# Patient Record
Sex: FEMALE | Race: BLACK OR AFRICAN AMERICAN | NOT HISPANIC OR LATINO | Employment: OTHER | URBAN - METROPOLITAN AREA
[De-identification: names, ages, dates, MRNs, and addresses within clinical notes are randomized per-mention and may not be internally consistent; named-entity substitution may affect disease eponyms.]

---

## 2017-01-05 RX ORDER — CLOPIDOGREL BISULFATE 75 MG/1
TABLET ORAL
Qty: 90 TABLET | Refills: 3 | OUTPATIENT
Start: 2017-01-05

## 2017-01-10 ENCOUNTER — APPOINTMENT (OUTPATIENT)
Dept: SLEEP MEDICINE | Facility: HOSPITAL | Age: 72
End: 2017-01-10
Attending: INTERNAL MEDICINE

## 2017-02-09 ENCOUNTER — OFFICE VISIT (OUTPATIENT)
Dept: OBSTETRICS AND GYNECOLOGY | Facility: CLINIC | Age: 72
End: 2017-02-09

## 2017-02-09 VITALS — SYSTOLIC BLOOD PRESSURE: 130 MMHG | DIASTOLIC BLOOD PRESSURE: 70 MMHG

## 2017-02-09 DIAGNOSIS — N89.8 VAGINAL IRRITATION: Primary | ICD-10-CM

## 2017-02-09 PROCEDURE — 99212 OFFICE O/P EST SF 10 MIN: CPT | Performed by: OBSTETRICS & GYNECOLOGY

## 2017-02-09 RX ORDER — VALSARTAN AND HYDROCHLOROTHIAZIDE 80; 12.5 MG/1; MG/1
TABLET, FILM COATED ORAL
COMMUNITY
Start: 2017-01-06 | End: 2017-09-07

## 2017-02-09 RX ORDER — FLUTICASONE PROPIONATE 50 MCG
SPRAY, SUSPENSION (ML) NASAL
COMMUNITY
Start: 2017-01-06 | End: 2017-08-03

## 2017-02-09 NOTE — PROGRESS NOTES
Subjective   Tyrone Rand is a 72 y.o. female is here today as a self referral for vaginal itching and irritation.    History of Present Illness-Here today for increasing difficulty with vaginal irritation.    The following portions of the patient's history were reviewed and updated as appropriate: allergies, current medications, past family history, past medical history, past social history, past surgical history and problem list.    Review of Systems   Constitutional: Negative.    HENT: Negative.    Eyes: Negative.    Respiratory: Negative.    Cardiovascular: Negative.    Gastrointestinal: Negative.    Endocrine: Negative.    Genitourinary: Negative.         Patient has had an annoying symptom for several years that primarily occurs after intercourse.  She will have a raised area developed takes 2 or 3 days to resolve is extremely sensitive to the touch.  She has not tried using a lubricant during intercourse as far.  She is also not used any over-the-counter vaginal moisturizers.   Musculoskeletal: Negative.    Skin: Negative.    Allergic/Immunologic: Negative.    Neurological: Negative.    Hematological: Negative.    Psychiatric/Behavioral: Negative.        Objective   Physical Exam   Constitutional: She is oriented to person, place, and time. She appears well-developed and well-nourished.   HENT:   Head: Normocephalic and atraumatic.   Eyes: Pupils are equal, round, and reactive to light.   Pulmonary/Chest: Effort normal.   Abdominal: Soft. She exhibits no distension. There is no tenderness.   Genitourinary: Vagina normal and uterus normal. No vaginal discharge found.   Neurological: She is alert and oriented to person, place, and time.   Skin: Skin is warm and dry.   Psychiatric: She has a normal mood and affect. Her behavior is normal. Judgment and thought content normal.   Nursing note and vitals reviewed.        Assessment/Plan   Problems Addressed this Visit     None      Visit Diagnoses     Vaginal  irritation    -  Primary        4 over-the-counter vaginal moisturizers were written down.  2 intercourse lubricants were suggested.  I encouraged her to come in if there is a flareup on that very day so I can see it for myself.  I also mentioned the use of topical estrogen cream as a possible treatment.

## 2017-03-14 ENCOUNTER — DOCUMENTATION (OUTPATIENT)
Dept: INTERNAL MEDICINE | Facility: HOSPITAL | Age: 72
End: 2017-03-14

## 2017-03-14 ENCOUNTER — HOSPITAL ENCOUNTER (OUTPATIENT)
Dept: SLEEP MEDICINE | Facility: HOSPITAL | Age: 72
Discharge: HOME | End: 2017-03-14
Attending: INTERNAL MEDICINE | Admitting: INTERNAL MEDICINE

## 2017-03-14 PROCEDURE — G0463 HOSPITAL OUTPT CLINIC VISIT: HCPCS

## 2017-03-14 NOTE — PROGRESS NOTES
PULMONARY SLEEP CONSULT NOTE      PATIENT IDENTIFICATION:  Name: Tyrone Rand  Age: 72 y.o.  Sex: female  :  1945  MRN: [id number]    DATE OF CONSULTATION:  3/14/2017   Referring Physician: No admitting provider for patient encounter.                  CHIEF COMPLAINT: Obstructive Sleep Apnea      History of Present Illness:   Tyrone Rand is a 72 y.o. female Pt on CPAP feeling better more energy especially the night he use it more than 4 hours, no sleepiness no fatigue no tiredness, no mask irritation no dryness, compliance report reviewed with pt AHI< 5 with good usage.   Still having sinus congestion and not using flonase       Review of Systems:   Constitutional: negative   Eyes: negative   ENT/oropharynx: negative   Cardiovascular: negative   Respiratory: negative   Gastrointestinal: negative   Genitourinary: negative   Neurological: negative   Musculoskeletal: negative   Integument/breast: negative   Endocrine: negative   Allergic/Immunologic: negative     Past Medical History:  Past Medical History   Diagnosis Date   • Depression    • Diabetes mellitus    • Hyperlipidemia    • Hypertension    • Panic attack    • Poor sleep pattern        Past Surgical History:  Past Surgical History   Procedure Laterality Date   • Colon surgery     • Kidney surgery     • Mouth surgery       TOOTH EXTRACTION        Family History:  Family History   Problem Relation Age of Onset   • Stroke Mother    • Heart disease Mother         Social History:   Social History   Substance Use Topics   • Smoking status: Former Smoker     Quit date:    • Smokeless tobacco: Not on file   • Alcohol use Yes      Comment: social        Allergies:  Allergies   Allergen Reactions   • Other    • Sulfa Antibiotics        Home Meds:    (Not in a hospital admission)    Objective:    Vitals Ranges:      WEIGHT: 200   pound       HEIGHT: 68  inches     BMI: 32   /80      Exam:    General Appearance:      Head:  Normocephalic,  without obvious abnormality, atraumatic.   Eyes:  Conjunctiva/corneas clear, EOM's intact, both eyes.         Ears:  Normal external ear canals, both ears.    Nose:  Nares normal, no drainage      Throat:  Lips, mucosa, and tongue normal. Mallampati score 3    Neck:  Supple, symmetrical, trachea midline. No JVD.  Lungs:   Bilateral basal rhonchi bilaterally, respirations unlabored symmetrical wall movement.    Chest wall:  No tenderness or deformity.    Heart:  Regular rate and rhythm, S1 and S2 normal.  Abdomen: Soft, non-tender, no masses, no organomegaly.    Extremities: Trace edema no clubbing or Cyanosis        Data Review:  All labs (24hrs): No results found for this or any previous visit (from the past 24 hour(s)).     Imaging:  [unfilled]    ASSESSMENT:  obstructive sleep apnea   .Hypertension essential  Allergic rhinitis   PLAN:  This patient with obstructive sleep apnea, compliance is improved. Encourage to use it more frequent, I re-emphasized on pt the long and short term benefit of treating LAURA.   Continue flonase BID        Susu Uriarte MD. D, ABSM.  3/14/2017  10:48 AM

## 2017-04-11 ENCOUNTER — TRANSCRIBE ORDERS (OUTPATIENT)
Dept: ADMINISTRATIVE | Facility: HOSPITAL | Age: 72
End: 2017-04-11

## 2017-04-11 ENCOUNTER — LAB (OUTPATIENT)
Dept: LAB | Facility: HOSPITAL | Age: 72
End: 2017-04-11
Attending: INTERNAL MEDICINE

## 2017-04-11 DIAGNOSIS — I12.9 HYPERTENSIVE NEPHROPATHY, STAGE 1-4 OR UNSPECIFIED CHRONIC KIDNEY DISEASE: ICD-10-CM

## 2017-04-11 DIAGNOSIS — N18.30 CHRONIC KIDNEY DISEASE, STAGE III (MODERATE) (HCC): ICD-10-CM

## 2017-04-11 DIAGNOSIS — E55.9 UNSPECIFIED VITAMIN D DEFICIENCY: ICD-10-CM

## 2017-04-11 DIAGNOSIS — N18.30 CHRONIC KIDNEY DISEASE, STAGE III (MODERATE) (HCC): Primary | ICD-10-CM

## 2017-04-11 LAB
25(OH)D3 SERPL-MCNC: 49.7 NG/ML
ANION GAP SERPL CALCULATED.3IONS-SCNC: 13.6 MMOL/L
BASOPHILS # BLD AUTO: 0.09 10*3/MM3 (ref 0–0.2)
BASOPHILS NFR BLD AUTO: 1.2 % (ref 0–2)
BILIRUB UR QL STRIP: NEGATIVE
BUN BLD-MCNC: 25 MG/DL (ref 8–23)
BUN/CREAT SERPL: 16.1 (ref 7–25)
CALCIUM SPEC-SCNC: 10.1 MG/DL (ref 8.8–10.5)
CHLORIDE SERPL-SCNC: 91 MMOL/L (ref 98–107)
CLARITY UR: CLEAR
CO2 SERPL-SCNC: 26.4 MMOL/L (ref 22–29)
COLOR UR: YELLOW
CREAT BLD-MCNC: 1.55 MG/DL (ref 0.57–1)
DEPRECATED RDW RBC AUTO: 41.1 FL (ref 37–54)
EOSINOPHIL # BLD AUTO: 0.19 10*3/MM3 (ref 0.1–0.3)
EOSINOPHIL NFR BLD AUTO: 2.5 % (ref 0–4)
ERYTHROCYTE [DISTWIDTH] IN BLOOD BY AUTOMATED COUNT: 13.6 % (ref 11.5–14.5)
GFR SERPL CREATININE-BSD FRML MDRD: 40 ML/MIN/1.73
GLUCOSE BLD-MCNC: 324 MG/DL (ref 65–99)
GLUCOSE UR STRIP-MCNC: ABNORMAL MG/DL
HCT VFR BLD AUTO: 41.9 % (ref 37–47)
HGB BLD-MCNC: 13.6 G/DL (ref 12–16)
HGB UR QL STRIP.AUTO: NEGATIVE
IMM GRANULOCYTES # BLD: 0.02 10*3/MM3 (ref 0–0.03)
IMM GRANULOCYTES NFR BLD: 0.3 % (ref 0–0.5)
KETONES UR QL STRIP: NEGATIVE
LEUKOCYTE ESTERASE UR QL STRIP.AUTO: NEGATIVE
LYMPHOCYTES # BLD AUTO: 1.71 10*3/MM3 (ref 0.6–4.8)
LYMPHOCYTES NFR BLD AUTO: 22.3 % (ref 20–45)
MCH RBC QN AUTO: 26.9 PG (ref 27–31)
MCHC RBC AUTO-ENTMCNC: 32.5 G/DL (ref 31–37)
MCV RBC AUTO: 83 FL (ref 81–99)
MONOCYTES # BLD AUTO: 0.67 10*3/MM3 (ref 0–1)
MONOCYTES NFR BLD AUTO: 8.7 % (ref 3–8)
NEUTROPHILS # BLD AUTO: 5 10*3/MM3 (ref 1.5–8.3)
NEUTROPHILS NFR BLD AUTO: 65 % (ref 45–70)
NITRITE UR QL STRIP: NEGATIVE
NRBC BLD MANUAL-RTO: 0 /100 WBC (ref 0–0)
PH UR STRIP.AUTO: 6 [PH] (ref 4.5–8)
PLATELET # BLD AUTO: 257 10*3/MM3 (ref 140–500)
PMV BLD AUTO: 9.5 FL (ref 7.4–10.4)
POTASSIUM BLD-SCNC: 4.6 MMOL/L (ref 3.5–5.2)
PROT UR QL STRIP: NEGATIVE
RBC # BLD AUTO: 5.05 10*6/MM3 (ref 4.2–5.4)
SODIUM BLD-SCNC: 131 MMOL/L (ref 136–145)
SP GR UR STRIP: 1.01 (ref 1–1.03)
UROBILINOGEN UR QL STRIP: ABNORMAL
WBC NRBC COR # BLD: 7.68 10*3/MM3 (ref 4.8–10.8)

## 2017-04-11 PROCEDURE — 36415 COLL VENOUS BLD VENIPUNCTURE: CPT

## 2017-04-11 PROCEDURE — 82306 VITAMIN D 25 HYDROXY: CPT

## 2017-04-11 PROCEDURE — 85025 COMPLETE CBC W/AUTO DIFF WBC: CPT

## 2017-04-11 PROCEDURE — 80048 BASIC METABOLIC PNL TOTAL CA: CPT

## 2017-04-11 PROCEDURE — 81003 URINALYSIS AUTO W/O SCOPE: CPT

## 2017-06-21 ENCOUNTER — TRANSCRIBE ORDERS (OUTPATIENT)
Dept: ADMINISTRATIVE | Facility: HOSPITAL | Age: 72
End: 2017-06-21

## 2017-06-21 DIAGNOSIS — Z12.31 VISIT FOR SCREENING MAMMOGRAM: Primary | ICD-10-CM

## 2017-06-28 ENCOUNTER — HOSPITAL ENCOUNTER (OUTPATIENT)
Dept: MAMMOGRAPHY | Facility: HOSPITAL | Age: 72
Discharge: HOME | End: 2017-06-28
Admitting: INTERNAL MEDICINE

## 2017-06-28 DIAGNOSIS — Z12.31 VISIT FOR SCREENING MAMMOGRAM: ICD-10-CM

## 2017-06-28 PROCEDURE — 77063 BREAST TOMOSYNTHESIS BI: CPT

## 2017-06-28 PROCEDURE — G0202 SCR MAMMO BI INCL CAD: HCPCS

## 2017-07-05 ENCOUNTER — TRANSCRIBE ORDERS (OUTPATIENT)
Dept: ADMINISTRATIVE | Facility: HOSPITAL | Age: 72
End: 2017-07-05

## 2017-07-05 DIAGNOSIS — R92.8 ABNORMAL MAMMOGRAM: Primary | ICD-10-CM

## 2017-07-11 ENCOUNTER — HOSPITAL ENCOUNTER (OUTPATIENT)
Dept: MAMMOGRAPHY | Facility: HOSPITAL | Age: 72
Discharge: HOME | End: 2017-07-11
Admitting: INTERNAL MEDICINE

## 2017-07-11 ENCOUNTER — HOSPITAL ENCOUNTER (OUTPATIENT)
Dept: ULTRASOUND IMAGING | Facility: HOSPITAL | Age: 72
Discharge: HOME | End: 2017-07-11

## 2017-07-11 DIAGNOSIS — R92.8 ABNORMAL MAMMOGRAM: ICD-10-CM

## 2017-07-11 PROCEDURE — G0206 DX MAMMO INCL CAD UNI: HCPCS

## 2017-07-11 PROCEDURE — G0279 TOMOSYNTHESIS, MAMMO: HCPCS

## 2017-07-11 PROCEDURE — 76642 ULTRASOUND BREAST LIMITED: CPT

## 2017-07-14 ENCOUNTER — TRANSCRIBE ORDERS (OUTPATIENT)
Dept: ADMINISTRATIVE | Facility: HOSPITAL | Age: 72
End: 2017-07-14

## 2017-07-14 DIAGNOSIS — N63.0 BREAST NODULE: Primary | ICD-10-CM

## 2017-07-19 ENCOUNTER — HOSPITAL ENCOUNTER (OUTPATIENT)
Dept: MAMMOGRAPHY | Facility: HOSPITAL | Age: 72
Discharge: HOME | End: 2017-07-19

## 2017-07-19 ENCOUNTER — HOSPITAL ENCOUNTER (OUTPATIENT)
Dept: ULTRASOUND IMAGING | Facility: HOSPITAL | Age: 72
Discharge: HOME | End: 2017-07-19
Admitting: INTERNAL MEDICINE

## 2017-07-19 DIAGNOSIS — N63.0 BREAST NODULE: ICD-10-CM

## 2017-08-02 DIAGNOSIS — R92.8 ABNORMAL MAMMOGRAM: Primary | ICD-10-CM

## 2017-08-03 ENCOUNTER — CONSULT (OUTPATIENT)
Dept: ONCOLOGY | Facility: CLINIC | Age: 72
End: 2017-08-03

## 2017-08-03 ENCOUNTER — LAB (OUTPATIENT)
Dept: OTHER | Facility: HOSPITAL | Age: 72
End: 2017-08-03

## 2017-08-03 VITALS
HEIGHT: 65 IN | TEMPERATURE: 98.2 F | SYSTOLIC BLOOD PRESSURE: 118 MMHG | OXYGEN SATURATION: 100 % | RESPIRATION RATE: 16 BRPM | WEIGHT: 195 LBS | DIASTOLIC BLOOD PRESSURE: 72 MMHG | BODY MASS INDEX: 32.49 KG/M2 | HEART RATE: 95 BPM

## 2017-08-03 DIAGNOSIS — R92.8 ABNORMAL MAMMOGRAM: ICD-10-CM

## 2017-08-03 DIAGNOSIS — C50.812 MALIGNANT NEOPLASM OF OVERLAPPING SITES OF LEFT FEMALE BREAST (HCC): Primary | ICD-10-CM

## 2017-08-03 LAB
BASOPHILS # BLD AUTO: 0.08 10*3/MM3 (ref 0–0.2)
BASOPHILS NFR BLD AUTO: 1 % (ref 0–1.5)
DEPRECATED RDW RBC AUTO: 42.5 FL (ref 37–54)
EOSINOPHIL # BLD AUTO: 0.27 10*3/MM3 (ref 0–0.7)
EOSINOPHIL NFR BLD AUTO: 3.4 % (ref 0.3–6.2)
ERYTHROCYTE [DISTWIDTH] IN BLOOD BY AUTOMATED COUNT: 13.8 % (ref 11.7–13)
HCT VFR BLD AUTO: 42.9 % (ref 35.6–45.5)
HGB BLD-MCNC: 14 G/DL (ref 11.9–15.5)
IMM GRANULOCYTES # BLD: 0.04 10*3/MM3 (ref 0–0.03)
IMM GRANULOCYTES NFR BLD: 0.5 % (ref 0–0.5)
LYMPHOCYTES # BLD AUTO: 1.7 10*3/MM3 (ref 0.9–4.8)
LYMPHOCYTES NFR BLD AUTO: 21.5 % (ref 19.6–45.3)
MCH RBC QN AUTO: 27.3 PG (ref 26.9–32)
MCHC RBC AUTO-ENTMCNC: 32.6 G/DL (ref 32.4–36.3)
MCV RBC AUTO: 83.8 FL (ref 80.5–98.2)
MONOCYTES # BLD AUTO: 0.87 10*3/MM3 (ref 0.2–1.2)
MONOCYTES NFR BLD AUTO: 11 % (ref 5–12)
NEUTROPHILS # BLD AUTO: 4.95 10*3/MM3 (ref 1.9–8.1)
NEUTROPHILS NFR BLD AUTO: 62.6 % (ref 42.7–76)
NRBC BLD MANUAL-RTO: 0 /100 WBC (ref 0–0)
PLATELET # BLD AUTO: 253 10*3/MM3 (ref 140–500)
PMV BLD AUTO: 9.3 FL (ref 6–12)
RBC # BLD AUTO: 5.12 10*6/MM3 (ref 3.9–5.2)
WBC NRBC COR # BLD: 7.91 10*3/MM3 (ref 4.5–10.7)

## 2017-08-03 PROCEDURE — 99205 OFFICE O/P NEW HI 60 MIN: CPT | Performed by: INTERNAL MEDICINE

## 2017-08-03 PROCEDURE — 85025 COMPLETE CBC W/AUTO DIFF WBC: CPT | Performed by: INTERNAL MEDICINE

## 2017-08-03 PROCEDURE — 36415 COLL VENOUS BLD VENIPUNCTURE: CPT

## 2017-08-03 NOTE — PROGRESS NOTES
Subjective     REASON FOR CONSULTATION:  Provide an opinion on any further workup or treatment on:    Left breast cancer                       REQUESTING PHYSICIAN: Cris Mendieta DO      RECORDS OBTAINED: Records of the patients history including those obtained from the referring provider were reviewed and summarized in detail.    HISTORY OF PRESENT ILLNESS:      Tyrone Rand is a 72 y.o. patient who was referred for evaluation of left breast cancer.  The patient had her annual mammogram on 6/28/17 and was found to have an asymmetric density in the left breast.  Additional images were recommended.  She had a diagnostic mammogram on 7/11/17 that confirmed the finding at 9 o'clock position of the left breast 6 cm from the nipple.  It measured 1.5 x 1.3 x 1.2 cmthe.  There was prominent blood flow in the lesion on Doppler.  The patient did not feel any lumps in the area of concern.  She had an ultrasound-guided biopsy on 7/19/17.  Pathology examination revealed invasive ductal carcinoma.  She she was referred to our office and was referred to oncology surgery.  She has an appointment with Dr. Kulkarni.    Patient reports occasional pain in the lower sternal area.  It is not related to activities.  It does not radiate.  She has no shortness of breath.  She does have diagnoses of sleep apnea syndrome but she does not use the CPAP machine regularly.    Patient has diabetes mellitus.  She had the hemoglobin A1c that was up to 13 and improved to 10 after she was recently started on short acting insulin.       Past Medical History:   Diagnosis Date   • Depression    • Diabetes mellitus    • H/O Left breast mass 2017   • Hyperlipidemia    • Hypertension    • Panic attack    • Poor sleep pattern        Past Surgical History:   Procedure Laterality Date   • COLON SURGERY     • KIDNEY SURGERY     • MOUTH SURGERY      TOOTH EXTRACTION         MEDICATIONS:    Current Outpatient Prescriptions:   •  Acetaminophen (TYLENOL  ARTHRITIS PAIN PO), Take  by mouth., Disp: , Rfl:   •  clopidogrel (PLAVIX) 75 MG tablet, Take 75 mg by mouth daily., Disp: , Rfl:   •  ergocalciferol (ERGOCALCIFEROL) 25531 UNITS capsule, Take 1 capsule by mouth once a week., Disp: , Rfl:   •  glucose blood test strip, Test blood sugar twice daily, Disp: , Rfl:   •  Insulin Lispro (HUMALOG KWIKPEN SC), Inject 10 Units under the skin 3 (Three) Times a Day., Disp: , Rfl:   •  Loratadine (CLARITIN PO), Take 10 mg by mouth Daily., Disp: , Rfl:   •  ranitidine (ZANTAC) 150 MG tablet, Take by mouth 2 (two) times a day., Disp: , Rfl:   •  spironolactone (ALDACTONE) 100 MG tablet, Take 1 tablet by mouth daily., Disp: 90 tablet, Rfl: 3  •  TOUJEO SOLOSTAR 300 UNIT/ML solution pen-injector, Inject 25 Units under the skin daily with dinner. INJECT 25 UNITS AT BEDTIME, Disp: 3 pen, Rfl: 5  •  valsartan-hydrochlorothiazide (DIOVAN-HCT) 80-12.5 MG per tablet, , Disp: , Rfl:      ALLERGIES:  Allergies   Allergen Reactions   • Other    • Sulfa Antibiotics         Social History     Social History   • Marital status:      Spouse name: Pascual   • Number of children: 1   • Years of education: N/A     Occupational History   •  Retired     Social History Main Topics   • Smoking status: Former Smoker     Quit date: 2000   • Smokeless tobacco: Not on file      Comment: smoked 25 years, 1 PPD   • Alcohol use Yes      Comment: social   • Drug use: Not on file   • Sexual activity: Not on file     Other Topics Concern   • Not on file     Social History Narrative       Cancer-related family history is not on file.    REVIEW OF SYSTEMS:  GENERAL:  No Fever or chills. No weight change.  No Fatigue.   SKIN:  No skin rashes or lesions.  HEME/LYMPH: No Anemia. No Easy bruisability. No Enlarged lymph nodes.  EYES:  No Vision changes. No Diplopia.  ENT:  No Mouth or gum bleeding. No Epistaxis. No Hoarseness.  RESPIRATORY:  No Cough. No Shortness of breath. No Hemoptysis.   CVS:  No Chest  "pain. No Palpitations. Ankle swelling.  GI:  No Abdominal pain. No Nausea. No Vomiting. No Constipation. No Diarrhea. No Melena. No Hematochezia.  :  No Hematuria. No Dysuria. No Increased frequency.   MUSCULOSKELETAL:  No Bone pain. No Joint pain. No Joint stiffness.  NEUROLOGICAL:  No Headaches. No Dizziness. No Focal weakness. No Global weakness.  No Numbness.  PSYCHIATRIC:  No Anxiety. No Mood changes. No Depression.        Objective   VITAL SIGNS:  Vitals:    08/03/17 1425   BP: 118/72   Pulse: 95   Resp: 16   Temp: 98.2 °F (36.8 °C)   TempSrc: Oral   SpO2: 100%   Weight: 195 lb (88.5 kg)   Height: 65.35\" (166 cm)  Comment: new pt   PainSc: 0-No pain       Wt Readings from Last 3 Encounters:   08/03/17 195 lb (88.5 kg)   01/28/16 182 lb (82.6 kg)   12/09/15 182 lb 0.2 oz (82.6 kg)       PHYSICAL EXAMINATION  GENERAL:  The patient appears in good general condition, not in acute distress.  SKIN:  No skin rashes or lesions. No Ecchymosis or Petechiae.  HEAD:  Normocephalic.  EYES:  No Jaundice. No Pallor.   NECK:  Supple with Good ROM. No Thyromegaly. No Masses.  LYMPHATICS:  No Cervical, supraclavicular or axillary Lymphadenopathy.  CHEST:  Lungs clear to auscultation. No added sounds.  BREASTS:  Right breast with no palpable masses. No abnormal skin changes.  Left breast examination revealed tenderness at 9 o'clock position.  No discrete palpable masses however.  ABDOMEN:  Soft. No tenderness. No Hepatomegaly. No Splenomegaly. No masses.  EXTREMITIES:  No Edema. No Cyanosis. No Calf tenderness.  NEUROLOGICAL:  No Focal neurological deficits.      RESULT REVIEW:     Results from last 7 days  Lab Units 08/03/17  1358   WBC 10*3/mm3 7.91   NEUTROS ABS 10*3/mm3 4.95   HEMOGLOBIN g/dL 14.0   HEMATOCRIT % 42.9   PLATELETS 10*3/mm3 253              07/11/2017:  1. LEFT UNILATERAL DIAGNOSTIC MAMMOGRAM.  2. ULTRASOUND LEFT BREAST.      HISTORY:  72-year-old female with a new irregular lesion in the deep medial " left  breast seen on recent screening mammogram.      TECHNIQUE:  Left unilateral digital diagnostic mammogram images were obtained,  including spot compression in two projections. Digital breast  tomosynthesis images were also reviewed.      MAMMOGRAM FINDINGS:  The images confirm the presence of a small, irregular, suspicious nodule  in the deep medial left breast along the 9:00 axis about 6 cm from the  nipple. Ultrasound examination was subsequent performed.      ULTRASOUND FINDINGS:  Ultrasound images show a suspicious, irregular, cystic nodule along the  9:00 axis measuring 1.5 x 1.3 x 1.2 cm. Prominent blood flow is seen  within the lesion on color Doppler imaging.      SUMMARY:  Mammogram and ultrasound features are highly suspicious for invasive  breast malignancy. Ultrasound-guided core biopsy is recommended for  further evaluation. I have discussed the findings and recommendations  with the patient in the department today. She will hold her daily  Plavix, we will schedule a procedure at her first opportunity.      Efforts are currently underway to contact the ordering physician. Stat  final copy of this report was sent to the ordering physician's office  immediately following this dictation with telephone notification and  documentation.      IMPRESSION:  1. Malignant appearing 1.5 cm lesion in the deep medial left breast.  2. Ultrasound-guided core needle biopsy is recommended for further  evaluation.    6/28/17:  1. Bilateral digital screening mammogram with CAD.  2. Bilateral digital screening breast tomosynthesis.  Breast parenchyma shows scattered fibroglandular densities. No  suspicious microcalcifications in either breast. Partially obscured  nodules in the right breast are benign and stable. In the lower inner  quadrant of the left breast, there is a new 13 mm asymmetric density  seen only on the cc view. Follow-up with an exaggerated medial cc view  and a true lateral view recommended. Ultrasound  may be needed as well.      IMPRESSION:  1. Benign right mammogram.  2. Asymmetric density in the lower inner quadrant of the left breast.  Additional imaging recommended.    I personally reviewed the mammogram with the patient during today's visit.    Assessment/Plan   Newly diagnosed left breast cancer, invasive ductal carcinoma, grade 3, ER negative, SC positive at 20%, HER-2 fredreic negative.  Ki-67 was elevated at 50-55%. It is located at 9 o'clock position of the left breast.  It was diagnosed on annual screening mammogram.  Patient is 72 years old.  She has underlying diabetes that's not well controlled.    I had an extensive discussion with the patient and her .  Discussed the findings on pathology examination.  I explained the features of the breast cancer namely the fact that this weakly hormone positive and the Ki-67 is elevated indicating rapid tumor growth.  It is measuring 1.3-1.5 cm (T1c). No clinical lymph node involvement on exam or imaging studies.     I recommended obtaining a lateral breast MRI examination to evaluate for other possible lesions in the breast.  This would also help in determining if there is lymph node involvement.  The patient has an appointment with surgical oncology on 8/15/17.    Based on the size of the tumor, a lumpectomy would be recommended if the MRI does not show any other suspicious areas in the breast.  We would await the MRI to see if there is chest wall involvement.  If there is no chest wall involvement, we would not recommend neoadjuvant chemotherapy given that this is not a large tumor and that is hormone positive.    We will see the patient in follow-up in early September and we'll review the results of the pathology examination.  The patient would need repeat ER and SC re-testing given that the initial results showed it to be ER negative and SC weakly positive.  I explained to the patient that radiation therapy would be needed if a lumpectomy was done.  She  would need adjuvant hormonal therapy after her surgery.  The decision regarding adjuvant chemotherapy would be made based on the results of the pathology examination of the lumpectomy.  If she has lymph node involvement, we would recommend adjuvant chemotherapy using Adriamycin and Cytoxan versus Taxotere and Cytoxan.  If the lymph nodes are not involved, we would recommend Oncotype DX to determine the expected benefit from adjuvant chemotherapy.    Thank you for the opportunity to participate in the care of this pleasant patient.  We will see her in follow up in 1 month and update you on her status.           Luz Elena Herndon MD  08/03/17

## 2017-08-10 ENCOUNTER — HOSPITAL ENCOUNTER (OUTPATIENT)
Dept: MRI IMAGING | Facility: HOSPITAL | Age: 72
Discharge: HOME | End: 2017-08-10
Attending: INTERNAL MEDICINE | Admitting: INTERNAL MEDICINE

## 2017-08-10 DIAGNOSIS — C50.812 MALIGNANT NEOPLASM OF OVERLAPPING SITES OF LEFT FEMALE BREAST (HCC): ICD-10-CM

## 2017-08-10 LAB — CREAT BLDA-MCNC: 1.5 MG/DL (ref 0.6–1.3)

## 2017-08-10 PROCEDURE — C8908 MRI W/O FOL W/CONT, BREAST,: HCPCS

## 2017-08-10 PROCEDURE — 0159T HC MRI BREAST COMPUTER ANALYSIS: CPT

## 2017-08-10 PROCEDURE — A9577 INJ MULTIHANCE: HCPCS | Performed by: INTERNAL MEDICINE

## 2017-08-10 PROCEDURE — 0 GADOBENATE DIMEGLUMINE 529 MG/ML SOLUTION: Performed by: INTERNAL MEDICINE

## 2017-08-10 PROCEDURE — 82565 ASSAY OF CREATININE: CPT

## 2017-08-10 RX ADMIN — GADOBENATE DIMEGLUMINE 18 ML: 529 INJECTION, SOLUTION INTRAVENOUS at 16:33

## 2017-08-15 ENCOUNTER — TRANSCRIBE ORDERS (OUTPATIENT)
Dept: ADMINISTRATIVE | Facility: HOSPITAL | Age: 72
End: 2017-08-15

## 2017-08-15 ENCOUNTER — LAB (OUTPATIENT)
Dept: LAB | Facility: HOSPITAL | Age: 72
End: 2017-08-15
Attending: INTERNAL MEDICINE

## 2017-08-15 DIAGNOSIS — N18.30 CHRONIC KIDNEY DISEASE, STAGE III (MODERATE) (HCC): Primary | ICD-10-CM

## 2017-08-15 DIAGNOSIS — N18.30 CHRONIC KIDNEY DISEASE, STAGE III (MODERATE) (HCC): ICD-10-CM

## 2017-08-15 DIAGNOSIS — I12.9 HYPERTENSIVE NEPHROPATHY, STAGE 1-4 OR UNSPECIFIED CHRONIC KIDNEY DISEASE: ICD-10-CM

## 2017-08-15 LAB
ANION GAP SERPL CALCULATED.3IONS-SCNC: 14.8 MMOL/L
BASOPHILS # BLD AUTO: 0.08 10*3/MM3 (ref 0–0.2)
BASOPHILS NFR BLD AUTO: 0.9 % (ref 0–2)
BUN BLD-MCNC: 34 MG/DL (ref 8–23)
BUN/CREAT SERPL: 23.1 (ref 7–25)
CALCIUM SPEC-SCNC: 9.6 MG/DL (ref 8.8–10.5)
CHLORIDE SERPL-SCNC: 95 MMOL/L (ref 98–107)
CO2 SERPL-SCNC: 23.2 MMOL/L (ref 22–29)
CREAT BLD-MCNC: 1.47 MG/DL (ref 0.57–1)
DEPRECATED RDW RBC AUTO: 42.6 FL (ref 37–54)
EOSINOPHIL # BLD AUTO: 0.25 10*3/MM3 (ref 0.1–0.3)
EOSINOPHIL NFR BLD AUTO: 2.7 % (ref 0–4)
ERYTHROCYTE [DISTWIDTH] IN BLOOD BY AUTOMATED COUNT: 13.8 % (ref 11.5–14.5)
GFR SERPL CREATININE-BSD FRML MDRD: 42 ML/MIN/1.73
GLUCOSE BLD-MCNC: 151 MG/DL (ref 65–99)
HCT VFR BLD AUTO: 42.2 % (ref 37–47)
HGB BLD-MCNC: 13.5 G/DL (ref 12–16)
IMM GRANULOCYTES # BLD: 0.03 10*3/MM3 (ref 0–0.03)
IMM GRANULOCYTES NFR BLD: 0.3 % (ref 0–0.5)
LYMPHOCYTES # BLD AUTO: 2.01 10*3/MM3 (ref 0.6–4.8)
LYMPHOCYTES NFR BLD AUTO: 21.9 % (ref 20–45)
MCH RBC QN AUTO: 27.1 PG (ref 27–31)
MCHC RBC AUTO-ENTMCNC: 32 G/DL (ref 31–37)
MCV RBC AUTO: 84.7 FL (ref 81–99)
MONOCYTES # BLD AUTO: 0.89 10*3/MM3 (ref 0–1)
MONOCYTES NFR BLD AUTO: 9.7 % (ref 3–8)
NEUTROPHILS # BLD AUTO: 5.91 10*3/MM3 (ref 1.5–8.3)
NEUTROPHILS NFR BLD AUTO: 64.5 % (ref 45–70)
NRBC BLD MANUAL-RTO: 0 /100 WBC (ref 0–0)
PLATELET # BLD AUTO: 271 10*3/MM3 (ref 140–500)
PMV BLD AUTO: 9.4 FL (ref 7.4–10.4)
POTASSIUM BLD-SCNC: 4.3 MMOL/L (ref 3.5–5.2)
RBC # BLD AUTO: 4.98 10*6/MM3 (ref 4.2–5.4)
SODIUM BLD-SCNC: 133 MMOL/L (ref 136–145)
WBC NRBC COR # BLD: 9.17 10*3/MM3 (ref 4.8–10.8)

## 2017-08-15 PROCEDURE — 85025 COMPLETE CBC W/AUTO DIFF WBC: CPT

## 2017-08-15 PROCEDURE — 36415 COLL VENOUS BLD VENIPUNCTURE: CPT

## 2017-08-15 PROCEDURE — 80048 BASIC METABOLIC PNL TOTAL CA: CPT

## 2017-08-16 ENCOUNTER — TRANSCRIBE ORDERS (OUTPATIENT)
Dept: ADMINISTRATIVE | Facility: HOSPITAL | Age: 72
End: 2017-08-16

## 2017-08-16 ENCOUNTER — LAB (OUTPATIENT)
Dept: LAB | Facility: HOSPITAL | Age: 72
End: 2017-08-16
Attending: INTERNAL MEDICINE

## 2017-08-16 DIAGNOSIS — R30.0 DYSURIA: Primary | ICD-10-CM

## 2017-08-16 DIAGNOSIS — R30.0 DYSURIA: ICD-10-CM

## 2017-08-16 LAB
BACTERIA UR QL AUTO: ABNORMAL /HPF
BILIRUB UR QL STRIP: NEGATIVE
CLARITY UR: CLEAR
COLOR UR: YELLOW
GLUCOSE UR STRIP-MCNC: NEGATIVE MG/DL
HGB UR QL STRIP.AUTO: ABNORMAL
HYALINE CASTS UR QL AUTO: ABNORMAL /LPF
KETONES UR QL STRIP: NEGATIVE
LEUKOCYTE ESTERASE UR QL STRIP.AUTO: ABNORMAL
NITRITE UR QL STRIP: POSITIVE
PH UR STRIP.AUTO: 5.5 [PH] (ref 4.5–8)
PROT UR QL STRIP: NEGATIVE
RBC # UR: ABNORMAL /HPF
REF LAB TEST METHOD: ABNORMAL
SP GR UR STRIP: 1.01 (ref 1–1.03)
SQUAMOUS #/AREA URNS HPF: ABNORMAL /HPF
UROBILINOGEN UR QL STRIP: ABNORMAL
WBC UR QL AUTO: ABNORMAL /HPF

## 2017-08-16 PROCEDURE — 81001 URINALYSIS AUTO W/SCOPE: CPT

## 2017-08-16 PROCEDURE — 81003 URINALYSIS AUTO W/O SCOPE: CPT

## 2017-08-16 PROCEDURE — 87186 SC STD MICRODIL/AGAR DIL: CPT

## 2017-08-16 PROCEDURE — 87086 URINE CULTURE/COLONY COUNT: CPT

## 2017-08-18 LAB — BACTERIA SPEC AEROBE CULT: ABNORMAL

## 2017-08-23 ENCOUNTER — TELEPHONE (OUTPATIENT)
Dept: ONCOLOGY | Facility: CLINIC | Age: 72
End: 2017-08-23

## 2017-08-23 NOTE — TELEPHONE ENCOUNTER
The following staff message was sent to Dr. Herndon:    Dr. Herndon, patient calling about her MRI results.  Is confused because she was told by you that she has breast cancer but says the letter she got about the MRI says she does not have breast cancer.  She is hoping to talk with you about when you have time.  If you are able to call her, the number is [phone]

## 2017-08-30 ENCOUNTER — TELEPHONE (OUTPATIENT)
Dept: MAMMOGRAPHY | Facility: HOSPITAL | Age: 72
End: 2017-08-30

## 2017-09-07 ENCOUNTER — OFFICE VISIT (OUTPATIENT)
Dept: ONCOLOGY | Facility: CLINIC | Age: 72
End: 2017-09-07

## 2017-09-07 ENCOUNTER — LAB (OUTPATIENT)
Dept: OTHER | Facility: HOSPITAL | Age: 72
End: 2017-09-07

## 2017-09-07 VITALS
DIASTOLIC BLOOD PRESSURE: 82 MMHG | WEIGHT: 195.8 LBS | HEIGHT: 65 IN | SYSTOLIC BLOOD PRESSURE: 124 MMHG | OXYGEN SATURATION: 95 % | RESPIRATION RATE: 16 BRPM | TEMPERATURE: 97.7 F | HEART RATE: 106 BPM | BODY MASS INDEX: 32.62 KG/M2

## 2017-09-07 DIAGNOSIS — C50.312 MALIGNANT NEOPLASM OF LOWER-INNER QUADRANT OF LEFT FEMALE BREAST (HCC): Primary | ICD-10-CM

## 2017-09-07 DIAGNOSIS — C50.812 MALIGNANT NEOPLASM OF OVERLAPPING SITES OF LEFT FEMALE BREAST (HCC): ICD-10-CM

## 2017-09-07 LAB
ALBUMIN SERPL-MCNC: 4.2 G/DL (ref 3.5–5.2)
ALBUMIN/GLOB SERPL: 1 G/DL
ALP SERPL-CCNC: 80 U/L (ref 39–117)
ALT SERPL W P-5'-P-CCNC: 18 U/L (ref 1–33)
ANION GAP SERPL CALCULATED.3IONS-SCNC: 12.7 MMOL/L
AST SERPL-CCNC: 19 U/L (ref 1–32)
BASOPHILS # BLD AUTO: 0.09 10*3/MM3 (ref 0–0.2)
BASOPHILS NFR BLD AUTO: 1 % (ref 0–1.5)
BILIRUB SERPL-MCNC: 0.2 MG/DL (ref 0.1–1.2)
BUN BLD-MCNC: 26 MG/DL (ref 8–23)
BUN/CREAT SERPL: 17.3 (ref 7–25)
CALCIUM SPEC-SCNC: 10.2 MG/DL (ref 8.6–10.5)
CHLORIDE SERPL-SCNC: 99 MMOL/L (ref 98–107)
CO2 SERPL-SCNC: 26.3 MMOL/L (ref 22–29)
CREAT BLD-MCNC: 1.5 MG/DL (ref 0.57–1)
DEPRECATED RDW RBC AUTO: 42.7 FL (ref 37–54)
EOSINOPHIL # BLD AUTO: 0.34 10*3/MM3 (ref 0–0.7)
EOSINOPHIL NFR BLD AUTO: 3.7 % (ref 0.3–6.2)
ERYTHROCYTE [DISTWIDTH] IN BLOOD BY AUTOMATED COUNT: 13.7 % (ref 11.7–13)
GFR SERPL CREATININE-BSD FRML MDRD: 41 ML/MIN/1.73
GLOBULIN UR ELPH-MCNC: 4.1 GM/DL
GLUCOSE BLD-MCNC: 142 MG/DL (ref 65–99)
HCT VFR BLD AUTO: 41.7 % (ref 35.6–45.5)
HGB BLD-MCNC: 13.4 G/DL (ref 11.9–15.5)
IMM GRANULOCYTES # BLD: 0.04 10*3/MM3 (ref 0–0.03)
IMM GRANULOCYTES NFR BLD: 0.4 % (ref 0–0.5)
LYMPHOCYTES # BLD AUTO: 1.92 10*3/MM3 (ref 0.9–4.8)
LYMPHOCYTES NFR BLD AUTO: 20.6 % (ref 19.6–45.3)
MCH RBC QN AUTO: 27.6 PG (ref 26.9–32)
MCHC RBC AUTO-ENTMCNC: 32.1 G/DL (ref 32.4–36.3)
MCV RBC AUTO: 85.8 FL (ref 80.5–98.2)
MONOCYTES # BLD AUTO: 0.92 10*3/MM3 (ref 0.2–1.2)
MONOCYTES NFR BLD AUTO: 9.9 % (ref 5–12)
NEUTROPHILS # BLD AUTO: 5.99 10*3/MM3 (ref 1.9–8.1)
NEUTROPHILS NFR BLD AUTO: 64.4 % (ref 42.7–76)
NRBC BLD MANUAL-RTO: 0 /100 WBC (ref 0–0)
PLATELET # BLD AUTO: 286 10*3/MM3 (ref 140–500)
PMV BLD AUTO: 9.2 FL (ref 6–12)
POTASSIUM BLD-SCNC: 4.9 MMOL/L (ref 3.5–5.2)
PROT SERPL-MCNC: 8.3 G/DL (ref 6–8.5)
RBC # BLD AUTO: 4.86 10*6/MM3 (ref 3.9–5.2)
SODIUM BLD-SCNC: 138 MMOL/L (ref 136–145)
WBC NRBC COR # BLD: 9.3 10*3/MM3 (ref 4.5–10.7)

## 2017-09-07 PROCEDURE — 99214 OFFICE O/P EST MOD 30 MIN: CPT | Performed by: INTERNAL MEDICINE

## 2017-09-07 PROCEDURE — 85025 COMPLETE CBC W/AUTO DIFF WBC: CPT | Performed by: INTERNAL MEDICINE

## 2017-09-07 PROCEDURE — 80053 COMPREHEN METABOLIC PANEL: CPT | Performed by: INTERNAL MEDICINE

## 2017-09-07 PROCEDURE — 36415 COLL VENOUS BLD VENIPUNCTURE: CPT

## 2017-09-07 RX ORDER — METOPROLOL SUCCINATE 25 MG/1
TABLET, EXTENDED RELEASE ORAL
Refills: 11 | COMMUNITY
Start: 2017-08-04

## 2017-09-07 NOTE — PROGRESS NOTES
Subjective     REASON FOR FOLLOW UP:      Left breast cancer                       HISTORY OF PRESENT ILLNESS:    Tyrone Rand is a 72 y.o. patient who was referred for evaluation of left breast cancer.  After her consultation, she was seen by Dr. Kulkarni.  He noticed for cardiac clearance for the patient and the patient saw her cardiologist, Dr. Saravanan Larios.  She had an echocardiogram done and she was cleared to have the lumpectomy done is scheduled for 9/28/17.  It will be done at [facility].    Patient denies any new symptoms on today's visit.    Past Medical History:   Diagnosis Date   • Depression    • Diabetes mellitus    • H/O Left breast mass 2017   • Hyperlipidemia    • Hypertension    • Panic attack    • Poor sleep pattern        Past Surgical History:   Procedure Laterality Date   • COLON SURGERY     • KIDNEY SURGERY     • MOUTH SURGERY      TOOTH EXTRACTION         MEDICATIONS:    Current Outpatient Prescriptions:   •  Acetaminophen (TYLENOL ARTHRITIS PAIN PO), Take  by mouth., Disp: , Rfl:   •  clopidogrel (PLAVIX) 75 MG tablet, Take 75 mg by mouth daily., Disp: , Rfl:   •  ergocalciferol (ERGOCALCIFEROL) 30588 UNITS capsule, Take 1 capsule by mouth once a week., Disp: , Rfl:   •  glucose blood test strip, Test blood sugar twice daily, Disp: , Rfl:   •  Insulin Lispro (HUMALOG KWIKPEN SC), Inject 10 Units under the skin 3 (Three) Times a Day., Disp: , Rfl:   •  Loratadine (CLARITIN PO), Take 10 mg by mouth Daily., Disp: , Rfl:   •  metoprolol succinate XL (TOPROL-XL) 25 MG 24 hr tablet, TAKE ONE TABLET BY MOUTH IN THE MORNING, Disp: , Rfl: 11  •  ranitidine (ZANTAC) 150 MG tablet, Take by mouth 2 (two) times a day., Disp: , Rfl:   •  spironolactone (ALDACTONE) 100 MG tablet, Take 1 tablet by mouth daily., Disp: 90 tablet, Rfl: 3  •  TOUJEO SOLOSTAR 300 UNIT/ML solution pen-injector, Inject 25 Units under the skin daily with dinner. INJECT 25 UNITS AT BEDTIME, Disp: 3 pen, Rfl: 5  "    ALLERGIES:  Allergies   Allergen Reactions   • Other    • Sulfa Antibiotics         Social History     Social History   • Marital status:      Spouse name: Pascual   • Number of children: 1   • Years of education: GED     Occupational History   •  Retired     Social History Main Topics   • Smoking status: Former Smoker     Packs/day: 0.00     Quit date: 2000   • Smokeless tobacco: Not on file      Comment: smoked 25 years, 1 PPD   • Alcohol use Yes      Comment: social   • Drug use: Not on file   • Sexual activity: Not on file     Other Topics Concern   • Not on file     Social History Narrative       Cancer-related family history is not on file.    REVIEW OF SYSTEMS:  GENERAL:  No Fever or chills. No weight change.  No Fatigue.   SKIN:  No skin rashes or lesions.  HEME/LYMPH: No Anemia. No Easy bruisability. No Enlarged lymph nodes.  EYES:  No Vision changes. No Diplopia.  ENT:  No Mouth or gum bleeding. No Epistaxis. No Hoarseness.  RESPIRATORY:  No Cough. No Shortness of breath. No Hemoptysis.   CVS:  No Chest pain. No Palpitations. Ankle swelling.  GI:  No Abdominal pain. No Nausea. No Vomiting. No Constipation. No Diarrhea. No Melena. No Hematochezia.  :  No Hematuria. No Dysuria. No Increased frequency.   MUSCULOSKELETAL:  No Bone pain. No Joint pain. No Joint stiffness.  NEUROLOGICAL:  No Headaches. No Dizziness. No Focal weakness. No Global weakness.  No Numbness.  PSYCHIATRIC:  No Anxiety. No Mood changes. No Depression.        Objective   VITAL SIGNS:  Vitals:    09/07/17 1354   BP: 124/82   Pulse: 106   Resp: 16   Temp: 97.7 °F (36.5 °C)   TempSrc: Oral   SpO2: 95%   Weight: 195 lb 12.8 oz (88.8 kg)   Height: 65.35\" (166 cm)   PainSc: 0-No pain       Wt Readings from Last 3 Encounters:   09/07/17 195 lb 12.8 oz (88.8 kg)   08/03/17 195 lb (88.5 kg)   08/10/17 195 lb (88.5 kg)       PHYSICAL EXAMINATION  GENERAL:  The patient appears in good general condition, not in acute distress.  SKIN:  " No skin rashes or lesions. No Ecchymosis or Petechiae.  NEUROLOGICAL:  No Focal neurological deficits.      RESULT REVIEW:     Results from last 7 days  Lab Units 09/07/17  1338   WBC 10*3/mm3 9.30   NEUTROS ABS 10*3/mm3 5.99   HEMOGLOBIN g/dL 13.4   HEMATOCRIT % 41.7   PLATELETS 10*3/mm3 286       Results from last 7 days  Lab Units 09/07/17  1338   SODIUM mmol/L 138   POTASSIUM mmol/L 4.9   CHLORIDE mmol/L 99   CO2 mmol/L 26.3   BUN mg/dL 26*   CREATININE mg/dL 1.50*   CALCIUM mg/dL 10.2   ALBUMIN g/dL 4.20   BILIRUBIN mg/dL 0.2   ALK PHOS U/L 80   ALT (SGPT) U/L 18   AST (SGOT) U/L 19          MRI of the breasts on 8/10/17:    BILATERAL BREAST MRI WITHOUT AND WITH CONTRAST      HISTORY: 72-year-old female with history of a malignant neoplasm of the  left breast presenting for further evaluation.      TECHNIQUE: Multiplanar and multisequence images of both breasts were  obtained pre and post intravenous administration of gadolinium. [employer]  was used to produce kinetic information and reformatted images.      COMPARISON: Digital mammography, 07/19/2017 and 07/11/2017.      FINDINGS: Within the posterior one-third of the medial aspect of the  left breast at the 8 o'clock position on the order of 11 cm from the  nipple, there is an irregular enhancing mass that measures 2.1 cm in the  anterior-to-posterior dimension, 1.2 cm in the superior-to-inferior  dimension, and 1.5 cm in the medial-to-lateral dimension. A metallic  clip is located within the mass. This represents the biopsy proven site  of malignancy.      Scattered stippled foci of variable enhancement are seen throughout the  left breast, none of which appear dominant  or clump. I see no evidence  for abnormal left breast skin, nipple or chest wall enhancement and  there is no evidence for left axillary or internal mammary chain  lymphadenopathy.      Scattered stippled foci of variable enhancement are seen throughout the  right breast, none of which appear  dominant or clump. I see no evidence  for abnormal right breast skin, nipple or chest wall enhancement, and  there is no evidence for right axillary adenopathy.      IMPRESSION:  1. Biopsy-proven malignancy in the left breast measuring on the order of  2.1 cm in greatest dimension at the 8 o'clock position. No other  suspicious findings are seen within the left breast and there is no  evidence for left axillary adenopathy.  2. There are no findings suspicious for malignancy in the right breast.  3. Scattered stippled foci throughout both breasts consistent with  bilateral fibrocystic breast changes.    Assessment/Plan   1.  Left breast cancer, invasive ductal carcinoma, grade 3, ER negative, AZ positive at 20%, HER-2 frederic negative.  Ki-67 was elevated at 50-55%. It is located at 9 o'clock position of the left breast.  She had MRI of the breast on 8/10/17 that showed the lesion to measure 2.1 cm located at 8 o'clock position.  No enlarged lymph nodes were identified.  No other lesions were seen.    Patient scheduled for a left breast lumpectomy and sentinel lymph node biopsy on 9/28/17 at [facility].    Explained to the patient that we would need to review the status of the lymph nodes to help with the decision regarding adjuvant chemotherapy treatments.  If there is no lymph node involvement, we would recommend Oncotype DX test to assist with decision making regarding adjuvant chemotherapy treatment.  If there is lymph node involvement, we would recommend adjuvant chemotherapy given that the tumor is grade 3 and that it is ER negative and AZ positive at 20%.    We discussed today that if her surgical margin is clear, adjuvant radiation therapy would not be recommended based on her age.  Adjuvant hormonal therapy would be recommended since the tumor is hormone positive although only at 20% for the AZ.    We will schedule the patient to return in 4 weeks to review the pathology report.        Luz Elena Herndon,  MD  09/07/17

## 2017-10-11 ENCOUNTER — LAB (OUTPATIENT)
Dept: LAB | Facility: HOSPITAL | Age: 72
End: 2017-10-11
Attending: INTERNAL MEDICINE

## 2017-10-11 ENCOUNTER — TRANSCRIBE ORDERS (OUTPATIENT)
Dept: ADMINISTRATIVE | Facility: HOSPITAL | Age: 72
End: 2017-10-11

## 2017-10-11 DIAGNOSIS — N18.30 CKD (CHRONIC KIDNEY DISEASE) STAGE 3, GFR 30-59 ML/MIN (HCC): ICD-10-CM

## 2017-10-11 DIAGNOSIS — N18.30 CKD (CHRONIC KIDNEY DISEASE) STAGE 3, GFR 30-59 ML/MIN (HCC): Primary | ICD-10-CM

## 2017-10-11 DIAGNOSIS — I12.9 HYPERTENSIVE NEPHROPATHY: ICD-10-CM

## 2017-10-11 LAB
ANION GAP SERPL CALCULATED.3IONS-SCNC: 11.1 MMOL/L
BACTERIA UR QL AUTO: ABNORMAL /HPF
BASOPHILS # BLD AUTO: 0.07 10*3/MM3 (ref 0–0.2)
BASOPHILS NFR BLD AUTO: 0.8 % (ref 0–2)
BILIRUB UR QL STRIP: NEGATIVE
BUN BLD-MCNC: 22 MG/DL (ref 8–23)
BUN/CREAT SERPL: 16.4 (ref 7–25)
CALCIUM SPEC-SCNC: 10.2 MG/DL (ref 8.8–10.5)
CHLORIDE SERPL-SCNC: 95 MMOL/L (ref 98–107)
CLARITY UR: ABNORMAL
CO2 SERPL-SCNC: 28.9 MMOL/L (ref 22–29)
COLOR UR: YELLOW
CREAT BLD-MCNC: 1.34 MG/DL (ref 0.57–1)
DEPRECATED RDW RBC AUTO: 42.9 FL (ref 37–54)
EOSINOPHIL # BLD AUTO: 0.1 10*3/MM3 (ref 0.1–0.3)
EOSINOPHIL NFR BLD AUTO: 1.2 % (ref 0–4)
ERYTHROCYTE [DISTWIDTH] IN BLOOD BY AUTOMATED COUNT: 13.9 % (ref 11.5–14.5)
GFR SERPL CREATININE-BSD FRML MDRD: 47 ML/MIN/1.73
GLUCOSE BLD-MCNC: 160 MG/DL (ref 65–99)
GLUCOSE UR STRIP-MCNC: NEGATIVE MG/DL
HCT VFR BLD AUTO: 42.5 % (ref 37–47)
HGB BLD-MCNC: 13.7 G/DL (ref 12–16)
HGB UR QL STRIP.AUTO: NEGATIVE
IMM GRANULOCYTES # BLD: 0.02 10*3/MM3 (ref 0–0.03)
IMM GRANULOCYTES NFR BLD: 0.2 % (ref 0–0.5)
KETONES UR QL STRIP: ABNORMAL
LEUKOCYTE ESTERASE UR QL STRIP.AUTO: ABNORMAL
LYMPHOCYTES # BLD AUTO: 1.36 10*3/MM3 (ref 0.6–4.8)
LYMPHOCYTES NFR BLD AUTO: 16.2 % (ref 20–45)
MCH RBC QN AUTO: 27.7 PG (ref 27–31)
MCHC RBC AUTO-ENTMCNC: 32.2 G/DL (ref 31–37)
MCV RBC AUTO: 85.9 FL (ref 81–99)
MONOCYTES # BLD AUTO: 0.74 10*3/MM3 (ref 0–1)
MONOCYTES NFR BLD AUTO: 8.8 % (ref 3–8)
NEUTROPHILS # BLD AUTO: 6.11 10*3/MM3 (ref 1.5–8.3)
NEUTROPHILS NFR BLD AUTO: 72.8 % (ref 45–70)
NITRITE UR QL STRIP: POSITIVE
NRBC BLD MANUAL-RTO: 0 /100 WBC (ref 0–0)
PH UR STRIP.AUTO: 6 [PH] (ref 4.5–8)
PLATELET # BLD AUTO: 284 10*3/MM3 (ref 140–500)
PMV BLD AUTO: 9.1 FL (ref 7.4–10.4)
POTASSIUM BLD-SCNC: 4.5 MMOL/L (ref 3.5–5.2)
PROT UR QL STRIP: NEGATIVE
RBC # BLD AUTO: 4.95 10*6/MM3 (ref 4.2–5.4)
RBC # UR: ABNORMAL /HPF
REF LAB TEST METHOD: ABNORMAL
SODIUM BLD-SCNC: 135 MMOL/L (ref 136–145)
SP GR UR STRIP: 1.02 (ref 1–1.03)
SQUAMOUS #/AREA URNS HPF: ABNORMAL /HPF
UROBILINOGEN UR QL STRIP: ABNORMAL
WBC NRBC COR # BLD: 8.4 10*3/MM3 (ref 4.8–10.8)
WBC UR QL AUTO: ABNORMAL /HPF

## 2017-10-11 PROCEDURE — 87186 SC STD MICRODIL/AGAR DIL: CPT

## 2017-10-11 PROCEDURE — 85025 COMPLETE CBC W/AUTO DIFF WBC: CPT

## 2017-10-11 PROCEDURE — 81001 URINALYSIS AUTO W/SCOPE: CPT

## 2017-10-11 PROCEDURE — 36415 COLL VENOUS BLD VENIPUNCTURE: CPT

## 2017-10-11 PROCEDURE — 80048 BASIC METABOLIC PNL TOTAL CA: CPT

## 2017-10-11 PROCEDURE — 87086 URINE CULTURE/COLONY COUNT: CPT

## 2017-10-12 ENCOUNTER — OFFICE VISIT (OUTPATIENT)
Dept: ONCOLOGY | Facility: CLINIC | Age: 72
End: 2017-10-12

## 2017-10-12 ENCOUNTER — LAB (OUTPATIENT)
Dept: OTHER | Facility: HOSPITAL | Age: 72
End: 2017-10-12

## 2017-10-12 VITALS
DIASTOLIC BLOOD PRESSURE: 70 MMHG | RESPIRATION RATE: 16 BRPM | TEMPERATURE: 97.6 F | OXYGEN SATURATION: 100 % | WEIGHT: 193.1 LBS | HEIGHT: 65 IN | SYSTOLIC BLOOD PRESSURE: 128 MMHG | HEART RATE: 77 BPM | BODY MASS INDEX: 32.17 KG/M2

## 2017-10-12 DIAGNOSIS — C50.312 MALIGNANT NEOPLASM OF LOWER-INNER QUADRANT OF LEFT FEMALE BREAST (HCC): ICD-10-CM

## 2017-10-12 DIAGNOSIS — Z17.1 MALIGNANT NEOPLASM OF LOWER-INNER QUADRANT OF LEFT BREAST IN FEMALE, ESTROGEN RECEPTOR NEGATIVE (HCC): Primary | ICD-10-CM

## 2017-10-12 DIAGNOSIS — C50.312 MALIGNANT NEOPLASM OF LOWER-INNER QUADRANT OF LEFT BREAST IN FEMALE, ESTROGEN RECEPTOR NEGATIVE (HCC): Primary | ICD-10-CM

## 2017-10-12 DIAGNOSIS — F41.9 ANXIETY: ICD-10-CM

## 2017-10-12 LAB
BASOPHILS # BLD AUTO: 0.11 10*3/MM3 (ref 0–0.2)
BASOPHILS NFR BLD AUTO: 1.1 % (ref 0–1.5)
DEPRECATED RDW RBC AUTO: 42.4 FL (ref 37–54)
EOSINOPHIL # BLD AUTO: 0.16 10*3/MM3 (ref 0–0.7)
EOSINOPHIL NFR BLD AUTO: 1.6 % (ref 0.3–6.2)
ERYTHROCYTE [DISTWIDTH] IN BLOOD BY AUTOMATED COUNT: 13.8 % (ref 11.7–13)
HCT VFR BLD AUTO: 43.9 % (ref 35.6–45.5)
HGB BLD-MCNC: 14 G/DL (ref 11.9–15.5)
IMM GRANULOCYTES # BLD: 0.07 10*3/MM3 (ref 0–0.03)
IMM GRANULOCYTES NFR BLD: 0.7 % (ref 0–0.5)
LYMPHOCYTES # BLD AUTO: 1.37 10*3/MM3 (ref 0.9–4.8)
LYMPHOCYTES NFR BLD AUTO: 14.1 % (ref 19.6–45.3)
MCH RBC QN AUTO: 26.9 PG (ref 26.9–32)
MCHC RBC AUTO-ENTMCNC: 31.9 G/DL (ref 32.4–36.3)
MCV RBC AUTO: 84.3 FL (ref 80.5–98.2)
MONOCYTES # BLD AUTO: 0.91 10*3/MM3 (ref 0.2–1.2)
MONOCYTES NFR BLD AUTO: 9.4 % (ref 5–12)
NEUTROPHILS # BLD AUTO: 7.08 10*3/MM3 (ref 1.9–8.1)
NEUTROPHILS NFR BLD AUTO: 73.1 % (ref 42.7–76)
NRBC BLD MANUAL-RTO: 0 /100 WBC (ref 0–0)
PLATELET # BLD AUTO: 253 10*3/MM3 (ref 140–500)
PMV BLD AUTO: 10.2 FL (ref 6–12)
RBC # BLD AUTO: 5.21 10*6/MM3 (ref 3.9–5.2)
WBC NRBC COR # BLD: 9.7 10*3/MM3 (ref 4.5–10.7)

## 2017-10-12 PROCEDURE — 85025 COMPLETE CBC W/AUTO DIFF WBC: CPT | Performed by: INTERNAL MEDICINE

## 2017-10-12 PROCEDURE — 99215 OFFICE O/P EST HI 40 MIN: CPT | Performed by: INTERNAL MEDICINE

## 2017-10-12 RX ORDER — ALPRAZOLAM 0.25 MG/1
0.25 TABLET ORAL EVERY 8 HOURS PRN
Qty: 20 TABLET | Refills: 0 | Status: SHIPPED | OUTPATIENT
Start: 2017-10-12 | End: 2018-03-21

## 2017-10-12 NOTE — PROGRESS NOTES
Subjective     REASON FOR FOLLOW UP:      Left breast cancer                       HISTORY OF PRESENT ILLNESS:    Tyrone Rand is a 72 y.o. patient with left breast cancer. She had left lumpectomy and sentinel lymph node biopsy on 9/26/17. She tolerated the procedure well.  She reports some discomfort in the left breast at the incision. No left arm pain or swelling.    The patient is accompanied by her  and daughter. The daughter reports that the patient has been under stress related to the diagnosis of the breast cancer and the need for chemotherapy. This anxiety has resulted in the patient having difficulty focusing.     Past Medical History:   Diagnosis Date   • Depression    • Diabetes mellitus    • H/O Left breast mass 2017   • Hyperlipidemia    • Hypertension    • Panic attack    • Poor sleep pattern        Past Surgical History:   Procedure Laterality Date   • COLON SURGERY     • KIDNEY SURGERY     • MOUTH SURGERY      TOOTH EXTRACTION         MEDICATIONS:    Current Outpatient Prescriptions:   •  Acetaminophen (TYLENOL ARTHRITIS PAIN PO), Take  by mouth., Disp: , Rfl:   •  clopidogrel (PLAVIX) 75 MG tablet, Take 75 mg by mouth daily., Disp: , Rfl:   •  ergocalciferol (ERGOCALCIFEROL) 74256 UNITS capsule, Take 1 capsule by mouth once a week., Disp: , Rfl:   •  glucose blood test strip, Test blood sugar twice daily, Disp: , Rfl:   •  Insulin Lispro (HUMALOG KWIKPEN SC), Inject 10 Units under the skin 3 (Three) Times a Day., Disp: , Rfl:   •  Loratadine (CLARITIN PO), Take 10 mg by mouth Daily., Disp: , Rfl:   •  metoprolol succinate XL (TOPROL-XL) 25 MG 24 hr tablet, TAKE ONE TABLET BY MOUTH IN THE MORNING, Disp: , Rfl: 11  •  ranitidine (ZANTAC) 150 MG tablet, Take by mouth 2 (two) times a day., Disp: , Rfl:   •  spironolactone (ALDACTONE) 100 MG tablet, Take 1 tablet by mouth daily., Disp: 90 tablet, Rfl: 3  •  TOUJEO SOLOSTAR 300 UNIT/ML solution pen-injector, Inject 25 Units under the skin  "daily with dinner. INJECT 25 UNITS AT BEDTIME, Disp: 3 pen, Rfl: 5     ALLERGIES:  Allergies   Allergen Reactions   • Other    • Sulfa Antibiotics         Social History     Social History   • Marital status:      Spouse name: Pascual   • Number of children: 1   • Years of education: GED     Occupational History   •  Retired     Social History Main Topics   • Smoking status: Former Smoker     Packs/day: 0.00     Quit date: 2000   • Smokeless tobacco: Not on file      Comment: smoked 25 years, 1 PPD   • Alcohol use Yes      Comment: social   • Drug use: Not on file   • Sexual activity: Not on file     Other Topics Concern   • Not on file     Social History Narrative       Cancer-related family history is not on file.    REVIEW OF SYSTEMS:  GENERAL:  No Fever or chills. No weight change.  No Fatigue.   SKIN:  No skin rashes or lesions.  HEME/LYMPH: No Anemia. No Easy bruisability. No Enlarged lymph nodes.  RESPIRATORY:  No Cough. No Shortness of breath. No Hemoptysis.   CVS:  No Chest pain. No Palpitations. Ankle swelling.  GI:  No Abdominal pain. No Nausea. No Vomiting. No Constipation. No Diarrhea. No Melena. No Hematochezia.  :  No Hematuria. No Dysuria. No Increased frequency.   MUSCULOSKELETAL:  No Bone pain. No Joint pain. No Joint stiffness.  NEUROLOGICAL:  No Headaches. No Dizziness. No Focal weakness. No Global weakness.  No Numbness.  PSYCHIATRIC:  Anxiety. No Mood changes. No Depression.      Objective   VITAL SIGNS:  Vitals:    10/12/17 1242   BP: 128/70   Pulse: 77   Resp: 16   Temp: 97.6 °F (36.4 °C)   TempSrc: Oral   SpO2: 100%   Weight: 193 lb 1.6 oz (87.6 kg)   Height: 65.35\" (166 cm)   PainSc: 0-No pain       Wt Readings from Last 3 Encounters:   10/12/17 193 lb 1.6 oz (87.6 kg)   09/07/17 195 lb 12.8 oz (88.8 kg)   08/03/17 195 lb (88.5 kg)       PHYSICAL EXAMINATION  GENERAL:  The patient appears in good general condition, not in acute distress.  SKIN:  No skin rashes or lesions.   EXT: " No left arm lymphedema.   NEUROLOGICAL:  No Focal neurological deficits.      RESULT REVIEW:     Results from last 7 days  Lab Units 10/11/17  1325   WBC 10*3/mm3 8.40   NEUTROS ABS 10*3/mm3 6.11   HEMOGLOBIN g/dL 13.7   HEMATOCRIT % 42.5   PLATELETS 10*3/mm3 284       Results from last 7 days  Lab Units 10/11/17  1325   SODIUM mmol/L 135*   POTASSIUM mmol/L 4.5   CHLORIDE mmol/L 95*   CO2 mmol/L 28.9   BUN mg/dL 22   CREATININE mg/dL 1.34*   CALCIUM mg/dL 10.2       Assessment/Plan   1.  Left breast cancer, invasive ductal carcinoma, grade 3, ER negative, FL positive at 20%, HER-2 frederic negative.  Ki-67 was elevated at 50-55% on the biopsy from 7/19/17. She underwent a lumpectomy and sentinel lymph node biopsy on 9/26/17 at [facility]. Pathology examination revealed the tumor to measure 1.6 cm. It is poorly differentiated with a Ki-67 50-60%.  ER is negative. FL is weakly positive at <10%. Her-2 is negative. This is considered to behave like triple negative breast cancer.     I recommended adjuvant chemotherapy. The rational behind the use of chemotherapy was explained. I recommended 4 cycles of Cytoxan and Taxotere with Neulasta support.     I explained the side effects to the patient and her family including fatigue, nausea, vomiting, infusion reaction, hair loss, neuropathy and myelosuppression. The effect on the blood glucose was also explained.  The patient and her family expressed their understanding and decided to proceed.      She has trip planned for Thanksgiving. I recommended that we start the chemotherapy on 11/6/17. She is going to have chemotherapy education with the NP next week.  She will have a port placed next week.      After completion of chemotherapy, we will refer her to Radiation Oncology for port lumpectomy radiation.  She will be placed on hormonal therapy after the radiation therapy.      2.  Anxiety due to the diagnosis of breat cancer and the need for chemotherapy. After a lengthy  discussion, she agreed to take Xanax 0.25 mg every 8 hours as needed.        Luz Elena Herndon MD  10/12/17

## 2017-10-13 LAB
BACTERIA SPEC AEROBE CULT: ABNORMAL
BACTERIA SPEC AEROBE CULT: ABNORMAL

## 2017-10-19 ENCOUNTER — OFFICE VISIT (OUTPATIENT)
Dept: ONCOLOGY | Facility: CLINIC | Age: 72
End: 2017-10-19

## 2017-10-19 ENCOUNTER — APPOINTMENT (OUTPATIENT)
Dept: OTHER | Facility: HOSPITAL | Age: 72
End: 2017-10-19

## 2017-10-19 VITALS — WEIGHT: 195 LBS | BODY MASS INDEX: 32.49 KG/M2 | HEIGHT: 65 IN

## 2017-10-19 DIAGNOSIS — C50.312 MALIGNANT NEOPLASM OF LOWER-INNER QUADRANT OF LEFT BREAST IN FEMALE, ESTROGEN RECEPTOR NEGATIVE (HCC): Primary | ICD-10-CM

## 2017-10-19 DIAGNOSIS — Z17.1 MALIGNANT NEOPLASM OF LOWER-INNER QUADRANT OF LEFT BREAST IN FEMALE, ESTROGEN RECEPTOR NEGATIVE (HCC): Primary | ICD-10-CM

## 2017-10-19 PROCEDURE — 99215 OFFICE O/P EST HI 40 MIN: CPT | Performed by: NURSE PRACTITIONER

## 2017-10-19 PROCEDURE — G0463 HOSPITAL OUTPT CLINIC VISIT: HCPCS | Performed by: NURSE PRACTITIONER

## 2017-10-19 RX ORDER — DEXAMETHASONE 4 MG/1
TABLET ORAL
Qty: 16 TABLET | Refills: 0 | Status: SHIPPED | OUTPATIENT
Start: 2017-10-19 | End: 2017-12-11 | Stop reason: SDUPTHER

## 2017-10-19 RX ORDER — ONDANSETRON 4 MG/1
4 TABLET, FILM COATED ORAL EVERY 8 HOURS PRN
Qty: 30 TABLET | Refills: 2 | Status: SHIPPED | OUTPATIENT
Start: 2017-10-19 | End: 2018-03-21

## 2017-10-19 NOTE — PROGRESS NOTES
Subjective     PATIENT NAME:  Tyrone Rand  YOB: 1945  PATIENTS AGE:  72 y.o.  PATIENTS SEX:  female  DATE OF SERVICE:  10/19/2017  PROVIDER:  BOB Moody      ____________________PATIENT EDUCATION____________________    PATIENT EDUCATION:  Today I met with the patient, her , and daughter to discuss the chemotherapy regimen recommended for treatment of her breast cancer.  The patient was given explanation of treatment premed side effects including office policy that prohibits patients to drive if sedating medications are administered, MD explanation given regarding benefits, side effects, toxicities and goals of treatment.  The patient received a Chemotherapy/Biotherapy Plan Summary including diagnosis and specific treatment plan.    SIDE EFFECTS:  Common side effects were discussed with the patient and/or significant other.  Discussion included hair loss/discoloration, anemia/fatigue, infection/chills/fever, appetite, bleeding risk/precautions, constipation, diarrhea, mouth sores, taste alteration, loss of appetite,nausea/vomiting, peripheral neuropathy, skin/nail changes, rash, muscle aches/weakness, photosensitivity, weight gain/loss, hearing loss, dizziness, high blood pressure, heart damage, liver damage, lung damage, kidney damage, DVT/PE risk, fluid retention, pleural/pericardial effusion, somnolence, electrolyte/LFT imbalance, vein exercises and/or the possible need for vascular access/port placement.  The patient was advice that although uncommon, leakage of an infused medication from the vein or venous access device (port) may lead to skin breakdown and/or other tissue damage.  The patient was advised that he/she may have pain, bleeding, and/or bruising from the insertion of a needle in their vein or venous access device (port).  The patient was further advised that, in spite of proper technique, infection with redness and irritation may rarely occur at the site where  the needle was inserted.  The patient was advised that if complications occur, additional medical treatment is available.    Discussion also included side effects specific to drugs in the treatment plan, specifically Taxotere, Cytoxan, and Neulasta.     Reproductive risks were discussed, including appropriate use of birth control and protection during sexual relations.    A total of 40 minutes were spent with the patient, with 100% of time spent in education and counseling.

## 2017-11-01 DIAGNOSIS — C50.312 MALIGNANT NEOPLASM OF LOWER-INNER QUADRANT OF LEFT BREAST IN FEMALE, ESTROGEN RECEPTOR NEGATIVE (HCC): Primary | ICD-10-CM

## 2017-11-01 DIAGNOSIS — Z17.1 MALIGNANT NEOPLASM OF LOWER-INNER QUADRANT OF LEFT BREAST IN FEMALE, ESTROGEN RECEPTOR NEGATIVE (HCC): Primary | ICD-10-CM

## 2017-11-01 PROBLEM — Z45.2 FITTING AND ADJUSTMENT OF VASCULAR CATHETER: Status: ACTIVE | Noted: 2017-11-01

## 2017-11-01 LAB
LAB AP CASE REPORT: NORMAL
LAB AP CLINICAL INFORMATION: NORMAL
Lab: NORMAL
PATH REPORT.FINAL DX SPEC: NORMAL

## 2017-11-01 RX ORDER — SODIUM CHLORIDE 0.9 % (FLUSH) 0.9 %
10 SYRINGE (ML) INJECTION AS NEEDED
Status: CANCELLED | OUTPATIENT
Start: 2017-11-06

## 2017-11-06 ENCOUNTER — OFFICE VISIT (OUTPATIENT)
Dept: ONCOLOGY | Facility: CLINIC | Age: 72
End: 2017-11-06

## 2017-11-06 ENCOUNTER — INFUSION (OUTPATIENT)
Dept: ONCOLOGY | Facility: HOSPITAL | Age: 72
End: 2017-11-06

## 2017-11-06 VITALS — DIASTOLIC BLOOD PRESSURE: 80 MMHG | SYSTOLIC BLOOD PRESSURE: 122 MMHG | HEART RATE: 66 BPM

## 2017-11-06 VITALS
HEART RATE: 73 BPM | WEIGHT: 193.4 LBS | OXYGEN SATURATION: 98 % | BODY MASS INDEX: 32.22 KG/M2 | HEIGHT: 65 IN | TEMPERATURE: 97.5 F | SYSTOLIC BLOOD PRESSURE: 132 MMHG | RESPIRATION RATE: 12 BRPM | DIASTOLIC BLOOD PRESSURE: 82 MMHG

## 2017-11-06 DIAGNOSIS — C50.312 MALIGNANT NEOPLASM OF LOWER-INNER QUADRANT OF LEFT BREAST IN FEMALE, ESTROGEN RECEPTOR NEGATIVE (HCC): Primary | ICD-10-CM

## 2017-11-06 DIAGNOSIS — Z17.1 MALIGNANT NEOPLASM OF LOWER-INNER QUADRANT OF LEFT BREAST IN FEMALE, ESTROGEN RECEPTOR NEGATIVE (HCC): ICD-10-CM

## 2017-11-06 DIAGNOSIS — Z17.1 MALIGNANT NEOPLASM OF LOWER-INNER QUADRANT OF LEFT BREAST IN FEMALE, ESTROGEN RECEPTOR NEGATIVE (HCC): Primary | ICD-10-CM

## 2017-11-06 DIAGNOSIS — C50.312 MALIGNANT NEOPLASM OF LOWER-INNER QUADRANT OF LEFT BREAST IN FEMALE, ESTROGEN RECEPTOR NEGATIVE (HCC): ICD-10-CM

## 2017-11-06 PROBLEM — D70.1 CHEMOTHERAPY INDUCED NEUTROPENIA (HCC): Status: ACTIVE | Noted: 2017-11-06

## 2017-11-06 PROBLEM — T45.1X5A CHEMOTHERAPY INDUCED NEUTROPENIA (HCC): Status: ACTIVE | Noted: 2017-11-06

## 2017-11-06 LAB
ALBUMIN SERPL-MCNC: 3.9 G/DL (ref 3.5–5.2)
ALBUMIN/GLOB SERPL: 1.1 G/DL (ref 1.1–2.4)
ALP SERPL-CCNC: 84 U/L (ref 38–116)
ALT SERPL W P-5'-P-CCNC: 16 U/L (ref 0–33)
ANION GAP SERPL CALCULATED.3IONS-SCNC: 13 MMOL/L
AST SERPL-CCNC: 14 U/L (ref 0–32)
BASOPHILS # BLD AUTO: 0.02 10*3/MM3 (ref 0–0.1)
BASOPHILS NFR BLD AUTO: 0.1 % (ref 0–1.1)
BILIRUB SERPL-MCNC: 0.2 MG/DL (ref 0.1–1.2)
BUN BLD-MCNC: 23 MG/DL (ref 6–20)
BUN/CREAT SERPL: 21.5 (ref 7.3–30)
CALCIUM SPEC-SCNC: 9.8 MG/DL (ref 8.5–10.2)
CHLORIDE SERPL-SCNC: 98 MMOL/L (ref 98–107)
CO2 SERPL-SCNC: 24 MMOL/L (ref 22–29)
CREAT BLD-MCNC: 1.07 MG/DL (ref 0.6–1.1)
DEPRECATED RDW RBC AUTO: 42.5 FL (ref 37–49)
EOSINOPHIL # BLD AUTO: 0 10*3/MM3 (ref 0–0.36)
EOSINOPHIL NFR BLD AUTO: 0 % (ref 1–5)
ERYTHROCYTE [DISTWIDTH] IN BLOOD BY AUTOMATED COUNT: 13.5 % (ref 11.7–14.5)
GFR SERPL CREATININE-BSD FRML MDRD: 61 ML/MIN/1.73
GLOBULIN UR ELPH-MCNC: 3.7 GM/DL (ref 1.8–3.5)
GLUCOSE BLD-MCNC: 228 MG/DL (ref 74–124)
HCT VFR BLD AUTO: 42.3 % (ref 34–45)
HGB BLD-MCNC: 13.5 G/DL (ref 11.5–14.9)
IMM GRANULOCYTES # BLD: 0.09 10*3/MM3 (ref 0–0.03)
IMM GRANULOCYTES NFR BLD: 0.6 % (ref 0–0.5)
LYMPHOCYTES # BLD AUTO: 1.01 10*3/MM3 (ref 1–3.5)
LYMPHOCYTES NFR BLD AUTO: 6.3 % (ref 20–49)
MCH RBC QN AUTO: 27.7 PG (ref 27–33)
MCHC RBC AUTO-ENTMCNC: 31.9 G/DL (ref 32–35)
MCV RBC AUTO: 86.7 FL (ref 83–97)
MONOCYTES # BLD AUTO: 0.86 10*3/MM3 (ref 0.25–0.8)
MONOCYTES NFR BLD AUTO: 5.4 % (ref 4–12)
NEUTROPHILS # BLD AUTO: 13.98 10*3/MM3 (ref 1.5–7)
NEUTROPHILS NFR BLD AUTO: 87.6 % (ref 39–75)
NRBC BLD MANUAL-RTO: 0 /100 WBC (ref 0–0)
PLATELET # BLD AUTO: 299 10*3/MM3 (ref 150–375)
PMV BLD AUTO: 9.3 FL (ref 8.9–12.1)
POTASSIUM BLD-SCNC: 4.2 MMOL/L (ref 3.5–4.7)
PROT SERPL-MCNC: 7.6 G/DL (ref 6.3–8)
RBC # BLD AUTO: 4.88 10*6/MM3 (ref 3.9–5)
SODIUM BLD-SCNC: 135 MMOL/L (ref 134–145)
WBC NRBC COR # BLD: 15.96 10*3/MM3 (ref 4–10)

## 2017-11-06 PROCEDURE — 25010000002 DEXAMETHASONE SODIUM PHOSPHATE 10 MG/ML SOLUTION 1 ML VIAL: Performed by: INTERNAL MEDICINE

## 2017-11-06 PROCEDURE — 96417 CHEMO IV INFUS EACH ADDL SEQ: CPT | Performed by: INTERNAL MEDICINE

## 2017-11-06 PROCEDURE — 25010000002 DOCETAXEL 20 MG/ML CONCENTRATION 8 ML VIAL: Performed by: INTERNAL MEDICINE

## 2017-11-06 PROCEDURE — 96375 TX/PRO/DX INJ NEW DRUG ADDON: CPT | Performed by: INTERNAL MEDICINE

## 2017-11-06 PROCEDURE — 25010000002 DIPHENHYDRAMINE PER 50 MG: Performed by: INTERNAL MEDICINE

## 2017-11-06 PROCEDURE — 80053 COMPREHEN METABOLIC PANEL: CPT

## 2017-11-06 PROCEDURE — 96415 CHEMO IV INFUSION ADDL HR: CPT | Performed by: INTERNAL MEDICINE

## 2017-11-06 PROCEDURE — 25010000002 PEGFILGRASTIM 6 MG/0.6ML PREFILLED SYRINGE KIT: Performed by: INTERNAL MEDICINE

## 2017-11-06 PROCEDURE — 96413 CHEMO IV INFUSION 1 HR: CPT | Performed by: INTERNAL MEDICINE

## 2017-11-06 PROCEDURE — 25010000002 CYCLOPHOSPHAMIDE PER 100 MG: Performed by: INTERNAL MEDICINE

## 2017-11-06 PROCEDURE — 25010000002 PALONOSETRON PER 25 MCG: Performed by: INTERNAL MEDICINE

## 2017-11-06 PROCEDURE — 85025 COMPLETE CBC W/AUTO DIFF WBC: CPT

## 2017-11-06 PROCEDURE — 99214 OFFICE O/P EST MOD 30 MIN: CPT | Performed by: INTERNAL MEDICINE

## 2017-11-06 PROCEDURE — 96377 APPLICATON ON-BODY INJECTOR: CPT | Performed by: INTERNAL MEDICINE

## 2017-11-06 RX ORDER — PALONOSETRON 0.05 MG/ML
0.25 INJECTION, SOLUTION INTRAVENOUS ONCE
Status: COMPLETED | OUTPATIENT
Start: 2017-11-06 | End: 2017-11-06

## 2017-11-06 RX ORDER — PALONOSETRON 0.05 MG/ML
0.25 INJECTION, SOLUTION INTRAVENOUS ONCE
Status: CANCELLED | OUTPATIENT
Start: 2017-11-06

## 2017-11-06 RX ORDER — FAMOTIDINE 10 MG/ML
20 INJECTION, SOLUTION INTRAVENOUS ONCE
Status: COMPLETED | OUTPATIENT
Start: 2017-11-06 | End: 2017-11-06

## 2017-11-06 RX ADMIN — PALONOSETRON HYDROCHLORIDE 0.25 MG: 0.25 INJECTION INTRAVENOUS at 11:02

## 2017-11-06 RX ADMIN — DEXAMETHASONE SODIUM PHOSPHATE 12 MG: 10 INJECTION, SOLUTION INTRAMUSCULAR; INTRAVENOUS at 11:32

## 2017-11-06 RX ADMIN — PEGFILGRASTIM 6 MG: KIT SUBCUTANEOUS at 14:37

## 2017-11-06 RX ADMIN — FAMOTIDINE 20 MG: 10 INJECTION, SOLUTION INTRAVENOUS at 11:04

## 2017-11-06 RX ADMIN — DOCETAXEL 145 MG: 20 INJECTION INTRAVENOUS at 12:20

## 2017-11-06 RX ADMIN — SODIUM CHLORIDE 250 ML: 900 INJECTION, SOLUTION INTRAVENOUS at 11:02

## 2017-11-06 RX ADMIN — DIPHENHYDRAMINE HYDROCHLORIDE 50 MG: 50 INJECTION, SOLUTION INTRAMUSCULAR; INTRAVENOUS at 11:06

## 2017-11-06 RX ADMIN — CYCLOPHOSPHAMIDE 1180 MG: 1 INJECTION, POWDER, FOR SOLUTION INTRAVENOUS; ORAL at 14:23

## 2017-11-06 NOTE — PROGRESS NOTES
Subjective     REASON FOR FOLLOW UP:      Left breast cancer                       HISTORY OF PRESENT ILLNESS:    Tyrone Rand is a 72 y.o. patient with left breast cancer. She had left lumpectomy and sentinel lymph node biopsy on 9/26/17. She had the port placed.  She had the chemotherapy vacation with the nurse practitioner and is admitted today to proceed with chemotherapy treatment.    Patient states that the anxiety did not worsen to the degree that she had to take the Xanax.    Past Medical History:   Diagnosis Date   • Depression    • Diabetes mellitus    • H/O Left breast mass 2017   • Hyperlipidemia    • Hypertension    • Panic attack    • Poor sleep pattern        Past Surgical History:   Procedure Laterality Date   • COLON SURGERY     • KIDNEY SURGERY     • MOUTH SURGERY      TOOTH EXTRACTION         MEDICATIONS:    Current Outpatient Prescriptions:   •  Acetaminophen (TYLENOL ARTHRITIS PAIN PO), Take  by mouth., Disp: , Rfl:   •  ALPRAZolam (XANAX) 0.25 MG tablet, Take 1 tablet by mouth Every 8 (Eight) Hours As Needed for Anxiety., Disp: 20 tablet, Rfl: 0  •  dexamethasone (DECADRON) 4 MG tablet, Take 2 tablets (one in the morning and one in the evening, with food) the day before chemo and the day after chemotherapy, Disp: 16 tablet, Rfl: 0  •  ergocalciferol (ERGOCALCIFEROL) 98780 UNITS capsule, Take 1 capsule by mouth once a week., Disp: , Rfl:   •  glucose blood test strip, Test blood sugar twice daily, Disp: , Rfl:   •  Insulin Lispro (HUMALOG KWIKPEN SC), Inject 10 Units under the skin 3 (Three) Times a Day., Disp: , Rfl:   •  Loratadine (CLARITIN PO), Take 10 mg by mouth Daily., Disp: , Rfl:   •  metoprolol succinate XL (TOPROL-XL) 25 MG 24 hr tablet, TAKE ONE TABLET BY MOUTH IN THE MORNING, Disp: , Rfl: 11  •  ondansetron (ZOFRAN) 4 MG tablet, Take 1 tablet by mouth Every 8 (Eight) Hours As Needed for Nausea or Vomiting., Disp: 30 tablet, Rfl: 2  •  ranitidine (ZANTAC) 150 MG tablet, Take by  mouth 2 (two) times a day., Disp: , Rfl:   •  spironolactone (ALDACTONE) 100 MG tablet, Take 1 tablet by mouth daily., Disp: 90 tablet, Rfl: 3  •  TOUJEO SOLOSTAR 300 UNIT/ML solution pen-injector, Inject 25 Units under the skin daily with dinner. INJECT 25 UNITS AT BEDTIME (Patient taking differently: Inject 40 Units under the skin Daily With Dinner. INJECT 25 UNITS AT BEDTIME), Disp: 3 pen, Rfl: 5     ALLERGIES:  Allergies   Allergen Reactions   • Sulfa Antibiotics         Social History     Social History   • Marital status:      Spouse name: Pascual   • Number of children: 1   • Years of education: GED     Occupational History   •  Retired     Social History Main Topics   • Smoking status: Former Smoker     Packs/day: 0.00     Quit date: 2000   • Smokeless tobacco: Not on file      Comment: smoked 25 years, 1 PPD   • Alcohol use Yes      Comment: social   • Drug use: Not on file   • Sexual activity: Not on file     Other Topics Concern   • Not on file     Social History Narrative       Cancer-related family history is not on file.    REVIEW OF SYSTEMS:  GENERAL:  No Fever or chills. No weight change.  No Fatigue.   SKIN:  No skin rashes or lesions.  HEME/LYMPH: No Anemia. No Easy bruisability. No Enlarged lymph nodes.  RESPIRATORY:  No Cough. No Shortness of breath. No Hemoptysis.   CVS:  No Chest pain. No Palpitations. Ankle swelling.  GI:  No Abdominal pain. No Nausea. No Vomiting. No Constipation. No Diarrhea. No Melena. No Hematochezia.  :  No Hematuria. No Dysuria. No Increased frequency.   MUSCULOSKELETAL:  No Bone pain. No Joint pain. No Joint stiffness.  NEUROLOGICAL:  No Headaches. No Dizziness. No Focal weakness. No Global weakness.  No Numbness.  PSYCHIATRIC:  AnxietyHas improved. No Mood changes. No Depression.      Objective   VITAL SIGNS:  Vitals:    11/06/17 0913   BP: 132/82   Pulse: 73   Resp: 12   Temp: 97.5 °F (36.4 °C)   TempSrc: Oral   SpO2: 98%   Weight: 193 lb 6.4 oz (87.7 kg)  "  Height: 65.35\" (166 cm)   PainSc: 0-No pain       Wt Readings from Last 3 Encounters:   11/06/17 193 lb 6.4 oz (87.7 kg)   10/19/17 195 lb (88.5 kg)   10/12/17 193 lb 1.6 oz (87.6 kg)       PHYSICAL EXAMINATION  GENERAL:  The patient appears in good general condition, not in acute distress.  SKIN:  No skin rashes or lesions.  CHEST: In the right upper chest appears benign.  No hematoma or swelling.   EXT: No left arm lymphedema.   NEUROLOGICAL:  No Focal neurological deficits.      RESULT REVIEW:     Results from last 7 days  Lab Units 11/06/17  0834   WBC 10*3/mm3 15.96*   NEUTROS ABS 10*3/mm3 13.98*   HEMOGLOBIN g/dL 13.5   HEMATOCRIT % 42.3   PLATELETS 10*3/mm3 299           Assessment/Plan   1.  Left breast cancer, invasive ductal carcinoma, grade 3, ER negative, NJ positive at 20%, HER-2 frederic negative.  Ki-67 was elevated at 50-55% on the biopsy from 7/19/17. She underwent a lumpectomy and sentinel lymph node biopsy on 9/26/17 at [facility]. Pathology examination revealed the tumor to measure 1.6 cm. It is poorly differentiated with a Ki-67 50-60%.  ER is negative. NJ is weakly positive at <10%. Her-2 is negative. This is considered to behave like triple negative breast cancer.     I recommended adjuvant chemotherapy With 4 cycles of Cytoxan and Taxotere with Neulasta support. She is at increased risk for development of neutropenia and neutropenic infections.  The patient had a chemotherapy education.  She is ready to proceed with chemotherapy today.  She will have on body Neulasta.  We discussed the side effects including possibility of pain in the bone and she will take loratadine for this daily for 4 days starting on day #1.    After completion of chemotherapy, we will refer her to Radiation Oncology for port lumpectomy radiation.  She will be placed on hormonal therapy after the radiation therapy.      2.  Anxiety due to the diagnosis of breat cancer and the need for chemotherapy.  It has improved and " she did not need to take the Xanax..      PLAN:    1.  Proceed with Taxotere and Cytoxan today.    2.  On body Neulasta today.  She will take loratadine daily for 4 days starting today.    3.  Return in 1 week for CBC with RN review.    4.  Patient will be seen for toxicity check in 10-14 days.        Luz Elena Herndon MD  11/06/17     Him

## 2017-11-13 ENCOUNTER — LAB (OUTPATIENT)
Dept: LAB | Facility: HOSPITAL | Age: 72
End: 2017-11-13

## 2017-11-13 ENCOUNTER — CLINICAL SUPPORT (OUTPATIENT)
Dept: ONCOLOGY | Facility: HOSPITAL | Age: 72
End: 2017-11-13

## 2017-11-13 VITALS — DIASTOLIC BLOOD PRESSURE: 66 MMHG | SYSTOLIC BLOOD PRESSURE: 106 MMHG | HEART RATE: 70 BPM

## 2017-11-13 DIAGNOSIS — Z17.1 MALIGNANT NEOPLASM OF LOWER-INNER QUADRANT OF LEFT BREAST IN FEMALE, ESTROGEN RECEPTOR NEGATIVE (HCC): ICD-10-CM

## 2017-11-13 DIAGNOSIS — C50.312 MALIGNANT NEOPLASM OF LOWER-INNER QUADRANT OF LEFT BREAST IN FEMALE, ESTROGEN RECEPTOR NEGATIVE (HCC): ICD-10-CM

## 2017-11-13 LAB
BASOPHILS # BLD AUTO: 0.05 10*3/MM3 (ref 0–0.1)
BASOPHILS NFR BLD AUTO: 0.6 % (ref 0–1.1)
DEPRECATED RDW RBC AUTO: 38.5 FL (ref 37–49)
EOSINOPHIL # BLD AUTO: 0.03 10*3/MM3 (ref 0–0.36)
EOSINOPHIL NFR BLD AUTO: 0.4 % (ref 1–5)
ERYTHROCYTE [DISTWIDTH] IN BLOOD BY AUTOMATED COUNT: 12.8 % (ref 11.7–14.5)
HCT VFR BLD AUTO: 41.6 % (ref 34–45)
HGB BLD-MCNC: 13.6 G/DL (ref 11.5–14.9)
IMM GRANULOCYTES # BLD: 0.5 10*3/MM3 (ref 0–0.03)
IMM GRANULOCYTES NFR BLD: 6.5 % (ref 0–0.5)
LYMPHOCYTES # BLD AUTO: 1.48 10*3/MM3 (ref 1–3.5)
LYMPHOCYTES NFR BLD AUTO: 19.2 % (ref 20–49)
MCH RBC QN AUTO: 27.1 PG (ref 27–33)
MCHC RBC AUTO-ENTMCNC: 32.7 G/DL (ref 32–35)
MCV RBC AUTO: 83 FL (ref 83–97)
MONOCYTES # BLD AUTO: 2.19 10*3/MM3 (ref 0.25–0.8)
MONOCYTES NFR BLD AUTO: 28.4 % (ref 4–12)
NEUTROPHILS # BLD AUTO: 3.46 10*3/MM3 (ref 1.5–7)
NEUTROPHILS NFR BLD AUTO: 44.9 % (ref 39–75)
NRBC BLD MANUAL-RTO: 0 /100 WBC (ref 0–0)
PLATELET # BLD AUTO: 118 10*3/MM3 (ref 150–375)
PMV BLD AUTO: 10.7 FL (ref 8.9–12.1)
RBC # BLD AUTO: 5.01 10*6/MM3 (ref 3.9–5)
WBC NRBC COR # BLD: 7.71 10*3/MM3 (ref 4–10)

## 2017-11-13 PROCEDURE — 36416 COLLJ CAPILLARY BLOOD SPEC: CPT | Performed by: INTERNAL MEDICINE

## 2017-11-13 PROCEDURE — 85025 COMPLETE CBC W/AUTO DIFF WBC: CPT | Performed by: INTERNAL MEDICINE

## 2017-11-13 NOTE — PROGRESS NOTES
CBC reviewed with pt and . Pt c/o fatigue and forgetfulness. Counts okay. Reviewed S/s of bleeding. Copy of labs to pt. Reviewed F/U appts with pt and . They V/U.

## 2017-11-16 ENCOUNTER — OFFICE VISIT (OUTPATIENT)
Dept: ONCOLOGY | Facility: CLINIC | Age: 72
End: 2017-11-16

## 2017-11-16 ENCOUNTER — LAB (OUTPATIENT)
Dept: OTHER | Facility: HOSPITAL | Age: 72
End: 2017-11-16

## 2017-11-16 VITALS
HEART RATE: 97 BPM | SYSTOLIC BLOOD PRESSURE: 132 MMHG | RESPIRATION RATE: 16 BRPM | BODY MASS INDEX: 31.72 KG/M2 | WEIGHT: 190.4 LBS | TEMPERATURE: 98.1 F | OXYGEN SATURATION: 98 % | HEIGHT: 65 IN | DIASTOLIC BLOOD PRESSURE: 80 MMHG

## 2017-11-16 DIAGNOSIS — Z17.1 MALIGNANT NEOPLASM OF LOWER-INNER QUADRANT OF LEFT BREAST IN FEMALE, ESTROGEN RECEPTOR NEGATIVE (HCC): ICD-10-CM

## 2017-11-16 DIAGNOSIS — Z17.1 MALIGNANT NEOPLASM OF LOWER-INNER QUADRANT OF LEFT BREAST IN FEMALE, ESTROGEN RECEPTOR NEGATIVE (HCC): Primary | ICD-10-CM

## 2017-11-16 DIAGNOSIS — C50.312 MALIGNANT NEOPLASM OF LOWER-INNER QUADRANT OF LEFT BREAST IN FEMALE, ESTROGEN RECEPTOR NEGATIVE (HCC): Primary | ICD-10-CM

## 2017-11-16 DIAGNOSIS — C50.312 MALIGNANT NEOPLASM OF LOWER-INNER QUADRANT OF LEFT BREAST IN FEMALE, ESTROGEN RECEPTOR NEGATIVE (HCC): ICD-10-CM

## 2017-11-16 LAB
DEPRECATED RDW RBC AUTO: 39.5 FL (ref 37–54)
ERYTHROCYTE [DISTWIDTH] IN BLOOD BY AUTOMATED COUNT: 13.2 % (ref 11.7–13)
HCT VFR BLD AUTO: 41.2 % (ref 35.6–45.5)
HGB BLD-MCNC: 13.6 G/DL (ref 11.9–15.5)
LYMPHOCYTES # BLD MANUAL: 1.75 10*3/MM3 (ref 0.8–7)
LYMPHOCYTES NFR BLD MANUAL: 13 % (ref 0–12)
LYMPHOCYTES NFR BLD MANUAL: 8 % (ref 24–44)
MCH RBC QN AUTO: 27.1 PG (ref 26.9–32)
MCHC RBC AUTO-ENTMCNC: 33 G/DL (ref 32.4–36.3)
MCV RBC AUTO: 82.2 FL (ref 80.5–98.2)
METAMYELOCYTES NFR BLD MANUAL: 4 % (ref 0–0)
MONOCYTES # BLD AUTO: 2.85 10*3/MM3 (ref 0–1)
NEUTROPHILS # BLD AUTO: 16.43 10*3/MM3 (ref 1.9–8.1)
NEUTROPHILS NFR BLD MANUAL: 75 % (ref 42.7–76)
PLAT MORPH BLD: NORMAL
PLATELET # BLD AUTO: 152 10*3/MM3 (ref 140–500)
PMV BLD AUTO: 10 FL (ref 6–12)
RBC # BLD AUTO: 5.01 10*6/MM3 (ref 3.9–5.2)
RBC MORPH BLD: NORMAL
SCAN SLIDE: NORMAL
WBC MORPH BLD: NORMAL
WBC NRBC COR # BLD: 21.91 10*3/MM3 (ref 4.5–10.7)

## 2017-11-16 PROCEDURE — 85007 BL SMEAR W/DIFF WBC COUNT: CPT | Performed by: INTERNAL MEDICINE

## 2017-11-16 PROCEDURE — 99214 OFFICE O/P EST MOD 30 MIN: CPT | Performed by: INTERNAL MEDICINE

## 2017-11-16 PROCEDURE — 36415 COLL VENOUS BLD VENIPUNCTURE: CPT

## 2017-11-16 PROCEDURE — 85025 COMPLETE CBC W/AUTO DIFF WBC: CPT | Performed by: INTERNAL MEDICINE

## 2017-11-16 NOTE — PROGRESS NOTES
Subjective     REASON FOR FOLLOW UP:      Left breast cancer                       HISTORY OF PRESENT ILLNESS:    Tyrone Rand is a 72 y.o. patient with left breast cancer. She started chemotherapy with Taxotere and Cytoxan on 11/6/17.    The patient had nausea without vomiting after the chemotherapy was started. She developed metallic taste and her appetite decreased.     The patient reports worsening of her anxiety.  This reached a degree that it is now affecting her ability to focus. She denies weakness or numbness.     Past Medical History:   Diagnosis Date   • Depression    • Diabetes mellitus    • H/O Left breast mass 2017   • Hyperlipidemia    • Hypertension    • Panic attack    • Poor sleep pattern        Past Surgical History:   Procedure Laterality Date   • COLON SURGERY     • KIDNEY SURGERY     • MOUTH SURGERY      TOOTH EXTRACTION         MEDICATIONS:    Current Outpatient Prescriptions:   •  Acetaminophen (TYLENOL ARTHRITIS PAIN PO), Take  by mouth., Disp: , Rfl:   •  ALPRAZolam (XANAX) 0.25 MG tablet, Take 1 tablet by mouth Every 8 (Eight) Hours As Needed for Anxiety., Disp: 20 tablet, Rfl: 0  •  dexamethasone (DECADRON) 4 MG tablet, Take 2 tablets (one in the morning and one in the evening, with food) the day before chemo and the day after chemotherapy, Disp: 16 tablet, Rfl: 0  •  ergocalciferol (ERGOCALCIFEROL) 83492 UNITS capsule, Take 1 capsule by mouth once a week., Disp: , Rfl:   •  glucose blood test strip, Test blood sugar twice daily, Disp: , Rfl:   •  Insulin Lispro (HUMALOG KWIKPEN SC), Inject 10 Units under the skin 3 (Three) Times a Day., Disp: , Rfl:   •  Loratadine (CLARITIN PO), Take 10 mg by mouth Daily., Disp: , Rfl:   •  metoprolol succinate XL (TOPROL-XL) 25 MG 24 hr tablet, TAKE ONE TABLET BY MOUTH IN THE MORNING, Disp: , Rfl: 11  •  ondansetron (ZOFRAN) 4 MG tablet, Take 1 tablet by mouth Every 8 (Eight) Hours As Needed for Nausea or Vomiting., Disp: 30 tablet, Rfl: 2  •   "ranitidine (ZANTAC) 150 MG tablet, Take by mouth 2 (two) times a day., Disp: , Rfl:   •  spironolactone (ALDACTONE) 100 MG tablet, Take 1 tablet by mouth daily., Disp: 90 tablet, Rfl: 3  •  TOUJEO SOLOSTAR 300 UNIT/ML solution pen-injector, Inject 25 Units under the skin daily with dinner. INJECT 25 UNITS AT BEDTIME (Patient taking differently: Inject 40 Units under the skin Daily With Dinner. INJECT 25 UNITS AT BEDTIME), Disp: 3 pen, Rfl: 5     ALLERGIES:  Allergies   Allergen Reactions   • Sulfa Antibiotics         Social History     Social History   • Marital status:      Spouse name: Pascual   • Number of children: 1   • Years of education: GED     Occupational History   •  Retired     Social History Main Topics   • Smoking status: Former Smoker     Packs/day: 0.00     Quit date: 2000   • Smokeless tobacco: Not on file      Comment: smoked 25 years, 1 PPD   • Alcohol use Yes      Comment: social   • Drug use: Not on file   • Sexual activity: Not on file     Other Topics Concern   • Not on file     Social History Narrative       Cancer-related family history is not on file.    REVIEW OF SYSTEMS:  GENERAL:  No Fever or chills. No weight change.  No Fatigue.   SKIN:  No skin rashes or lesions.  HEME/LYMPH: No Anemia. No Easy bruisability. No Enlarged lymph nodes.  RESPIRATORY:  No Cough. No Shortness of breath. No Hemoptysis.   CVS:  No Chest pain. No Palpitations. Ankle swelling.  GI:  See HPI.  :  No Hematuria. No Dysuria. No Increased frequency.   MUSCULOSKELETAL:  No Bone pain. No Joint pain. No Joint stiffness.  NEUROLOGICAL:  No Headaches. No Dizziness. No Focal weakness. No Global weakness.  No Numbness.  PSYCHIATRIC:  Anxiety is worse.       Objective   VITAL SIGNS:  Vitals:    11/16/17 0817   BP: 132/80   Pulse: 97   Resp: 16   Temp: 98.1 °F (36.7 °C)   TempSrc: Oral   SpO2: 98%   Weight: 190 lb 6.4 oz (86.4 kg)   Height: 65.35\" (166 cm)   PainSc: 0-No pain       Wt Readings from Last 3 " Encounters:   11/16/17 190 lb 6.4 oz (86.4 kg)   11/06/17 193 lb 6.4 oz (87.7 kg)   10/19/17 195 lb (88.5 kg)       PHYSICAL EXAMINATION  GENERAL:  The patient appears in good general condition, not in acute distress.  SKIN:  No skin rashes or lesions.  CHEST: In the right upper chest appears benign.  No hematoma or swelling.   EXT: No LE edema   NEUROLOGICAL:  No Focal neurological deficits.      RESULT REVIEW:     Results from last 7 days  Lab Units 11/13/17  1035   WBC 10*3/mm3 7.71   NEUTROS ABS 10*3/mm3 3.46   HEMOGLOBIN g/dL 13.6   HEMATOCRIT % 41.6   PLATELETS 10*3/mm3 118*           Assessment/Plan   1.  Left breast cancer, invasive ductal carcinoma, grade 3, ER negative, PA positive at 20%, HER-2 frederic negative.  Ki-67 was 50-55% on the biopsy from 7/19/17.   · She underwent a lumpectomy and sentinel lymph node biopsy on 9/26/17.   · Pathology examination revealed a 1.6 cm tumor, poorly differentiated with a Ki-67 50-60%, ER negative, PA weakly positive at <10%. Her-2 is negative.   · Adjuvant chemotherapy With 4 cycles of Cytoxan and Taxotere with Neulasta support was started on 11/6/17.  · After completion of chemotherapy, we will refer her to Radiation Oncology for post-lumpectomy radiation.  She will be placed on hormonal therapy after the radiation therapy.      2.  Anxiety due to the diagnosis of breat cancer and the need for chemotherapy.      3.  Essential hypertension.  The blood pressure is currently normal but it was low a few days ago. She states she was not taking her Spironolactone for the past month.      PLAN:    1.  Asked patient to start half a tablet of Xanax every 8 hrs as needed for the anxiety.    2.  Return on 11/27/17 for follow up and cycle #2.    3.  Continue to hold spironolactone.  Continue Toprol XL.    4.  Follow up on 11/27/17 for cycle #2.        Luz Elena Herndon MD  11/16/17     Him

## 2017-11-27 ENCOUNTER — INFUSION (OUTPATIENT)
Dept: ONCOLOGY | Facility: HOSPITAL | Age: 72
End: 2017-11-27

## 2017-11-27 ENCOUNTER — OFFICE VISIT (OUTPATIENT)
Dept: ONCOLOGY | Facility: CLINIC | Age: 72
End: 2017-11-27

## 2017-11-27 VITALS
HEART RATE: 72 BPM | HEIGHT: 65 IN | RESPIRATION RATE: 18 BRPM | SYSTOLIC BLOOD PRESSURE: 122 MMHG | TEMPERATURE: 97.6 F | BODY MASS INDEX: 31.56 KG/M2 | DIASTOLIC BLOOD PRESSURE: 80 MMHG | WEIGHT: 189.4 LBS

## 2017-11-27 DIAGNOSIS — Z17.1 MALIGNANT NEOPLASM OF LOWER-INNER QUADRANT OF LEFT BREAST IN FEMALE, ESTROGEN RECEPTOR NEGATIVE (HCC): ICD-10-CM

## 2017-11-27 DIAGNOSIS — D70.1 CHEMOTHERAPY INDUCED NEUTROPENIA (HCC): ICD-10-CM

## 2017-11-27 DIAGNOSIS — T45.1X5A CHEMOTHERAPY INDUCED NEUTROPENIA (HCC): ICD-10-CM

## 2017-11-27 DIAGNOSIS — F41.9 ANXIETY: ICD-10-CM

## 2017-11-27 DIAGNOSIS — C50.312 MALIGNANT NEOPLASM OF LOWER-INNER QUADRANT OF LEFT BREAST IN FEMALE, ESTROGEN RECEPTOR NEGATIVE (HCC): Primary | ICD-10-CM

## 2017-11-27 DIAGNOSIS — Z17.1 MALIGNANT NEOPLASM OF LOWER-INNER QUADRANT OF LEFT BREAST IN FEMALE, ESTROGEN RECEPTOR NEGATIVE (HCC): Primary | ICD-10-CM

## 2017-11-27 DIAGNOSIS — C50.312 MALIGNANT NEOPLASM OF LOWER-INNER QUADRANT OF LEFT BREAST IN FEMALE, ESTROGEN RECEPTOR NEGATIVE (HCC): ICD-10-CM

## 2017-11-27 DIAGNOSIS — I10 ESSENTIAL HYPERTENSION: ICD-10-CM

## 2017-11-27 LAB
ALBUMIN SERPL-MCNC: 3.9 G/DL (ref 3.5–5.2)
ALBUMIN/GLOB SERPL: 1.2 G/DL (ref 1.1–2.4)
ALP SERPL-CCNC: 81 U/L (ref 38–116)
ALT SERPL W P-5'-P-CCNC: 25 U/L (ref 0–33)
ANION GAP SERPL CALCULATED.3IONS-SCNC: 13 MMOL/L
AST SERPL-CCNC: 16 U/L (ref 0–32)
BASOPHILS # BLD AUTO: 0.01 10*3/MM3 (ref 0–0.1)
BASOPHILS NFR BLD AUTO: 0.1 % (ref 0–1.1)
BILIRUB SERPL-MCNC: 0.2 MG/DL (ref 0.1–1.2)
BUN BLD-MCNC: 18 MG/DL (ref 6–20)
BUN/CREAT SERPL: 18.8 (ref 7.3–30)
CALCIUM SPEC-SCNC: 9.8 MG/DL (ref 8.5–10.2)
CHLORIDE SERPL-SCNC: 100 MMOL/L (ref 98–107)
CO2 SERPL-SCNC: 24 MMOL/L (ref 22–29)
CREAT BLD-MCNC: 0.96 MG/DL (ref 0.6–1.1)
DEPRECATED RDW RBC AUTO: 43.7 FL (ref 37–49)
EOSINOPHIL # BLD AUTO: 0 10*3/MM3 (ref 0–0.36)
EOSINOPHIL NFR BLD AUTO: 0 % (ref 1–5)
ERYTHROCYTE [DISTWIDTH] IN BLOOD BY AUTOMATED COUNT: 14.1 % (ref 11.7–14.5)
GFR SERPL CREATININE-BSD FRML MDRD: 69 ML/MIN/1.73
GLOBULIN UR ELPH-MCNC: 3.2 GM/DL (ref 1.8–3.5)
GLUCOSE BLD-MCNC: 225 MG/DL (ref 74–124)
HCT VFR BLD AUTO: 39.8 % (ref 34–45)
HGB BLD-MCNC: 12.6 G/DL (ref 11.5–14.9)
IMM GRANULOCYTES # BLD: 0.07 10*3/MM3 (ref 0–0.03)
IMM GRANULOCYTES NFR BLD: 0.6 % (ref 0–0.5)
LYMPHOCYTES # BLD AUTO: 0.76 10*3/MM3 (ref 1–3.5)
LYMPHOCYTES NFR BLD AUTO: 6.2 % (ref 20–49)
MCH RBC QN AUTO: 27.2 PG (ref 27–33)
MCHC RBC AUTO-ENTMCNC: 31.7 G/DL (ref 32–35)
MCV RBC AUTO: 86 FL (ref 83–97)
MONOCYTES # BLD AUTO: 0.64 10*3/MM3 (ref 0.25–0.8)
MONOCYTES NFR BLD AUTO: 5.2 % (ref 4–12)
NEUTROPHILS # BLD AUTO: 10.79 10*3/MM3 (ref 1.5–7)
NEUTROPHILS NFR BLD AUTO: 87.9 % (ref 39–75)
NRBC BLD MANUAL-RTO: 0 /100 WBC (ref 0–0)
PLATELET # BLD AUTO: 344 10*3/MM3 (ref 150–375)
PMV BLD AUTO: 9 FL (ref 8.9–12.1)
POTASSIUM BLD-SCNC: 4.2 MMOL/L (ref 3.5–4.7)
PROT SERPL-MCNC: 7.1 G/DL (ref 6.3–8)
RBC # BLD AUTO: 4.63 10*6/MM3 (ref 3.9–5)
SODIUM BLD-SCNC: 137 MMOL/L (ref 134–145)
WBC NRBC COR # BLD: 12.27 10*3/MM3 (ref 4–10)

## 2017-11-27 PROCEDURE — 25010000002 PALONOSETRON PER 25 MCG: Performed by: INTERNAL MEDICINE

## 2017-11-27 PROCEDURE — 25010000002 CYCLOPHOSPHAMIDE PER 100 MG: Performed by: INTERNAL MEDICINE

## 2017-11-27 PROCEDURE — 96413 CHEMO IV INFUSION 1 HR: CPT | Performed by: INTERNAL MEDICINE

## 2017-11-27 PROCEDURE — 96415 CHEMO IV INFUSION ADDL HR: CPT | Performed by: INTERNAL MEDICINE

## 2017-11-27 PROCEDURE — 96417 CHEMO IV INFUS EACH ADDL SEQ: CPT | Performed by: INTERNAL MEDICINE

## 2017-11-27 PROCEDURE — 85025 COMPLETE CBC W/AUTO DIFF WBC: CPT

## 2017-11-27 PROCEDURE — 25010000002 DEXAMETHASONE PER 1 MG: Performed by: INTERNAL MEDICINE

## 2017-11-27 PROCEDURE — 96375 TX/PRO/DX INJ NEW DRUG ADDON: CPT | Performed by: INTERNAL MEDICINE

## 2017-11-27 PROCEDURE — 96377 APPLICATON ON-BODY INJECTOR: CPT | Performed by: INTERNAL MEDICINE

## 2017-11-27 PROCEDURE — 80053 COMPREHEN METABOLIC PANEL: CPT

## 2017-11-27 PROCEDURE — 25010000002 PEGFILGRASTIM 6 MG/0.6ML PREFILLED SYRINGE KIT: Performed by: INTERNAL MEDICINE

## 2017-11-27 PROCEDURE — 25010000002 DIPHENHYDRAMINE PER 50 MG: Performed by: INTERNAL MEDICINE

## 2017-11-27 PROCEDURE — 99214 OFFICE O/P EST MOD 30 MIN: CPT | Performed by: INTERNAL MEDICINE

## 2017-11-27 PROCEDURE — 25010000002 DOCETAXEL 20 MG/ML CONCENTRATION 8 ML VIAL: Performed by: INTERNAL MEDICINE

## 2017-11-27 RX ORDER — PALONOSETRON 0.05 MG/ML
0.25 INJECTION, SOLUTION INTRAVENOUS ONCE
Status: CANCELLED | OUTPATIENT
Start: 2017-11-27

## 2017-11-27 RX ORDER — PALONOSETRON 0.05 MG/ML
0.25 INJECTION, SOLUTION INTRAVENOUS ONCE
Status: COMPLETED | OUTPATIENT
Start: 2017-11-27 | End: 2017-11-27

## 2017-11-27 RX ORDER — FAMOTIDINE 10 MG/ML
20 INJECTION, SOLUTION INTRAVENOUS ONCE
Status: CANCELLED | OUTPATIENT
Start: 2017-11-27

## 2017-11-27 RX ORDER — FAMOTIDINE 10 MG/ML
20 INJECTION, SOLUTION INTRAVENOUS ONCE
Status: COMPLETED | OUTPATIENT
Start: 2017-11-27 | End: 2017-11-27

## 2017-11-27 RX ADMIN — DIPHENHYDRAMINE HYDROCHLORIDE 25 MG: 50 INJECTION, SOLUTION INTRAMUSCULAR; INTRAVENOUS at 09:34

## 2017-11-27 RX ADMIN — DEXAMETHASONE SODIUM PHOSPHATE 12 MG: 10 INJECTION INTRAMUSCULAR; INTRAVENOUS at 09:17

## 2017-11-27 RX ADMIN — PEGFILGRASTIM 6 MG: KIT SUBCUTANEOUS at 12:54

## 2017-11-27 RX ADMIN — DOCETAXEL 145 MG: 20 INJECTION, SOLUTION, CONCENTRATE INTRAVENOUS at 10:28

## 2017-11-27 RX ADMIN — PALONOSETRON HYDROCHLORIDE 0.25 MG: 0.25 INJECTION INTRAVENOUS at 09:16

## 2017-11-27 RX ADMIN — CYCLOPHOSPHAMIDE 1180 MG: 1 INJECTION, POWDER, FOR SOLUTION INTRAVENOUS; ORAL at 12:00

## 2017-11-27 RX ADMIN — FAMOTIDINE 20 MG: 10 INJECTION, SOLUTION INTRAVENOUS at 09:15

## 2017-11-27 NOTE — PROGRESS NOTES
Subjective     REASON FOR FOLLOW UP:      Left breast cancer                       HISTORY OF PRESENT ILLNESS:    Tyrone Rand is a 72 y.o. patient with left breast cancer. She started chemotherapy with Taxotere and Cytoxan on 11/6/17.    The patient had nausea without vomiting after the chemotherapy was started.  She also developed metallic taste but this has improved.    Patient continues to have anxiety.  She took 1/2 tablet of Xanax and it helped reduce anxiety.      Patient denies numbness in the fingers or toes.    Past Medical History:   Diagnosis Date   • Depression    • Diabetes mellitus    • H/O Left breast mass 2017   • Hyperlipidemia    • Hypertension    • Panic attack    • Poor sleep pattern        Past Surgical History:   Procedure Laterality Date   • COLON SURGERY     • KIDNEY SURGERY     • MOUTH SURGERY      TOOTH EXTRACTION         MEDICATIONS:    Current Outpatient Prescriptions:   •  Acetaminophen (TYLENOL ARTHRITIS PAIN PO), Take  by mouth., Disp: , Rfl:   •  ALPRAZolam (XANAX) 0.25 MG tablet, Take 1 tablet by mouth Every 8 (Eight) Hours As Needed for Anxiety., Disp: 20 tablet, Rfl: 0  •  dexamethasone (DECADRON) 4 MG tablet, Take 2 tablets (one in the morning and one in the evening, with food) the day before chemo and the day after chemotherapy, Disp: 16 tablet, Rfl: 0  •  ergocalciferol (ERGOCALCIFEROL) 09757 UNITS capsule, Take 1 capsule by mouth once a week., Disp: , Rfl:   •  glucose blood test strip, Test blood sugar twice daily, Disp: , Rfl:   •  Insulin Lispro (HUMALOG KWIKPEN SC), Inject 10 Units under the skin 3 (Three) Times a Day., Disp: , Rfl:   •  Loratadine (CLARITIN PO), Take 10 mg by mouth Daily., Disp: , Rfl:   •  metoprolol succinate XL (TOPROL-XL) 25 MG 24 hr tablet, TAKE ONE TABLET BY MOUTH IN THE MORNING, Disp: , Rfl: 11  •  ondansetron (ZOFRAN) 4 MG tablet, Take 1 tablet by mouth Every 8 (Eight) Hours As Needed for Nausea or Vomiting., Disp: 30 tablet, Rfl: 2  •   "ranitidine (ZANTAC) 150 MG tablet, Take by mouth 2 (two) times a day., Disp: , Rfl:   •  spironolactone (ALDACTONE) 100 MG tablet, Take 1 tablet by mouth daily., Disp: 90 tablet, Rfl: 3  •  TOUJEO SOLOSTAR 300 UNIT/ML solution pen-injector, Inject 25 Units under the skin daily with dinner. INJECT 25 UNITS AT BEDTIME (Patient taking differently: Inject 40 Units under the skin Daily With Dinner. INJECT 25 UNITS AT BEDTIME), Disp: 3 pen, Rfl: 5     ALLERGIES:  Allergies   Allergen Reactions   • Sulfa Antibiotics         Social History     Social History   • Marital status:      Spouse name: Pascual   • Number of children: 1   • Years of education: GED     Occupational History   •  Retired     Social History Main Topics   • Smoking status: Former Smoker     Packs/day: 0.00     Quit date: 2000   • Smokeless tobacco: Not on file      Comment: smoked 25 years, 1 PPD   • Alcohol use Yes      Comment: social   • Drug use: Not on file   • Sexual activity: Not on file     Other Topics Concern   • Not on file     Social History Narrative       Cancer-related family history is not on file.    REVIEW OF SYSTEMS:  GENERAL:  No Fever or chills. No weight change.  No Fatigue.   SKIN:  No skin rashes or lesions.  HEME/LYMPH: No Anemia.   RESPIRATORY:  No No Shortness of breath. No Hemoptysis.   CVS:  No Chest pain. No Palpitations. Ankle swelling.  GI:  Nausea without vomiting.  Metallic taste.  :  No Hematuria. No Dysuria. No Increased frequency.   MUSCULOSKELETAL:  No Bone pain.   NEUROLOGICAL:  No Headaches. No Dizziness.  No numbness.  PSYCHIATRIC:  Anxiety as in the history of present illness.  She had a similar episode in the past      Objective   VITAL SIGNS:  Vitals:    11/27/17 0759   BP: 122/80   Pulse: 72   Resp: 18   Temp: 97.6 °F (36.4 °C)   Weight: 189 lb 6.4 oz (85.9 kg)   Height: 65.35\" (166 cm)   PainSc: 0-No pain       Wt Readings from Last 3 Encounters:   11/27/17 189 lb 6.4 oz (85.9 kg)   11/16/17 190 lb " 6.4 oz (86.4 kg)   11/06/17 193 lb 6.4 oz (87.7 kg)       PHYSICAL EXAMINATION  GENERAL:  The patient appears in good general condition, not in acute distress.  SKIN:  No skin rashes or lesions.  CHEST: Port in the left upper chest is benign.  Clear bilaterally.  No added sounds.   EXT: No LE edema   NEUROLOGICAL:  No Focal neurological deficits.      RESULT REVIEW:     Results from last 7 days  Lab Units 11/27/17  0737   WBC 10*3/mm3 12.27*   NEUTROS ABS 10*3/mm3 10.79*   HEMOGLOBIN g/dL 12.6   HEMATOCRIT % 39.8   PLATELETS 10*3/mm3 344       Results from last 7 days  Lab Units 11/27/17  0737   SODIUM mmol/L 137   POTASSIUM mmol/L 4.2   CHLORIDE mmol/L 100   CO2 mmol/L 24.0   BUN mg/dL 18   CREATININE mg/dL 0.96   CALCIUM mg/dL 9.8   ALBUMIN g/dL 3.90   BILIRUBIN mg/dL 0.2   ALK PHOS U/L 81   ALT (SGPT) U/L 25   AST (SGOT) U/L 16       Assessment/Plan   1.  Left breast cancer, invasive ductal carcinoma, grade 3, ER negative, ND positive at 20%, HER-2 frederic negative.  Ki-67 was 50-55% on the biopsy from 7/19/17.   · She underwent a lumpectomy and sentinel lymph node biopsy on 9/26/17.   · Pathology examination revealed a 1.6 cm tumor, poorly differentiated with a Ki-67 50-60%, ER negative, ND weakly positive at <10%. Her-2 is negative.   · Adjuvant chemotherapy With 4 cycles of Cytoxan and Taxotere with Neulasta support was started on 11/6/17.  · After completion of chemotherapy, we will refer her to Radiation Oncology for post-lumpectomy radiation.  She will be placed on hormonal therapy after the radiation therapy.      Patient tolerated the chemotherapy except for mild nausea without vomiting.  She developed metallic taste but this is improving    2.  Anxiety due to the diagnosis of breat cancer.  This has improved.    3.  Patient is at risk for developing neutropenic fever and neutropenic infections.  She received on body Neulasta with cycle #1 and she did not develop fever.    4.  Essential hypertension.  The  blood pressure is staying normal.  She is off spironolactone.        PLAN:    1.  Proceed with cycle #2 of Cytoxan and Taxotere and the same doses.    2.  The patient will have on body Neulasta today.    3.  Return in 1 and in 2 weeks with CBC with RN review.    4.  Return in 3 weeks for physician review and cycle #3.        Luz Elena Herndon MD  11/27/17

## 2017-12-01 ENCOUNTER — TELEPHONE (OUTPATIENT)
Dept: ONCOLOGY | Facility: HOSPITAL | Age: 72
End: 2017-12-01

## 2017-12-01 NOTE — TELEPHONE ENCOUNTER
Pt  calling c/o pt having stomach cramping with constipation. He is not sure when patient's last BM was. He states she has not tried anything yet to help with the constipation. Reviewed laxative protocol with . He will get her some senokot to try taking today and go from there. They will call or go to ER if pt is unable to have a BM and develops abdominal pain. He v/u

## 2017-12-05 ENCOUNTER — LAB (OUTPATIENT)
Dept: LAB | Facility: HOSPITAL | Age: 72
End: 2017-12-05

## 2017-12-05 ENCOUNTER — CLINICAL SUPPORT (OUTPATIENT)
Dept: ONCOLOGY | Facility: HOSPITAL | Age: 72
End: 2017-12-05

## 2017-12-05 DIAGNOSIS — Z17.1 MALIGNANT NEOPLASM OF LOWER-INNER QUADRANT OF LEFT BREAST IN FEMALE, ESTROGEN RECEPTOR NEGATIVE (HCC): ICD-10-CM

## 2017-12-05 DIAGNOSIS — R41.0 CONFUSION: ICD-10-CM

## 2017-12-05 DIAGNOSIS — C50.312 MALIGNANT NEOPLASM OF LOWER-INNER QUADRANT OF LEFT BREAST IN FEMALE, ESTROGEN RECEPTOR NEGATIVE (HCC): ICD-10-CM

## 2017-12-05 DIAGNOSIS — Z17.1 MALIGNANT NEOPLASM OF LOWER-INNER QUADRANT OF LEFT BREAST IN FEMALE, ESTROGEN RECEPTOR NEGATIVE (HCC): Primary | ICD-10-CM

## 2017-12-05 DIAGNOSIS — C50.312 MALIGNANT NEOPLASM OF LOWER-INNER QUADRANT OF LEFT BREAST IN FEMALE, ESTROGEN RECEPTOR NEGATIVE (HCC): Primary | ICD-10-CM

## 2017-12-05 LAB
BASOPHILS # BLD AUTO: 0.07 10*3/MM3 (ref 0–0.1)
BASOPHILS NFR BLD AUTO: 0.3 % (ref 0–1.1)
DEPRECATED RDW RBC AUTO: 40 FL (ref 37–49)
EOSINOPHIL # BLD AUTO: 0.04 10*3/MM3 (ref 0–0.36)
EOSINOPHIL NFR BLD AUTO: 0.2 % (ref 1–5)
ERYTHROCYTE [DISTWIDTH] IN BLOOD BY AUTOMATED COUNT: 13.5 % (ref 11.7–14.5)
HCT VFR BLD AUTO: 39.4 % (ref 34–45)
HGB BLD-MCNC: 13.1 G/DL (ref 11.5–14.9)
IMM GRANULOCYTES # BLD: 4.16 10*3/MM3 (ref 0–0.03)
IMM GRANULOCYTES NFR BLD: 16 % (ref 0–0.5)
LYMPHOCYTES # BLD AUTO: 1.49 10*3/MM3 (ref 1–3.5)
LYMPHOCYTES NFR BLD AUTO: 5.7 % (ref 20–49)
MCH RBC QN AUTO: 27.4 PG (ref 27–33)
MCHC RBC AUTO-ENTMCNC: 33.2 G/DL (ref 32–35)
MCV RBC AUTO: 82.4 FL (ref 83–97)
MONOCYTES # BLD AUTO: 3.35 10*3/MM3 (ref 0.25–0.8)
MONOCYTES NFR BLD AUTO: 12.8 % (ref 4–12)
NEUTROPHILS # BLD AUTO: 16.97 10*3/MM3 (ref 1.5–7)
NEUTROPHILS NFR BLD AUTO: 65 % (ref 39–75)
NRBC BLD MANUAL-RTO: 0.2 /100 WBC (ref 0–0)
PLATELET # BLD AUTO: 171 10*3/MM3 (ref 150–375)
PMV BLD AUTO: 10.1 FL (ref 8.9–12.1)
RBC # BLD AUTO: 4.78 10*6/MM3 (ref 3.9–5)
WBC NRBC COR # BLD: 26.08 10*3/MM3 (ref 4–10)

## 2017-12-05 PROCEDURE — 36416 COLLJ CAPILLARY BLOOD SPEC: CPT | Performed by: INTERNAL MEDICINE

## 2017-12-05 PROCEDURE — 85025 COMPLETE CBC W/AUTO DIFF WBC: CPT | Performed by: INTERNAL MEDICINE

## 2017-12-05 NOTE — PROGRESS NOTES
CBC noted and reviewed with pt and pts  and Dr Herndon.  Pt denies any N/V but is not eating well.  Drinks only 2 glasses fld/day and take 1 Ensure.  States she has 1-2 loose stool/day.  Denies any fevers or signs of infection.  Pt struggling with finding words today.  Pt confused about symptoms and meds she is taking.  Pt having difficulty remembering facts about her blood sugars and if she tested today.  Pts  voiced concern that pt seems more confused over the past week.  He stated these symptoms are new this past week. Reviewed with Dr Herndon.  Order noted for MRI head, with and without contrast and to refer to Dr Yanni Beverly.  Orders explained to pt and her .  Pt very confused and concerned regarding these orders.  I discussed with her and her  the change in her symptoms.  Spent 20 minutes discussing SE of treatment and ways to treat the symptoms. and discussed hair loss and comfort measures regarding that.  They wanted to go home and discuss with their daughter. They stated they would call our office to sched   Orders placed.  Pt is sched for RN rev next week.  Pt and pts  V/U  Copy of labs to pt

## 2017-12-11 ENCOUNTER — LAB (OUTPATIENT)
Dept: LAB | Facility: HOSPITAL | Age: 72
End: 2017-12-11

## 2017-12-11 ENCOUNTER — CLINICAL SUPPORT (OUTPATIENT)
Dept: ONCOLOGY | Facility: HOSPITAL | Age: 72
End: 2017-12-11

## 2017-12-11 ENCOUNTER — DOCUMENTATION (OUTPATIENT)
Dept: NUTRITION | Facility: HOSPITAL | Age: 72
End: 2017-12-11

## 2017-12-11 DIAGNOSIS — Z17.1 MALIGNANT NEOPLASM OF LOWER-INNER QUADRANT OF LEFT BREAST IN FEMALE, ESTROGEN RECEPTOR NEGATIVE (HCC): ICD-10-CM

## 2017-12-11 DIAGNOSIS — C50.312 MALIGNANT NEOPLASM OF LOWER-INNER QUADRANT OF LEFT BREAST IN FEMALE, ESTROGEN RECEPTOR NEGATIVE (HCC): ICD-10-CM

## 2017-12-11 LAB
BASOPHILS # BLD AUTO: 0.07 10*3/MM3 (ref 0–0.1)
BASOPHILS NFR BLD AUTO: 0.9 % (ref 0–1.1)
DEPRECATED RDW RBC AUTO: 41.5 FL (ref 37–49)
EOSINOPHIL # BLD AUTO: 0 10*3/MM3 (ref 0–0.36)
EOSINOPHIL NFR BLD AUTO: 0 % (ref 1–5)
ERYTHROCYTE [DISTWIDTH] IN BLOOD BY AUTOMATED COUNT: 14.1 % (ref 11.7–14.5)
HCT VFR BLD AUTO: 40.9 % (ref 34–45)
HGB BLD-MCNC: 13.4 G/DL (ref 11.5–14.9)
IMM GRANULOCYTES # BLD: 0.08 10*3/MM3 (ref 0–0.03)
IMM GRANULOCYTES NFR BLD: 1 % (ref 0–0.5)
LYMPHOCYTES # BLD AUTO: 0.85 10*3/MM3 (ref 1–3.5)
LYMPHOCYTES NFR BLD AUTO: 11 % (ref 20–49)
MCH RBC QN AUTO: 27.1 PG (ref 27–33)
MCHC RBC AUTO-ENTMCNC: 32.8 G/DL (ref 32–35)
MCV RBC AUTO: 82.8 FL (ref 83–97)
MONOCYTES # BLD AUTO: 0.92 10*3/MM3 (ref 0.25–0.8)
MONOCYTES NFR BLD AUTO: 11.9 % (ref 4–12)
NEUTROPHILS # BLD AUTO: 5.84 10*3/MM3 (ref 1.5–7)
NEUTROPHILS NFR BLD AUTO: 75.2 % (ref 39–75)
NRBC BLD MANUAL-RTO: 0 /100 WBC (ref 0–0)
PLATELET # BLD AUTO: 155 10*3/MM3 (ref 150–375)
PMV BLD AUTO: 9.6 FL (ref 8.9–12.1)
RBC # BLD AUTO: 4.94 10*6/MM3 (ref 3.9–5)
WBC NRBC COR # BLD: 7.76 10*3/MM3 (ref 4–10)

## 2017-12-11 PROCEDURE — 85025 COMPLETE CBC W/AUTO DIFF WBC: CPT | Performed by: INTERNAL MEDICINE

## 2017-12-11 PROCEDURE — 36416 COLLJ CAPILLARY BLOOD SPEC: CPT | Performed by: INTERNAL MEDICINE

## 2017-12-11 RX ORDER — DEXAMETHASONE 4 MG/1
TABLET ORAL
Qty: 8 TABLET | Refills: 0 | Status: SHIPPED | OUTPATIENT
Start: 2017-12-11 | End: 2018-01-17

## 2017-12-11 NOTE — PROGRESS NOTES
Oncology Nutrition Screening    Patient Name:  Tyrone Rand  YOB: 1945  MRN: [id number]  Date:  12/11/17  Physician:  Katrina    Type of Cancer Treatment:   Chemotherapy:  Type: Taxol, cytoxan, neulasta      Patient Active Problem List   Diagnosis   • Abnormal electrocardiogram   • Abnormal mammogram   • Chronic kidney disease, stage III (moderate)   • Cognitive complaints   • Dementia   • Depression   • Uncontrolled type 2 diabetes mellitus   • Diabetes mellitus   • Dysuria   • Gastroesophageal reflux disease   • Headache   • Hyperlipidemia   • Hypertension   • Obesity (BMI 30-39.9)   • Obstructive sleep apnea syndrome   • Ventricular premature beats   • Renal mass   • Shortness of breath   • Dyssomnia   • Type 2 diabetes mellitus   • Vitamin D deficiency   • Weight gain   • Calculus of gallbladder without cholecystitis without obstruction   • Malignant neoplasm of lower-inner quadrant of left breast in female, estrogen receptor negative   • Fitting and adjustment of vascular catheter   • Chemotherapy induced neutropenia       Current Outpatient Prescriptions   Medication Sig Dispense Refill   • Acetaminophen (TYLENOL ARTHRITIS PAIN PO) Take  by mouth.     • ALPRAZolam (XANAX) 0.25 MG tablet Take 1 tablet by mouth Every 8 (Eight) Hours As Needed for Anxiety. 20 tablet 0   • dexamethasone (DECADRON) 4 MG tablet Take 2 tablets (one in the morning and one in the evening, with food) the day before chemo and the day after chemotherapy 8 tablet 0   • ergocalciferol (ERGOCALCIFEROL) 10556 UNITS capsule Take 1 capsule by mouth once a week.     • glucose blood test strip Test blood sugar twice daily     • Insulin Lispro (HUMALOG KWIKPEN SC) Inject 10 Units under the skin 3 (Three) Times a Day.     • Loratadine (CLARITIN PO) Take 10 mg by mouth Daily.     • metoprolol succinate XL (TOPROL-XL) 25 MG 24 hr tablet TAKE ONE TABLET BY MOUTH IN THE MORNING  11   • ondansetron (ZOFRAN) 4 MG tablet Take 1 tablet  "by mouth Every 8 (Eight) Hours As Needed for Nausea or Vomiting. 30 tablet 2   • ranitidine (ZANTAC) 150 MG tablet Take by mouth 2 (two) times a day.     • spironolactone (ALDACTONE) 100 MG tablet Take 1 tablet by mouth daily. 90 tablet 3   • TOUJEO SOLOSTAR 300 UNIT/ML solution pen-injector Inject 25 Units under the skin daily with dinner. INJECT 25 UNITS AT BEDTIME (Patient taking differently: Inject 40 Units under the skin Daily With Dinner. INJECT 25 UNITS AT BEDTIME) 3 pen 5     No current facility-administered medications for this visit.        Glycemic Risk:   IDDM, Steriods    Weight:   Height: 65\"  Weight: 189 lbs.  Usual Body Weight: 193 lbs.   BMI: 31  Overweight  Weight has decreased 4 pounds over last 1 month    Oral Food Intake:  Regular Diet - No Restrictions  Compared to normal intake, current food intake is less than normal    Hydration Status:   How many 8 ounce glass of water of fluid do you drink per day?  8    Enteral Feeding:   n/a    Nutrition Symptoms:   Altered Apetite  Altered Taste Perception  Constipation    Activity:   Not my normal self, but able to be up and about with fairly normal activities     reports that she quit smoking about 17 years ago. She smoked 0.00 packs per day. She does not have any smokeless tobacco history on file. She reports that she drinks alcohol.    Evaluation of Nutritional Risk:   Patient identified to be at nutritional risk and/or for nutritional consultation; will follow patient through course of treatment.   Diagnosis  Glycemic Risk  Weight Change  Nutrition Impact Symptoms  Patient Education     Notes:  [facility]  She states that she has had a decreased appetite, early satiety and  taste changes from chemotherapy.  Not sure what she should be eating at home for snacks. She is drinking about one Ensure per day.  She states previous constipation and nausea has resolved.    Discussed recommended foods for meals and snacks, " emphasizing protein foods. Gave Eating Hints booklet and a recipe book for patients undergoing cancer treatment.  Encouraged frequent meals throughout the day.   Provided RD contact info for any questions. Will follow as needed.         Electronically signed by:  Yessy Santos RD  4:08 PM

## 2017-12-11 NOTE — PROGRESS NOTES
Pt here today for CBC and RN review.  CBC reviewed with pt and family and copy given to pt.  CBC WNL and stable for pt.  Pt denies fevers, aches or chills.  Pt states that she is able to eat 3 meals a day and drinks 1 ensure a day, along with added fluids.  Pt to come back next week for treatment.  Pt did not have enough dexamethasone for next treatment, refill sent to [facility].  Pt still has problems with confusion at times.  Pt has MRI scheduled for 12/28 (see last note from Dov HUGGINS).    No fever, aches or chills.  No s/sx of infection.   Pt and family v/u to call office with any questions or concerns.

## 2017-12-12 ENCOUNTER — TELEPHONE (OUTPATIENT)
Dept: ONCOLOGY | Facility: CLINIC | Age: 72
End: 2017-12-12

## 2017-12-12 RX ORDER — LORAZEPAM 1 MG/1
1 TABLET ORAL
Qty: 1 TABLET | Refills: 0 | OUTPATIENT
Start: 2017-12-12 | End: 2017-12-12

## 2017-12-12 NOTE — TELEPHONE ENCOUNTER
----- Message from Luz Elena Herndon MD sent at 12/12/2017  1:32 AM EST -----  Please see if the MRI of the brain can be done this week so I can review when I see the patient on 12/18/17. It is currently scheduled for 12/28/17.    Thank you

## 2017-12-12 NOTE — TELEPHONE ENCOUNTER
Pt.  called to confirm that pt. Mri has been moved up to 12/14/17.  Was told it would not be a open mri and pt. Is claustrophobic.  Will need something to help her get through the procedure.  All d/w dr. Coffey (dr. # 2)  As dr. carpio is out of the office this week.  Will call pt. In ativan 1mg to be taken about 30 minutes prior to the procedure. Pt.  informed.

## 2017-12-14 ENCOUNTER — HOSPITAL ENCOUNTER (OUTPATIENT)
Dept: MRI IMAGING | Facility: HOSPITAL | Age: 72
Discharge: HOME | End: 2017-12-14
Attending: INTERNAL MEDICINE | Admitting: INTERNAL MEDICINE

## 2017-12-14 DIAGNOSIS — C50.312 MALIGNANT NEOPLASM OF LOWER-INNER QUADRANT OF LEFT BREAST IN FEMALE, ESTROGEN RECEPTOR NEGATIVE (HCC): ICD-10-CM

## 2017-12-14 DIAGNOSIS — Z17.1 MALIGNANT NEOPLASM OF LOWER-INNER QUADRANT OF LEFT BREAST IN FEMALE, ESTROGEN RECEPTOR NEGATIVE (HCC): ICD-10-CM

## 2017-12-14 DIAGNOSIS — R41.0 CONFUSION: ICD-10-CM

## 2017-12-14 LAB — CREAT BLDA-MCNC: 1.1 MG/DL (ref 0.6–1.3)

## 2017-12-14 PROCEDURE — 0 GADOBENATE DIMEGLUMINE 529 MG/ML SOLUTION: Performed by: INTERNAL MEDICINE

## 2017-12-14 PROCEDURE — 82565 ASSAY OF CREATININE: CPT

## 2017-12-14 PROCEDURE — A9577 INJ MULTIHANCE: HCPCS | Performed by: INTERNAL MEDICINE

## 2017-12-14 PROCEDURE — 70553 MRI BRAIN STEM W/O & W/DYE: CPT

## 2017-12-14 RX ADMIN — GADOBENATE DIMEGLUMINE 17 ML: 529 INJECTION, SOLUTION INTRAVENOUS at 10:50

## 2017-12-18 ENCOUNTER — INFUSION (OUTPATIENT)
Dept: ONCOLOGY | Facility: HOSPITAL | Age: 72
End: 2017-12-18

## 2017-12-18 ENCOUNTER — OFFICE VISIT (OUTPATIENT)
Dept: ONCOLOGY | Facility: CLINIC | Age: 72
End: 2017-12-18

## 2017-12-18 VITALS
OXYGEN SATURATION: 99 % | WEIGHT: 185 LBS | TEMPERATURE: 98.4 F | SYSTOLIC BLOOD PRESSURE: 148 MMHG | DIASTOLIC BLOOD PRESSURE: 88 MMHG | HEART RATE: 94 BPM | HEIGHT: 65 IN | BODY MASS INDEX: 30.82 KG/M2 | RESPIRATION RATE: 12 BRPM

## 2017-12-18 DIAGNOSIS — Z17.1 MALIGNANT NEOPLASM OF LOWER-INNER QUADRANT OF LEFT BREAST IN FEMALE, ESTROGEN RECEPTOR NEGATIVE (HCC): Primary | ICD-10-CM

## 2017-12-18 DIAGNOSIS — E11.8 TYPE 2 DIABETES MELLITUS WITH COMPLICATION, UNSPECIFIED LONG TERM INSULIN USE STATUS: ICD-10-CM

## 2017-12-18 DIAGNOSIS — R41.0 CONFUSION: ICD-10-CM

## 2017-12-18 DIAGNOSIS — C50.312 MALIGNANT NEOPLASM OF LOWER-INNER QUADRANT OF LEFT BREAST IN FEMALE, ESTROGEN RECEPTOR NEGATIVE (HCC): ICD-10-CM

## 2017-12-18 DIAGNOSIS — Z17.1 MALIGNANT NEOPLASM OF LOWER-INNER QUADRANT OF LEFT BREAST IN FEMALE, ESTROGEN RECEPTOR NEGATIVE (HCC): ICD-10-CM

## 2017-12-18 DIAGNOSIS — N18.30 CHRONIC KIDNEY DISEASE, STAGE III (MODERATE) (HCC): Primary | ICD-10-CM

## 2017-12-18 DIAGNOSIS — D70.1 CHEMOTHERAPY INDUCED NEUTROPENIA (HCC): ICD-10-CM

## 2017-12-18 DIAGNOSIS — C50.312 MALIGNANT NEOPLASM OF LOWER-INNER QUADRANT OF LEFT BREAST IN FEMALE, ESTROGEN RECEPTOR NEGATIVE (HCC): Primary | ICD-10-CM

## 2017-12-18 DIAGNOSIS — I10 ESSENTIAL HYPERTENSION: ICD-10-CM

## 2017-12-18 DIAGNOSIS — F41.9 ANXIETY: ICD-10-CM

## 2017-12-18 DIAGNOSIS — T45.1X5A CHEMOTHERAPY INDUCED NEUTROPENIA (HCC): ICD-10-CM

## 2017-12-18 LAB
ALBUMIN SERPL-MCNC: 3.7 G/DL (ref 3.5–5.2)
ALBUMIN/GLOB SERPL: 1.1 G/DL (ref 1.1–2.4)
ALP SERPL-CCNC: 93 U/L (ref 38–116)
ALT SERPL W P-5'-P-CCNC: 22 U/L (ref 0–33)
ANION GAP SERPL CALCULATED.3IONS-SCNC: 15.6 MMOL/L
AST SERPL-CCNC: 22 U/L (ref 0–32)
BASOPHILS # BLD AUTO: 0.01 10*3/MM3 (ref 0–0.1)
BASOPHILS NFR BLD AUTO: 0.1 % (ref 0–1.1)
BILIRUB SERPL-MCNC: 0.2 MG/DL (ref 0.1–1.2)
BUN BLD-MCNC: 16 MG/DL (ref 6–20)
BUN/CREAT SERPL: 16.2 (ref 7.3–30)
CALCIUM SPEC-SCNC: 9.6 MG/DL (ref 8.5–10.2)
CHLORIDE SERPL-SCNC: 99 MMOL/L (ref 98–107)
CO2 SERPL-SCNC: 22.4 MMOL/L (ref 22–29)
CREAT BLD-MCNC: 0.99 MG/DL (ref 0.6–1.1)
DEPRECATED RDW RBC AUTO: 44.1 FL (ref 37–49)
EOSINOPHIL # BLD AUTO: 0 10*3/MM3 (ref 0–0.36)
EOSINOPHIL NFR BLD AUTO: 0 % (ref 1–5)
ERYTHROCYTE [DISTWIDTH] IN BLOOD BY AUTOMATED COUNT: 14.4 % (ref 11.7–14.5)
GFR SERPL CREATININE-BSD FRML MDRD: 66 ML/MIN/1.73
GLOBULIN UR ELPH-MCNC: 3.3 GM/DL (ref 1.8–3.5)
GLUCOSE BLD-MCNC: 351 MG/DL (ref 74–124)
GLUCOSE BLDC-MCNC: 244 MG/DL (ref 74–124)
HCT VFR BLD AUTO: 36.8 % (ref 34–45)
HGB BLD-MCNC: 11.8 G/DL (ref 11.5–14.9)
HOLD SPECIMEN: NORMAL
IMM GRANULOCYTES # BLD: 0.03 10*3/MM3 (ref 0–0.03)
IMM GRANULOCYTES NFR BLD: 0.4 % (ref 0–0.5)
LYMPHOCYTES # BLD AUTO: 0.65 10*3/MM3 (ref 1–3.5)
LYMPHOCYTES NFR BLD AUTO: 8 % (ref 20–49)
Lab: ABNORMAL
MCH RBC QN AUTO: 27.4 PG (ref 27–33)
MCHC RBC AUTO-ENTMCNC: 32.1 G/DL (ref 32–35)
MCV RBC AUTO: 85.6 FL (ref 83–97)
MONOCYTES # BLD AUTO: 0.57 10*3/MM3 (ref 0.25–0.8)
MONOCYTES NFR BLD AUTO: 7.1 % (ref 4–12)
NEUTROPHILS # BLD AUTO: 6.82 10*3/MM3 (ref 1.5–7)
NEUTROPHILS NFR BLD AUTO: 84.4 % (ref 39–75)
NRBC BLD MANUAL-RTO: 0 /100 WBC (ref 0–0)
PLATELET # BLD AUTO: 295 10*3/MM3 (ref 150–375)
PMV BLD AUTO: 8.6 FL (ref 8.9–12.1)
POTASSIUM BLD-SCNC: 4.6 MMOL/L (ref 3.5–4.7)
PROT SERPL-MCNC: 7 G/DL (ref 6.3–8)
RBC # BLD AUTO: 4.3 10*6/MM3 (ref 3.9–5)
SODIUM BLD-SCNC: 137 MMOL/L (ref 134–145)
WBC NRBC COR # BLD: 8.08 10*3/MM3 (ref 4–10)

## 2017-12-18 PROCEDURE — 25010000002 DOCETAXEL 20 MG/ML SOLUTION 8 ML VIAL: Performed by: INTERNAL MEDICINE

## 2017-12-18 PROCEDURE — 96375 TX/PRO/DX INJ NEW DRUG ADDON: CPT | Performed by: INTERNAL MEDICINE

## 2017-12-18 PROCEDURE — 85025 COMPLETE CBC W/AUTO DIFF WBC: CPT

## 2017-12-18 PROCEDURE — 25010000002 PALONOSETRON PER 25 MCG: Performed by: INTERNAL MEDICINE

## 2017-12-18 PROCEDURE — 96417 CHEMO IV INFUS EACH ADDL SEQ: CPT | Performed by: INTERNAL MEDICINE

## 2017-12-18 PROCEDURE — 25010000002 DIPHENHYDRAMINE PER 50 MG: Performed by: INTERNAL MEDICINE

## 2017-12-18 PROCEDURE — 96413 CHEMO IV INFUSION 1 HR: CPT | Performed by: INTERNAL MEDICINE

## 2017-12-18 PROCEDURE — 96377 APPLICATON ON-BODY INJECTOR: CPT | Performed by: INTERNAL MEDICINE

## 2017-12-18 PROCEDURE — 25010000002 CYCLOPHOSPHAMIDE PER 100 MG: Performed by: INTERNAL MEDICINE

## 2017-12-18 PROCEDURE — 82962 GLUCOSE BLOOD TEST: CPT | Performed by: INTERNAL MEDICINE

## 2017-12-18 PROCEDURE — 25010000002 PEGFILGRASTIM 6 MG/0.6ML PREFILLED SYRINGE KIT: Performed by: INTERNAL MEDICINE

## 2017-12-18 PROCEDURE — 80053 COMPREHEN METABOLIC PANEL: CPT

## 2017-12-18 PROCEDURE — 99215 OFFICE O/P EST HI 40 MIN: CPT | Performed by: INTERNAL MEDICINE

## 2017-12-18 PROCEDURE — 25010000002 DEXAMETHASONE SODIUM PHOSPHATE 10 MG/ML SOLUTION 1 ML VIAL: Performed by: INTERNAL MEDICINE

## 2017-12-18 RX ORDER — FAMOTIDINE 10 MG/ML
20 INJECTION, SOLUTION INTRAVENOUS ONCE
Status: COMPLETED | OUTPATIENT
Start: 2017-12-18 | End: 2017-12-18

## 2017-12-18 RX ORDER — PALONOSETRON 0.05 MG/ML
0.25 INJECTION, SOLUTION INTRAVENOUS ONCE
Status: CANCELLED | OUTPATIENT
Start: 2017-12-18

## 2017-12-18 RX ORDER — FAMOTIDINE 10 MG/ML
20 INJECTION, SOLUTION INTRAVENOUS ONCE
Status: CANCELLED | OUTPATIENT
Start: 2017-12-18

## 2017-12-18 RX ORDER — PALONOSETRON 0.05 MG/ML
0.25 INJECTION, SOLUTION INTRAVENOUS ONCE
Status: COMPLETED | OUTPATIENT
Start: 2017-12-18 | End: 2017-12-18

## 2017-12-18 RX ADMIN — DIPHENHYDRAMINE HYDROCHLORIDE 25 MG: 50 INJECTION, SOLUTION INTRAMUSCULAR; INTRAVENOUS at 09:11

## 2017-12-18 RX ADMIN — DOCETAXEL 145 MG: 20 INJECTION, SOLUTION, CONCENTRATE INTRAVENOUS at 09:59

## 2017-12-18 RX ADMIN — DEXAMETHASONE SODIUM PHOSPHATE 12 MG: 10 INJECTION, SOLUTION INTRAMUSCULAR; INTRAVENOUS at 08:54

## 2017-12-18 RX ADMIN — CYCLOPHOSPHAMIDE 1180 MG: 1 INJECTION, POWDER, FOR SOLUTION INTRAVENOUS; ORAL at 11:01

## 2017-12-18 RX ADMIN — PEGFILGRASTIM 6 MG: KIT SUBCUTANEOUS at 11:51

## 2017-12-18 RX ADMIN — FAMOTIDINE 20 MG: 10 INJECTION, SOLUTION INTRAVENOUS at 08:54

## 2017-12-18 RX ADMIN — SODIUM CHLORIDE 250 ML: 900 INJECTION, SOLUTION INTRAVENOUS at 08:50

## 2017-12-18 RX ADMIN — PALONOSETRON HYDROCHLORIDE 0.25 MG: 0.25 INJECTION INTRAVENOUS at 08:54

## 2017-12-18 NOTE — PROGRESS NOTES
Subjective     REASON FOR FOLLOW UP:      Left breast cancer                       HISTORY OF PRESENT ILLNESS:    Tyrone Rand is a 72 y.o. patient with left breast cancer. She started chemotherapy with Taxotere and Cytoxan on 11/6/17.    The patient is tolerating the chemotherapy without problems with nausea or vomiting.  However, she had one episode of nausea and vomiting after the MRI of the brain.  She thinks it might have been from something she ate.  She is not experiencing numbness in the fingers or toes.  There is no lower extremity swelling.    The patient's family noticed that he is more interactive and less confused.  She saw her PCP and Dr. Mendieta recommended awaiting the results of the MRI of the brain.    Past Medical History:   Diagnosis Date   • Depression    • Diabetes mellitus    • H/O Left breast mass 2017   • Hyperlipidemia    • Hypertension    • Panic attack    • Poor sleep pattern        Past Surgical History:   Procedure Laterality Date   • COLON SURGERY     • KIDNEY SURGERY     • MOUTH SURGERY      TOOTH EXTRACTION         MEDICATIONS:    Current Outpatient Prescriptions:   •  Acetaminophen (TYLENOL ARTHRITIS PAIN PO), Take  by mouth., Disp: , Rfl:   •  ALPRAZolam (XANAX) 0.25 MG tablet, Take 1 tablet by mouth Every 8 (Eight) Hours As Needed for Anxiety., Disp: 20 tablet, Rfl: 0  •  dexamethasone (DECADRON) 4 MG tablet, Take 2 tablets (one in the morning and one in the evening, with food) the day before chemo and the day after chemotherapy, Disp: 8 tablet, Rfl: 0  •  ergocalciferol (ERGOCALCIFEROL) 01049 UNITS capsule, Take 1 capsule by mouth once a week., Disp: , Rfl:   •  glucose blood test strip, Test blood sugar twice daily, Disp: , Rfl:   •  Insulin Lispro (HUMALOG KWIKPEN SC), Inject 10 Units under the skin 3 (Three) Times a Day., Disp: , Rfl:   •  Loratadine (CLARITIN PO), Take 10 mg by mouth Daily., Disp: , Rfl:   •  metoprolol succinate XL (TOPROL-XL) 25 MG 24 hr tablet, TAKE ONE  TABLET BY MOUTH IN THE MORNING, Disp: , Rfl: 11  •  ondansetron (ZOFRAN) 4 MG tablet, Take 1 tablet by mouth Every 8 (Eight) Hours As Needed for Nausea or Vomiting., Disp: 30 tablet, Rfl: 2  •  ranitidine (ZANTAC) 150 MG tablet, Take by mouth 2 (two) times a day., Disp: , Rfl:   •  spironolactone (ALDACTONE) 100 MG tablet, Take 1 tablet by mouth daily., Disp: 90 tablet, Rfl: 3  •  TOUJEO SOLOSTAR 300 UNIT/ML solution pen-injector, Inject 25 Units under the skin daily with dinner. INJECT 25 UNITS AT BEDTIME (Patient taking differently: Inject 40 Units under the skin Daily With Dinner. INJECT 25 UNITS AT BEDTIME), Disp: 3 pen, Rfl: 5     ALLERGIES:  Allergies   Allergen Reactions   • Sulfa Antibiotics         Social History     Social History   • Marital status:      Spouse name: Pascual   • Number of children: 1   • Years of education: GED     Occupational History   •  Retired     Social History Main Topics   • Smoking status: Former Smoker     Packs/day: 0.00     Quit date: 2000   • Smokeless tobacco: Not on file      Comment: smoked 25 years, 1 PPD   • Alcohol use Yes      Comment: social   • Drug use: Not on file   • Sexual activity: Not on file     Other Topics Concern   • Not on file     Social History Narrative       Cancer-related family history is not on file.    REVIEW OF SYSTEMS:  GENERAL:  No Fever or chills. No weight change.  No Fatigue.   SKIN:  Itching and developed after the MRI of the brain.  HEME/LYMPH: No Anemia.   RESPIRATORY:  No No Shortness of breath. No Hemoptysis.   CVS:  No Chest pain. No Palpitations. Ankle swelling.  GI:  One episode of Nausea and vomiting.  Metallic taste has improved.  :  No Hematuria. No Dysuria. No Increased frequency.   MUSCULOSKELETAL:  No Bone pain.   NEUROLOGICAL:  No Headaches. No Dizziness.  No numbness.  PSYCHIATRIC:  Anxiety as in the history of present illness.  She had a similar episode in the past.  Confusion has improved.       Objective   VITAL  "SIGNS:  Vitals:    12/18/17 0804   BP: 148/88   Pulse: 94   Resp: 12   Temp: 98.4 °F (36.9 °C)   TempSrc: Oral   SpO2: 99%   Weight: 83.9 kg (185 lb)   Height: 166 cm (65.35\")   PainSc: 0-No pain       Wt Readings from Last 3 Encounters:   12/18/17 83.9 kg (185 lb)   12/14/17 85.9 kg (189 lb 6 oz)   11/27/17 85.9 kg (189 lb 6.4 oz)       PHYSICAL EXAMINATION  GENERAL:  The patient appears in good general condition, not in acute distress. She is not Confused.  She is oriented ×3.  SKIN:  No skin rashes or lesions.  CHEST: Lungs are clear bilaterally.  No added sounds.   EXT: No LE edema   NEUROLOGICAL:  No Focal neurological deficits.      RESULT REVIEW:     Results from last 7 days  Lab Units 12/18/17  0744 12/11/17  1136   WBC 10*3/mm3 8.08 7.76   NEUTROS ABS 10*3/mm3 6.82 5.84   HEMOGLOBIN g/dL 11.8 13.4   HEMATOCRIT % 36.8 40.9   PLATELETS 10*3/mm3 295 155       Results from last 7 days  Lab Units 12/14/17  1006   CREATININE mg/dL 1.10     MRI of the brain on 12/14/17:  FINDINGS: There is some mild patchy and nodular T2 high signal in the  periventricular white matter, multiple small patchy nodular foci  scattered in the subcortical white matter of cerebral hemispheres and  some patchy T2 high signal in the central pontine white matter most  consistent with mild-to-moderate small vessel disease. There is a 5 x 2  mm old posterior lateral right thalamic lacunar type infarct. The  remainder of the brain parenchyma is normal in signal intensity. The  ventricles are normal in size. I see no focal mass effect and no midline  shift and no extra-axial fluid collections are identified. No abnormal  areas of enhancement are seen in the head. No diffusion-weighted  abnormality is seen with no acute infarct identified. The paranasal  sinuses and the mastoid air cells and middle ear cavities are clear.  Good flow voids are demonstrated within the cerebral vessels and in the  dural venous sinuses. The calvarium and skull base " and upper cervical  spine demonstrate normal marrow signal intensity. The orbits are  unremarkable.      IMPRESSION:      There is mild to moderate small vessel disease in cerebral and central  pontine white matter and a tiny 5 x 2 mm old lacunar type infarct in the  posterior lateral right thalamus. The remainder of MRI of the head is  within normal limits with no evidence of metastatic disease to the head.    Assessment/Plan   1.  Left breast cancer, invasive ductal carcinoma, grade 3, ER negative, IN positive at 20%, HER-2 frederic negative.  Ki-67 was 50-55% on the biopsy from 7/19/17.   · She underwent a lumpectomy and sentinel lymph node biopsy on 9/26/17.   · Pathology examination revealed a 1.6 cm tumor, poorly differentiated with a Ki-67 50-60%, ER negative, IN weakly positive at <10%. Her-2 is negative.   · Adjuvant chemotherapy With 4 cycles of Cytoxan and Taxotere with Neulasta support was started on 11/6/17.  · After completion of chemotherapy, we will refer her to Radiation Oncology for post-lumpectomy radiation.  She will be placed on hormonal therapy after the radiation therapy.      Patient is tolerating the chemotherapy treatment.  The metallic taste is improving.  She had one episode of nausea and vomiting that was most likely related to the chemotherapy.    2.  Anxiety due to the diagnosis of breat cancer.  There was some worsening after last chemotherapy treatment.  The patient appeared to be confused and having trouble finding words.  We therefore obtained an MRI of the brain.  Thankfully, there was no evidence of metastases.  There was an old lacunar infarct in the right thalamus measuring 5 x 2 mm likely asymptomatic.  Patient does not have any symptoms to suggest stroke in the past.  She is doing better today.  However, I recommended evaluation by psychiatry evaluate for possible depression versus adjustment disorder.    3.  Patient is at risk for developing neutropenic fever and neutropenic  infections.  She she is receiving on body Neulasta with cycle #1 and she did not develop fever.  Neutrophil count is staying in the normal range.    4.  Essential hypertension.  The blood pressure is staying normal.  She is off spironolactone.  If blood pressure remains elevated, we will ask her to restart spironolactone.    5.  Itching of unclear etiology.  It is possible that this is due to the MRI contrast.  I asked the patient to take Benadryl 25 mg every 6 hours as needed.      PLAN:    1.  Proceed with cycle #3 of Cytoxan and Taxotere at the same doses.    2.  The patient will have on body Neulasta today.    3.  Return in 3 weeks for physician review and to receive cycle #4 inches the final chemotherapy cycle.     4.  The patient was referred to Dr. Yanni Beverly to evaluate for depression.        Luz Elena Herndon MD  12/18/17

## 2017-12-22 ENCOUNTER — TELEPHONE (OUTPATIENT)
Dept: ONCOLOGY | Facility: HOSPITAL | Age: 72
End: 2017-12-22

## 2017-12-22 RX ORDER — CLINDAMYCIN HYDROCHLORIDE 300 MG/1
300 CAPSULE ORAL 3 TIMES DAILY
Qty: 21 CAPSULE | Refills: 0 | Status: SHIPPED | OUTPATIENT
Start: 2017-12-22 | End: 2018-01-24

## 2017-12-22 NOTE — TELEPHONE ENCOUNTER
Pt called stating she has a boil on her labia majora. She states it developed more than a week ago but it is not going away. She states it is red and not painful. There is no open area to it. She just had treatment on 12/18/17. Reviewed with Lacie MAR. She states to start pt on clindamycin 300mg every 8 hours for 7 days and to do warm compresses a few times a day, called pt. Pt v/u. Script sent to pharmacy

## 2017-12-29 ENCOUNTER — TELEPHONE (OUTPATIENT)
Dept: ONCOLOGY | Facility: HOSPITAL | Age: 72
End: 2017-12-29

## 2017-12-29 NOTE — TELEPHONE ENCOUNTER
Pts  called today concerned that his wife is not eating much and is sleeping a lot.  Pt completed cycle three of taxotere/cytoxan last Monday.  I explained to him that these are expected side effects of the chemo.  He does report she is drinking plenty of fluids, and is drinking ensure as well.  I encouraged him to continue that and if anything should change to give us a call back. He v/u

## 2018-01-04 ENCOUNTER — TELEPHONE (OUTPATIENT)
Dept: ONCOLOGY | Facility: HOSPITAL | Age: 73
End: 2018-01-04

## 2018-01-08 ENCOUNTER — INFUSION (OUTPATIENT)
Dept: ONCOLOGY | Facility: HOSPITAL | Age: 73
End: 2018-01-08

## 2018-01-08 ENCOUNTER — OFFICE VISIT (OUTPATIENT)
Dept: ONCOLOGY | Facility: CLINIC | Age: 73
End: 2018-01-08

## 2018-01-08 ENCOUNTER — TELEPHONE (OUTPATIENT)
Dept: PSYCHIATRY | Facility: HOSPITAL | Age: 73
End: 2018-01-08

## 2018-01-08 VITALS
DIASTOLIC BLOOD PRESSURE: 70 MMHG | RESPIRATION RATE: 16 BRPM | BODY MASS INDEX: 29.59 KG/M2 | HEIGHT: 65 IN | WEIGHT: 177.6 LBS | HEART RATE: 72 BPM | TEMPERATURE: 97.3 F | SYSTOLIC BLOOD PRESSURE: 120 MMHG

## 2018-01-08 DIAGNOSIS — T45.1X5A CHEMOTHERAPY INDUCED NEUTROPENIA (HCC): ICD-10-CM

## 2018-01-08 DIAGNOSIS — Z17.1 MALIGNANT NEOPLASM OF LOWER-INNER QUADRANT OF LEFT BREAST IN FEMALE, ESTROGEN RECEPTOR NEGATIVE (HCC): ICD-10-CM

## 2018-01-08 DIAGNOSIS — C50.312 MALIGNANT NEOPLASM OF LOWER-INNER QUADRANT OF LEFT BREAST IN FEMALE, ESTROGEN RECEPTOR NEGATIVE (HCC): Primary | ICD-10-CM

## 2018-01-08 DIAGNOSIS — E86.0 DEHYDRATION: Primary | ICD-10-CM

## 2018-01-08 DIAGNOSIS — Z17.1 MALIGNANT NEOPLASM OF LOWER-INNER QUADRANT OF LEFT BREAST IN FEMALE, ESTROGEN RECEPTOR NEGATIVE (HCC): Primary | ICD-10-CM

## 2018-01-08 DIAGNOSIS — D70.1 CHEMOTHERAPY INDUCED NEUTROPENIA (HCC): ICD-10-CM

## 2018-01-08 DIAGNOSIS — E86.0 DEHYDRATION: ICD-10-CM

## 2018-01-08 DIAGNOSIS — N17.9 ACUTE KIDNEY INJURY (HCC): ICD-10-CM

## 2018-01-08 DIAGNOSIS — C50.312 MALIGNANT NEOPLASM OF LOWER-INNER QUADRANT OF LEFT BREAST IN FEMALE, ESTROGEN RECEPTOR NEGATIVE (HCC): ICD-10-CM

## 2018-01-08 DIAGNOSIS — E83.52 HYPERCALCEMIA: ICD-10-CM

## 2018-01-08 LAB
ALBUMIN SERPL-MCNC: 4.1 G/DL (ref 3.5–5.2)
ALBUMIN/GLOB SERPL: 1.2 G/DL (ref 1.1–2.4)
ALP SERPL-CCNC: 85 U/L (ref 38–116)
ALT SERPL W P-5'-P-CCNC: 18 U/L (ref 0–33)
ANION GAP SERPL CALCULATED.3IONS-SCNC: 12.9 MMOL/L
AST SERPL-CCNC: 18 U/L (ref 0–32)
BASOPHILS # BLD AUTO: 0.01 10*3/MM3 (ref 0–0.1)
BASOPHILS NFR BLD AUTO: 0.1 % (ref 0–1.1)
BILIRUB SERPL-MCNC: 0.3 MG/DL (ref 0.1–1.2)
BUN BLD-MCNC: 19 MG/DL (ref 6–20)
BUN/CREAT SERPL: 15.8 (ref 7.3–30)
CALCIUM SPEC-SCNC: 10.3 MG/DL (ref 8.5–10.2)
CHLORIDE SERPL-SCNC: 97 MMOL/L (ref 98–107)
CO2 SERPL-SCNC: 27.1 MMOL/L (ref 22–29)
CREAT BLD-MCNC: 1.2 MG/DL (ref 0.6–1.1)
DEPRECATED RDW RBC AUTO: 46.5 FL (ref 37–49)
EOSINOPHIL # BLD AUTO: 0 10*3/MM3 (ref 0–0.36)
EOSINOPHIL NFR BLD AUTO: 0 % (ref 1–5)
ERYTHROCYTE [DISTWIDTH] IN BLOOD BY AUTOMATED COUNT: 15.1 % (ref 11.7–14.5)
GFR SERPL CREATININE-BSD FRML MDRD: 53 ML/MIN/1.73
GLOBULIN UR ELPH-MCNC: 3.4 GM/DL (ref 1.8–3.5)
GLUCOSE BLD-MCNC: 234 MG/DL (ref 74–124)
HCT VFR BLD AUTO: 37.7 % (ref 34–45)
HGB BLD-MCNC: 12.2 G/DL (ref 11.5–14.9)
IMM GRANULOCYTES # BLD: 0.04 10*3/MM3 (ref 0–0.03)
IMM GRANULOCYTES NFR BLD: 0.4 % (ref 0–0.5)
LYMPHOCYTES # BLD AUTO: 0.59 10*3/MM3 (ref 1–3.5)
LYMPHOCYTES NFR BLD AUTO: 6 % (ref 20–49)
MCH RBC QN AUTO: 27.5 PG (ref 27–33)
MCHC RBC AUTO-ENTMCNC: 32.4 G/DL (ref 32–35)
MCV RBC AUTO: 84.9 FL (ref 83–97)
MONOCYTES # BLD AUTO: 0.7 10*3/MM3 (ref 0.25–0.8)
MONOCYTES NFR BLD AUTO: 7.2 % (ref 4–12)
NEUTROPHILS # BLD AUTO: 8.43 10*3/MM3 (ref 1.5–7)
NEUTROPHILS NFR BLD AUTO: 86.3 % (ref 39–75)
NRBC BLD MANUAL-RTO: 0 /100 WBC (ref 0–0)
PLATELET # BLD AUTO: 359 10*3/MM3 (ref 150–375)
PMV BLD AUTO: 8.5 FL (ref 8.9–12.1)
POTASSIUM BLD-SCNC: 4 MMOL/L (ref 3.5–4.7)
PROT SERPL-MCNC: 7.5 G/DL (ref 6.3–8)
RBC # BLD AUTO: 4.44 10*6/MM3 (ref 3.9–5)
SODIUM BLD-SCNC: 137 MMOL/L (ref 134–145)
WBC NRBC COR # BLD: 9.77 10*3/MM3 (ref 4–10)

## 2018-01-08 PROCEDURE — 96361 HYDRATE IV INFUSION ADD-ON: CPT | Performed by: INTERNAL MEDICINE

## 2018-01-08 PROCEDURE — 85025 COMPLETE CBC W/AUTO DIFF WBC: CPT

## 2018-01-08 PROCEDURE — 99215 OFFICE O/P EST HI 40 MIN: CPT | Performed by: INTERNAL MEDICINE

## 2018-01-08 PROCEDURE — 36415 COLL VENOUS BLD VENIPUNCTURE: CPT | Performed by: INTERNAL MEDICINE

## 2018-01-08 PROCEDURE — 80053 COMPREHEN METABOLIC PANEL: CPT

## 2018-01-08 PROCEDURE — 96360 HYDRATION IV INFUSION INIT: CPT | Performed by: INTERNAL MEDICINE

## 2018-01-08 RX ADMIN — SODIUM CHLORIDE 1000 ML: 900 INJECTION, SOLUTION INTRAVENOUS at 13:26

## 2018-01-08 NOTE — TELEPHONE ENCOUNTER
[facility]    Pt  stopped into [facility] inquiring about psychosocial services for his wife. Upon chart review, pt was referred to clinic on December 5, 2017. I called pt to establish new evaluation time.  prefers to speak in person and will stop by [facility] after treatment today.

## 2018-01-08 NOTE — PROGRESS NOTES
Subjective     REASON FOR FOLLOW UP:      Left breast cancer                       HISTORY OF PRESENT ILLNESS:    Tyrone Rand is a 72 y.o. patient with left breast cancer. She started chemotherapy with Taxotere and Cytoxan on 11/6/17.    The patient is tolerating the chemotherapy without problems with nausea or vomiting.  However, she had one episode of nausea and vomiting after the MRI of the brain.  She thinks it might have been from something she ate.  She is not experiencing numbness in the fingers or toes.  There is no lower extremity swelling.    The patient's family noticed that he is more interactive and less confused.  She saw her PCP and Dr. Mendieta recommended awaiting the results of the MRI of the brain.    Past Medical History:   Diagnosis Date   • Depression    • Diabetes mellitus    • H/O Left breast mass 2017   • Hyperlipidemia    • Hypertension    • Panic attack    • Poor sleep pattern        Past Surgical History:   Procedure Laterality Date   • COLON SURGERY     • KIDNEY SURGERY     • MOUTH SURGERY      TOOTH EXTRACTION         MEDICATIONS:    Current Outpatient Prescriptions:   •  Acetaminophen (TYLENOL ARTHRITIS PAIN PO), Take  by mouth., Disp: , Rfl:   •  ALPRAZolam (XANAX) 0.25 MG tablet, Take 1 tablet by mouth Every 8 (Eight) Hours As Needed for Anxiety., Disp: 20 tablet, Rfl: 0  •  clindamycin (CLEOCIN) 300 MG capsule, Take 1 capsule by mouth 3 (Three) Times a Day., Disp: 21 capsule, Rfl: 0  •  dexamethasone (DECADRON) 4 MG tablet, Take 2 tablets (one in the morning and one in the evening, with food) the day before chemo and the day after chemotherapy, Disp: 8 tablet, Rfl: 0  •  ergocalciferol (ERGOCALCIFEROL) 45874 UNITS capsule, Take 1 capsule by mouth once a week., Disp: , Rfl:   •  glucose blood test strip, Test blood sugar twice daily, Disp: , Rfl:   •  Insulin Lispro (HUMALOG KWIKPEN SC), Inject 10 Units under the skin 3 (Three) Times a Day., Disp: , Rfl:   •  Loratadine (CLARITIN  PO), Take 10 mg by mouth Daily., Disp: , Rfl:   •  metoprolol succinate XL (TOPROL-XL) 25 MG 24 hr tablet, TAKE ONE TABLET BY MOUTH IN THE MORNING, Disp: , Rfl: 11  •  ondansetron (ZOFRAN) 4 MG tablet, Take 1 tablet by mouth Every 8 (Eight) Hours As Needed for Nausea or Vomiting., Disp: 30 tablet, Rfl: 2  •  ranitidine (ZANTAC) 150 MG tablet, Take by mouth 2 (two) times a day., Disp: , Rfl:   •  spironolactone (ALDACTONE) 100 MG tablet, Take 1 tablet by mouth daily., Disp: 90 tablet, Rfl: 3  •  TOUJEO SOLOSTAR 300 UNIT/ML solution pen-injector, Inject 25 Units under the skin daily with dinner. INJECT 25 UNITS AT BEDTIME (Patient taking differently: Inject 40 Units under the skin Daily With Dinner. INJECT 25 UNITS AT BEDTIME), Disp: 3 pen, Rfl: 5     ALLERGIES:  Allergies   Allergen Reactions   • Sulfa Antibiotics         Social History     Social History   • Marital status:      Spouse name: Pascual   • Number of children: 1   • Years of education: GED     Occupational History   •  Retired     Social History Main Topics   • Smoking status: Former Smoker     Packs/day: 0.00     Quit date: 2000   • Smokeless tobacco: Not on file      Comment: smoked 25 years, 1 PPD   • Alcohol use Yes      Comment: social   • Drug use: Not on file   • Sexual activity: Not on file     Other Topics Concern   • Not on file     Social History Narrative       Cancer-related family history is not on file.    REVIEW OF SYSTEMS:  GENERAL:  No Fever or chills. No weight change.  No Fatigue.   SKIN:  Itching and developed after the MRI of the brain.  HEME/LYMPH: No Anemia.   RESPIRATORY:  No No Shortness of breath. No Hemoptysis.   CVS:  No Chest pain. No Palpitations. Ankle swelling.  GI:  One episode of Nausea and vomiting.  Metallic taste has improved.  :  No Hematuria. No Dysuria. No Increased frequency.   MUSCULOSKELETAL:  No Bone pain.   NEUROLOGICAL:  No Headaches. No Dizziness.  No numbness.  PSYCHIATRIC:  Anxiety as in the  "history of present illness.  She had a similar episode in the past.  Confusion has improved.       Objective   VITAL SIGNS:  Vitals:    01/08/18 1220   BP: 120/70   Pulse: 72   Resp: 16   Temp: 97.3 °F (36.3 °C)   Weight: 80.6 kg (177 lb 9.6 oz)   Height: 166 cm (65.35\")   PainSc: 0-No pain       Wt Readings from Last 3 Encounters:   01/08/18 80.6 kg (177 lb 9.6 oz)   12/18/17 83.9 kg (185 lb)   12/14/17 85.9 kg (189 lb 6 oz)       PHYSICAL EXAMINATION  GENERAL:  The patient appears in good general condition, not in acute distress. She is not Confused.  She is oriented ×3.  SKIN:  No skin rashes or lesions.  CHEST: Lungs are clear bilaterally.  No added sounds.   EXT: No LE edema   NEUROLOGICAL:  No Focal neurological deficits.      RESULT REVIEW:     Results from last 7 days  Lab Units 01/08/18  1142   WBC 10*3/mm3 9.77   NEUTROS ABS 10*3/mm3 8.43*   HEMOGLOBIN g/dL 12.2   HEMATOCRIT % 37.7   PLATELETS 10*3/mm3 359       Results from last 7 days  Lab Units 01/08/18  1142   SODIUM mmol/L 137   POTASSIUM mmol/L 4.0   CHLORIDE mmol/L 97*   CO2 mmol/L 27.1   BUN mg/dL 19   CREATININE mg/dL 1.20*   CALCIUM mg/dL 10.3*   ALBUMIN g/dL 4.10   BILIRUBIN mg/dL 0.3   ALK PHOS U/L 85   ALT (SGPT) U/L 18   AST (SGOT) U/L 18     MRI of the brain on 12/14/17:  FINDINGS: There is some mild patchy and nodular T2 high signal in the  periventricular white matter, multiple small patchy nodular foci  scattered in the subcortical white matter of cerebral hemispheres and  some patchy T2 high signal in the central pontine white matter most  consistent with mild-to-moderate small vessel disease. There is a 5 x 2  mm old posterior lateral right thalamic lacunar type infarct. The  remainder of the brain parenchyma is normal in signal intensity. The  ventricles are normal in size. I see no focal mass effect and no midline  shift and no extra-axial fluid collections are identified. No abnormal  areas of enhancement are seen in the head. No " diffusion-weighted  abnormality is seen with no acute infarct identified. The paranasal  sinuses and the mastoid air cells and middle ear cavities are clear.  Good flow voids are demonstrated within the cerebral vessels and in the  dural venous sinuses. The calvarium and skull base and upper cervical  spine demonstrate normal marrow signal intensity. The orbits are  unremarkable.      IMPRESSION:      There is mild to moderate small vessel disease in cerebral and central  pontine white matter and a tiny 5 x 2 mm old lacunar type infarct in the  posterior lateral right thalamus. The remainder of MRI of the head is  within normal limits with no evidence of metastatic disease to the head.    Assessment/Plan   1.  Left breast cancer, invasive ductal carcinoma, grade 3, ER negative, MN positive at 20%, HER-2 frederic negative.  Ki-67 was 50-55% on the biopsy from 7/19/17.   · She underwent a lumpectomy and sentinel lymph node biopsy on 9/26/17.   · Pathology examination revealed a 1.6 cm tumor, poorly differentiated with a Ki-67 50-60%, ER negative, MN weakly positive at <10%. Her-2 is negative.   · Adjuvant chemotherapy With plan for 4 cycles of Cytoxan and Taxotere with Neulasta support was started on 11/6/17.  · After completion of chemotherapy, we will refer her to Radiation Oncology for post-lumpectomy radiation.  She will be placed on hormonal therapy after the radiation therapy.        2.  Anxiety due to the diagnosis of breat cancer.  There was some worsening after last chemotherapy treatment.  The patient appeared to be confused and having trouble finding words.  We therefore obtained an MRI of the brain.  Thankfully, there was no evidence of metastases.  There was an old lacunar infarct in the right thalamus measuring 5 x 2 mm likely asymptomatic.  Patient does not have any symptoms to suggest stroke in the past.  She was referred for evaluation by psychiatry evaluate for possible depression versus adjustment  disorder that she has not been seen yet.    3.  Patient is at risk for developing neutropenic fever and neutropenic infections.  She she is receiving on body Neulasta with cycle #1 and she did not develop fever.  Neutrophil count is staying in the normal range.    4.  Acute kidney injury area did the patient has not been drinking enough fluids.  She lost weight since the third cycle of chemotherapy.  Her creatinine has increased and her serum calcium has increased as a result of the dehydration.  Hyperpigmentation of the skin secondary to chemotherapy.  I asked her to use a lotion and explained to her that it is most likely going to occur with cycle #4.    5.  History of essential hypertension.  She is off spironolactone.     PLAN:    1.  Delay the start of cycle #4 until 1/10/18.    2.  We will give IV fluid hydration today.    3.  We will schedule him to see the nurse practitioner on 1/10/18 with repeat CMP to make sure her creatinine has improved before proceeding with cycle #4.    4.  We'll schedule the patient to return for additional IV fluid hydration on 1/12/18.    5.  I'll see the patient follow-up in one week with CBC, CMP and possible IV fluids.  We will make at that point a referral to radiation oncology.        Luz Elena Herndon MD  01/08/18

## 2018-01-09 DIAGNOSIS — Z17.1 MALIGNANT NEOPLASM OF LOWER-INNER QUADRANT OF LEFT BREAST IN FEMALE, ESTROGEN RECEPTOR NEGATIVE (HCC): ICD-10-CM

## 2018-01-09 DIAGNOSIS — E86.0 DEHYDRATION: ICD-10-CM

## 2018-01-09 DIAGNOSIS — C50.312 MALIGNANT NEOPLASM OF LOWER-INNER QUADRANT OF LEFT BREAST IN FEMALE, ESTROGEN RECEPTOR NEGATIVE (HCC): ICD-10-CM

## 2018-01-10 ENCOUNTER — INFUSION (OUTPATIENT)
Dept: ONCOLOGY | Facility: HOSPITAL | Age: 73
End: 2018-01-10

## 2018-01-10 ENCOUNTER — OFFICE VISIT (OUTPATIENT)
Dept: ONCOLOGY | Facility: CLINIC | Age: 73
End: 2018-01-10

## 2018-01-10 VITALS
WEIGHT: 178.4 LBS | HEART RATE: 80 BPM | HEIGHT: 65 IN | DIASTOLIC BLOOD PRESSURE: 70 MMHG | BODY MASS INDEX: 29.72 KG/M2 | TEMPERATURE: 97.3 F | SYSTOLIC BLOOD PRESSURE: 110 MMHG | RESPIRATION RATE: 16 BRPM

## 2018-01-10 DIAGNOSIS — E86.0 DEHYDRATION: ICD-10-CM

## 2018-01-10 DIAGNOSIS — E86.0 DEHYDRATION: Primary | ICD-10-CM

## 2018-01-10 DIAGNOSIS — C50.312 MALIGNANT NEOPLASM OF LOWER-INNER QUADRANT OF LEFT BREAST IN FEMALE, ESTROGEN RECEPTOR NEGATIVE (HCC): ICD-10-CM

## 2018-01-10 DIAGNOSIS — Z17.1 MALIGNANT NEOPLASM OF LOWER-INNER QUADRANT OF LEFT BREAST IN FEMALE, ESTROGEN RECEPTOR NEGATIVE (HCC): ICD-10-CM

## 2018-01-10 DIAGNOSIS — N18.30 CHRONIC KIDNEY DISEASE, STAGE III (MODERATE) (HCC): Primary | ICD-10-CM

## 2018-01-10 LAB
ALBUMIN SERPL-MCNC: 3.9 G/DL (ref 3.5–5.2)
ALBUMIN/GLOB SERPL: 1.3 G/DL (ref 1.1–2.4)
ALP SERPL-CCNC: 82 U/L (ref 38–116)
ALT SERPL W P-5'-P-CCNC: 20 U/L (ref 0–33)
ANION GAP SERPL CALCULATED.3IONS-SCNC: 12 MMOL/L
AST SERPL-CCNC: 18 U/L (ref 0–32)
BASOPHILS # BLD AUTO: 0.07 10*3/MM3 (ref 0–0.1)
BASOPHILS NFR BLD AUTO: 1.3 % (ref 0–1.1)
BILIRUB SERPL-MCNC: 0.2 MG/DL (ref 0.1–1.2)
BUN BLD-MCNC: 19 MG/DL (ref 6–20)
BUN/CREAT SERPL: 18.1 (ref 7.3–30)
CALCIUM SPEC-SCNC: 9.5 MG/DL (ref 8.5–10.2)
CHLORIDE SERPL-SCNC: 97 MMOL/L (ref 98–107)
CO2 SERPL-SCNC: 27 MMOL/L (ref 22–29)
CREAT BLD-MCNC: 1.05 MG/DL (ref 0.6–1.1)
DEPRECATED RDW RBC AUTO: 47.8 FL (ref 37–49)
EOSINOPHIL # BLD AUTO: 0.06 10*3/MM3 (ref 0–0.36)
EOSINOPHIL NFR BLD AUTO: 1.1 % (ref 1–5)
ERYTHROCYTE [DISTWIDTH] IN BLOOD BY AUTOMATED COUNT: 15.5 % (ref 11.7–14.5)
GFR SERPL CREATININE-BSD FRML MDRD: 62 ML/MIN/1.73
GLOBULIN UR ELPH-MCNC: 3 GM/DL (ref 1.8–3.5)
GLUCOSE BLD-MCNC: 269 MG/DL (ref 74–124)
HCT VFR BLD AUTO: 38 % (ref 34–45)
HGB BLD-MCNC: 12.1 G/DL (ref 11.5–14.9)
HOLD SPECIMEN: NORMAL
IMM GRANULOCYTES # BLD: 0.03 10*3/MM3 (ref 0–0.03)
IMM GRANULOCYTES NFR BLD: 0.6 % (ref 0–0.5)
LYMPHOCYTES # BLD AUTO: 0.62 10*3/MM3 (ref 1–3.5)
LYMPHOCYTES NFR BLD AUTO: 11.8 % (ref 20–49)
MCH RBC QN AUTO: 27.2 PG (ref 27–33)
MCHC RBC AUTO-ENTMCNC: 31.8 G/DL (ref 32–35)
MCV RBC AUTO: 85.4 FL (ref 83–97)
MONOCYTES # BLD AUTO: 0.84 10*3/MM3 (ref 0.25–0.8)
MONOCYTES NFR BLD AUTO: 16 % (ref 4–12)
NEUTROPHILS # BLD AUTO: 3.62 10*3/MM3 (ref 1.5–7)
NEUTROPHILS NFR BLD AUTO: 69.2 % (ref 39–75)
NRBC BLD MANUAL-RTO: 0 /100 WBC (ref 0–0)
PLATELET # BLD AUTO: 257 10*3/MM3 (ref 150–375)
PMV BLD AUTO: 8.7 FL (ref 8.9–12.1)
POTASSIUM BLD-SCNC: 3.7 MMOL/L (ref 3.5–4.7)
PROT SERPL-MCNC: 6.9 G/DL (ref 6.3–8)
RBC # BLD AUTO: 4.45 10*6/MM3 (ref 3.9–5)
SODIUM BLD-SCNC: 136 MMOL/L (ref 134–145)
WBC NRBC COR # BLD: 5.24 10*3/MM3 (ref 4–10)

## 2018-01-10 PROCEDURE — 96377 APPLICATON ON-BODY INJECTOR: CPT | Performed by: NURSE PRACTITIONER

## 2018-01-10 PROCEDURE — 85025 COMPLETE CBC W/AUTO DIFF WBC: CPT | Performed by: INTERNAL MEDICINE

## 2018-01-10 PROCEDURE — 25010000002 CYCLOPHOSPHAMIDE PER 100 MG: Performed by: NURSE PRACTITIONER

## 2018-01-10 PROCEDURE — 96375 TX/PRO/DX INJ NEW DRUG ADDON: CPT | Performed by: NURSE PRACTITIONER

## 2018-01-10 PROCEDURE — 80053 COMPREHEN METABOLIC PANEL: CPT | Performed by: INTERNAL MEDICINE

## 2018-01-10 PROCEDURE — 96413 CHEMO IV INFUSION 1 HR: CPT | Performed by: NURSE PRACTITIONER

## 2018-01-10 PROCEDURE — 96417 CHEMO IV INFUS EACH ADDL SEQ: CPT | Performed by: NURSE PRACTITIONER

## 2018-01-10 PROCEDURE — 25010000002 PALONOSETRON PER 25 MCG: Performed by: NURSE PRACTITIONER

## 2018-01-10 PROCEDURE — 25010000002 DOCETAXEL 20 MG/ML SOLUTION 8 ML VIAL: Performed by: NURSE PRACTITIONER

## 2018-01-10 PROCEDURE — 25010000002 DIPHENHYDRAMINE PER 50 MG: Performed by: NURSE PRACTITIONER

## 2018-01-10 PROCEDURE — 99213 OFFICE O/P EST LOW 20 MIN: CPT | Performed by: NURSE PRACTITIONER

## 2018-01-10 PROCEDURE — 25010000002 PEGFILGRASTIM 6 MG/0.6ML PREFILLED SYRINGE KIT: Performed by: NURSE PRACTITIONER

## 2018-01-10 PROCEDURE — 36415 COLL VENOUS BLD VENIPUNCTURE: CPT | Performed by: INTERNAL MEDICINE

## 2018-01-10 PROCEDURE — 25010000002 DEXAMETHASONE SODIUM PHOSPHATE 10 MG/ML SOLUTION 1 ML VIAL: Performed by: NURSE PRACTITIONER

## 2018-01-10 RX ORDER — PALONOSETRON 0.05 MG/ML
0.25 INJECTION, SOLUTION INTRAVENOUS ONCE
Status: COMPLETED | OUTPATIENT
Start: 2018-01-10 | End: 2018-01-10

## 2018-01-10 RX ORDER — FAMOTIDINE 10 MG/ML
20 INJECTION, SOLUTION INTRAVENOUS ONCE
Status: CANCELLED | OUTPATIENT
Start: 2018-01-10

## 2018-01-10 RX ORDER — PALONOSETRON 0.05 MG/ML
0.25 INJECTION, SOLUTION INTRAVENOUS ONCE
Status: CANCELLED | OUTPATIENT
Start: 2018-01-10

## 2018-01-10 RX ORDER — FAMOTIDINE 10 MG/ML
20 INJECTION, SOLUTION INTRAVENOUS ONCE
Status: COMPLETED | OUTPATIENT
Start: 2018-01-10 | End: 2018-01-10

## 2018-01-10 RX ADMIN — PEGFILGRASTIM 6 MG: KIT SUBCUTANEOUS at 14:04

## 2018-01-10 RX ADMIN — DIPHENHYDRAMINE HYDROCHLORIDE 25 MG: 50 INJECTION, SOLUTION INTRAMUSCULAR; INTRAVENOUS at 11:32

## 2018-01-10 RX ADMIN — FAMOTIDINE 20 MG: 10 INJECTION, SOLUTION INTRAVENOUS at 11:12

## 2018-01-10 RX ADMIN — PALONOSETRON HYDROCHLORIDE 0.25 MG: 0.25 INJECTION INTRAVENOUS at 11:14

## 2018-01-10 RX ADMIN — SODIUM CHLORIDE 250 ML: 900 INJECTION, SOLUTION INTRAVENOUS at 10:30

## 2018-01-10 RX ADMIN — CYCLOPHOSPHAMIDE 1180 MG: 1 INJECTION, POWDER, FOR SOLUTION INTRAVENOUS; ORAL at 13:27

## 2018-01-10 RX ADMIN — DEXAMETHASONE SODIUM PHOSPHATE 12 MG: 10 INJECTION, SOLUTION INTRAMUSCULAR; INTRAVENOUS at 11:16

## 2018-01-10 RX ADMIN — DOCETAXEL 145 MG: 160 INJECTION, SOLUTION, CONCENTRATE INTRAVENOUS at 12:25

## 2018-01-10 NOTE — PROGRESS NOTES
Subjective   Here for cycle 4 Taxotere/ cytoxan.    REASON FOR FOLLOW UP:      Left breast cancer                       HISTORY OF PRESENT ILLNESS:    Tyrone Rand is a 72 y.o. patient with left breast cancer. She started chemotherapy with Taxotere and Cytoxan on 11/6/17.    Patient presents today for cycle 4 Taxotere/Cytoxan, which is delayed a few days because of acute kidney injury.  She began having issues with eating and drinking.  Her creatinine was elevated at 1.2 on 1/8/2018, her calcium was elevated as well to 10.3 as result of her dehydration.  We gave her IV hydration on day, and she returns today in anticipation of her fourth cycle.  She reports that she has been trying to increase her fluid intake.      Past Medical History:   Diagnosis Date   • Depression    • Diabetes mellitus    • H/O Left breast mass 2017   • Hyperlipidemia    • Hypertension    • Panic attack    • Poor sleep pattern      Past Surgical History:   Procedure Laterality Date   • COLON SURGERY     • KIDNEY SURGERY     • MOUTH SURGERY      TOOTH EXTRACTION     MEDICATIONS:    Current Outpatient Prescriptions:   •  Acetaminophen (TYLENOL ARTHRITIS PAIN PO), Take  by mouth., Disp: , Rfl:   •  ALPRAZolam (XANAX) 0.25 MG tablet, Take 1 tablet by mouth Every 8 (Eight) Hours As Needed for Anxiety., Disp: 20 tablet, Rfl: 0  •  clindamycin (CLEOCIN) 300 MG capsule, Take 1 capsule by mouth 3 (Three) Times a Day., Disp: 21 capsule, Rfl: 0  •  dexamethasone (DECADRON) 4 MG tablet, Take 2 tablets (one in the morning and one in the evening, with food) the day before chemo and the day after chemotherapy, Disp: 8 tablet, Rfl: 0  •  ergocalciferol (ERGOCALCIFEROL) 44686 UNITS capsule, Take 1 capsule by mouth once a week., Disp: , Rfl:   •  glucose blood test strip, Test blood sugar twice daily, Disp: , Rfl:   •  Insulin Lispro (HUMALOG KWIKPEN SC), Inject 10 Units under the skin 3 (Three) Times a Day., Disp: , Rfl:   •  Loratadine (CLARITIN PO),  Take 10 mg by mouth Daily., Disp: , Rfl:   •  metoprolol succinate XL (TOPROL-XL) 25 MG 24 hr tablet, TAKE ONE TABLET BY MOUTH IN THE MORNING, Disp: , Rfl: 11  •  ondansetron (ZOFRAN) 4 MG tablet, Take 1 tablet by mouth Every 8 (Eight) Hours As Needed for Nausea or Vomiting., Disp: 30 tablet, Rfl: 2  •  ranitidine (ZANTAC) 150 MG tablet, Take by mouth 2 (two) times a day., Disp: , Rfl:   •  spironolactone (ALDACTONE) 100 MG tablet, Take 1 tablet by mouth daily., Disp: 90 tablet, Rfl: 3  •  TOUJEO SOLOSTAR 300 UNIT/ML solution pen-injector, Inject 25 Units under the skin daily with dinner. INJECT 25 UNITS AT BEDTIME (Patient taking differently: Inject 40 Units under the skin Daily With Dinner. INJECT 25 UNITS AT BEDTIME), Disp: 3 pen, Rfl: 5  No current facility-administered medications for this visit.     Facility-Administered Medications Ordered in Other Visits:   •  cyclophosphamide (CYTOXAN) 1,180 mg in sodium chloride 0.9 % 309 mL chemo IVPB, 600 mg/m2 (Treatment Plan Recorded), Intravenous, Once, Lacie Hudson, APRN  •  DOCEtaxel 145 mg in sodium chloride 0.9 % 257.25 mL chemo IVPB, 75 mg/m2 (Treatment Plan Recorded), Intravenous, Once, Lacie Hudson, APRN  •  pegfilgrastim (NEULASTA ONPRO) on-body injector 6 mg, 6 mg, Subcutaneous, Once, Lacie Hudson, APRN     ALLERGIES:  Allergies   Allergen Reactions   • Sulfa Antibiotics         Social History     Social History   • Marital status:      Spouse name: Pascual   • Number of children: 1   • Years of education: GED     Occupational History   •  Retired     Social History Main Topics   • Smoking status: Former Smoker     Packs/day: 0.00     Quit date: 2000   • Smokeless tobacco: Not on file      Comment: smoked 25 years, 1 PPD   • Alcohol use Yes      Comment: social   • Drug use: Not on file   • Sexual activity: Not on file     Other Topics Concern   • Not on file     Social History Narrative       Cancer-related family history is not on  "file.    Review of Systems   Constitutional: Positive for appetite change and fatigue. Negative for chills, fever and unexpected weight change.   HENT:   Negative for mouth sores, nosebleeds, sore throat and trouble swallowing.    Respiratory: Negative for cough and shortness of breath.    Cardiovascular: Negative for chest pain and leg swelling.   Gastrointestinal: Negative for abdominal pain, constipation, diarrhea, nausea and vomiting.   Endocrine: Negative for hot flashes.   Genitourinary: Negative for difficulty urinating.    Musculoskeletal: Negative for arthralgias and myalgias.   Skin: Negative for rash.        Hyperpigmentation of skin    Neurological: Negative for dizziness and extremity weakness.   Hematological: Negative for adenopathy. Does not bruise/bleed easily.   Psychiatric/Behavioral: Negative for sleep disturbance.     Objective   VITAL SIGNS:  Vitals:    01/10/18 1000   BP: 110/70   Pulse: 80   Resp: 16   Temp: 97.3 °F (36.3 °C)   Weight: 80.9 kg (178 lb 6.4 oz)   Height: 166 cm (65.35\")   PainSc: 0-No pain       Wt Readings from Last 3 Encounters:   01/10/18 80.9 kg (178 lb 6.4 oz)   01/08/18 80.6 kg (177 lb 9.6 oz)   12/18/17 83.9 kg (185 lb)       Physical Exam   Constitutional: She is oriented to person, place, and time. She appears well-developed and well-nourished.   HENT:   Head: Normocephalic and atraumatic.   Nose: Nose normal.   Mouth/Throat: Oropharynx is clear and moist and mucous membranes are normal. No oropharyngeal exudate.   Eyes: Pupils are equal, round, and reactive to light.   Neck: Normal range of motion. Neck supple.   Cardiovascular: Normal rate, regular rhythm and normal heart sounds.    Pulmonary/Chest: Effort normal and breath sounds normal. No respiratory distress. She has no wheezes. She has no rhonchi. She has no rales.   Abdominal: Soft. Normal appearance and bowel sounds are normal. She exhibits no distension. There is no hepatosplenomegaly. There is no tenderness. "   Musculoskeletal: Normal range of motion. She exhibits no edema.   Neurological: She is alert and oriented to person, place, and time.   Skin: Skin is warm and dry.   Patchy areas of hyperpigmentation, noted particularly on her hands bilaterally   Psychiatric: She has a normal mood and affect. Her behavior is normal.   Nursing note and vitals reviewed.    RESULT REVIEW:     Results from last 7 days  Lab Units 01/10/18  0940 01/08/18  1142   WBC 10*3/mm3 5.24 9.77   NEUTROS ABS 10*3/mm3 3.62 8.43*   HEMOGLOBIN g/dL 12.1 12.2   HEMATOCRIT % 38.0 37.7   PLATELETS 10*3/mm3 257 359       Results from last 7 days  Lab Units 01/10/18  0956 01/08/18  1142   SODIUM mmol/L 136 137   POTASSIUM mmol/L 3.7 4.0   CHLORIDE mmol/L 97* 97*   CO2 mmol/L 27.0 27.1   BUN mg/dL 19 19   CREATININE mg/dL 1.05 1.20*   CALCIUM mg/dL 9.5 10.3*   ALBUMIN g/dL 3.90 4.10   BILIRUBIN mg/dL 0.2 0.3   ALK PHOS U/L 82 85   ALT (SGPT) U/L 20 18   AST (SGOT) U/L 18 18     MRI of the brain on 12/14/17:  FINDINGS: There is some mild patchy and nodular T2 high signal in the  periventricular white matter, multiple small patchy nodular foci  scattered in the subcortical white matter of cerebral hemispheres and  some patchy T2 high signal in the central pontine white matter most  consistent with mild-to-moderate small vessel disease. There is a 5 x 2  mm old posterior lateral right thalamic lacunar type infarct. The  remainder of the brain parenchyma is normal in signal intensity. The  ventricles are normal in size. I see no focal mass effect and no midline  shift and no extra-axial fluid collections are identified. No abnormal  areas of enhancement are seen in the head. No diffusion-weighted  abnormality is seen with no acute infarct identified. The paranasal  sinuses and the mastoid air cells and middle ear cavities are clear.  Good flow voids are demonstrated within the cerebral vessels and in the  dural venous sinuses. The calvarium and skull base and  upper cervical  spine demonstrate normal marrow signal intensity. The orbits are  unremarkable.      IMPRESSION:      There is mild to moderate small vessel disease in cerebral and central  pontine white matter and a tiny 5 x 2 mm old lacunar type infarct in the  posterior lateral right thalamus. The remainder of MRI of the head is  within normal limits with no evidence of metastatic disease to the head.    Assessment/Plan   1.  Left breast cancer, invasive ductal carcinoma, grade 3, ER negative, DC positive at 20%, HER-2 frederic negative.  Ki-67 was 50-55% on the biopsy from 7/19/17.   · She underwent a lumpectomy and sentinel lymph node biopsy on 9/26/17.   · Pathology examination revealed a 1.6 cm tumor, poorly differentiated with a Ki-67 50-60%, ER negative, DC weakly positive at <10%. Her-2 is negative.   · Adjuvant chemotherapy With plan for 4 cycles of Cytoxan and Taxotere with Neulasta support was started on 11/6/17.  · After completion of chemotherapy, we will refer her to Radiation Oncology for post-lumpectomy radiation.  She will be placed on hormonal therapy after the radiation therapy.        2.  Anxiety due to the diagnosis of breat cancer.  There was some worsening after last chemotherapy treatment.  The patient appeared to be confused and having trouble finding words.  We therefore obtained an MRI of the brain.  Thankfully, there was no evidence of metastases.  There was an old lacunar infarct in the right thalamus measuring 5 x 2 mm likely asymptomatic.  Patient does not have any symptoms to suggest stroke in the past.  She was referred for evaluation by psychiatry evaluate for possible depression versus adjustment disorder that she has not been seen yet.    3.  Patient is at risk for developing neutropenic fever and neutropenic infections.  She she is receiving on body Neulasta with cycle #1 and she did not develop fever.  Neutrophil count is staying in the normal range.    4.  Acute kidney injury area  did the patient has not been drinking enough fluids.  She lost weight since the third cycle of chemotherapy.  Her creatinine has increased to 1.2 on 1/8/2018, and her serum calcium was elevated as well due to dehydration.  She was given IV hydration and cycle 4 was delayed.  She returns today, 1/10/2018 anticipation of her fourth cycle.  Her creatinine has improved to 1.05.  We will proceed with treatment today.  I've encouraged her and discussed the importance of drinking fluids.  She will return on Friday for more IV hydration.      5.  Hyperpigmentation of the skin secondary to chemotherapy.  I asked her to use a lotion and explained to her that it is most likely going to occur with cycle #4.    6.   History of essential hypertension.  She is off spironolactone.     PLAN:    1.  Proceed with cycle 4, Taxotere/Cytoxan.   2.  Patient will return on Friday, 1/12/2018 for IV hydration.  3.  Patient will return for follow-up visit with Dr. Herndon on 1/17/2018, with a CBC, CMP, and plans for IV fluids that day as well.       Lacie Hudson, APRN  01/10/18

## 2018-01-12 ENCOUNTER — APPOINTMENT (OUTPATIENT)
Dept: ONCOLOGY | Facility: HOSPITAL | Age: 73
End: 2018-01-12

## 2018-01-15 ENCOUNTER — INFUSION (OUTPATIENT)
Dept: ONCOLOGY | Facility: HOSPITAL | Age: 73
End: 2018-01-15

## 2018-01-15 VITALS
SYSTOLIC BLOOD PRESSURE: 120 MMHG | HEART RATE: 113 BPM | TEMPERATURE: 98.4 F | DIASTOLIC BLOOD PRESSURE: 67 MMHG | WEIGHT: 175 LBS | BODY MASS INDEX: 28.81 KG/M2

## 2018-01-15 DIAGNOSIS — Z17.1 MALIGNANT NEOPLASM OF LOWER-INNER QUADRANT OF LEFT BREAST IN FEMALE, ESTROGEN RECEPTOR NEGATIVE (HCC): ICD-10-CM

## 2018-01-15 DIAGNOSIS — E86.0 DEHYDRATION: ICD-10-CM

## 2018-01-15 DIAGNOSIS — C50.312 MALIGNANT NEOPLASM OF LOWER-INNER QUADRANT OF LEFT BREAST IN FEMALE, ESTROGEN RECEPTOR NEGATIVE (HCC): ICD-10-CM

## 2018-01-15 DIAGNOSIS — E86.0 DEHYDRATION: Primary | ICD-10-CM

## 2018-01-15 RX ADMIN — SODIUM CHLORIDE 1000 ML: 900 INJECTION, SOLUTION INTRAVENOUS at 13:36

## 2018-01-17 ENCOUNTER — INFUSION (OUTPATIENT)
Dept: ONCOLOGY | Facility: HOSPITAL | Age: 73
End: 2018-01-17

## 2018-01-17 ENCOUNTER — OFFICE VISIT (OUTPATIENT)
Dept: ONCOLOGY | Facility: CLINIC | Age: 73
End: 2018-01-17

## 2018-01-17 VITALS
HEART RATE: 118 BPM | TEMPERATURE: 97.4 F | WEIGHT: 175.4 LBS | BODY MASS INDEX: 29.22 KG/M2 | OXYGEN SATURATION: 99 % | RESPIRATION RATE: 12 BRPM | DIASTOLIC BLOOD PRESSURE: 74 MMHG | HEIGHT: 65 IN | SYSTOLIC BLOOD PRESSURE: 118 MMHG

## 2018-01-17 DIAGNOSIS — Z17.1 MALIGNANT NEOPLASM OF LOWER-INNER QUADRANT OF LEFT BREAST IN FEMALE, ESTROGEN RECEPTOR NEGATIVE (HCC): ICD-10-CM

## 2018-01-17 DIAGNOSIS — E86.0 DEHYDRATION: ICD-10-CM

## 2018-01-17 DIAGNOSIS — C50.312 MALIGNANT NEOPLASM OF LOWER-INNER QUADRANT OF LEFT BREAST IN FEMALE, ESTROGEN RECEPTOR NEGATIVE (HCC): Primary | ICD-10-CM

## 2018-01-17 DIAGNOSIS — C50.312 MALIGNANT NEOPLASM OF LOWER-INNER QUADRANT OF LEFT BREAST IN FEMALE, ESTROGEN RECEPTOR NEGATIVE (HCC): ICD-10-CM

## 2018-01-17 DIAGNOSIS — E86.0 DEHYDRATION: Primary | ICD-10-CM

## 2018-01-17 DIAGNOSIS — Z17.1 MALIGNANT NEOPLASM OF LOWER-INNER QUADRANT OF LEFT BREAST IN FEMALE, ESTROGEN RECEPTOR NEGATIVE (HCC): Primary | ICD-10-CM

## 2018-01-17 LAB
ALBUMIN SERPL-MCNC: 3.8 G/DL (ref 3.5–5.2)
ALBUMIN/GLOB SERPL: 1.4 G/DL (ref 1.1–2.4)
ALP SERPL-CCNC: 99 U/L (ref 38–116)
ALT SERPL W P-5'-P-CCNC: 21 U/L (ref 0–33)
ANION GAP SERPL CALCULATED.3IONS-SCNC: 11.1 MMOL/L
AST SERPL-CCNC: 17 U/L (ref 0–32)
BASOPHILS # BLD AUTO: 0.06 10*3/MM3 (ref 0–0.1)
BASOPHILS NFR BLD AUTO: 2.8 % (ref 0–1.1)
BILIRUB SERPL-MCNC: 0.3 MG/DL (ref 0.1–1.2)
BUN BLD-MCNC: 11 MG/DL (ref 6–20)
BUN/CREAT SERPL: 10.4 (ref 7.3–30)
CALCIUM SPEC-SCNC: 9.3 MG/DL (ref 8.5–10.2)
CHLORIDE SERPL-SCNC: 100 MMOL/L (ref 98–107)
CO2 SERPL-SCNC: 25.9 MMOL/L (ref 22–29)
CREAT BLD-MCNC: 1.06 MG/DL (ref 0.6–1.1)
DEPRECATED RDW RBC AUTO: 47.2 FL (ref 37–49)
EOSINOPHIL # BLD AUTO: 0.05 10*3/MM3 (ref 0–0.36)
EOSINOPHIL NFR BLD AUTO: 2.3 % (ref 1–5)
ERYTHROCYTE [DISTWIDTH] IN BLOOD BY AUTOMATED COUNT: 15 % (ref 11.7–14.5)
GFR SERPL CREATININE-BSD FRML MDRD: 61 ML/MIN/1.73
GLOBULIN UR ELPH-MCNC: 2.7 GM/DL (ref 1.8–3.5)
GLUCOSE BLD-MCNC: 247 MG/DL (ref 74–124)
HCT VFR BLD AUTO: 35.3 % (ref 34–45)
HGB BLD-MCNC: 11.5 G/DL (ref 11.5–14.9)
IMM GRANULOCYTES # BLD: 0.04 10*3/MM3 (ref 0–0.03)
IMM GRANULOCYTES NFR BLD: 1.9 % (ref 0–0.5)
LYMPHOCYTES # BLD AUTO: 0.41 10*3/MM3 (ref 1–3.5)
LYMPHOCYTES NFR BLD AUTO: 19 % (ref 20–49)
MCH RBC QN AUTO: 27.9 PG (ref 27–33)
MCHC RBC AUTO-ENTMCNC: 32.6 G/DL (ref 32–35)
MCV RBC AUTO: 85.7 FL (ref 83–97)
MONOCYTES # BLD AUTO: 0.97 10*3/MM3 (ref 0.25–0.8)
MONOCYTES NFR BLD AUTO: 44.9 % (ref 4–12)
NEUTROPHILS # BLD AUTO: 0.63 10*3/MM3 (ref 1.5–7)
NEUTROPHILS NFR BLD AUTO: 29.1 % (ref 39–75)
NRBC BLD MANUAL-RTO: 0 /100 WBC (ref 0–0)
PLATELET # BLD AUTO: 129 10*3/MM3 (ref 150–375)
PMV BLD AUTO: 10.6 FL (ref 8.9–12.1)
POTASSIUM BLD-SCNC: 4.1 MMOL/L (ref 3.5–4.7)
PROT SERPL-MCNC: 6.5 G/DL (ref 6.3–8)
RBC # BLD AUTO: 4.12 10*6/MM3 (ref 3.9–5)
SODIUM BLD-SCNC: 137 MMOL/L (ref 134–145)
WBC NRBC COR # BLD: 2.16 10*3/MM3 (ref 4–10)

## 2018-01-17 PROCEDURE — 99214 OFFICE O/P EST MOD 30 MIN: CPT | Performed by: INTERNAL MEDICINE

## 2018-01-17 PROCEDURE — 96360 HYDRATION IV INFUSION INIT: CPT | Performed by: INTERNAL MEDICINE

## 2018-01-17 PROCEDURE — 96361 HYDRATE IV INFUSION ADD-ON: CPT

## 2018-01-17 PROCEDURE — 80053 COMPREHEN METABOLIC PANEL: CPT

## 2018-01-17 PROCEDURE — 85025 COMPLETE CBC W/AUTO DIFF WBC: CPT

## 2018-01-17 RX ORDER — LEVOFLOXACIN 500 MG/1
500 TABLET, FILM COATED ORAL DAILY
Qty: 5 TABLET | Refills: 0 | Status: SHIPPED | OUTPATIENT
Start: 2018-01-17 | End: 2018-01-24

## 2018-01-17 RX ADMIN — SODIUM CHLORIDE 1000 ML: 900 INJECTION, SOLUTION INTRAVENOUS at 11:02

## 2018-01-17 NOTE — PROGRESS NOTES
Subjective     REASON FOR FOLLOW UP:      Left breast cancer                       HISTORY OF PRESENT ILLNESS:    Tyrone Rand is a 72 y.o. patient with left breast cancer. She started chemotherapy with Taxotere and Cytoxan on 11/6/17.  She received her fourth and last cycle of chemotherapy on 1/10/18.  She is reporting increased fatigue since her last chemotherapy.  Her appetite remains poor.  She reports no change in the taste of food items.    The patient is neutropenic today.  She is not having fever or chills.    Past Medical History:   Diagnosis Date   • Depression    • Diabetes mellitus    • H/O Left breast mass 2017   • Hyperlipidemia    • Hypertension    • Panic attack    • Poor sleep pattern        Past Surgical History:   Procedure Laterality Date   • COLON SURGERY     • KIDNEY SURGERY     • MOUTH SURGERY      TOOTH EXTRACTION         MEDICATIONS:    Current Outpatient Prescriptions:   •  Acetaminophen (TYLENOL ARTHRITIS PAIN PO), Take  by mouth., Disp: , Rfl:   •  ALPRAZolam (XANAX) 0.25 MG tablet, Take 1 tablet by mouth Every 8 (Eight) Hours As Needed for Anxiety., Disp: 20 tablet, Rfl: 0  •  clindamycin (CLEOCIN) 300 MG capsule, Take 1 capsule by mouth 3 (Three) Times a Day., Disp: 21 capsule, Rfl: 0  •  dexamethasone (DECADRON) 4 MG tablet, Take 2 tablets (one in the morning and one in the evening, with food) the day before chemo and the day after chemotherapy, Disp: 8 tablet, Rfl: 0  •  ergocalciferol (ERGOCALCIFEROL) 27147 UNITS capsule, Take 1 capsule by mouth once a week., Disp: , Rfl:   •  glucose blood test strip, Test blood sugar twice daily, Disp: , Rfl:   •  Insulin Lispro (HUMALOG KWIKPEN SC), Inject 10 Units under the skin 3 (Three) Times a Day., Disp: , Rfl:   •  Loratadine (CLARITIN PO), Take 10 mg by mouth Daily., Disp: , Rfl:   •  metoprolol succinate XL (TOPROL-XL) 25 MG 24 hr tablet, TAKE ONE TABLET BY MOUTH IN THE MORNING, Disp: , Rfl: 11  •  ondansetron (ZOFRAN) 4 MG tablet,  Take 1 tablet by mouth Every 8 (Eight) Hours As Needed for Nausea or Vomiting., Disp: 30 tablet, Rfl: 2  •  ranitidine (ZANTAC) 150 MG tablet, Take by mouth 2 (two) times a day., Disp: , Rfl:   •  spironolactone (ALDACTONE) 100 MG tablet, Take 1 tablet by mouth daily., Disp: 90 tablet, Rfl: 3  •  TOUJEO SOLOSTAR 300 UNIT/ML solution pen-injector, Inject 25 Units under the skin daily with dinner. INJECT 25 UNITS AT BEDTIME (Patient taking differently: Inject 40 Units under the skin Daily With Dinner. INJECT 25 UNITS AT BEDTIME), Disp: 3 pen, Rfl: 5     ALLERGIES:  Allergies   Allergen Reactions   • Sulfa Antibiotics         Social History     Social History   • Marital status:      Spouse name: Pascual   • Number of children: 1   • Years of education: GED     Occupational History   •  Retired     Social History Main Topics   • Smoking status: Former Smoker     Packs/day: 0.00     Quit date: 2000   • Smokeless tobacco: Not on file      Comment: smoked 25 years, 1 PPD   • Alcohol use Yes      Comment: social   • Drug use: Not on file   • Sexual activity: Not on file     Other Topics Concern   • Not on file     Social History Narrative       Cancer-related family history is not on file.    REVIEW OF SYSTEMS:  GENERAL:  No Fever or chills. No weight change.  Fatigue.   SKIN: No skin rash.  Discoloration of the skin of the hands.  HEME/LYMPH: No Anemia. Neutropenia.  RESPIRATORY:  No Shortness of breath.   CVS:  No Chest pain. No Palpitations. Ankle swelling.  GI:  No Nausea or vomiting.  Metallic taste .  :  No Hematuria. No Dysuria. No Increased frequency.   MUSCULOSKELETAL:  No Bone pain.   NEUROLOGICAL:  No Headaches. No Dizziness.  No numbness.  PSYCHIATRIC:  Anxiety as in the history of present illness.  She had a similar episode in the past.  Confusion has improved.       Objective   VITAL SIGNS:  Vitals:    01/17/18 0933   BP: 118/74   Pulse: 118   Resp: 12   Temp: 97.4 °F (36.3 °C)   TempSrc: Oral  "  SpO2: 99%   Weight: 79.6 kg (175 lb 6.4 oz)   Height: 166 cm (65.35\")   PainSc: 0-No pain       Wt Readings from Last 3 Encounters:   01/17/18 79.6 kg (175 lb 6.4 oz)   01/15/18 79.4 kg (175 lb)   01/10/18 80.9 kg (178 lb 6.4 oz)       PHYSICAL EXAMINATION  GENERAL:  The patient appears in good general condition, not in acute distress. She is not Confused.  She is oriented ×3.  SKIN:  No skin rashes or lesions.  MOUTH:  thrush over the left buccal area  CHEST: Lungs are clear bilaterally.  No added sounds.   EXT: No LE edema   NEUROLOGICAL:  No Focal neurological deficits.      RESULT REVIEW:     Results from last 7 days  Lab Units 01/17/18  0900   WBC 10*3/mm3 2.16*   NEUTROS ABS 10*3/mm3 0.63*   HEMOGLOBIN g/dL 11.5   HEMATOCRIT % 35.3   PLATELETS 10*3/mm3 129*       Results from last 7 days  Lab Units 01/17/18  0900 01/10/18  0956   SODIUM mmol/L 137 136   POTASSIUM mmol/L 4.1 3.7   CHLORIDE mmol/L 100 97*   CO2 mmol/L 25.9 27.0   BUN mg/dL 11 19   CREATININE mg/dL 1.06 1.05   CALCIUM mg/dL 9.3 9.5   ALBUMIN g/dL 3.80 3.90   BILIRUBIN mg/dL 0.3 0.2   ALK PHOS U/L 99 82   ALT (SGPT) U/L 21 20   AST (SGOT) U/L 17 18         Assessment/Plan   1.  Left breast cancer, invasive ductal carcinoma, grade 3, ER negative, LA positive at 20%, HER-2 frederic negative.  Ki-67 was 50-55% on the biopsy from 7/19/17.   · She underwent a lumpectomy and sentinel lymph node biopsy on 9/26/17.   · Pathology examination revealed a 1.6 cm tumor, poorly differentiated with a Ki-67 50-60%, ER negative, LA weakly positive at <10%. Her-2 is negative.   · Adjuvant chemotherapy with Cytoxan and Taxotere with Neulasta support was started on 11/6/17 and completed on 1/8/18.  · We will refer her to Radiation Oncology for post-lumpectomy radiation.    · She will be placed on hormonal therapy after the radiation therapy.        2.  Anxiety due to the diagnosis of breat cancer.  There was some worsening after last chemotherapy treatment.  The patient " appeared to be confused and having trouble finding words.  We therefore obtained an MRI of the brain.  Thankfully, there was no evidence of metastases.  There was an old lacunar infarct in the right thalamus measuring 5 x 2 mm likely asymptomatic.  Patient's anxiety is improving.      3.  Neutropenia secondary to chemotherapy without neutropenic infection.  It developed despite the use of Neulasta.     4.  Acute kidney injury due to decreased fluid intake.  Kidney function is improved.      5.  Oral candidiasis.    6.  History of essential hypertension.  She is off spironolactone.     PLAN:    1.  We will give IV fluid hydration today and we will repeat the fluids on 1/19/18.    2.  We will start antibiotic prophylaxis with Levaquin 500 mg once a day for 5 days.    3.  We will treat with nystatin 5 mL swish and swallow 4 times a day for 7 days.    4.   We will refer to radiation oncology.     5.  I'll see the patient in 1 week with a CBC and CMP.          Luz Elena Herndon MD  01/17/18     marco antonio

## 2018-01-19 ENCOUNTER — INFUSION (OUTPATIENT)
Dept: ONCOLOGY | Facility: HOSPITAL | Age: 73
End: 2018-01-19

## 2018-01-19 VITALS
HEART RATE: 121 BPM | OXYGEN SATURATION: 98 % | BODY MASS INDEX: 29.47 KG/M2 | TEMPERATURE: 97.5 F | SYSTOLIC BLOOD PRESSURE: 116 MMHG | WEIGHT: 179 LBS | DIASTOLIC BLOOD PRESSURE: 75 MMHG

## 2018-01-19 DIAGNOSIS — Z17.1 MALIGNANT NEOPLASM OF LOWER-INNER QUADRANT OF LEFT BREAST IN FEMALE, ESTROGEN RECEPTOR NEGATIVE (HCC): ICD-10-CM

## 2018-01-19 DIAGNOSIS — C50.312 MALIGNANT NEOPLASM OF LOWER-INNER QUADRANT OF LEFT BREAST IN FEMALE, ESTROGEN RECEPTOR NEGATIVE (HCC): ICD-10-CM

## 2018-01-19 DIAGNOSIS — E86.0 DEHYDRATION: Primary | ICD-10-CM

## 2018-01-19 PROCEDURE — 96360 HYDRATION IV INFUSION INIT: CPT | Performed by: INTERNAL MEDICINE

## 2018-01-19 PROCEDURE — 96361 HYDRATE IV INFUSION ADD-ON: CPT | Performed by: INTERNAL MEDICINE

## 2018-01-19 RX ADMIN — SODIUM CHLORIDE 1000 ML: 0.9 INJECTION, SOLUTION INTRAVENOUS at 13:21

## 2018-01-24 ENCOUNTER — INFUSION (OUTPATIENT)
Dept: ONCOLOGY | Facility: HOSPITAL | Age: 73
End: 2018-01-24

## 2018-01-24 ENCOUNTER — OFFICE VISIT (OUTPATIENT)
Dept: ONCOLOGY | Facility: CLINIC | Age: 73
End: 2018-01-24

## 2018-01-24 VITALS
SYSTOLIC BLOOD PRESSURE: 122 MMHG | BODY MASS INDEX: 29.79 KG/M2 | TEMPERATURE: 98 F | HEIGHT: 65 IN | HEART RATE: 101 BPM | RESPIRATION RATE: 16 BRPM | DIASTOLIC BLOOD PRESSURE: 78 MMHG | WEIGHT: 178.8 LBS | OXYGEN SATURATION: 96 %

## 2018-01-24 DIAGNOSIS — T45.1X5A CHEMOTHERAPY INDUCED NEUTROPENIA (HCC): ICD-10-CM

## 2018-01-24 DIAGNOSIS — Z17.1 MALIGNANT NEOPLASM OF LOWER-INNER QUADRANT OF LEFT BREAST IN FEMALE, ESTROGEN RECEPTOR NEGATIVE (HCC): ICD-10-CM

## 2018-01-24 DIAGNOSIS — E86.0 DEHYDRATION: Primary | ICD-10-CM

## 2018-01-24 DIAGNOSIS — Z17.1 MALIGNANT NEOPLASM OF LOWER-INNER QUADRANT OF LEFT BREAST IN FEMALE, ESTROGEN RECEPTOR NEGATIVE (HCC): Primary | ICD-10-CM

## 2018-01-24 DIAGNOSIS — C50.312 MALIGNANT NEOPLASM OF LOWER-INNER QUADRANT OF LEFT BREAST IN FEMALE, ESTROGEN RECEPTOR NEGATIVE (HCC): ICD-10-CM

## 2018-01-24 DIAGNOSIS — E86.0 DEHYDRATION: ICD-10-CM

## 2018-01-24 DIAGNOSIS — C50.312 MALIGNANT NEOPLASM OF LOWER-INNER QUADRANT OF LEFT BREAST IN FEMALE, ESTROGEN RECEPTOR NEGATIVE (HCC): Primary | ICD-10-CM

## 2018-01-24 DIAGNOSIS — D70.1 CHEMOTHERAPY INDUCED NEUTROPENIA (HCC): ICD-10-CM

## 2018-01-24 DIAGNOSIS — N18.30 CHRONIC KIDNEY DISEASE, STAGE III (MODERATE) (HCC): ICD-10-CM

## 2018-01-24 DIAGNOSIS — I10 ESSENTIAL HYPERTENSION: ICD-10-CM

## 2018-01-24 LAB
ALBUMIN SERPL-MCNC: 3.5 G/DL (ref 3.5–5.2)
ALBUMIN/GLOB SERPL: 1.3 G/DL (ref 1.1–2.4)
ALP SERPL-CCNC: 80 U/L (ref 38–116)
ALT SERPL W P-5'-P-CCNC: 16 U/L (ref 0–33)
ANION GAP SERPL CALCULATED.3IONS-SCNC: 12.4 MMOL/L
AST SERPL-CCNC: 21 U/L (ref 0–32)
BASOPHILS # BLD AUTO: 0.04 10*3/MM3 (ref 0–0.1)
BASOPHILS NFR BLD AUTO: 0.9 % (ref 0–1.1)
BILIRUB SERPL-MCNC: 0.2 MG/DL (ref 0.1–1.2)
BUN BLD-MCNC: 11 MG/DL (ref 6–20)
BUN/CREAT SERPL: 11 (ref 7.3–30)
CALCIUM SPEC-SCNC: 8.8 MG/DL (ref 8.5–10.2)
CHLORIDE SERPL-SCNC: 102 MMOL/L (ref 98–107)
CO2 SERPL-SCNC: 25.6 MMOL/L (ref 22–29)
CREAT BLD-MCNC: 1 MG/DL (ref 0.6–1.1)
DEPRECATED RDW RBC AUTO: 49.7 FL (ref 37–49)
EOSINOPHIL # BLD AUTO: 0.01 10*3/MM3 (ref 0–0.36)
EOSINOPHIL NFR BLD AUTO: 0.2 % (ref 1–5)
ERYTHROCYTE [DISTWIDTH] IN BLOOD BY AUTOMATED COUNT: 15.9 % (ref 11.7–14.5)
GFR SERPL CREATININE-BSD FRML MDRD: 66 ML/MIN/1.73
GLOBULIN UR ELPH-MCNC: 2.7 GM/DL (ref 1.8–3.5)
GLUCOSE BLD-MCNC: 250 MG/DL (ref 74–124)
HCT VFR BLD AUTO: 35.8 % (ref 34–45)
HGB BLD-MCNC: 11.3 G/DL (ref 11.5–14.9)
IMM GRANULOCYTES # BLD: 0.08 10*3/MM3 (ref 0–0.03)
IMM GRANULOCYTES NFR BLD: 1.8 % (ref 0–0.5)
LYMPHOCYTES # BLD AUTO: 0.69 10*3/MM3 (ref 1–3.5)
LYMPHOCYTES NFR BLD AUTO: 15.4 % (ref 20–49)
MCH RBC QN AUTO: 27.2 PG (ref 27–33)
MCHC RBC AUTO-ENTMCNC: 31.6 G/DL (ref 32–35)
MCV RBC AUTO: 86.1 FL (ref 83–97)
MONOCYTES # BLD AUTO: 0.48 10*3/MM3 (ref 0.25–0.8)
MONOCYTES NFR BLD AUTO: 10.7 % (ref 4–12)
NEUTROPHILS # BLD AUTO: 3.18 10*3/MM3 (ref 1.5–7)
NEUTROPHILS NFR BLD AUTO: 71 % (ref 39–75)
NRBC BLD MANUAL-RTO: 0 /100 WBC (ref 0–0)
PLATELET # BLD AUTO: 142 10*3/MM3 (ref 150–375)
PMV BLD AUTO: 9.1 FL (ref 8.9–12.1)
POTASSIUM BLD-SCNC: 3.7 MMOL/L (ref 3.5–4.7)
PROT SERPL-MCNC: 6.2 G/DL (ref 6.3–8)
RBC # BLD AUTO: 4.16 10*6/MM3 (ref 3.9–5)
SODIUM BLD-SCNC: 140 MMOL/L (ref 134–145)
WBC NRBC COR # BLD: 4.48 10*3/MM3 (ref 4–10)

## 2018-01-24 PROCEDURE — 80053 COMPREHEN METABOLIC PANEL: CPT

## 2018-01-24 PROCEDURE — 85025 COMPLETE CBC W/AUTO DIFF WBC: CPT

## 2018-01-24 PROCEDURE — 99214 OFFICE O/P EST MOD 30 MIN: CPT | Performed by: INTERNAL MEDICINE

## 2018-01-24 PROCEDURE — 96360 HYDRATION IV INFUSION INIT: CPT | Performed by: INTERNAL MEDICINE

## 2018-01-24 RX ADMIN — SODIUM CHLORIDE 1000 ML: 900 INJECTION, SOLUTION INTRAVENOUS at 12:14

## 2018-01-24 NOTE — PROGRESS NOTES
Subjective     REASON FOR FOLLOW UP:      Left breast cancer                       HISTORY OF PRESENT ILLNESS:    Tyrone Rand is a 72 y.o. patient with left breast cancer. She started chemotherapy with Taxotere and Cytoxan on 11/6/17.  She received her fourth and last cycle of chemotherapy on 1/10/18.  When she was seen last week, she was neutropenic with no signs of neutropenic infection.  She was placed on Levaquin for 5 days.  Her WBC count has improved.  She is not having fever or chills.  Fatigue has slightly improved.  Her appetite has slightly improved as well.  She continues to have altered taste sensation.  In addition, she reports darkening of the skin of the hands and the color of the nails.        Past Medical History:   Diagnosis Date   • Breast cancer     Left   • Depression    • Diabetes mellitus    • GERD (gastroesophageal reflux disease)    • H/O Left breast mass 2017   • H/O vitamin D deficiency    • History of snoring    • Hyperlipidemia    • Hypertension    • Panic attack    • Poor sleep pattern    • Sleep apnea     CPAP       Past Surgical History:   Procedure Laterality Date   • BREAST LUMPECTOMY Left 09/2017   • COLON SURGERY     • COLONOSCOPY     • KIDNEY SURGERY  2011   • MOUTH SURGERY      TOOTH EXTRACTION   • TOOTH EXTRACTION           MEDICATIONS:    Current Outpatient Prescriptions:   •  Acetaminophen (TYLENOL ARTHRITIS PAIN PO), Take  by mouth., Disp: , Rfl:   •  ALPRAZolam (XANAX) 0.25 MG tablet, Take 1 tablet by mouth Every 8 (Eight) Hours As Needed for Anxiety., Disp: 20 tablet, Rfl: 0  •  ergocalciferol (ERGOCALCIFEROL) 34117 UNITS capsule, Take 1 capsule by mouth once a week., Disp: , Rfl:   •  glucose blood test strip, Test blood sugar twice daily, Disp: , Rfl:   •  Insulin Lispro (HUMALOG KWIKPEN SC), Inject 10 Units under the skin 3 (Three) Times a Day., Disp: , Rfl:   •  Loratadine (CLARITIN PO), Take 10 mg by mouth Daily., Disp: , Rfl:   •  metoprolol succinate XL  (TOPROL-XL) 25 MG 24 hr tablet, TAKE ONE TABLET BY MOUTH IN THE MORNING, Disp: , Rfl: 11  •  nystatin (MYCOSTATIN) 210643 UNIT/ML suspension, Swish and swallow 5 mL 4 (Four) Times a Day., Disp: 473 mL, Rfl: 0  •  ondansetron (ZOFRAN) 4 MG tablet, Take 1 tablet by mouth Every 8 (Eight) Hours As Needed for Nausea or Vomiting., Disp: 30 tablet, Rfl: 2  •  ranitidine (ZANTAC) 150 MG tablet, Take by mouth 2 (two) times a day., Disp: , Rfl:   •  spironolactone (ALDACTONE) 100 MG tablet, Take 1 tablet by mouth daily., Disp: 90 tablet, Rfl: 3  •  TOUJEO SOLOSTAR 300 UNIT/ML solution pen-injector, Inject 25 Units under the skin daily with dinner. INJECT 25 UNITS AT BEDTIME (Patient taking differently: Inject 40 Units under the skin Daily With Dinner. INJECT 25 UNITS AT BEDTIME), Disp: 3 pen, Rfl: 5     ALLERGIES:  Allergies   Allergen Reactions   • Sulfa Antibiotics         Social History     Social History   • Marital status:      Spouse name: Pascual   • Number of children: 1   • Years of education: GED     Occupational History   •  Retired     Social History Main Topics   • Smoking status: Former Smoker     Packs/day: 1.00     Years: 25.00     Quit date: 2000   • Smokeless tobacco: Not on file      Comment: smoked 25 years, 1 PPD   • Alcohol use Yes      Comment: social   • Drug use: No   • Sexual activity: Not on file     Other Topics Concern   • Not on file     Social History Narrative       Cancer-related family history is not on file.    REVIEW OF SYSTEMS:  GENERAL:  No Fever or chills. No weight change.  FatigueHas somewhat improved.   SKIN: No skin rash.  Discoloration of the skin of the hands.  HEME/LYMPH: No Anemia. Neutropenia.  RESPIRATORY:  No Shortness of breath.   CVS:  No Chest pain. No Palpitations.  No lower extremity swelling.  GI:  No Nausea or vomiting.  Metallic taste slightly better.  :  No Hematuria. No Dysuria. No Increased frequency.   MUSCULOSKELETAL:  No Bone pain.   NEUROLOGICAL:  No  "Headaches. No Dizziness.  No numbness.  PSYCHIATRIC:  Anxiety has improved.  Confusion has improved.       Objective   VITAL SIGNS:  Vitals:    01/24/18 1113   BP: 122/78   Pulse: 101   Resp: 16   Temp: 98 °F (36.7 °C)   TempSrc: Oral   SpO2: 96%   Weight: 81.1 kg (178 lb 12.8 oz)   Height: 166 cm (65.35\")   PainSc: 0-No pain       Wt Readings from Last 3 Encounters:   01/24/18 81.1 kg (178 lb 12.8 oz)   01/19/18 81.2 kg (179 lb)   01/17/18 79.6 kg (175 lb 6.4 oz)       PHYSICAL EXAMINATION  GENERAL:  The patient appears in good general condition, not in acute distress. She is not Confused.  She is oriented ×3.  SKIN:  No skin rashes or lesions.  MOUTH:  thrush over the left buccal area  CHEST: Lungs are clear bilaterally.  No added sounds.   EXT: No LE edema   NEUROLOGICAL:  No Focal neurological deficits.      RESULT REVIEW:     Results from last 7 days  Lab Units 01/24/18  1044   WBC 10*3/mm3 4.48   NEUTROS ABS 10*3/mm3 3.18   HEMOGLOBIN g/dL 11.3*   HEMATOCRIT % 35.8   PLATELETS 10*3/mm3 142*       Results from last 7 days  Lab Units 01/24/18  1044   SODIUM mmol/L 140   POTASSIUM mmol/L 3.7   CHLORIDE mmol/L 102   CO2 mmol/L 25.6   BUN mg/dL 11   CREATININE mg/dL 1.00   CALCIUM mg/dL 8.8   ALBUMIN g/dL 3.50   BILIRUBIN mg/dL 0.2   ALK PHOS U/L 80   ALT (SGPT) U/L 16   AST (SGOT) U/L 21         Assessment/Plan   1.  Left breast cancer, invasive ductal carcinoma, grade 3, ER negative, NH positive at 20%, HER-2 frederic negative.  Ki-67 was 50-55% on the biopsy from 7/19/17.   · She underwent a lumpectomy and sentinel lymph node biopsy on 9/26/17.   · Pathology examination revealed a 1.6 cm tumor, poorly differentiated with a Ki-67 50-60%, ER negative, NH weakly positive at <10%. Her-2 is negative.   · Adjuvant chemotherapy with Cytoxan and Taxotere with Neulasta support was started on 11/6/17 and completed on 1/8/18.  · She was referred Radiation Oncology for post-lumpectomy radiation.    · She will be placed on " hormonal therapy after she completes the radiation therapy.        2.  Anxiety due to the diagnosis of breat cancer, improved.     3.  Neutropenia secondary to chemotherapy despite the use of Neulasta without neutropenic infection.  She was placed on prophylactic Levaquin.  Her WBC count is now normal.    4.  Acute kidney injury due to decreased fluid intake.  Kidney function is improved.  She remains tachycardic indicating some degree of dehydration    5.  History of essential hypertension.  She is off spironolactone.     PLAN:      1.  IV fluids today.  Since her kidney function improved, we did not schedule additional IV fluid hydration but I asked her to make sure she drinks adequate amounts of fluids at home.  She will notify us if she is not able to maintain adequate fluid intake and we will bring her to the office at that point for IV hydration.    2.  Return in 1 week for a CBC to make sure her counts have continued to improve.    3.  Patient will see radiation oncology later this week.  We will make a referral for her to the survivorship clinic.    4.  I will see her in follow-up in 2 months.  We will further discuss anti-hormonal therapy at that point which will be started after completion of radiation therapy.          Luz Elena Herndon MD  01/24/18

## 2018-01-25 ENCOUNTER — APPOINTMENT (OUTPATIENT)
Dept: RADIATION ONCOLOGY | Facility: HOSPITAL | Age: 73
End: 2018-01-25

## 2018-01-25 ENCOUNTER — CONSULT (OUTPATIENT)
Dept: RADIATION ONCOLOGY | Facility: CLINIC | Age: 73
End: 2018-01-25

## 2018-01-25 VITALS
DIASTOLIC BLOOD PRESSURE: 76 MMHG | BODY MASS INDEX: 28.97 KG/M2 | TEMPERATURE: 97.4 F | WEIGHT: 176 LBS | SYSTOLIC BLOOD PRESSURE: 130 MMHG | HEART RATE: 100 BPM | OXYGEN SATURATION: 96 %

## 2018-01-25 DIAGNOSIS — C50.312 MALIGNANT NEOPLASM OF LOWER-INNER QUADRANT OF LEFT BREAST IN FEMALE, ESTROGEN RECEPTOR NEGATIVE (HCC): Primary | ICD-10-CM

## 2018-01-25 DIAGNOSIS — Z17.1 MALIGNANT NEOPLASM OF LOWER-INNER QUADRANT OF LEFT BREAST IN FEMALE, ESTROGEN RECEPTOR NEGATIVE (HCC): Primary | ICD-10-CM

## 2018-01-25 PROCEDURE — G0463 HOSPITAL OUTPT CLINIC VISIT: HCPCS | Performed by: RADIOLOGY

## 2018-01-25 PROCEDURE — 77290 THER RAD SIMULAJ FIELD CPLX: CPT | Performed by: RADIOLOGY

## 2018-01-25 PROCEDURE — 99205 OFFICE O/P NEW HI 60 MIN: CPT | Performed by: RADIOLOGY

## 2018-01-25 PROCEDURE — 77332 RADIATION TREATMENT AID(S): CPT | Performed by: RADIOLOGY

## 2018-01-25 PROCEDURE — 77263 THER RADIOLOGY TX PLNG CPLX: CPT | Performed by: RADIOLOGY

## 2018-01-25 NOTE — PROGRESS NOTES
DIAGNOSIS and REASON FOR CONSULTATION: Malignant neoplasm of lower-inner quadrant of left breast in female, estrogen receptor negative - for advice and recommendations regarding the diagnosis    Referring Provider:  Luz Elena Herndon MD  Patient Care Team:  Cris Mendieta DO as PCP - General (Internal Medicine)  Cris Mendieta DO as Referring Physician (Internal Medicine)  Luz Elena Herndon MD as Consulting Physician (Hematology and Oncology)  Jhonny Powell MD as Consulting Physician (Nephrology)  Billie Leahy MD as Consulting Physician (Radiation Oncology)    CHIEF COMPLAINT:  For advice and recommendations regarding Malignant neoplasm of lower-inner quadrant of left breast in female, estrogen receptor negative     HISTORY OF PRESENT ILLNESS:  The patient is a 72 y.o. year old female who was found to have an abnormality on routine screening mammogram dated June 28, 2017.  This revealed an asymmetric density in the lower inner quadrant of the left breast measuring 1.3 cm.  She underwent left sided ultrasound and diagnostic mammogram on July 11, 2017 which showed a cystic nodule at the 9 o'clock position measuring 1.5 x 1.3 x 1.2 cm.    She underwent ultrasound-guided biopsy and clip placement on July 19, 2017 and the pathology revealed an invasive ductal carcinoma, grade 3 measuring 1.1 cm in greatest dimension.  The tissue was found to be negative for the estrogen receptors, positive for progesterone receptors at 20% and negative for the HER-2/frederic oncogene.    She underwent bilateral breast MRI on August 10, 2017 which showed the biopsy-proven malignancy at the 8 o'clock position of the left breast measuring 2.1 x 1.2 x 1.5 cm, no other abnormality in either breast nor axilla.    She went to surgery on September 26, 2017 and underwent a left sided lumpectomy and sentinel lymph node biopsy.  The final breast pathology revealed a poorly differentiated invasive ductal carcinoma measuring 1.6 m in  greatest dimension.  There was no lymph vascular space invasion noted.  No intraductal component was noted.  The closest margin was anteriorly at less than 1 mm but then further tissue labeled as a new superior surgical margin was negative for additional disease.  In addition 0 of 3 sentinel nodes were involved.  Hormone receptors were repeated and found to be negative for both estrogen and progesterone receptors and negative for the HER-2/frederic oncogene.  Therefore, she appears to have a T1c N0 high-grade invasive ductal carcinoma of the left breast.    She was seen by the [facility] physicians and started on Taxotere/Cytoxan on November 6, 2017.  She received her fourth and final cycle on January 10, 2018.  She did have significant fatigue and some decrease in appetite. She was battling neutropenia but is now feeling slightly better. I was asked to see the patient at the request of the referring provider noted above for advice and recommendations regarding this diagnosis.     Past Medical History: she  has a past medical history of Breast cancer; Depression; Diabetes mellitus; GERD (gastroesophageal reflux disease); H/O Left breast mass (2017); H/O vitamin D deficiency; History of snoring; Hyperlipidemia; Hypertension; Panic attack; Poor sleep pattern; and Sleep apnea.    Past Surgical History:  she has a past surgical history that includes Colon surgery; Kidney surgery (2011); Mouth surgery; Colonoscopy; Breast lumpectomy (Left, 09/2017); and Tooth extraction.    Meds:    Current Outpatient Prescriptions:   •  Acetaminophen (TYLENOL ARTHRITIS PAIN PO), Take  by mouth., Disp: , Rfl:   •  ALPRAZolam (XANAX) 0.25 MG tablet, Take 1 tablet by mouth Every 8 (Eight) Hours As Needed for Anxiety., Disp: 20 tablet, Rfl: 0  •  ergocalciferol (ERGOCALCIFEROL) 02718 UNITS capsule, Take 1 capsule by mouth once a week., Disp: , Rfl:   •  glucose blood test strip, Test blood sugar twice daily, Disp: , Rfl:   •  Insulin Lispro (HUMALOG  RICO SC), Inject 10 Units under the skin 3 (Three) Times a Day., Disp: , Rfl:   •  Loratadine (CLARITIN PO), Take 10 mg by mouth Daily., Disp: , Rfl:   •  metoprolol succinate XL (TOPROL-XL) 25 MG 24 hr tablet, TAKE ONE TABLET BY MOUTH IN THE MORNING, Disp: , Rfl: 11  •  nystatin (MYCOSTATIN) 976937 UNIT/ML suspension, Swish and swallow 5 mL 4 (Four) Times a Day., Disp: 473 mL, Rfl: 0  •  ondansetron (ZOFRAN) 4 MG tablet, Take 1 tablet by mouth Every 8 (Eight) Hours As Needed for Nausea or Vomiting., Disp: 30 tablet, Rfl: 2  •  ranitidine (ZANTAC) 150 MG tablet, Take by mouth 2 (two) times a day., Disp: , Rfl:   •  spironolactone (ALDACTONE) 100 MG tablet, Take 1 tablet by mouth daily., Disp: 90 tablet, Rfl: 3  •  TOUJEO SOLOSTAR 300 UNIT/ML solution pen-injector, Inject 25 Units under the skin daily with dinner. INJECT 25 UNITS AT BEDTIME (Patient taking differently: Inject 40 Units under the skin Daily With Dinner. INJECT 25 UNITS AT BEDTIME), Disp: 3 pen, Rfl: 5  No current facility-administered medications for this visit.     Allergies:    Allergies   Allergen Reactions   • Sulfa Antibiotics        Family History:  her family history includes Heart disease in her mother; Hypertension in her mother; Stroke in her mother.    Social History:  she  reports that she quit smoking about 18 years ago. She has a 25.00 pack-year smoking history. She does not have any smokeless tobacco history on file. She reports that she drinks alcohol. She reports that she does not use illicit drugs.    Pertinent Findings on   Review of Systems   Constitutional: Positive for appetite change, fatigue and unexpected weight change. Negative for chills, diaphoresis and fever.   HENT:   Negative for hearing loss, lump/mass, mouth sores, nosebleeds, sore throat, tinnitus, trouble swallowing and voice change.    Eyes: Negative for eye problems and icterus.   Respiratory: Positive for shortness of breath. Negative for chest tightness, cough,  hemoptysis and wheezing.    Cardiovascular: Negative for chest pain, leg swelling and palpitations.   Gastrointestinal: Negative for abdominal distention, abdominal pain, blood in stool, constipation, diarrhea, nausea, rectal pain and vomiting.   Endocrine: Negative for hot flashes.   Genitourinary: Negative for bladder incontinence, difficulty urinating, dyspareunia, dysuria, frequency, hematuria, menstrual problem, nocturia, pelvic pain, vaginal bleeding and vaginal discharge.    Musculoskeletal: Negative for arthralgias, back pain, flank pain, gait problem, myalgias, neck pain and neck stiffness.   Skin: Positive for itching. Negative for rash and wound.   Neurological: Negative for dizziness, extremity weakness, gait problem, headaches, light-headedness, numbness, seizures and speech difficulty.   Hematological: Negative for adenopathy. Does not bruise/bleed easily.   Psychiatric/Behavioral: Positive for confusion, decreased concentration and depression. Negative for sleep disturbance and suicidal ideas. The patient is nervous/anxious.    :  Vitals:    01/25/18 1027   BP: 130/76   Pulse: 100   Temp: 97.4 °F (36.3 °C)   SpO2: 96%   Weight: 79.8 kg (176 lb)       Performance Status: (2) Ambulatory and capable of self care, unable to carry out work activity, up and about > 50% or waking hours    Pertinent Findings on:  Physical Exam   Constitutional: She appears well-developed and well-nourished. No distress.   HENT:   Head: Normocephalic.   Neck: Normal range of motion. Neck supple.   Pulmonary/Chest: She exhibits no mass and no tenderness. Right breast exhibits no inverted nipple, no mass, no nipple discharge, no skin change and no tenderness. Left breast exhibits no inverted nipple, no mass, no nipple discharge, no skin change and no tenderness.   Right breast is without abnormality as is the right axilla. The left breast shows a healing lumpectomy incision over the  Lower spect of the breast. There is no skin  or nipple abnormality, no warmth or erythema.   Musculoskeletal: Normal range of motion.   Lymphadenopathy:     She has no cervical adenopathy.     She has no axillary adenopathy.        Right axillary: No pectoral adenopathy present.        Left axillary: No pectoral adenopathy present.       Right: No supraclavicular adenopathy present.        Left: No supraclavicular adenopathy present.   Axillary incision is healing well. There is no palpable axillary, supraclavicular nor cervical lymphadenopathy appreciated. No lymphedema is noted in either upper extremity.   Skin: Skin is intact. She is not diaphoretic.   Psychiatric: She has a normal mood and affect. Her speech is normal and behavior is normal. Judgment and thought content normal. Cognition and memory are normal.       Assessment:   Malignant neoplasm of lower-inner quadrant of left breast in female, estrogen receptor negative     Stage IA (T1c, N0, M0)  This assessment comes from my review of the imaging, pathology, physician notes and other pertinent information as mentioned.    Plan:   We reviewed the specifics of her clinical, imaging and pathology findings today and also talked through the role of radiation therapy in her breast conservation treatment. Multiple questions were answered about the role of hte chemotherapy and radiation therapy as well as hormonal therapy.     I recommended, given the above, that we embark on a course of postoperative whole breast radiation therapy consisting of 4256 cGy aimed at the breast given over 16 treatments. We will then plan on boosting the tumor bed region as I will delineate it on her treatment planning scan with an additional 1000 cGy given in five treatments. The above course of treatment should be completed in 4 1/2 weeks.    We discussed the specifics, logistics and side effects of this course of treatment  which may involve acutely, irritation of the skin, including erythema and possibly moist desquamation,  tenderness of the musculature of the chest wall and mild fatigue. We discussed the long term possibility of mild hyperpigmentation and fibrosis of the breast, mild increased incidence of rib fracture and radiation pneumonitis.  We also discussed the importance of our treatment planning process in protecting these underlying structures as much as possible, specifically heart, ribs and lung and I believe all her questions were answered to her satisfaction.      She is still struggling with her performance status and mood. She has stopped taking most of her medications, is not eating well and is fatigued. We have discussed dietary changes and gone thru her medications today. She will resume those necessary and we will check her blood sugar today as she has been off her insulin for over 4 weeks. She is agreeable to keeping a sugar journal for us to review and we will continue to check her blood pressure. We will also have our  check on her to see if we can be of further help.     We will start our treatment planning process with a CT scan, which we were able to complete today. I am planning on getting her treatment planning finalized and treatments underway in early February, 3-4 weeks out from her last round of chemo.    I spent greater than 60 minutes in face-to-face time with the patient and 45 minutes of that time was spent in counseling and coordination of care, including review of imaging and pathology; prognosis and differential diagnosis; indications, goals, logistics, benefits and risks of treatment as well as alternatives and surveillance options.

## 2018-01-29 ENCOUNTER — TELEPHONE (OUTPATIENT)
Dept: PSYCHIATRY | Facility: HOSPITAL | Age: 73
End: 2018-01-29

## 2018-01-29 NOTE — TELEPHONE ENCOUNTER
Supportive Oncology Services    Call received from SEJAL Culver, requesting I check in on pt regarding incomplete referral. Spoke with  who continues to report provider burden, physical limitations of pt, and disinterest in medication-although, with persistent depressive sx.  reports interest in meeting with SEJAL Culver, while receiving radiation at [place]. Explained services available and encouraged pt to see me while on [facility] this Wednesday after being seen in CBC office. Also discussed ability to meet with Kristine, and have communicated this conversation with her. Will continue to follow to provide any and all desire care for the patient.

## 2018-01-30 PROCEDURE — 77295 3-D RADIOTHERAPY PLAN: CPT | Performed by: RADIOLOGY

## 2018-01-31 ENCOUNTER — OFFICE VISIT (OUTPATIENT)
Dept: PSYCHIATRY | Facility: HOSPITAL | Age: 73
End: 2018-01-31

## 2018-01-31 ENCOUNTER — LAB (OUTPATIENT)
Dept: LAB | Facility: HOSPITAL | Age: 73
End: 2018-01-31

## 2018-01-31 ENCOUNTER — CLINICAL SUPPORT (OUTPATIENT)
Dept: ONCOLOGY | Facility: HOSPITAL | Age: 73
End: 2018-01-31

## 2018-01-31 DIAGNOSIS — Z17.1 MALIGNANT NEOPLASM OF LOWER-INNER QUADRANT OF LEFT BREAST IN FEMALE, ESTROGEN RECEPTOR NEGATIVE (HCC): ICD-10-CM

## 2018-01-31 DIAGNOSIS — F33.2 SEVERE EPISODE OF RECURRENT MAJOR DEPRESSIVE DISORDER, WITHOUT PSYCHOTIC FEATURES (HCC): Primary | ICD-10-CM

## 2018-01-31 DIAGNOSIS — C50.312 MALIGNANT NEOPLASM OF LOWER-INNER QUADRANT OF LEFT BREAST IN FEMALE, ESTROGEN RECEPTOR NEGATIVE (HCC): ICD-10-CM

## 2018-01-31 DIAGNOSIS — T45.1X5A CHEMOTHERAPY INDUCED NEUTROPENIA (HCC): ICD-10-CM

## 2018-01-31 DIAGNOSIS — D70.1 CHEMOTHERAPY INDUCED NEUTROPENIA (HCC): ICD-10-CM

## 2018-01-31 LAB
BASOPHILS # BLD AUTO: 0.08 10*3/MM3 (ref 0–0.1)
BASOPHILS NFR BLD AUTO: 1.7 % (ref 0–1.1)
DEPRECATED RDW RBC AUTO: 49.9 FL (ref 37–49)
EOSINOPHIL # BLD AUTO: 0.06 10*3/MM3 (ref 0–0.36)
EOSINOPHIL NFR BLD AUTO: 1.3 % (ref 1–5)
ERYTHROCYTE [DISTWIDTH] IN BLOOD BY AUTOMATED COUNT: 16.3 % (ref 11.7–14.5)
HCT VFR BLD AUTO: 36.5 % (ref 34–45)
HGB BLD-MCNC: 12 G/DL (ref 11.5–14.9)
IMM GRANULOCYTES # BLD: 0.03 10*3/MM3 (ref 0–0.03)
IMM GRANULOCYTES NFR BLD: 0.6 % (ref 0–0.5)
LYMPHOCYTES # BLD AUTO: 1.34 10*3/MM3 (ref 1–3.5)
LYMPHOCYTES NFR BLD AUTO: 28.2 % (ref 20–49)
MCH RBC QN AUTO: 27.8 PG (ref 27–33)
MCHC RBC AUTO-ENTMCNC: 32.9 G/DL (ref 32–35)
MCV RBC AUTO: 84.5 FL (ref 83–97)
MONOCYTES # BLD AUTO: 0.81 10*3/MM3 (ref 0.25–0.8)
MONOCYTES NFR BLD AUTO: 17 % (ref 4–12)
NEUTROPHILS # BLD AUTO: 2.44 10*3/MM3 (ref 1.5–7)
NEUTROPHILS NFR BLD AUTO: 51.2 % (ref 39–75)
NRBC BLD MANUAL-RTO: 0 /100 WBC (ref 0–0)
PLATELET # BLD AUTO: 165 10*3/MM3 (ref 150–375)
PMV BLD AUTO: 9.3 FL (ref 8.9–12.1)
RBC # BLD AUTO: 4.32 10*6/MM3 (ref 3.9–5)
WBC NRBC COR # BLD: 4.76 10*3/MM3 (ref 4–10)

## 2018-01-31 PROCEDURE — 77300 RADIATION THERAPY DOSE PLAN: CPT | Performed by: RADIOLOGY

## 2018-01-31 PROCEDURE — 85025 COMPLETE CBC W/AUTO DIFF WBC: CPT | Performed by: INTERNAL MEDICINE

## 2018-01-31 PROCEDURE — 36416 COLLJ CAPILLARY BLOOD SPEC: CPT | Performed by: INTERNAL MEDICINE

## 2018-01-31 PROCEDURE — G0463 HOSPITAL OUTPT CLINIC VISIT: HCPCS | Performed by: NURSE PRACTITIONER

## 2018-01-31 PROCEDURE — 90792 PSYCH DIAG EVAL W/MED SRVCS: CPT | Performed by: NURSE PRACTITIONER

## 2018-01-31 RX ORDER — MIRTAZAPINE 15 MG/1
15 TABLET, ORALLY DISINTEGRATING ORAL NIGHTLY
Qty: 30 TABLET | Refills: 2 | Status: SHIPPED | OUTPATIENT
Start: 2018-01-31 | End: 2018-03-01 | Stop reason: ALTCHOICE

## 2018-01-31 NOTE — PROGRESS NOTES
CBC reviewed with pt and . Counts WNL. Pt states she is feeling well other than fatigue. She states she is drinking plenty of fluids. She reports drinking approx 2 water bottles and a boost drink a day. Encouraged pt to increase fluid intake a little more. She v/u. Educated pt on s/s of dehydration and when to call if she feels like she needs IVF. Pt denies needing them at this time. Advised pt to call if she has any questions/concerns. She v/u. Pt will start radiation next week.

## 2018-02-01 ENCOUNTER — APPOINTMENT (OUTPATIENT)
Dept: RADIATION ONCOLOGY | Facility: HOSPITAL | Age: 73
End: 2018-02-01

## 2018-02-02 ENCOUNTER — TELEPHONE (OUTPATIENT)
Dept: PSYCHIATRY | Facility: HOSPITAL | Age: 73
End: 2018-02-02

## 2018-02-02 NOTE — TELEPHONE ENCOUNTER
Supportive Oncology Services    Called pt to check in regarding appt earlier this week.  Spoke with daughter, Chely and pt, Rodolfo. Pt reports taking Remeron for past two nights. Reports feeling a little more tired and having more difficulty concentrating since initiating. Discussed anticipated benefits and strong recommendation to continue taking. Pt agreeable. Pt to call clinic Monday with any questions or concerns regarding continued somnolence.

## 2018-02-05 ENCOUNTER — DOCUMENTATION (OUTPATIENT)
Dept: ONCOLOGY | Facility: HOSPITAL | Age: 73
End: 2018-02-05

## 2018-02-05 PROCEDURE — 77417 THER RADIOLOGY PORT IMAGE(S): CPT | Performed by: RADIOLOGY

## 2018-02-05 PROCEDURE — 77412 RADIATION TX DELIVERY LVL 3: CPT | Performed by: RADIOLOGY

## 2018-02-05 PROCEDURE — 77427 RADIATION TX MANAGEMENT X5: CPT | Performed by: RADIOLOGY

## 2018-02-05 PROCEDURE — 77334 RADIATION TREATMENT AID(S): CPT | Performed by: RADIOLOGY

## 2018-02-05 PROCEDURE — 77280 THER RAD SIMULAJ FIELD SMPL: CPT | Performed by: RADIOLOGY

## 2018-02-05 NOTE — PROGRESS NOTES
"OSW met with the pt today along with her supportive , following her radiation treatment at [place]. The pt had met with Jennifer Santacruz, Psych NP, last week and began tx with mirtazapine. The pt expressed hopefulness that this medication would provide assistance with her depression. She was able to verbalize instructions from Jennifer; however, the pt did mention struggling with her memory even before having chemotherapy. The pt's  voiced concern regarding her \"emotional state\" and listened very attentively. The pt participated actively in the consultation.     The couple was interested in discussing financial concerns. The pt and her  reside in [place] in a home they have owned since 1973; they do not have a mortgage. It appears that the couple have planned well by having a small savings. Both receive monthly Social Security checks along with their pensions from [employer]. Since the couple had not yet dealt with a serious illness (the  states he is in good health), they are concerned regarding the cost of medical bills for the pt's cancer treatment. OSW suggested calling the insurance company. Pt and  stated they will likely wait to see which bills are owed and how much. OSW stated that the couple could apply for [facility] assistance with medical bills to see if they qualify. The pt and her  stated that they were not currently interested in applying for any assistance programs from [facility] at this time. They were provided with the phone number for [facility] to apply for a transportation emilie. Additionally, OSW reviewed support group options with the pt and a [facility] [employer] referral. She stated that she is not inclined at this time to participate.    OSW addressed the issue of advanced care directive with the pt. She stated that she has one that needs to be updated. She agreed to locate it and bring it with her for discussion with OSW. OSW explained " "her availability to assist with a ACD conversation or for Jennifer Santacruz to do so. The pt's  was provided with the smoking cessation brochure \"How to Quit\" and was informed about the Smoking Cessation training to be held at the [facility] on 2/14/18.     OSW provided her contact information and remains available as needs arise.    Kristine Link, [facility]  Oncology Social Worker  "

## 2018-02-06 PROCEDURE — 77336 RADIATION PHYSICS CONSULT: CPT | Performed by: RADIOLOGY

## 2018-02-06 PROCEDURE — 77412 RADIATION TX DELIVERY LVL 3: CPT | Performed by: RADIOLOGY

## 2018-02-07 PROCEDURE — 77412 RADIATION TX DELIVERY LVL 3: CPT | Performed by: RADIOLOGY

## 2018-02-08 PROCEDURE — 77412 RADIATION TX DELIVERY LVL 3: CPT | Performed by: RADIOLOGY

## 2018-02-09 PROCEDURE — 77412 RADIATION TX DELIVERY LVL 3: CPT | Performed by: RADIOLOGY

## 2018-02-12 PROCEDURE — 77427 RADIATION TX MANAGEMENT X5: CPT | Performed by: RADIOLOGY

## 2018-02-12 PROCEDURE — 77412 RADIATION TX DELIVERY LVL 3: CPT | Performed by: RADIOLOGY

## 2018-02-13 ENCOUNTER — RADIATION ONCOLOGY WEEKLY ASSESSMENT (OUTPATIENT)
Dept: RADIATION ONCOLOGY | Facility: CLINIC | Age: 73
End: 2018-02-13

## 2018-02-13 VITALS
HEIGHT: 65 IN | TEMPERATURE: 97.5 F | BODY MASS INDEX: 29.82 KG/M2 | OXYGEN SATURATION: 96 % | HEART RATE: 85 BPM | DIASTOLIC BLOOD PRESSURE: 71 MMHG | SYSTOLIC BLOOD PRESSURE: 112 MMHG | WEIGHT: 179 LBS

## 2018-02-13 DIAGNOSIS — C50.312 MALIGNANT NEOPLASM OF LOWER-INNER QUADRANT OF LEFT BREAST IN FEMALE, ESTROGEN RECEPTOR NEGATIVE (HCC): Primary | ICD-10-CM

## 2018-02-13 DIAGNOSIS — Z17.1 MALIGNANT NEOPLASM OF LOWER-INNER QUADRANT OF LEFT BREAST IN FEMALE, ESTROGEN RECEPTOR NEGATIVE (HCC): Primary | ICD-10-CM

## 2018-02-13 PROCEDURE — 77336 RADIATION PHYSICS CONSULT: CPT | Performed by: RADIOLOGY

## 2018-02-13 PROCEDURE — 77412 RADIATION TX DELIVERY LVL 3: CPT | Performed by: RADIOLOGY

## 2018-02-13 PROCEDURE — 77417 THER RADIOLOGY PORT IMAGE(S): CPT | Performed by: RADIOLOGY

## 2018-02-13 NOTE — PROGRESS NOTES
"Physician Weekly Management Note    Diagnosis:   Malignant neoplasm of lower-inner quadrant of left breast in female, estrogen receptor negative    Stage IA (T1c, N0, M0)     Reason for Visit: Tx: 7/21  Concurrent Chemo:   No    Notes on Treatment course, Films, Medical progress and Plan:  Doing well. Still fatigued but improved. No problems or questions, cont on.    ROS - Other than as listed above, as follow:  Constitutional - Normal - Denies lack of appetite, fatigue, fever, night sweats and change in weight.  Neck - Normal - Denies neck masses, muscle weakness, neck pain, decreased range of motion and swelling of the neck.  Breasts - Normal - Denies breast masses, nipple discharge, nipple inversion and pain.  Respiratory - Normal - Denies cough, dyspnea, hemoptysis, hiccoughs, pleuritic chest pain and wheezing.  Gastrointestinal - Normal - Denies abdominal pain, constipation, diarrhea, heartburn / dyspepsia, hematemesis, hemorrhoids, melena / GI bleeding, nausea, pain / cramping, satiety and vomiting.  Musculoskeletal - Normal - Denies arthritis, bone pain, joint pain, muscle weakness and decreased range of motion.   Hematologic/Lymphatic - Normal - Denies easy bruising and tender or enlarged lymph nodes.    PYHSICAL EXAM - Other than listed above, as follows:  Vitals:    02/13/18 0938   BP: 112/71   Pulse: 85   Temp: 97.5 °F (36.4 °C)   TempSrc: Oral   SpO2: 96%   Weight: 81.2 kg (179 lb)   Height: 166 cm (65.35\")   PainSc: 0-No pain       Constitutional - Normal - No evidence of impaired alertness, inadequate appearance, premature or advanced chronologic age, uncooperativeness, altered mood and affect and disorientation.  Neck - Normal - No evidence of tender or enlarged lymph nodes, neck abnormalities, restricted range of motion and enlarged thyroid gland.  Breasts - Normal - No change in nipple or incision site.  Chest - Normal - No evidence of chest abnormalities and tender or enlarged lymph " "nodes.  Hematologic/Lymphatic -  Normal - No evidence of tender or enlarged axillae lymph nodes and tender or enlarged neck lymph nodes.    Performance Status: (2) Ambulatory and capable of self care, unable to carry out work activity, up and about > 50% or waking hours  Problem added:  No problems updated.  Medications added: No orders of the defined types were placed in this encounter.    Ancillary referrals made: No orders of the defined types were placed in this encounter.      Technical aspects reviewed:  Weekly OBI approved if applicable? Yes  Weekly port films approved?   Yes  Change requests noted if applicable? Yes  Patient setup and plan reviewed?  Yes    Chart Reviewed:  Continue current treatment plan?   Yes  Treatment plan change requested?  No    I have reviewed and marked as \"reviewed\" the current medications, allergies and problem list in the patients EMR.    I have reviewed the patient's medical, surgical  history in detail, reviewed any pertinent lab work  and updated the computerized patient record if needed.    Patient's Care Team:  Patient Care Team:  Cris Mendieta DO as PCP - General (Internal Medicine)  Cris Mendieta DO as Referring Physician (Internal Medicine)  Luz Elena Herndon MD as Consulting Physician (Hematology and Oncology)  Jhonny Powell MD as Consulting Physician (Nephrology)  Billie Leahy MD as Consulting Physician (Radiation Oncology)    Seen and approved by:  Billie Leahy MD, 02/13/2018  "

## 2018-02-14 ENCOUNTER — OFFICE VISIT (OUTPATIENT)
Dept: PSYCHIATRY | Facility: HOSPITAL | Age: 73
End: 2018-02-14

## 2018-02-14 DIAGNOSIS — F33.1 MAJOR DEPRESSIVE DISORDER, RECURRENT EPISODE, MODERATE (HCC): Primary | ICD-10-CM

## 2018-02-14 PROCEDURE — 77412 RADIATION TX DELIVERY LVL 3: CPT | Performed by: RADIOLOGY

## 2018-02-14 PROCEDURE — G0463 HOSPITAL OUTPT CLINIC VISIT: HCPCS | Performed by: NURSE PRACTITIONER

## 2018-02-14 PROCEDURE — 99214 OFFICE O/P EST MOD 30 MIN: CPT | Performed by: NURSE PRACTITIONER

## 2018-02-14 PROCEDURE — 77417 THER RADIOLOGY PORT IMAGE(S): CPT | Performed by: RADIOLOGY

## 2018-02-14 NOTE — PROGRESS NOTES
Subjective  Patient ID: Tyrone Rand is a 73 y.o. female who [facility] for follow-up.   Pt presents to clinic with  for continued assessment of anxiety and depression associated with breast cancer s/p taxotere and cytoxan, currently with 12 more treatments of radiation to complete. Today, pt reports feeling a little off balance and groggy, stating it is difficult for her to get up early to report to radiation by 9:20. PHQ9 and GAD7 scores with notable decrease from initial visit. Pt initiated remeron as instructed and reports to be taking it q hs. Pt endorses appropriate sleep, easy to initiate, and straight through, generally totaling 8 hours nightly. Appetite continues to be reduced, primarily hindered by change in taste. Weight reported to be stable. Energy is somewhat improved; pt has increased exercise around house, generally one short walk daily. Denies sleeping as much throughout the day, although does appreciate a one hour nap. Pt is very engaged in conversation and easily follows from topic to topic; affective flattening continues, but I do not observe blatant cognitive impairment. Pt does report disrupted concentration and reports difficulty performing tasks, such as making old recipes, as easily as before. Pt continues with negative attributions toward medication but agrees the data we have points in a positive direction.    PHQ9 Total Score: 7  GARETH 7 Total Score: 4    Diagnoses and all orders for this visit:    Major depressive disorder, recurrent episode, moderate      Plan of Care  Medications Reviewed. Metrix demonstrate some reduction in depression; pt agrees to continuing with remeron q hs. Will continue to evaluate benefit in terms of impact on sleep, mood, appetite, and anxiety. Pt and  agree with plan.     Total face to face time spent 30 minutes. 26 minutes spent in supportive counseling surrounding issues of motivation,  rehabilitation, and mood mgmt. Continued with medication education and counseling.

## 2018-02-15 PROCEDURE — 77412 RADIATION TX DELIVERY LVL 3: CPT | Performed by: RADIOLOGY

## 2018-02-16 PROCEDURE — 77412 RADIATION TX DELIVERY LVL 3: CPT | Performed by: RADIOLOGY

## 2018-02-19 PROCEDURE — 77427 RADIATION TX MANAGEMENT X5: CPT | Performed by: RADIOLOGY

## 2018-02-19 PROCEDURE — 77412 RADIATION TX DELIVERY LVL 3: CPT | Performed by: RADIOLOGY

## 2018-02-20 PROCEDURE — 77417 THER RADIOLOGY PORT IMAGE(S): CPT | Performed by: RADIOLOGY

## 2018-02-20 PROCEDURE — 77336 RADIATION PHYSICS CONSULT: CPT | Performed by: RADIOLOGY

## 2018-02-20 PROCEDURE — 77412 RADIATION TX DELIVERY LVL 3: CPT | Performed by: RADIOLOGY

## 2018-02-21 PROCEDURE — 77412 RADIATION TX DELIVERY LVL 3: CPT | Performed by: RADIOLOGY

## 2018-02-21 PROCEDURE — 77417 THER RADIOLOGY PORT IMAGE(S): CPT | Performed by: RADIOLOGY

## 2018-02-22 PROCEDURE — 77412 RADIATION TX DELIVERY LVL 3: CPT | Performed by: RADIOLOGY

## 2018-02-23 ENCOUNTER — RADIATION ONCOLOGY WEEKLY ASSESSMENT (OUTPATIENT)
Dept: RADIATION ONCOLOGY | Facility: CLINIC | Age: 73
End: 2018-02-23

## 2018-02-23 VITALS
TEMPERATURE: 97.7 F | HEIGHT: 65 IN | BODY MASS INDEX: 29.66 KG/M2 | HEART RATE: 90 BPM | DIASTOLIC BLOOD PRESSURE: 70 MMHG | WEIGHT: 178 LBS | OXYGEN SATURATION: 98 % | SYSTOLIC BLOOD PRESSURE: 109 MMHG

## 2018-02-23 DIAGNOSIS — C50.312 MALIGNANT NEOPLASM OF LOWER-INNER QUADRANT OF LEFT BREAST IN FEMALE, ESTROGEN RECEPTOR NEGATIVE (HCC): Primary | ICD-10-CM

## 2018-02-23 DIAGNOSIS — Z17.1 MALIGNANT NEOPLASM OF LOWER-INNER QUADRANT OF LEFT BREAST IN FEMALE, ESTROGEN RECEPTOR NEGATIVE (HCC): Primary | ICD-10-CM

## 2018-02-23 PROCEDURE — 77412 RADIATION TX DELIVERY LVL 3: CPT | Performed by: RADIOLOGY

## 2018-02-23 NOTE — PROGRESS NOTES
Physician Weekly Management Note    Diagnosis:     Diagnosis Plan   1. Malignant neoplasm of lower-inner quadrant of left breast in female, estrogen receptor negative         RT Details:  fx 15/21 left breast tangents    Notes on Treatment course, Films, Medical progress:  Doing ok, occasional sharp shooting pain in breast resolves spontaneously, mild hyperpigmentation    Weekly Management:  Medication reviewed?   Yes  New medications given?   Yes  Problemlist reviewed?   No  Problem added?   No  Issues raised requiring referral to support services - task assigned to:  na    Technical aspects reviewed:  Weekly OBI approved?   Yes  Weekly port films approved?   Yes  Change requests noted on port film?   No  Patient setup and plan reviewed?   Yes    Chart Reviewed:  Continue current treatment plan?   Yes  Treatment plan change requested?   No  CBC reviewed?   No  Concurrent Chemo?   No    Objective     Toxicities:   none     Review of Systems   Constitutional: Positive for fatigue.   Skin: Positive for color change.          There were no vitals filed for this visit.    Current Status 1/24/2018   ECOG score 0       Physical Exam   Constitutional: She appears well-developed and well-nourished.   Pulmonary/Chest:               Problem Summary List    Diagnosis:     Diagnosis Plan   1. Malignant neoplasm of lower-inner quadrant of left breast in female, estrogen receptor negative       Pathology:   breast    Past Medical History:   Diagnosis Date   • Breast cancer     Left   • Depression    • Diabetes mellitus    • GERD (gastroesophageal reflux disease)    • H/O Left breast mass 2017   • H/O vitamin D deficiency    • History of snoring    • Hyperlipidemia    • Hypertension    • Panic attack    • Poor sleep pattern    • Sleep apnea     CPAP         Past Surgical History:   Procedure Laterality Date   • BREAST LUMPECTOMY Left 09/2017   • COLON SURGERY     • COLONOSCOPY     • KIDNEY SURGERY  2011   • MOUTH SURGERY       TOOTH EXTRACTION   • TOOTH EXTRACTION           Current Outpatient Prescriptions on File Prior to Visit   Medication Sig Dispense Refill   • Acetaminophen (TYLENOL ARTHRITIS PAIN PO) Take  by mouth.     • ALPRAZolam (XANAX) 0.25 MG tablet Take 1 tablet by mouth Every 8 (Eight) Hours As Needed for Anxiety. 20 tablet 0   • ergocalciferol (ERGOCALCIFEROL) 15620 UNITS capsule Take 1 capsule by mouth once a week.     • glucose blood test strip Test blood sugar twice daily     • Insulin Lispro (HUMALOG KWIKPEN SC) Inject 10 Units under the skin 3 (Three) Times a Day.     • Loratadine (CLARITIN PO) Take 10 mg by mouth Daily.     • metoprolol succinate XL (TOPROL-XL) 25 MG 24 hr tablet TAKE ONE TABLET BY MOUTH IN THE MORNING  11   • mirtazapine (REMERON SOL-TAB) 15 MG disintegrating tablet Take 1 tablet by mouth Every Night. 30 tablet 2   • nystatin (MYCOSTATIN) 167011 UNIT/ML suspension Swish and swallow 5 mL 4 (Four) Times a Day. 473 mL 0   • ondansetron (ZOFRAN) 4 MG tablet Take 1 tablet by mouth Every 8 (Eight) Hours As Needed for Nausea or Vomiting. 30 tablet 2   • ranitidine (ZANTAC) 150 MG tablet Take by mouth 2 (two) times a day.     • spironolactone (ALDACTONE) 100 MG tablet Take 1 tablet by mouth daily. 90 tablet 3   • TOUJEO SOLOSTAR 300 UNIT/ML solution pen-injector Inject 25 Units under the skin daily with dinner. INJECT 25 UNITS AT BEDTIME (Patient taking differently: Inject 40 Units under the skin Daily With Dinner. INJECT 25 UNITS AT BEDTIME) 3 pen 5     No current facility-administered medications on file prior to visit.        Allergies   Allergen Reactions   • Sulfa Antibiotics          Referring Provider:    No referring provider defined for this encounter.    Oncologist:  No primary care provider on file.      Seen and approved by:  Merced Mascorro MD  02/23/2018

## 2018-02-26 PROCEDURE — 77321 SPECIAL TELETX PORT PLAN: CPT | Performed by: RADIOLOGY

## 2018-02-26 PROCEDURE — 77412 RADIATION TX DELIVERY LVL 3: CPT | Performed by: RADIOLOGY

## 2018-02-26 PROCEDURE — 77427 RADIATION TX MANAGEMENT X5: CPT | Performed by: RADIOLOGY

## 2018-02-27 ENCOUNTER — RADIATION ONCOLOGY WEEKLY ASSESSMENT (OUTPATIENT)
Dept: RADIATION ONCOLOGY | Facility: CLINIC | Age: 73
End: 2018-02-27

## 2018-02-27 DIAGNOSIS — C50.312 MALIGNANT NEOPLASM OF LOWER-INNER QUADRANT OF LEFT BREAST IN FEMALE, ESTROGEN RECEPTOR NEGATIVE (HCC): Primary | ICD-10-CM

## 2018-02-27 DIAGNOSIS — Z17.1 MALIGNANT NEOPLASM OF LOWER-INNER QUADRANT OF LEFT BREAST IN FEMALE, ESTROGEN RECEPTOR NEGATIVE (HCC): Primary | ICD-10-CM

## 2018-02-27 PROCEDURE — 77336 RADIATION PHYSICS CONSULT: CPT | Performed by: RADIOLOGY

## 2018-02-27 PROCEDURE — 77300 RADIATION THERAPY DOSE PLAN: CPT | Performed by: RADIOLOGY

## 2018-02-27 PROCEDURE — 77280 THER RAD SIMULAJ FIELD SMPL: CPT | Performed by: RADIOLOGY

## 2018-02-27 PROCEDURE — 77417 THER RADIOLOGY PORT IMAGE(S): CPT | Performed by: RADIOLOGY

## 2018-02-27 PROCEDURE — 77412 RADIATION TX DELIVERY LVL 3: CPT | Performed by: RADIOLOGY

## 2018-02-27 NOTE — PROGRESS NOTES
Physician Weekly Management Note    Diagnosis:   Malignant neoplasm of lower-inner quadrant of left breast in female, estrogen receptor negative    Stage IA (T1c, N0, M0)     Reason for Visit: Tx: 17/21  Concurrent Chemo:   No    Notes on Treatment course, Films, Medical progress and Plan:  Doing well. Still fatigued and family concerned about weight loss. Patient says energy has improved. Will have Yessy touch base with them. Boost and follow up discussed. No other problems or questions, cont on.    ROS - Other than as listed above, as follow:  Constitutional - Normal - Denies lack of appetite, fatigue, fever, night sweats and change in weight.  Neck - Normal - Denies neck masses, muscle weakness, neck pain, decreased range of motion and swelling of the neck.  Breasts - Normal - Denies breast masses, nipple discharge, nipple inversion and pain.  Respiratory - Normal - Denies cough, dyspnea, hemoptysis, hiccoughs, pleuritic chest pain and wheezing.  Gastrointestinal - Normal - Denies abdominal pain, constipation, diarrhea, heartburn / dyspepsia, hematemesis, hemorrhoids, melena / GI bleeding, nausea, pain / cramping, satiety and vomiting.  Musculoskeletal - Normal - Denies arthritis, bone pain, joint pain, muscle weakness and decreased range of motion.   Hematologic/Lymphatic - Normal - Denies easy bruising and tender or enlarged lymph nodes.    PYHSICAL EXAM - Other than listed above, as follows:  There were no vitals filed for this visit.    Constitutional - Normal - No evidence of impaired alertness, inadequate appearance, premature or advanced chronologic age, uncooperativeness, altered mood and affect and disorientation.  Neck - Normal - No evidence of tender or enlarged lymph nodes, neck abnormalities, restricted range of motion and enlarged thyroid gland.  Breasts - Normal - No change in nipple or incision site.  Chest - Normal - No evidence of chest abnormalities and tender or enlarged lymph  "nodes.  Hematologic/Lymphatic -  Normal - No evidence of tender or enlarged axillae lymph nodes and tender or enlarged neck lymph nodes.    Performance Status: (2) Ambulatory and capable of self care, unable to carry out work activity, up and about > 50% or waking hours  Problem added:  No problems updated.  Medications added: No orders of the defined types were placed in this encounter.    Ancillary referrals made: No orders of the defined types were placed in this encounter.      Technical aspects reviewed:  Weekly OBI approved if applicable? Yes  Weekly port films approved?   Yes  Change requests noted if applicable? Yes  Patient setup and plan reviewed?  Yes    Chart Reviewed:  Continue current treatment plan?   Yes  Treatment plan change requested?  No    I have reviewed and marked as \"reviewed\" the current medications, allergies and problem list in the patients EMR.    I have reviewed the patient's medical, surgical  history in detail, reviewed any pertinent lab work  and updated the computerized patient record if needed.    Patient's Care Team:  Patient Care Team:  Cris Mendieta DO as PCP - General (Internal Medicine)  Cris Mendieta DO as Referring Physician (Internal Medicine)  Luz Elena Herndon MD as Consulting Physician (Hematology and Oncology)  Jhonny Powell MD as Consulting Physician (Nephrology)  Billie Leahy MD as Consulting Physician (Radiation Oncology)    Seen and approved by:  Billie Leahy MD, 02/13/2018  "

## 2018-02-28 ENCOUNTER — TELEPHONE (OUTPATIENT)
Dept: PSYCHIATRY | Facility: HOSPITAL | Age: 73
End: 2018-02-28

## 2018-02-28 PROCEDURE — 77412 RADIATION TX DELIVERY LVL 3: CPT | Performed by: RADIOLOGY

## 2018-02-28 NOTE — TELEPHONE ENCOUNTER
[facility]    Call received from pt , Pascual, without number for return call. Attempted to return call with inability to leave message.

## 2018-03-01 ENCOUNTER — OFFICE VISIT (OUTPATIENT)
Dept: PSYCHIATRY | Facility: HOSPITAL | Age: 73
End: 2018-03-01

## 2018-03-01 ENCOUNTER — APPOINTMENT (OUTPATIENT)
Dept: RADIATION ONCOLOGY | Facility: HOSPITAL | Age: 73
End: 2018-03-01

## 2018-03-01 DIAGNOSIS — R53.0 NEOPLASTIC MALIGNANT RELATED FATIGUE: ICD-10-CM

## 2018-03-01 DIAGNOSIS — F33.1 MAJOR DEPRESSIVE DISORDER, RECURRENT EPISODE, MODERATE (HCC): Primary | ICD-10-CM

## 2018-03-01 PROCEDURE — 77412 RADIATION TX DELIVERY LVL 3: CPT | Performed by: RADIOLOGY

## 2018-03-01 PROCEDURE — G0463 HOSPITAL OUTPT CLINIC VISIT: HCPCS | Performed by: NURSE PRACTITIONER

## 2018-03-01 PROCEDURE — 99214 OFFICE O/P EST MOD 30 MIN: CPT | Performed by: NURSE PRACTITIONER

## 2018-03-01 RX ORDER — MIRTAZAPINE 15 MG/1
15 TABLET, FILM COATED ORAL NIGHTLY
Qty: 30 TABLET | Refills: 2 | Status: SHIPPED | OUTPATIENT
Start: 2018-03-01 | End: 2018-03-21

## 2018-03-01 NOTE — PROGRESS NOTES
Subjective  Patient ID: Tyrone Rand is a 73 y.o. female who presents [facility] for follow-up.   Pt reports to clinic with  for continued mgmt of depression alongside breast cancer dx. Chief complaint of fatigue is reported to continue. Pt feels this is exacerbated on remeron; states she sometimes takes this every other day instead of every day, noting higher fatigue on days following nights she did take med. Continues to report disruption in tastes which she feels to be related to ODT nature of drug. Appetite is improved but continues to be minimal. Pt endorses amotivation regarding getting up to get food even when hungry. Denies N/V/D, mouth sores, or difficulty swallowing. Pt endorses some continuation of anxiety sx, but generally reports improvement in both anxiety and mood. Increased insight regarding dx; pt reports not previously believing she would survive this. Reports new motivation to improve quality of life given new belief that death is not imminent. Looking forward to a few events this spring and summer including a great grand child first birthday and a wedding. Denies excitement, stating the cancer dx caused her to be flat and she has had difficulty experiencing feelings of samira since then. Desires to have this back. Pt does report some improvement since medication changes, and is agreeable to continuing on current regimen.    PHQ9 Total Score: 9  GARETH 7 Total Score: 4    Diagnoses and all orders for this visit:    Major depressive disorder, recurrent episode, moderate    Neoplastic malignant related fatigue    Other orders  -     mirtazapine (REMERON) 15 MG tablet; Take 1 tablet by mouth Every Night.      Plan of Care  Pt continues with negative attributions toward medication, although does report taking at least half the time. Agrees with improvement in mood, fatigue, and focus although continues continues to report flatness, fatigue, and decreased  cognition as compared to baseline. Continued counseling regarding multifactorial causes of sx including radiation, chemo, depression, cancer, polypharmacy, and age. Discussed ability to adjust to SSRI in am which is less likely to have sedation properties of remeron.  and wife both feel progress has been made on existing agent and, due to resistance to change, prefer to continue with existing treatment. Option for low dose stimulant trial breached for heightened wakefulness, concentration and focus. Adamantly denies interest at this time. Plan to follow in 3 weeks for continued medication assessment.    Total face to face time spent 28 minutes. 25 minutes spent in supportive counseling surrounding issues of managing cancer related fatigue, recognizing cancer attributions and emotions related to it, and explored desires for increased quality of life.

## 2018-03-02 PROCEDURE — 77412 RADIATION TX DELIVERY LVL 3: CPT | Performed by: RADIOLOGY

## 2018-03-05 PROCEDURE — 77412 RADIATION TX DELIVERY LVL 3: CPT | Performed by: RADIOLOGY

## 2018-03-05 PROCEDURE — 77336 RADIATION PHYSICS CONSULT: CPT | Performed by: RADIOLOGY

## 2018-03-21 ENCOUNTER — OFFICE VISIT (OUTPATIENT)
Dept: PSYCHIATRY | Facility: HOSPITAL | Age: 73
End: 2018-03-21

## 2018-03-21 ENCOUNTER — LAB (OUTPATIENT)
Dept: LAB | Facility: HOSPITAL | Age: 73
End: 2018-03-21

## 2018-03-21 ENCOUNTER — INFUSION (OUTPATIENT)
Dept: ONCOLOGY | Facility: HOSPITAL | Age: 73
End: 2018-03-21

## 2018-03-21 ENCOUNTER — OFFICE VISIT (OUTPATIENT)
Dept: ONCOLOGY | Facility: CLINIC | Age: 73
End: 2018-03-21

## 2018-03-21 VITALS
DIASTOLIC BLOOD PRESSURE: 84 MMHG | BODY MASS INDEX: 29.12 KG/M2 | RESPIRATION RATE: 16 BRPM | SYSTOLIC BLOOD PRESSURE: 116 MMHG | TEMPERATURE: 97.6 F | HEART RATE: 85 BPM | OXYGEN SATURATION: 97 % | WEIGHT: 174.8 LBS | HEIGHT: 65 IN

## 2018-03-21 DIAGNOSIS — C50.312 MALIGNANT NEOPLASM OF LOWER-INNER QUADRANT OF LEFT BREAST IN FEMALE, ESTROGEN RECEPTOR NEGATIVE (HCC): ICD-10-CM

## 2018-03-21 DIAGNOSIS — R53.83 FATIGUE DUE TO DEPRESSION: ICD-10-CM

## 2018-03-21 DIAGNOSIS — F32.9 SINGLE CURRENT EPISODE OF MAJOR DEPRESSIVE DISORDER, UNSPECIFIED DEPRESSION EPISODE SEVERITY: ICD-10-CM

## 2018-03-21 DIAGNOSIS — Z45.2 FITTING AND ADJUSTMENT OF VASCULAR CATHETER: Primary | ICD-10-CM

## 2018-03-21 DIAGNOSIS — Z17.1 MALIGNANT NEOPLASM OF LOWER-INNER QUADRANT OF LEFT BREAST IN FEMALE, ESTROGEN RECEPTOR NEGATIVE (HCC): ICD-10-CM

## 2018-03-21 DIAGNOSIS — N61.0 MASTITIS, LEFT, ACUTE: ICD-10-CM

## 2018-03-21 DIAGNOSIS — T45.1X5A CHEMOTHERAPY INDUCED NEUTROPENIA (HCC): ICD-10-CM

## 2018-03-21 DIAGNOSIS — F32.A FATIGUE DUE TO DEPRESSION: ICD-10-CM

## 2018-03-21 DIAGNOSIS — F33.1 MAJOR DEPRESSIVE DISORDER, RECURRENT EPISODE, MODERATE (HCC): Primary | ICD-10-CM

## 2018-03-21 DIAGNOSIS — Z45.2 FITTING AND ADJUSTMENT OF VASCULAR CATHETER: ICD-10-CM

## 2018-03-21 DIAGNOSIS — C50.312 MALIGNANT NEOPLASM OF LOWER-INNER QUADRANT OF LEFT BREAST IN FEMALE, ESTROGEN RECEPTOR NEGATIVE (HCC): Primary | ICD-10-CM

## 2018-03-21 DIAGNOSIS — D70.1 CHEMOTHERAPY INDUCED NEUTROPENIA (HCC): ICD-10-CM

## 2018-03-21 DIAGNOSIS — N18.30 CHRONIC KIDNEY DISEASE, STAGE III (MODERATE) (HCC): Primary | ICD-10-CM

## 2018-03-21 DIAGNOSIS — N28.89 RENAL MASS: ICD-10-CM

## 2018-03-21 DIAGNOSIS — Z17.1 MALIGNANT NEOPLASM OF LOWER-INNER QUADRANT OF LEFT BREAST IN FEMALE, ESTROGEN RECEPTOR NEGATIVE (HCC): Primary | ICD-10-CM

## 2018-03-21 LAB
ALBUMIN SERPL-MCNC: 3.9 G/DL (ref 3.5–5.2)
ALBUMIN/GLOB SERPL: 1.1 G/DL (ref 1.1–2.4)
ALP SERPL-CCNC: 110 U/L (ref 38–116)
ALT SERPL W P-5'-P-CCNC: 14 U/L (ref 0–33)
ANION GAP SERPL CALCULATED.3IONS-SCNC: 12.8 MMOL/L
AST SERPL-CCNC: 16 U/L (ref 0–32)
BASOPHILS # BLD AUTO: 0.07 10*3/MM3 (ref 0–0.1)
BASOPHILS NFR BLD AUTO: 1.6 % (ref 0–1.1)
BILIRUB SERPL-MCNC: 0.2 MG/DL (ref 0.1–1.2)
BUN BLD-MCNC: 15 MG/DL (ref 6–20)
BUN/CREAT SERPL: 14.6 (ref 7.3–30)
CALCIUM SPEC-SCNC: 9.8 MG/DL (ref 8.5–10.2)
CHLORIDE SERPL-SCNC: 96 MMOL/L (ref 98–107)
CO2 SERPL-SCNC: 25.2 MMOL/L (ref 22–29)
CREAT BLD-MCNC: 1.03 MG/DL (ref 0.6–1.1)
DEPRECATED RDW RBC AUTO: 41.9 FL (ref 37–49)
EOSINOPHIL # BLD AUTO: 0.2 10*3/MM3 (ref 0–0.36)
EOSINOPHIL NFR BLD AUTO: 4.6 % (ref 1–5)
ERYTHROCYTE [DISTWIDTH] IN BLOOD BY AUTOMATED COUNT: 12.9 % (ref 11.7–14.5)
GFR SERPL CREATININE-BSD FRML MDRD: 63 ML/MIN/1.73
GLOBULIN UR ELPH-MCNC: 3.4 GM/DL (ref 1.8–3.5)
GLUCOSE BLD-MCNC: 305 MG/DL (ref 74–124)
HCT VFR BLD AUTO: 43 % (ref 34–45)
HGB BLD-MCNC: 13.6 G/DL (ref 11.5–14.9)
HOLD SPECIMEN: NORMAL
IMM GRANULOCYTES # BLD: 0.01 10*3/MM3 (ref 0–0.03)
IMM GRANULOCYTES NFR BLD: 0.2 % (ref 0–0.5)
LYMPHOCYTES # BLD AUTO: 0.78 10*3/MM3 (ref 1–3.5)
LYMPHOCYTES NFR BLD AUTO: 18.1 % (ref 20–49)
MCH RBC QN AUTO: 28 PG (ref 27–33)
MCHC RBC AUTO-ENTMCNC: 31.6 G/DL (ref 32–35)
MCV RBC AUTO: 88.5 FL (ref 83–97)
MONOCYTES # BLD AUTO: 0.45 10*3/MM3 (ref 0.25–0.8)
MONOCYTES NFR BLD AUTO: 10.4 % (ref 4–12)
NEUTROPHILS # BLD AUTO: 2.8 10*3/MM3 (ref 1.5–7)
NEUTROPHILS NFR BLD AUTO: 65.1 % (ref 39–75)
NRBC BLD MANUAL-RTO: 0 /100 WBC (ref 0–0)
PLATELET # BLD AUTO: 199 10*3/MM3 (ref 150–375)
PMV BLD AUTO: 9.4 FL (ref 8.9–12.1)
POTASSIUM BLD-SCNC: 4.3 MMOL/L (ref 3.5–4.7)
PROT SERPL-MCNC: 7.3 G/DL (ref 6.3–8)
RBC # BLD AUTO: 4.86 10*6/MM3 (ref 3.9–5)
SODIUM BLD-SCNC: 134 MMOL/L (ref 134–145)
WBC NRBC COR # BLD: 4.31 10*3/MM3 (ref 4–10)

## 2018-03-21 PROCEDURE — 36415 COLL VENOUS BLD VENIPUNCTURE: CPT | Performed by: INTERNAL MEDICINE

## 2018-03-21 PROCEDURE — 99214 OFFICE O/P EST MOD 30 MIN: CPT | Performed by: INTERNAL MEDICINE

## 2018-03-21 PROCEDURE — 90833 PSYTX W PT W E/M 30 MIN: CPT | Performed by: NURSE PRACTITIONER

## 2018-03-21 PROCEDURE — 25010000002 HEPARIN FLUSH (PORCINE) 100 UNIT/ML SOLUTION: Performed by: INTERNAL MEDICINE

## 2018-03-21 PROCEDURE — 85025 COMPLETE CBC W/AUTO DIFF WBC: CPT | Performed by: INTERNAL MEDICINE

## 2018-03-21 PROCEDURE — 99214 OFFICE O/P EST MOD 30 MIN: CPT | Performed by: NURSE PRACTITIONER

## 2018-03-21 PROCEDURE — 80053 COMPREHEN METABOLIC PANEL: CPT | Performed by: INTERNAL MEDICINE

## 2018-03-21 PROCEDURE — 96523 IRRIG DRUG DELIVERY DEVICE: CPT | Performed by: INTERNAL MEDICINE

## 2018-03-21 RX ORDER — MELATONIN
2000 DAILY
COMMUNITY

## 2018-03-21 RX ORDER — SODIUM CHLORIDE 0.9 % (FLUSH) 0.9 %
10 SYRINGE (ML) INJECTION AS NEEDED
Status: DISCONTINUED | OUTPATIENT
Start: 2018-03-21 | End: 2018-03-21 | Stop reason: HOSPADM

## 2018-03-21 RX ORDER — SODIUM CHLORIDE 0.9 % (FLUSH) 0.9 %
10 SYRINGE (ML) INJECTION AS NEEDED
Status: CANCELLED | OUTPATIENT
Start: 2018-03-21

## 2018-03-21 RX ADMIN — SODIUM CHLORIDE, PRESERVATIVE FREE 500 UNITS: 5 INJECTION INTRAVENOUS at 11:52

## 2018-03-21 RX ADMIN — Medication 10 ML: at 11:51

## 2018-03-21 NOTE — PROGRESS NOTES
Subjective     REASON FOR FOLLOW UP:      Left breast cancer                       HISTORY OF PRESENT ILLNESS:    Tyrone Rand is a 73 y.o. patient with left breast cancer.  She completed chemotherapy on 1/10/18.  He completed radiation therapy on 3/5/18.    Patient returns for follow-up accompanied by her  and daughter.  She reports intermittent pain in the left breast.  She developed redness of the skin of the left breast.  She is now noticing scaling.    Patient continues to have fatigue although it improved to some degree since she completed the chemotherapy treatment.  She continues to be depressed.  She reports becoming very sleepy after starting Remeron.  The  states that after taking the Remeron for 2 nights, she became confused.  She was loud at times at home.  He told her not to take the medicine anymore.      Past Medical History:   Diagnosis Date   • Breast cancer     Left   • Depression    • Diabetes mellitus    • GERD (gastroesophageal reflux disease)    • H/O Left breast mass 2017   • H/O vitamin D deficiency    • History of snoring    • Hyperlipidemia    • Hypertension    • Panic attack    • Poor sleep pattern    • Sleep apnea     CPAP       Past Surgical History:   Procedure Laterality Date   • BREAST LUMPECTOMY Left 09/2017   • COLON SURGERY     • COLONOSCOPY     • KIDNEY SURGERY  2011   • MOUTH SURGERY      TOOTH EXTRACTION   • TOOTH EXTRACTION           MEDICATIONS:    Current Outpatient Prescriptions:   •  cholecalciferol (VITAMIN D3) 1000 units tablet, Take 1,000 Units by mouth Daily., Disp: , Rfl:   •  metoprolol succinate XL (TOPROL-XL) 25 MG 24 hr tablet, TAKE ONE TABLET BY MOUTH IN THE MORNING, Disp: , Rfl: 11  •  TOUJEO SOLOSTAR 300 UNIT/ML solution pen-injector, Inject 25 Units under the skin daily with dinner. INJECT 25 UNITS AT BEDTIME (Patient taking differently: Inject 40 Units under the skin Daily With Dinner. INJECT 25 UNITS AT BEDTIME), Disp: 3 pen, Rfl: 5  "    ALLERGIES:  Allergies   Allergen Reactions   • Sulfa Antibiotics         Social History     Social History   • Marital status:      Spouse name: Pascual   • Number of children: 1   • Years of education: GED     Occupational History   •  Retired     Social History Main Topics   • Smoking status: Former Smoker     Packs/day: 1.00     Years: 25.00     Quit date: 2000   • Smokeless tobacco: Not on file      Comment: smoked 25 years, 1 PPD   • Alcohol use Yes      Comment: social   • Drug use: No   • Sexual activity: Not on file     Other Topics Concern   • Not on file     Social History Narrative   • No narrative on file       Cancer-related family history is not on file.    REVIEW OF SYSTEMS:  GENERAL:  No Fever or chills. Fatigue somewhat improved.   SKIN:  Redness of the left breast is improving.  HEME/LYMPH: No Easy bruisability.  RESPIRATORY:  No Shortness of breath.    CVS:  No Chest pain.  GI:  No Abdominal pain. No Nausea. No Vomiting.   NEUROLOGICAL:  No Headaches.   PSYCHIATRIC:  Depression..         Objective   VITAL SIGNS:  Vitals:    03/21/18 1051   BP: 116/84   Pulse: 85   Resp: 16   Temp: 97.6 °F (36.4 °C)   TempSrc: Oral   SpO2: 97%   Weight: 79.3 kg (174 lb 12.8 oz)   Height: 166 cm (65.35\")   PainSc: 0-No pain  Comment: Lt breast pain comes and goes.       Wt Readings from Last 3 Encounters:   03/21/18 79.3 kg (174 lb 12.8 oz)   02/23/18 80.7 kg (178 lb)   02/13/18 81.2 kg (179 lb)       PHYSICAL EXAMINATION  GENERAL:  The patient appears depressed, not in acute distress.  SKIN: Discoloration of the skin of the inner lower quadrant of the breast.  HEAD:  Normocephalic.  EYES:  No Pallor. No Jaundice.   NECK:  Supple with no Thyromegaly or Masses.  LYMPHATICS:  No cervical or supraclavicular or axillary Lymphadenopathy.  BREASTS: Left breast examination revealed diffuse tenderness.  Discoloration of the skin of the inner lower quadrant.  No suspicious masses.  CHEST:  Lungs clear to " auscultation bilaterally. No added sounds.  EXTREMITIES:  No Edema. No Calf tenderness. No Cyanosis.   NEUROLOGICAL:  No Focal neurological deficits.       RESULT REVIEW:     Results from last 7 days  Lab Units 03/21/18  1040   WBC 10*3/mm3 4.31   NEUTROS ABS 10*3/mm3 2.80   HEMOGLOBIN g/dL 13.6   HEMATOCRIT % 43.0   PLATELETS 10*3/mm3 199       Results from last 7 days  Lab Units 03/21/18  1040   SODIUM mmol/L 134   POTASSIUM mmol/L 4.3   CHLORIDE mmol/L 96*   CO2 mmol/L 25.2   BUN mg/dL 15   CREATININE mg/dL 1.03   CALCIUM mg/dL 9.8   ALBUMIN g/dL 3.90   BILIRUBIN mg/dL 0.2   ALK PHOS U/L 110   ALT (SGPT) U/L 14   AST (SGOT) U/L 16         Assessment/Plan   1.  Left breast cancer, invasive ductal carcinoma, grade 3, ER negative, MA positive at 20%, HER-2 frederic negative.  Ki-67 was 50-55% on the biopsy from 7/19/17.   · She underwent a lumpectomy and sentinel lymph node biopsy on 9/26/17.   · Pathology examination revealed a 1.6 cm tumor, poorly differentiated with a Ki-67 50-60%, ER negative, MA weakly positive at <10%. Her-2 is negative.   · Adjuvant chemotherapy with Cytoxan and Taxotere with Neulasta support was started on 11/6/17 and completed on 1/8/18.  · Patient received post lumpectomy radiation and was completed on 3/5/18.    Patient is doing good with no evidence of recurrence.  Discussed anti-hormonal therapy with Arimidex since the breast cancer cells were weakly positive for progesterone receptor.  We discussed the side effects of the hot flashes, musculoskeletal side effects like arthralgias and myalgias and mood changes.  The patient initially said she does not want to take anti-hormonal therapy.  I explained the potential for added benefit since the breast cancer cells were weakly positive.  Due to the patient having problems with depression, I recommended giving her more time to see if her symptoms would improve to a point that she would be able to start anti-hormonal therapy.    2.  Depression.   She is following up with Jennifer Santacruz and has an appointment later today.  She states that she did not take Remeron for more than 2 nights.    3.  Redness of the skin of the left breast contained a 2 radiation dermatitis.  It is improving and there are no signs of healing.  She has Aquaphor ointment that she is using.  I reassured her that there is no evidence of superimposed action.  She has intermittent pain in the left breast.  No masses were found physical examination.  The pain is attributed to radiation changes.    4.  The patient has a port.  I recommended keeping it for about a year.      PLAN:    1.  The patient's port will be flushed today and in 6 weeks.      2.  We will see the patient in follow-up in 6 weeks.  We'll obtain a CBC and CMP.  We will discuss at her return visit anti-hormonal therapy and hopefully her mood and depression with the better at that time point that she would be able to start anti-hormonal therapy.            Luz Elena Herndon MD  03/21/18

## 2018-03-21 NOTE — PROGRESS NOTES
Subjective  Patient ID: Tyrone Rand is a 73 y.o. female who presents to [facility] for follow-up.   Pt reports to clinic with  and daughter for continued discussion of chief complaint anhedonia, amotivation, and general apathy concurrent with grade III breast cancer dx, ER-, PR10%+, HER2-. Pt is s/p cytoxan, taxotere, and radiation. Presents with variable affect, ranging from flat and withdrawn to euthymic, depending on topic of conversation. Mood is apathetic. Pt denies pain or physical SE. Station and gait are WNL. Pt is actively involved in conversation, alert and oriented to person, place, time, and situation. PHQ9=10 and GAD7=3, grossly unchanged from last few visits. Pt endorses continued fatigue, oversleeping, and poor appetite. Complains of slow executive functioning, resulting in dtr taking over much of billing and  taking over daily household tasks since dx. Pt reports apathy toward any activity other than facebook or watching TV. States she discontinued remeron 2 days after our last apt because it was making her too tired. Continues with negative attributions toward medications. Mentally, pt reports feeling better now than prior to initiating medication. However, personal and collaborative report indicate disinterest in life and strong disinterest to go anywhere. Pt reports desire to feel differently, and recalls self prior to cancer as being involved with a few routine activities as well as in charge of many household functions such as doing laundry, grocery shopping, and cooking.     MMSE considered although not conducted at this visit due to time limitations. Completed 2 months ago with score 30/30 and family does not feel she has declined in any way since this visit. Does have mother with hx of dementia, suspected appx age 71 yo.     PHQ9 Total Score: 10  GARETH 7 Total Score: 3    There are no diagnoses linked to this encounter.    Plan of Care  Pt  continues with moderate to severe sx of depression, per clinical assessment. Medications Reviewed. Pt reports only taking vitamin D3 and metoprolol at this time. Med list adjusted to reflect this. Pt reports blatant disinterest in additional medications, and family agrees due to anxiety additional medications provoke in patient. Discussed professional recommendation for antidepressant. Also discussed potential benefit of cognitive enhancing drug such as namenda. Pt and family deny interest in medications and desire to focus on behavioral modifications at this time. Counseling provided regarding physiological nature of depression. Negative attributions evaluated, and pt reports not liking the way she feels on medication. Strong hx of noncompliance. Discussed [facility] as avenue to talk with someone who understands current feelings/ situation. Pt reports disinterest.     22 spent in psychotherapy. Motivational interviewing techniques utilized. Pt reports not knowing who she is now, as compared to who she used to be prior to cancer dx. Reports desire to feel better. Feels a first step would be to begin going to [employer], which is something she used to do weekly. Graded tasks discussed, and pt agrees to select one activity a week she must go to. Discussed ability to leave early, sit in back, etc. Pt agrees this is a doable first step. Significant time spent discussing pt view of future and current life desires. Topics discussed including development of new normal, behavioral activation, and reintroduction into previously desires activities. Will see pt in 2 weeks to evaluate progress.

## 2018-04-03 ENCOUNTER — OFFICE VISIT (OUTPATIENT)
Dept: PSYCHIATRY | Facility: HOSPITAL | Age: 73
End: 2018-04-03

## 2018-04-03 DIAGNOSIS — F33.2 SEVERE EPISODE OF RECURRENT MAJOR DEPRESSIVE DISORDER, WITHOUT PSYCHOTIC FEATURES (HCC): Primary | ICD-10-CM

## 2018-04-03 PROCEDURE — 99211 OFF/OP EST MAY X REQ PHY/QHP: CPT | Performed by: NURSE PRACTITIONER

## 2018-04-03 PROCEDURE — 90836 PSYTX W PT W E/M 45 MIN: CPT | Performed by: NURSE PRACTITIONER

## 2018-04-03 PROCEDURE — G0463 HOSPITAL OUTPT CLINIC VISIT: HCPCS | Performed by: NURSE PRACTITIONER

## 2018-04-03 NOTE — PROGRESS NOTES
Chief Complaint: impaired decision making, significant daytime fatigue, emotional flatness    Subjective  Patient ID: Tyrone Rand is a 73 y.o. female who presents to [facility] for follow-up regarding aforementioned sx. At best, pt is euthymic, alert, oriented, and engaged in conversation. With frustration or decision requirement, pt demonstrates flat affect and paucity of speech. Pt station and gait WNL. Pt endorses continued daytime fatigue with difficulty concentrating and focusing. Appetite remains poor. Pt reports difficulty with decision making, including picking out outfit for the day or choosing a recipe/ remember old ways of cooking. Pt reports some days with more energy and others with continued fatigue. Often stays up too late, which  feels contributes to more challenging days. Sleep is sufficient, but pt naps frequently, often from 7-9 pm causing late bedtimes. Pt reports going to [employer] one week ago, which was supportive and enjoyable. Was not able to go on Easter because of fatigue related to busy out of town weekend with family. Pt sadly reports continued difficulties with survivorship and new normal development. Describes inability to get to point of experienced happiness or pleasure. Pt continues to report negative attributions to medications and is not agreeable to trying any at this time.       Collateral information from  reports continued struggling at times, although feels these times are somewhat less than before. Does feel pt somewhat more engaged. Agrees with pt decision not to initiate any medication. Feels pt should make all her own decisions and is not comfortable encouraging her to participate in activities, fearing she may not want to do them.     PHQ9 Total Score: 10  GARETH 7 Total Score: 3    Diagnoses and all orders for this visit:    Severe episode of recurrent major depressive disorder, without psychotic features    Plan of  Care  Medications Reviewed. Pt continues to report not taking any recommended psychotropic medications. Professional opinion expressed to pt and  regarding need for pharmacotherapy given pt severe (believe PHQ9 to be underreported) and persistent depression. Both continue to report negative attributions to medications and deny willingness to try meds for mood mgmt or cognitive enhancement. Discussed potential benefit for physical activity to improve energy, mood, and sleep. Strongly suggested pt and  both participate in our lio chi class, beginning this Friday. Both hesitant but will consider. I have shared with patient and  that I am unable to assist with existing situation without follow through of some recommendations. Will hope to follow up with pt in two weeks before or after lio chi class. Otherwise, will follow pt outpatient in four weeks for continued assessment of mood, cognitive impairment, and new normal development.    45 minutes spent in supportive counseling surrounding topics of new normal development, strategies to overcome current deficits, decision making, accountability,

## 2018-04-04 ENCOUNTER — DOCUMENTATION (OUTPATIENT)
Dept: RADIATION ONCOLOGY | Facility: HOSPITAL | Age: 73
End: 2018-04-04

## 2018-04-04 NOTE — PROGRESS NOTES
RADIATION THERAPY TREATMENT SUMMARY    Diagnosis: Malignant neoplasm of lower-inner quadrant of left breast     Patient Care Team:  Cris Mendieta DO as PCP - General (Internal Medicine)  Cris Mendieta DO as Referring Physician (Internal Medicine)  Luz Elena Herndon MD as Consulting Physician (Hematology and Oncology)  Jhonny Powell MD as Consulting Physician (Nephrology)  Billie Leahy MD as Consulting Physician (Radiation Oncology)    Tyrone Rand has recently completed the course of radiation therapy prescribed for the above-mentioned diagnosis.  Below, please find the specifics of the course of treatment delivered:    Radiation Details:      Dates of treatment:  2/5/2018 - 3/5/2018      Details of radiation therapy:  Treatment Site - LEFT BREAST  Treatment Intent - Curative, Postoperative  Total Dose in cGy - 4256  Number of Treatments - 16  Dose per fraction - 266 cGy per fraction  Fractions per day - 1 fx/day  Fractions per week - 5 fx/week  Treatment Type - Tangents, Electronic Compensation (EC), 3D  Energy - 6 MVP, 18 MVP  Normalization - Calc point, Prescribed at 100%  Imaging/Field Verification - Simulation before first treatment to verify field, blocking, placement and positioning, Simulation for all ports of change, Weekly port films of all ports    Treatment Site - Left Tumor Bed Boost  Treatment Intent - Curative  Total Dose in cGy - 1000  Number of Treatments - 5  Dose per fraction - 200 cGy per fraction  Fractions per day - 1 fx/day  Fractions per week - 5 fx/week  Treatment Type - 3D  Energy - 9 Mev  Normalization - Calc point, Prescribed at 95%  Imaging/Field Verification - Simulation before first treatment to verify field, blocking, placement and positioning, Simulation for all ports of change, Weekly port films of all ports    Tolerance and Toxicities: She tolerated the treatments well, with the expected mild irritation of the skin which required no treatment breaks. She  completed the treatments in 28 elapsed days.    Follow-up Plans:  I have asked that she return to see me in approximately 3-4 weeks and have also made a referred to our Survivorship Clinic.

## 2018-04-11 ENCOUNTER — OFFICE VISIT (OUTPATIENT)
Dept: RADIATION ONCOLOGY | Facility: CLINIC | Age: 73
End: 2018-04-11

## 2018-04-11 VITALS
DIASTOLIC BLOOD PRESSURE: 78 MMHG | OXYGEN SATURATION: 97 % | BODY MASS INDEX: 28.82 KG/M2 | HEIGHT: 65 IN | WEIGHT: 173 LBS | SYSTOLIC BLOOD PRESSURE: 113 MMHG | HEART RATE: 78 BPM | TEMPERATURE: 97.7 F

## 2018-04-11 DIAGNOSIS — Z17.1 MALIGNANT NEOPLASM OF LOWER-INNER QUADRANT OF LEFT BREAST IN FEMALE, ESTROGEN RECEPTOR NEGATIVE (HCC): Primary | ICD-10-CM

## 2018-04-11 DIAGNOSIS — C50.312 MALIGNANT NEOPLASM OF LOWER-INNER QUADRANT OF LEFT BREAST IN FEMALE, ESTROGEN RECEPTOR NEGATIVE (HCC): Primary | ICD-10-CM

## 2018-04-11 PROCEDURE — 99024 POSTOP FOLLOW-UP VISIT: CPT | Performed by: RADIOLOGY

## 2018-04-11 NOTE — PROGRESS NOTES
DIAGNOSIS and REASON FOR FOLLOW UP: Malignant neoplasm of lower-inner quadrant of left breast in female, estrogen receptor negative    Stage IA (T1c, N0, M0)     Patient Care Team:  Cris Mendieta DO as PCP - General (Internal Medicine)  Cris Mendieta DO as Referring Physician (Internal Medicine)  Luz Elena Herndon MD as Consulting Physician (Hematology and Oncology)  Jhonny Powell MD as Consulting Physician (Nephrology)  Billie Leahy MD as Consulting Physician (Radiation Oncology)    CHIEF COMPLAINT:  4 week follow up  I had the pleasure of seeing Tyrone Rand  back in the department today, now approximately 4 weeks out from the radiation therapy portion of her treatment for the above mentioned diagnosis. Clinically she is doing wonderfully well.  Her energy has improved and she states her performance status is improving though not yet  back to baseline.  She is seeing Jennifer Guzman and is pleased with that progress. She has no new complaints regarding the breast.    Past Medical History: she  has a past medical history of Breast cancer; Depression; Diabetes mellitus; GERD (gastroesophageal reflux disease); H/O Left breast mass (2017); H/O vitamin D deficiency; History of snoring; Hyperlipidemia; Hypertension; Panic attack; Poor sleep pattern; and Sleep apnea.    Past Surgical History:  she has a past surgical history that includes Colon surgery; Kidney surgery (2011); Mouth surgery; Colonoscopy; Breast lumpectomy (Left, 09/2017); and Tooth extraction.    Meds:    Current Outpatient Prescriptions:   •  cholecalciferol (VITAMIN D3) 1000 units tablet, Take 1,000 Units by mouth Daily., Disp: , Rfl:   •  metoprolol succinate XL (TOPROL-XL) 25 MG 24 hr tablet, TAKE ONE TABLET BY MOUTH IN THE MORNING, Disp: , Rfl: 11    Allergies:    Allergies   Allergen Reactions   • Sulfa Antibiotics        Family History:  her family history includes Heart disease in her mother; Hypertension in her mother;  "Stroke in her mother.    Social History:  she  reports that she quit smoking about 18 years ago. She has a 25.00 pack-year smoking history. She does not have any smokeless tobacco history on file. She reports that she drinks alcohol. She reports that she does not use drugs.    Pertinent Findings on Review of Systems:  Fourteen systems were reviewed and are negative other than as mentioned above.    Pertinent Findings on Physical Exam:  Vitals:    04/11/18 0945   BP: 113/78   Pulse: 78   Temp: 97.7 °F (36.5 °C)   SpO2: 97%   Weight: 78.5 kg (173 lb)   Height: 166 cm (65.35\")       Performance Status:  (2) Ambulatory and capable of self care, unable to carry out work activity, up and about > 50% or waking hours    Physical examination of the right breast reveals no abnormality and the rightt axilla is benign, without lymphadenopathy.     The left breast shows the well-healed lumpectomy incision and only slight and mild hyperpigmentation from the radiation therapy. The breast is quite soft and easy to examine. There are no worrisome nodules appreciated. The nipple is without change.     The axilla is benign on the left and there is no cervical or supraclavicular lymphadenopathy. There is no lymphedema noted in either upper extremity.    Assessment:  Malignant neoplasm of lower-inner quadrant of left breast in female, estrogen receptor negative    Stage IA (T1c, N0, M0)   Doing wonderfully well, 4 weeks out from radiation therapy    Plan:   We reviewed today the current NCCN guidelines for her surveillance and follow up, specifically the need for physician visit every 4 to 6 months for 5 years, annual mammogram, annual gynecologic assessment, continued monitoring of bone health and achieving and maintaining an ideal BMI.      She does continue to see the [facility] physicians, will see them again in early May and understands she will continue follow-up through that office. Regarding her mammographic follow-up, she knows to " continue on with her annual bilateral mammogram in late June/early July.                   Given the above, I have not given her an appointment to return here, but encouraged her to call if I can be of any further help in her care and thank you again for allowing us to participate in her care.

## 2018-05-02 ENCOUNTER — INFUSION (OUTPATIENT)
Dept: ONCOLOGY | Facility: HOSPITAL | Age: 73
End: 2018-05-02

## 2018-05-02 ENCOUNTER — OFFICE VISIT (OUTPATIENT)
Dept: PSYCHIATRY | Facility: HOSPITAL | Age: 73
End: 2018-05-02

## 2018-05-02 ENCOUNTER — OFFICE VISIT (OUTPATIENT)
Dept: ONCOLOGY | Facility: CLINIC | Age: 73
End: 2018-05-02

## 2018-05-02 VITALS
HEART RATE: 78 BPM | SYSTOLIC BLOOD PRESSURE: 110 MMHG | HEIGHT: 65 IN | RESPIRATION RATE: 16 BRPM | TEMPERATURE: 98.3 F | OXYGEN SATURATION: 98 % | BODY MASS INDEX: 28.89 KG/M2 | DIASTOLIC BLOOD PRESSURE: 72 MMHG | WEIGHT: 173.4 LBS

## 2018-05-02 DIAGNOSIS — Z17.1 MALIGNANT NEOPLASM OF LOWER-INNER QUADRANT OF LEFT BREAST IN FEMALE, ESTROGEN RECEPTOR NEGATIVE (HCC): Primary | ICD-10-CM

## 2018-05-02 DIAGNOSIS — Z45.2 FITTING AND ADJUSTMENT OF VASCULAR CATHETER: ICD-10-CM

## 2018-05-02 DIAGNOSIS — F33.2 SEVERE EPISODE OF RECURRENT MAJOR DEPRESSIVE DISORDER, WITHOUT PSYCHOTIC FEATURES (HCC): Primary | ICD-10-CM

## 2018-05-02 DIAGNOSIS — C50.312 MALIGNANT NEOPLASM OF LOWER-INNER QUADRANT OF LEFT BREAST IN FEMALE, ESTROGEN RECEPTOR NEGATIVE (HCC): Primary | ICD-10-CM

## 2018-05-02 PROBLEM — T45.1X5A CHEMOTHERAPY INDUCED NEUTROPENIA (HCC): Status: RESOLVED | Noted: 2017-11-06 | Resolved: 2018-05-02

## 2018-05-02 PROBLEM — D70.1 CHEMOTHERAPY INDUCED NEUTROPENIA: Status: RESOLVED | Noted: 2017-11-06 | Resolved: 2018-05-02

## 2018-05-02 PROBLEM — E86.0 DEHYDRATION: Status: RESOLVED | Noted: 2018-01-08 | Resolved: 2018-05-02

## 2018-05-02 LAB
ALBUMIN SERPL-MCNC: 4 G/DL (ref 3.5–5.2)
ALBUMIN/GLOB SERPL: 1.2 G/DL (ref 1.1–2.4)
ALP SERPL-CCNC: 125 U/L (ref 38–116)
ALT SERPL W P-5'-P-CCNC: 16 U/L (ref 0–33)
ANION GAP SERPL CALCULATED.3IONS-SCNC: 14.3 MMOL/L
AST SERPL-CCNC: 19 U/L (ref 0–32)
BASOPHILS # BLD AUTO: 0.05 10*3/MM3 (ref 0–0.1)
BASOPHILS NFR BLD AUTO: 1 % (ref 0–1.1)
BILIRUB SERPL-MCNC: 0.2 MG/DL (ref 0.1–1.2)
BUN BLD-MCNC: 21 MG/DL (ref 6–20)
BUN/CREAT SERPL: 21.4 (ref 7.3–30)
CALCIUM SPEC-SCNC: 9.4 MG/DL (ref 8.5–10.2)
CHLORIDE SERPL-SCNC: 98 MMOL/L (ref 98–107)
CO2 SERPL-SCNC: 24.7 MMOL/L (ref 22–29)
CREAT BLD-MCNC: 0.98 MG/DL (ref 0.6–1.1)
DEPRECATED RDW RBC AUTO: 40.5 FL (ref 37–49)
EOSINOPHIL # BLD AUTO: 0.11 10*3/MM3 (ref 0–0.36)
EOSINOPHIL NFR BLD AUTO: 2.3 % (ref 1–5)
ERYTHROCYTE [DISTWIDTH] IN BLOOD BY AUTOMATED COUNT: 13.2 % (ref 11.7–14.5)
GFR SERPL CREATININE-BSD FRML MDRD: 67 ML/MIN/1.73
GLOBULIN UR ELPH-MCNC: 3.4 GM/DL (ref 1.8–3.5)
GLUCOSE BLD-MCNC: 387 MG/DL (ref 74–124)
HCT VFR BLD AUTO: 41.4 % (ref 34–45)
HGB BLD-MCNC: 13.2 G/DL (ref 11.5–14.9)
IMM GRANULOCYTES # BLD: 0.02 10*3/MM3 (ref 0–0.03)
IMM GRANULOCYTES NFR BLD: 0.4 % (ref 0–0.5)
LYMPHOCYTES # BLD AUTO: 0.75 10*3/MM3 (ref 1–3.5)
LYMPHOCYTES NFR BLD AUTO: 15.6 % (ref 20–49)
MCH RBC QN AUTO: 27.2 PG (ref 27–33)
MCHC RBC AUTO-ENTMCNC: 31.9 G/DL (ref 32–35)
MCV RBC AUTO: 85.4 FL (ref 83–97)
MONOCYTES # BLD AUTO: 0.47 10*3/MM3 (ref 0.25–0.8)
MONOCYTES NFR BLD AUTO: 9.8 % (ref 4–12)
NEUTROPHILS # BLD AUTO: 3.41 10*3/MM3 (ref 1.5–7)
NEUTROPHILS NFR BLD AUTO: 70.9 % (ref 39–75)
NRBC BLD MANUAL-RTO: 0 /100 WBC (ref 0–0)
PLATELET # BLD AUTO: 184 10*3/MM3 (ref 150–375)
PMV BLD AUTO: 9.8 FL (ref 8.9–12.1)
POTASSIUM BLD-SCNC: 4.3 MMOL/L (ref 3.5–4.7)
PROT SERPL-MCNC: 7.4 G/DL (ref 6.3–8)
RBC # BLD AUTO: 4.85 10*6/MM3 (ref 3.9–5)
SODIUM BLD-SCNC: 137 MMOL/L (ref 134–145)
WBC NRBC COR # BLD: 4.81 10*3/MM3 (ref 4–10)

## 2018-05-02 PROCEDURE — 90833 PSYTX W PT W E/M 30 MIN: CPT | Performed by: NURSE PRACTITIONER

## 2018-05-02 PROCEDURE — 96523 IRRIG DRUG DELIVERY DEVICE: CPT | Performed by: INTERNAL MEDICINE

## 2018-05-02 PROCEDURE — 85025 COMPLETE CBC W/AUTO DIFF WBC: CPT

## 2018-05-02 PROCEDURE — G0463 HOSPITAL OUTPT CLINIC VISIT: HCPCS | Performed by: NURSE PRACTITIONER

## 2018-05-02 PROCEDURE — 80053 COMPREHEN METABOLIC PANEL: CPT

## 2018-05-02 PROCEDURE — 99214 OFFICE O/P EST MOD 30 MIN: CPT | Performed by: INTERNAL MEDICINE

## 2018-05-02 PROCEDURE — 99212 OFFICE O/P EST SF 10 MIN: CPT | Performed by: NURSE PRACTITIONER

## 2018-05-02 PROCEDURE — 25010000002 HEPARIN FLUSH (PORCINE) 100 UNIT/ML SOLUTION: Performed by: INTERNAL MEDICINE

## 2018-05-02 RX ORDER — SODIUM CHLORIDE 0.9 % (FLUSH) 0.9 %
10 SYRINGE (ML) INJECTION AS NEEDED
Status: CANCELLED | OUTPATIENT
Start: 2018-05-02

## 2018-05-02 RX ORDER — ANASTROZOLE 1 MG/1
1 TABLET ORAL DAILY
Qty: 30 TABLET | Refills: 11 | Status: SHIPPED | OUTPATIENT
Start: 2018-05-02 | End: 2019-05-01 | Stop reason: SDUPTHER

## 2018-05-02 RX ORDER — SODIUM CHLORIDE 0.9 % (FLUSH) 0.9 %
10 SYRINGE (ML) INJECTION AS NEEDED
Status: DISCONTINUED | OUTPATIENT
Start: 2018-05-02 | End: 2018-05-02 | Stop reason: HOSPADM

## 2018-05-02 RX ADMIN — Medication 10 ML: at 11:42

## 2018-05-02 RX ADMIN — SODIUM CHLORIDE, PRESERVATIVE FREE 500 UNITS: 5 INJECTION INTRAVENOUS at 11:42

## 2018-05-02 NOTE — PROGRESS NOTES
Chief Complaint: Continued mood disruption, fatigue, continued disruption with decision making    Subjective  Patient ID: Tyrone Rand is a 73 y.o. female who presents to [facility] for follow-up regarding aforementioned complaints. Has completed chemotherapy and radiation for breast cancer and has received good report from Dr. Herndon and Dr. Leahy. Continues to endorse difficulty making decisions such as picking out outfit in the morning, complicated by recent weight loss with dx and tx and inappropriate fit of existing options. Appetite has improved and weight is currently stable. Pt continues to endorse appropriate sleep at night with reduced daytime napping; generally reporting awakening at 9:00 am. However, reports sleep to be nonrestorative and fatigue high. Does endorse dx of PSA with noncompliant CPAP usage. Spends much of the day watching TV. Reports frustration with lack of motivation. Pt describes mood as having some good days and some bad days. Recently went to [place] with  which was enjoyable. Also reports looking forward to granddaughter's wedding. Nonetheless, feeling of anxiety surrounding decision making is palpable with notable change in affect from euthymic to flat with higher level conversation.    Review of Systems   Constitutional: Positive for activity change and fatigue.   Neurological: Positive for headaches.        In am   Psychiatric/Behavioral: Positive for confusion, decreased concentration and dysphoric mood.     PHQ9 Total Score: 6  GARETH 7 Total Score: 5    Physical Exam   Constitutional: She is oriented to person, place, and time.   Neurological: She is alert and oriented to person, place, and time.   Psychiatric: Her speech is normal and behavior is normal. Thought content normal. Cognition and memory are impaired. She exhibits a depressed mood.    AT BEST. However, pt is flat, with paucity of speech particularly surrounding medication  discussion or topics requiring decision making    There are no diagnoses linked to this encounter.    Plan of Care  Discussed continued sx of depression with pt and , as well as my recommendation/ their continued refusal of medications. Pt does report hx of LAURA, noncompliant with CPAP. Discussed recommendation for wearing this q hs with benefit to cognition, headaches, and daytime energy. Pt agreeable. Plan for pt to call sleep medicine to assist with general maintenance since last used CPAP quite some time ago. Briefly introduced potential benefit of modafinil for wakefulness promotion once compliant, although both continue to remain hesitant toward medication. Pt instructed to call clinic in two weeks to touch base regarding machine. Will plan for two week FU from this conversation, to be established at this time.     27 minutes spent in supportive counseling surrounding issues of symptom mgmt, evaluation of negative attributions toward medication, general health, and potential impact of depression, LAURA, tx hx, and/ or cognitive deficit on cognitive sx.

## 2018-05-02 NOTE — PROGRESS NOTES
Subjective     REASON FOR FOLLOW UP:      Left breast cancer                       HISTORY OF PRESENT ILLNESS:    Tyrone Rand is a 73 y.o. patient with left breast cancer.  She completed chemotherapy on 1/10/18.  She  completed radiation therapy on 3/5/18.  She reports improvement in the fatigue.  Her mood has improved as well.  Her concentration and memory have improved.  She is able to do more things at home.        Past Medical History:   Diagnosis Date   • Breast cancer     Left   • Depression    • Diabetes mellitus    • GERD (gastroesophageal reflux disease)    • H/O Left breast mass 2017   • H/O vitamin D deficiency    • History of snoring    • Hyperlipidemia    • Hypertension    • Panic attack    • Poor sleep pattern    • Sleep apnea     CPAP       Past Surgical History:   Procedure Laterality Date   • BREAST LUMPECTOMY Left 09/2017   • COLON SURGERY     • COLONOSCOPY     • KIDNEY SURGERY  2011   • MOUTH SURGERY      TOOTH EXTRACTION   • TOOTH EXTRACTION           MEDICATIONS:    Current Outpatient Prescriptions:   •  cholecalciferol (VITAMIN D3) 1000 units tablet, Take 1,000 Units by mouth Daily., Disp: , Rfl:   •  metoprolol succinate XL (TOPROL-XL) 25 MG 24 hr tablet, TAKE ONE TABLET BY MOUTH IN THE MORNING, Disp: , Rfl: 11  No current facility-administered medications for this visit.      ALLERGIES:  Allergies   Allergen Reactions   • Sulfa Antibiotics         Social History     Social History   • Marital status:      Spouse name: Pascual   • Number of children: 1   • Years of education: GED     Occupational History   •  Retired     Social History Main Topics   • Smoking status: Former Smoker     Packs/day: 1.00     Years: 25.00     Quit date: 2000   • Smokeless tobacco: Not on file      Comment: smoked 25 years, 1 PPD   • Alcohol use Yes      Comment: social   • Drug use: No   • Sexual activity: Not on file     Other Topics Concern   • Not on file     Social History Narrative   • No  "narrative on file       Cancer-related family history is not on file.    REVIEW OF SYSTEMS:  GENERAL:  Improvement in the fatigue.  SKIN:  Redness of the left breast has improved.  HEME/LYMPH: No lymph node enlargement.  RESPIRATORY:  No shortness breath.  CVS:  No Chest pain.  GI:  No abdominal pain.  No nausea or vomiting.  NEUROLOGICAL:  No headaches.  Improvement in the memory and concentration.  PSYCHIATRIC:  Improvement in the anxiety.        Objective   VITAL SIGNS:  Vitals:    05/02/18 1207   BP: 110/72   Pulse: 78   Resp: 16   Temp: 98.3 °F (36.8 °C)   SpO2: 98%  Comment: at rest   Weight: 78.7 kg (173 lb 6.4 oz)   Height: 166 cm (65.35\")   PainSc: 0-No pain       Wt Readings from Last 3 Encounters:   05/02/18 78.7 kg (173 lb 6.4 oz)   04/11/18 78.5 kg (173 lb)   03/21/18 79.3 kg (174 lb 12.8 oz)       PHYSICAL EXAMINATION  GENERAL:  The patient appears in good general condition, not in acute distress.  Appears less anxious.  SKIN:  No skin rashes or lesions. No Ecchymosis or Petechiae.  HEAD:  Normocephalic.  EYES:  No Pallor. No Jaundice.   NECK:  Supple with no Thyromegaly or Masses.  LYMPHATICS:  No cervical or supraclavicular or axillary Lymphadenopathy.  CHEST:  Lungs clear to auscultation bilaterally. No added sounds.  Port in the right upper chest is benign.  CARDIAC:  Normal S1 & S2. No Murmurs.  ABDOMEN:  Soft. No tenderness. No Hepatomegaly. No Splenomegaly. No masses.  EXTREMITIES:  No Edema. No Calf tenderness. No Cyanosis.   NEUROLOGICAL:  No Focal neurological deficits.       RESULT REVIEW:     Results from last 7 days  Lab Units 05/02/18  1134   WBC 10*3/mm3 4.81   NEUTROS ABS 10*3/mm3 3.41   HEMOGLOBIN g/dL 13.2   HEMATOCRIT % 41.4   PLATELETS 10*3/mm3 184       Results from last 7 days  Lab Units 05/02/18  1134   SODIUM mmol/L 137   POTASSIUM mmol/L 4.3   CHLORIDE mmol/L 98   CO2 mmol/L 24.7   BUN mg/dL 21*   CREATININE mg/dL 0.98   CALCIUM mg/dL 9.4   ALBUMIN g/dL 4.00   BILIRUBIN mg/dL " 0.2   ALK PHOS U/L 125*   ALT (SGPT) U/L 16   AST (SGOT) U/L 19         Assessment/Plan   1.  Left breast cancer, invasive ductal carcinoma, grade 3, ER negative, AL positive at 20%, HER-2 frederic negative.  Ki-67 was 50-55% on the biopsy from 7/19/17.   · She underwent a lumpectomy and sentinel lymph node biopsy on 9/26/17.   · Pathology examination revealed a 1.6 cm tumor, poorly differentiated with a Ki-67 50-60%, ER negative, AL weakly positive at <10%. Her-2 is negative.   · Adjuvant chemotherapy with Cytoxan and Taxotere with Neulasta support was started on 11/6/17 and completed on 1/8/18.  · Patient received post lumpectomy radiation and was completed on 3/5/18.    Patient is recovering from the side effects of the chemotherapy and radiation therapy.  I discussed with her starting adjuvant hormonal therapy since her cancer was hormone positive.  We discussed the side effects including hot flashes, musculoskeletal side effects, osteoporosis and possible worsening of depression.  Patient asked for my recommendation and I recommended starting the medicine.  If there is change in her depression, we will stop the medicine.    2.  Depression.  She follows with Jennifer Guzman regarding this.    3.  Patient has a port.  I recommended keeping it for 1 year.  We will flush every 6 weeks.      PLAN:    1.  Start Arimidex 1 mg daily.     2.  Return in 6 weeks for review.  She will have the port flushed.  We will obtain CBC CMP.    3.  I discussed the need to increase activity and watch intake of high-calorie food.    4.  We'll refer the patient to the survivorship clinic.  At her next visit, We'll schedule the patient for lateral mammograms in July 2018.        Luz Elena Herndon MD  05/02/18

## 2018-05-17 ENCOUNTER — OFFICE VISIT (OUTPATIENT)
Dept: OTHER | Facility: HOSPITAL | Age: 73
End: 2018-05-17
Attending: INTERNAL MEDICINE

## 2018-05-17 VITALS
BODY MASS INDEX: 28.15 KG/M2 | DIASTOLIC BLOOD PRESSURE: 63 MMHG | OXYGEN SATURATION: 99 % | HEART RATE: 75 BPM | TEMPERATURE: 96.3 F | WEIGHT: 171 LBS | RESPIRATION RATE: 18 BRPM | SYSTOLIC BLOOD PRESSURE: 119 MMHG

## 2018-05-17 DIAGNOSIS — Z17.1 MALIGNANT NEOPLASM OF LOWER-INNER QUADRANT OF LEFT BREAST IN FEMALE, ESTROGEN RECEPTOR NEGATIVE (HCC): Primary | ICD-10-CM

## 2018-05-17 DIAGNOSIS — C50.312 MALIGNANT NEOPLASM OF LOWER-INNER QUADRANT OF LEFT BREAST IN FEMALE, ESTROGEN RECEPTOR NEGATIVE (HCC): Primary | ICD-10-CM

## 2018-05-17 PROCEDURE — 99215 OFFICE O/P EST HI 40 MIN: CPT | Performed by: NURSE PRACTITIONER

## 2018-05-17 PROCEDURE — G0463 HOSPITAL OUTPT CLINIC VISIT: HCPCS | Performed by: NURSE PRACTITIONER

## 2018-05-17 RX ORDER — INSULIN DEGLUDEC 200 U/ML
30 INJECTION, SOLUTION SUBCUTANEOUS
Refills: 0 | COMMUNITY
Start: 2018-05-08 | End: 2023-03-22

## 2018-05-17 NOTE — PROGRESS NOTES
[facility] CANCER SURVIVORSHIP VISIT NOTE    Tyrone Rand is a pleasant 73 y.o.   female being followed by Luz Elena Herndon MD  for left breast cancer, invasive ductal carcinoma, grade 3, ER negative, MT positive at 20%, HER-2 frederic negative. Here today with her spouse Pascual in our Cancer Survivorship Clinic, to review survivorship care plan.    TREATMENT HISTORY:   7/19/2017 Biopsy    Left breast biopsy showed ductal adenocarcinoma    [place] grade 3  Estrogen receptor: Negative  Progesterone receptor: Positive  HER2 status: Negative          9/26/2017 Surgery    Left breast lumpectomy of lower inner quadrant and sentinel lymph node biopsy at [place]     Poorly differentiated ductal carcinoma  Tumor size 16 mm  3 sentinel lymph nodes negative  Margins clear    pT1c N0 M0: Pathologic stage IA        11/6/2017 - 1/10/2018 Chemotherapy    Taxotere (Docetaxel) and Cytoxan (Cyclophosphamide) with Neulasta support x 4 cycles        2/5/2018 - 3/5/2018 Radiation    Radiation OncologyTreatment Course:  Tyrone Rand received 5256 cGy in to left breast.        5/2/2018 -  Hormonal Therapy    Arimidex 1 mg by mouth daily           Allergies as of 05/17/2018 - Reviewed 05/02/2018   Allergen Reaction Noted   • Sulfa antibiotics  12/17/2015       MEDICATIONS:  I have reviewed the medication list with the patient and I updated it in the electronic medical record. Medication dosages and frequencies were confirmed to be accurate with the patient.      Review of Systems   Constitutional: Positive for fatigue. Negative for activity change and appetite change.   Respiratory: Negative for chest tightness and shortness of breath.    Cardiovascular: Negative for chest pain.   Gastrointestinal: Negative for abdominal pain, constipation, diarrhea and nausea.   Genitourinary: Negative for difficulty urinating.   Musculoskeletal: Positive for arthralgias (generalized). Negative for myalgias.    Neurological: Negative for dizziness, numbness and headaches.   Psychiatric/Behavioral: Negative for decreased concentration, dysphoric mood and sleep disturbance. The patient is not nervous/anxious.        DISTRESS QUESTIONNAIRE: left blank/10 EFFECT TO LEVEL OF FUNCTIONING: left blank  Patient identified areas of distress: see flowsheet      /63   Pulse 75   Temp 96.3 °F (35.7 °C) (Oral)   Resp 18   Wt 77.6 kg (171 lb)   SpO2 99% Comment: room air  BMI 28.15 kg/m²     Pain Score    05/17/18 1030   PainSc: 0-No pain         Physical Exam   Constitutional: She is oriented to person, place, and time. She appears well-developed and well-nourished. She is cooperative.   HENT:   Head: Normocephalic and atraumatic.   Mouth/Throat: Oropharynx is clear and moist and mucous membranes are normal.   Cardiovascular: Normal rate and regular rhythm.    No lower extremity edema   Pulmonary/Chest: Effort normal. No respiratory distress.   Abdominal: Soft. Normal appearance.   Neurological: She is alert and oriented to person, place, and time.   Skin: Skin is warm, dry and intact.   Psychiatric: She has a normal mood and affect. Her speech is normal and behavior is normal. Judgment and thought content normal. Cognition and memory are normal.   Vitals reviewed.        Problem List Items Addressed This Visit     Malignant neoplasm of lower-inner quadrant of left breast in female, estrogen receptor negative - Primary            SURVIVORSHIP DISCUSSION HELD TODAY:   Primary patient goal(s): wellness     Management of disease and treatment related effects: Patient reports feeling generally well after completion of treatment. Does have some persistent fatigue. Taking Arimidex daily without identified barriers to adherence. Has some generalized arthralgias but is not sure that they are related to AI therapy. Denies hot flashes today. Reports she is sleeping well and appetite is generally good. No problems with swelling, pain  "or range of motion of left upper extremity. We discussed risk of lymphedema in women with sentinel node biopsy is low and has been reported to be between 5% and 17%, and also preventative measures and s/s of lymphedema developing or infection to be reported promptly. Information regarding breast care clinic and bioimpedance testing provided. Discussed strategies for preservation of bone mass while on AI therapy including calcium and vitamin D supplementation and importance of weight bearing exercise to include moderate intensity walking most days of the week.       Psychosocial and spiritual: Tyrone Solo does affirm some anxiety. She believes her visits with the Jennifer ROJAS in the supportive oncology clinic have been helpful in managing this. Denies feeling down or blue more than half the days in the last two weeks. Pascual has been a good source of support for her, attending her provider visits and taking notes. Discussed availability of reiki and massage therapy available at the [facility] by appointment and written information provided. Discussed the possible benefits as a complementary nonpharmacologic modality for stress reduction, improving sleep and pain management and she expressed interest in these.    Patient does not have advance care planning complete. Information provided regarding upcoming Advance Care Planning classes offered monthly at the [facility].          Maintaining personal wellness: We discussed current BMI of 28 and recommended target of 20-25 for wellness, cardiovascular health and risk reduction for cancer recurrence and prevention. We discussed availability of oncology registered dietician, Yessy Santos and referral offered. Patient declines at this time. Yessy's contact info and handout describing recommended dietary modifications emphasizing whole foods, plant based diet provided.  We discussed the [employer] \"Stronger Together\" program which " provides a 3 month [facility] family membership free of charge and written information provided. Also discussed Cancer and Restorative Exercise (CARE) program offered at [facility] designed to assist with building strength, stamina, confidence and overall fitness and written info provided.              Discussed NCCN recommendations for all cancer survivors of 150 minutes/week moderate intensity exercise, achieve and maintain a healthy BMI, plants-based whole-foods diet, avoid tobacco and second hand smoke, moderate alcohol intake - no more than 1 drink per day for women, 2 drinks per day for men.     No orders of the defined types were placed in this encounter.      After review of the Survivorship Treatment Summary & Care Plan, the patient verbalized understanding of recommendations for follow-up. As outlined in the care plan, they were advised to continue with follow-up care in accordance with the NCCN surveillance guidelines while transitioning back to their primary care physician for continued general preventive and healthcare needs. We discussed the importance of healthy eating, exercise and weight management. We reviewed current guidelines for routine screening of other cancers.     A copy of the Survivorship Treatment Summary & Care Plan for Ms. Rand (see below) was provided to and forwarded to the providers identified on the care team.      30 minutes out of 40 minutes face-to-face spent counseling patient extensively on treatment summary, surveillance for recurrence, late and long-term effects of disease and treatment, intimacy and self-image, preventative screening for other cancers, achieving/maintaining healthy BMI and link to risk reduction, adherence to current therapies, management of anxiety/uncertainty and self care strategies.         Breast Cancer Survivorship Plan      General Information   Patient name Tyrone Rand   Date of birth 1945    Phone   Home Phone  [phone]   [place] [phone]      Email No e-mail address on record      Cancer Treatment Team     Patient Care Team:  Luz Elena Herndon MD as Consulting Physician (Hematology and Oncology)  Billie Leahy MD as Consulting Physician (Radiation Oncology)    Provider Phone numbers  Care Team Provider: Cris Mendieta DO,  ([phone])  Care Team Provider: Luz Elena Herndon MD,  ([phone])  Care Team Provider: Jhonny Powell MD,  ([phone])  Care Team Provider: Billie Leahy MD,  ([phone])  Care Team Provider: Corey Kulkarni MD,  ([phone])     Post Treatment Care Team   Primary Care Physician Cris Mendieta DO  [address] [address]   [phone]         Background Information   Medical history Past Medical History:   • Breast cancer    Left   • Depression   • Diabetes mellitus   • GERD (gastroesophageal reflux disease)   • H/O Left breast mass   • H/O vitamin D deficiency   • History of snoring   • Hyperlipidemia   • Hypertension   • Panic attack   • Poor sleep pattern   • Sleep apnea    CPAP      Surgical history Past Surgical History:   • BREAST LUMPECTOMY   • COLON SURGERY   • COLONOSCOPY   • KIDNEY SURGERY   • MOUTH SURGERY    TOOTH EXTRACTION   • TOOTH EXTRACTION      Tobacco use History   Smoking Status   • Former Smoker   • Packs/day: 1.00   • Years: 25.00   • Quit date: 2000   Smokeless Tobacco   • Not on file     Comment: smoked 25 years, 1 PPD      Family oncology history Cancer-related family history is not on file.      Oncology Information      Malignant neoplasm of lower-inner quadrant of left breast in female, estrogen receptor negative    7/19/2017 Initial Diagnosis     Malignant neoplasm of lower-inner quadrant of left breast in female, estrogen receptor negative         7/19/2017 Biopsy     Left breast biopsy showed ductal adenocarcinoma    [place] grade 3  Estrogen receptor: Negative  Progesterone receptor:  Positive  HER2 status: Negative           9/26/2017 Surgery     Left breast lumpectomy of lower inner quadrant and sentinel lymph node biopsy at [place]     Poorly differentiated ductal carcinoma  Tumor size 16 mm  3 sentinel lymph nodes negative  Margins clear    pT1c N0 M0: Pathologic stage IA         11/6/2017 - 1/10/2018 Chemotherapy     Taxotere (Docetaxel) and Cytoxan (Cyclophosphamide) with Neulasta support x 4 cycles         2/5/2018 - 3/5/2018 Radiation     Radiation OncologyTreatment Course:  Tyrone Rand received 5256 cGy in to left breast.         5/2/2018 -  Hormonal Therapy     Arimidex 1 mg by mouth daily              Complications during Therapy:   Fatigue, Nausea and Vomiting, Neutropenia without Fever and dehydration   Modification to Treatment Plan:  No Modifications      Lifetime Dose Tracking:   No doses have been documented on this patient for the following tracked chemicals: Doxorubicin, Epirubicin, Idarubicin, Daunorubicin, Mitoxantrone, Bleomycin, Mitomycin, Doxorubicin Liposomal         Treatment Summary   Treatment goal Curative   Plan Name OP BREAST TC DOCEtaxel / Cyclophosphamide   Status Inactive   Start Date 11/6/2017   End Date 1/24/2018   Provider Luz Elena Herndon MD   Chemotherapy pegfilgrastim (NEULASTA ONPRO) on-body injector 6 mg, 6 mg, Subcutaneous, Once, 4 of 4 cycles    cyclophosphamide (CYTOXAN) 1,180 mg in sodium chloride 0.9 % 309 mL chemo IVPB, 600 mg/m2 = 1,180 mg, Intravenous, Once, 4 of 4 cycles    DOCEtaxel (TAXOTERE) 145 mg in sodium chloride 0.9 % 257.25 mL chemo IVPB, 75 mg/m2 = 145 mg, Intravenous, Once, 4 of 4 cycles     Response to treatment             Persistent Treatment-Associated Adverse Effects at Completion of Therapy   It is important to recognize that not every person experiences the following adverse events after treatment.  You may not have any of these issues, a few or many adverse effects.  Experiences are highly variable.  Please discuss any  adverse effects of cancer treatment with your cancer care team.      After Surgical Therapy   Breast Conserving Surgery (Lumpectomy)     Breast conserving surgery (lumpectomy) involves the removal of the breast mass (cancer lump) and a surrounding area of normal tissue. After surgery, there may be pain and soreness in the chest, underarm or shoulder which should get better over time. Nerves may be damaged in the breast and areas of lymph node removal which may result in numbness and other changes in sensation. Ask your doctor or nurse if you have issues with pain, numbness or tingling, or any changes in function or mobility.      Breast conserving surgery allows women to keep their breast but the breast may look different than it did before surgery. The breast may be smaller and may be different in size and shape. There will be a scar from the surgery and scar tissue may feel different. Radiation therapy can also affect the way the breast looks and feels. How you think and feel about your body is important and coping with changes after breast surgery takes time.  It's important to look at your scar, which should become less red and swollen over time. It's important to touch your scar, too.  Your physician may give you instructions about massaging the scar to help with healing and to soften scar tissue. If you have a partner, let your partner look at and feel the scar when you're ready.  Working through feelings about the cancer and changes as a result of surgery may take time and support. Talk with your doctor or nurse about any issues with body image and coping.      Survivors of breast cancer should speak with their health care provider regarding the possibility of a genetic or family syndrome. If there does appear to be a family history or possible genetic syndrome, genetic counseling and testing may be recommended.    [place] Node Biopsy   Removal of the sentinel lymph node is the removal of the first lymph  node to which cancer cells are most likely to spread. After surgery, there may be pain and soreness in the area where the node was removed.  Nerves may be damaged which may result in numbness or other changes in sensation. While sentinel node biopsy (as opposed to a lymph node dissection where more lymph nodes are removed) decreases the risk of developing lymphedema, the risk is not completely gone.     Lymphedema   Removal of lymph nodes can slow the normal flow of lymph in the area which can lead to swelling in that limb, also called lymphedema.  Survivors who also received radiation therapy to the area where a lymph node was removed may be at increased risk of developing lymphedema. In some cases, the lymphedema can occur years after cancer therapy was completed. Lymphedema can cause pain or discomfort, disfigurement, change in function, and increased risk of infection in the affected area and closest limb. Signs of lymphedema can include a feeling of fullness or heaviness, changes in the skin (red, thick, stiff), aching, tightness, and difficulty moving or flexing nearby joints.  Other signs could be that your jewelry or clothes, like socks, pants or sleeves, may begin to feel tight on the affected limb. As signs of lymphedema could develop months or years after treatment, continue to monitor for signs and notify your doctor.      Several steps can be taken to help prevent and control lymphedema. Survivors should protect the potentially affected limb by avoiding cuts, scrapes, burns, insect bites, shots/vaccines, blood draws, and IV sticks in order to decrease the risk of developing an infection in the limb. In addition, the survivor should protect the limb from the sun to avoid sunburn.  Lastly, survivors should avoid tight clothing and jewelry, and blood pressures in the affected limb that might further slow the normal flow of lymph.      Survivors of cancer, including those at risk for lymphedema, can and  should exercise. Survivors should start slow and gradually increase intensity while monitoring your limb for changes in swelling or redness. If either occurs, stop exercising and notify your doctor for further direction.            After Chemotherapy   Chemotherapy travels throughout the body and can affect both cancer cells and normal, healthy cells.  Damage to healthy cells can cause side effects. Every person doesn't get every side effect, and some people get few, if any.     Many side effects go away fairly quickly after treatment ends, but some may take months or even years to completely go away. The time it takes to get over some side effects and get your energy back varies from person to person. It depends on many factors; including your overall health and the drugs you were given. Some side effects can last a lifetime, such as when chemo causes long-term damage to the heart, lungs, kidneys, or reproductive organs. Certain types of chemo sometimes cause delayed effects, such as a second cancers that may show up many years later. Ask your doctor or nurse if you need more information.      After Radiation Therapy   Radiation therapy can cause early and late side effects.  Early side effects are those that happen during or shortly after treatment and are usually gone within a few weeks after treatment ends.  Late side effects may take months or years to develop and vary depending on the areas of the body included in the field of radiation and the radiation techniques that were used.      The most common early side effects are fatigue (feeling tired) and skin changes.  Other early side effects are usually related to the area being treated. If you continue to have some skin problems after treatment ends, be gentle with the skin in the treatment area until all signs of irritation are gone and continue to care for your skin as your doctors and nurses have advised. If you continue to have fatigue, you may need to  plan your activities to maximize energy, get extra rest while your body is still recovering and follow other instructions from your doctors and nurses such as exercise and activity.    Long term effects of radiation therapy vary greatly depending on the areas included in the field of radiation and the radiation techniques that were used. Breast tissue exposed to radiation may become more firm over time and you may notice changes in the size and shape of the breast. The skin where the breast was treated may have a different coloration, be more red or appear tanned. If the lymph nodes under the arm (axillary nodes) were in the radiation field, there may be an increased risk of developing lymphedema.  Lymphedema is a condition in which fluid collects in the arm or other areas such as the hand, fingers, chest or back and cause swelling. In rare cases, radiation therapy can increase the risk of a second cancer. Ask your doctors and nurses about the risk of long term effects. Keep your follow up appointments and keep up with recommended health screenings        Hormone Therapy    Hormones, like estrogen and progesterone, are naturally produced in the body. Typically these hormones help our body function in various ways, however, in some cases these hormones can also cause cancer to grow.  When your cancer is positive for hormone receptors, your doctor may recommend hormone therapy. Hormone therapy works by either blocking the effects of the hormone on the cancer cell, or by reducing the amount of hormone produced by the body.  In doing so, the cancer cells no longer receive or use the hormone to grow.      It is very important for you to take the medicine as prescribed by your doctor and keep your regular follow-up appointments. Some medications interfere with hormone therapy medications, so be sure and discuss all medications that you take with your doctor.  Common side effects women experience from hormone therapy  includes hot flashes, vaginal dryness, and lack of a period in women who have not yet reached menopause.  Some less common but serious side effects of hormone therapy may include increased risk for blood clots, joint pain, bone loss, weakness, mood changes, nausea, fatigue, and loss of interest in sexual activity, endometrial and uterine cancers, risk for stroke, heart attack, angina, and heart failure. Share with your doctor the side effects you experience so a plan can be made to minimize the effects. In addition, these medications will be taken long-term, in some cases 5-10 years. When taking oral hormone therapy, it may be helpful to create a calendar, use a pillbox, or set an alarm on your watch or phone to remind you daily to take the medication.      Care of your Venous Access Device        Your right internal juglar central line will need to be flushed every 4 weeks when not being used.  Flushing your device needs to be done by a specially trained nurse.  If your device is properly maintained, it can stay in place for as long as your doctor feels you need it.        General After Cancer Treatment        It is not uncommon for cancer to impact other areas of your life such as relationships, work and mental health.  If you develop financial concerns, resources are sometimes available to assist in these areas.  Depression and anxiety can present either during or after cancer diagnosis and treatment.  It is important to discuss with your physician any of these concerns so these resources can be made available to you.      General Cancer Support & Resources    [facility]    Cancer Resource Center & Multidisciplinary Clinic:  [address] [place] [address]  [phone]    Survivorship Clinical Nurse Specialist  [place] ROJAS - [phone]    Oncology Social Worker:  Neida Patten [facility] - [phone]     Psychiatric Nurse Practitioner:  Jennifer Santacruz APRN -  [phone]    Financial Counselor and Contact Information:  [facility] - [phone]    Oncology Dietician Contact Information:  Yessy Danielle RD - [phone]     Managing Anxiety or Depression:  Referral to support group, e.g. American Cancer Society (www.cancer.org, 413.980.5690), Cancer Support Community (www.[employer].org, 697.747.6583)    Referral to a community provider for cognitive behavioral therapy or counseling.    Psychiatric care or counseling and/or initiation of pharmacotherapy are available at the [facility] Psychosocial Supportive Oncology . Please call [phone] or talk to a member of your treatment team about a referral.    For anonymous information, resource requests or support you can also call the 24-hour Crisis and Information Center at [phone]      Fear of Cancer Coming Back:  Patients need to know that these feelings are normal, and they won’t cause the cancer to come back.    • Referral for cognitive behavioral therapy or counseling    • Referral to support group, e.g. American Cancer Society (www.cancer.org, 730.910.8624), Cancer Support Community (www.[employer].org, 434.590.3385)      Fatigue:  • Regular physical activity (goal of 150 minutes of activity per week)  • Evaluation for hypothyroidism, anemia, depression      Financial Concerns:  The financial challenges that people with cancer and their families face are very real.     For hospital bills, you may want to talk with a hospital financial counselor. You may be able to work out a monthly payment plan or even get a reduced rate. You may also want to stay in touch with your insurance company to make sure costs are covered.    For information about resources that are available you can go to the NCI database, “Organizations That Offer Support Services,” at http://www.cancer.gov, search terms “financial assistance.”     Or call the NCI toll-free  3-609-4-CANCER (1-336.277.2988) to ask for help.      Managing Hot Flashes:  Ask your doctor about appropriate medical treatments. Medicines that may help manage hot flashes include Venlafaxine (Effexor) 37.5mg up to 75 mg daily OR Gabapentin (Neurontin) 300mg at bedtime up to 3 times/day.   OR referral to gynecologist          Managing Insomnia  · Practice good sleep hygiene (reduce/eliminate TV and electronic device screen time one hour before bed)  · Evaluation and management of other symptoms (hot flashes, anxiety, and pain)  · Regular physical activity (goal of 150 minutes of activity per week)  · Mindfulness based stress reduction      Concerns about Sexuality, Intimacy and Body Image    • Evaluation and treatment for anxiety, depression, as appropriate    • Referral to counseling and/or support group to address body image concerns, such as breast asymmetry, prosthesis, e.g. the American Cancer Society’s Look Good…Feel Better program (www.cancer.org, [phone])    • Recommendation for vaginal lubricant (Astroglide) or moisturizer (Replens, 1x     every 3 days)    • Screening for pain with intercourse      About Lymphedema:  According to the National Cancer Cutler, anywhere from 5-17% of women who have sentinel lymph node biopsy develops lymphedema. Among women who have axillary lymph node dissection, the percentage is higher - from 20-53% - and risk increases with the number of nodes taken out.     • Referral to lymphedema physical therapy specialist for lymphatic massage  • Weight training  • Maintenance of healthy weight    Tips to reduce the risk of injury or infection to the arm:  · Treat infections of the at-risk arm and hand right away.  · Wear gloves when doing house or garden work.  · Keep skin clean and well-moisturized.  · Use the arm not at risk when having blood drawn, getting injections or having blood pressure taken.  · Avoid sunburn and excess heat from saunas, hot baths, tanning and other  "sources.  · Don't cut nail cuticles.  · Use insect repellant when outdoors.  · Avoid injuries, including scratches and bruises, to the at-risk arm.  · Rest the at-risk arm in an elevated position (above the heart or shoulder).     If you have an infection, injury or any of the symptoms listed above, see your health care provider.        Changes in Memory or \"Brain Fog\"  Patients should know that 25% of cancer patients have cognitive dysfunction after treatment and it usually gets better over time    Some things you can do to manage \"brain fog\" or memory problems include:  · Mental exercises (e.g., word games, crossword puzzles)   · Setting up and following routines  · Repeating information  · Keeping a memory journal  · Avoiding multitasking  · Exercising  · Maintaining a healthy diet.   · There is some evidence that Group Cognitive Training is also effective  · Rule out depression, sleep disturbance      Maintain a Healthy Weight:  Aim for a target BMI of 20-25 m2  (Body mass index is 28.15 kg/m².)    An oncology specialized registered dietician is available at the [facility] for consultations to you. Please call [phone]      Prevention and Wellness:  · Achieve and maintain a healthy body weight as weight gain is a risk factor for development or recurrence of some cancers (BMI between 20-25m2).  · Current Body mass index is 28.15 kg/m².  · Regular physical activity (preferably daily). Strive for at least 150 minutes of moderate or 75 minutes of vigorous activity per week.  · Maintain a diet high in vegetables, fruits and whole grains and low in sugars and fats. Limit red meat and avoid processed foods.  · Avoid all tobacco and second hand smoke.  · Minimize alcohol intake  · No more than one drink per day for women and two drinks per day for men.  · Continue all standard non-cancer related healthcare with your primary care provider. Any new, unusual, and/or persistent symptoms should be " brought to the attention of your provider.        Surveillance    How Frequent?    Medical Oncology visits 1 - 4 times per year as clinically appropriate for 5 years, then annually      Lab tests As clinically indicated    Imaging exams      Mammography Annual clinical breast exam needed    Schedule a mammogram one year after the first mammogram that led to diagnosis, but no earlier than six months after radiation therapy. Obtain a mammogram every six to 12 months thereafter.    Mammogram due June/July 2018          Lymphedema Monitor for lymphedema    Genetic Counseling Periodic screening for changes in family history and referral to genetic counseling as indicated    Gynecologic assessment Pap smear: Beginning at age 30, every three years if the last three PAP tests in a row were normal. Every year if the last PAP was abnormal. every 3 years as long as the last 3 were normal, every year if abnormal.     Women age 70 and older who have had 3 or more normal PAP tests in a row, and no abnormal PAP tests results in the last 10 years, may choose to stop having PAP tests.    Pelvic exam:  Continue to visit a gynecologist annually.    Bone Density study Bone densitometry every 1 to 2 years after initiation of aromatase inhibitor (if applicable) and/or at age 65 or older  Calcium and Vitamin D  Weight-bearing exercise  Avoidance of smoking/smoking cessation counseling, if appropriate  Bisphosphonate, if indicated per bone densitometry    Immunizations       Influenza       Herpes Zoster       Pneumococcal Yearly  Once  As appropriate    Tobacco Cessation The patient is not currently a tobacco user.  Counseling given: Not Answered         Monitor for ongoing toxicities:   Continue all follow up with Jennifer ROJAS      Call your doctor if you have any of these signs or symptoms   New or worsening symptoms.  Pain that is new, unrelieved or bothersome.  Difficulty with your emotions, anxiety, and/or depression.  Physical  problems that affect your abilities in your daily life or those that are bothersome (for example, continued fatigue, trouble sleeping, sexual problems, or edema).      Referrals provided   There are no referral needs at this time.           Self Care Plan                      Self Care Plan: What You Can Do to Stay Healthy after Treatment for Cancer       Cancer treatments may increase your chance of developing other health problems years after you have completed treatment. The purpose of this self care plan is to inform you about what steps you can take to maintain good health after cancer treatment. Keep in mind that every person treated for cancer is different and that these recommendations are not intended to be a substitute for the advice of a doctor or other health care professional. Please use these recommendations to talk with your health care provider about an appropriate follow up care plan for you.       Surveillance for Your Cancer    Recommendation Frequency Comments   Cancer surveillance visit with medical provider that is focused on detecting signs of recurrence of your cancer.   For additional information, visit   www.[email or web address] or   www.cancer.net/patient/[email or web address]  Frequency depends on type and stage of cancer you had. (If you had a higher risk cancer, you may be seen more often).    Your doctor has provided you with a personalized cancer treatment summary and survivorship care plan. If you need another copy, ask your doctor.             General Cancer Screening for Women     Cancer screening tests are designed to find cancer or pre-cancerous areas before there are any symptoms and, generally, when treatments are most successful. Various organizations have developed guidelines for cancer screening for women. While these guidelines vary slightly between different organizations, they cover the same basic screening tests for breast, cervical and colorectal cancers.   In addition, during routine  health examinations (at any age) your health care provider may also evaluate for cancers of the skin, mouth and thyroid. Not all screening tests are right for everyone. Your personal and family cancer history, and/or the presence of a known genetic predisposition, can affect which tests are right for you, and at what age you begin them. Therefore, you should discuss these with your health care provider. Your care plan will also include a section on follow up care for your type of cancer, and these recommendations override the general screening recommendations for that particular type of cancer in the general population.     The American Cancer Society (ACS) recommends these screening guidelines for women:    Recommendation   Frequency Comments   Breast Cancer Screening   For more information, see the ACS document Breast Cancer: Early Detection.   www.cancer.org/ssLINK/breast-cancer-early-detection-delmi  · Yearly mammograms starting at age 40, and continuing for as long as a woman is in good health.   · Clinical breast exam (CBE), performed by a health care professional, every three years for women in their 20s and 30s, and every year for women 40 and over.   · A monthly breast self-exam (BSE) is a good way to monitor breast health. Women should know how their breasts normally look and feel, and report any change promptly to their health care provider.  The ACS recommends that some women - because of their family history, a genetic tendency, or certain other factors - be screened with Magnetic Resonance Imaging (MRI) in addition to mammograms. The number of women who fall into this category is small (less than 2 percent of all [place] women). Talk with your doctor about your personal history and whether you should have additional tests at an earlier age.    Colon and Rectal Cancer Screening   For more information see the ACS document    Colorectal Cancer: Early Detection.  "  www.cancer.org/ssLINK/colorectal-cancer-early-detection-delmi  Options for colon cancer screening can be divided into those that screen for both cancer and polyps, and those that just screen for cancer. Screening should begin at age 50 (unless you are considered \"high risk\" (see comments), using one of the following testing schedules:   Tests that find polyps and cancer   (Preferred over those that find cancer alone. If any of these tests are positive, a colonoscopy should be done.)   · Flexible sigmoidoscopy every five years, or   · Colonoscopy every 10 years, or   · Double-contrast barium enema every five years, or   · CT colonography (virtual colonoscopy) every five years     Tests that primarily test for cancer   · Yearly fecal occult blood test (FOBT)*, or   · Yearly fecal immunochemical test (FIT) *, or   · Stool DNA test (sDNA), interval uncertain*     * The multiple stool take-home test should be used. One test done by the doctor in the office is not adequate. A colonoscopy should be done if the test is positive.   Talk with your doctor about your medical history, and what colorectal cancer screening test and schedule is best for you.   Individuals at higher risk of colon cancer should have screening earlier and potentially more frequently. Those at higher risk of colon and rectal cancer:     · Individuals with a family history of colon or rectal cancer in a relative who was diagnosed before the age of 60   · Individuals with a history of polyps   · Individuals with inflammatory bowel disease (Crohn's disease or ulcerative colitis)   · Individuals with a genetic predisposition to colon or rectal cancer, such as hereditary non-polyposis colon cancer (HNPCC) syndrome or familial adenomatous polyposis (FAP) syndrome       Cervical Cancer Screening     For more information see the ACS document Cervical Cancer: Early Detection.   www.cancer.org/.../cervical-cancer-prevention-and-early-detection-delmi  Cervical " cancer screening should not begin before age 21. Screening Pap tests should be performed every three years between age 21 and 29   · Women age 30 or older should be screened every 3 years with a Pap test or every five years with a combined Pap/Human Papillomavirus (HPV) test.   · Women 65 years of age or older who have had negative consecutive screening in the preceding 10 years should discontinue screening.   · Women who have had a total hysterectomy (removal of the uterus and cervix) should also stop having Pap tests, unless the surgery was done as a treatment for cervical cancer or pre-cancer. Women who have had a hysterectomy without removal of the cervix should continue to have Pap tests.   · Women who have had a history of a serious cervical precancer should continue screening for at least 20 years, even if that extends screening past age 65.   · Women who have been vaccinated against HPV should still follow these screening guidelines.  Treatment for most gynecological cancers involves hysterectomy and alters recommendations for Pap tests. Refer to your personalized cancer treatment summary and survivorship care plan to see what the Pap test recommendations are for you. If you need another copy of your care plan, please ask your doctor.        Sun Exposure and Skin Cancer Risk     Skin cancer is the most commonly diagnosed type of cancer, and rates are on the rise. However, this is one cancer that in most cases can be prevented or detected early. While you may hear that you need the sun to make vitamin D, in reality you only need a few minutes a day to do this. Exposure to ultraviolet (UV) rays, either by natural sunlight or tanning beds, can lead to skin cancer. In addition, UV rays lead to other forms of skin damage, including wrinkles, loss of skin elasticity, dark patches (sometimes called age spots or liver spots), and pre-cancerous skin changes (such as dry, scaly, rough patches). Although dark-skinned  "people are less likely to develop skin cancer, they can and do develop skin cancers, most often in areas that are not exposed to sun (on the soles of the feet, under nails, and genitals).   You can do a lot to protect yourself from damaging UV rays and to detect skin cancer early. Start by practicing sun safety, including using a broad spectrum sunscreen (which protects against UVA and UVB rays)  with an SPF of at least 30 every day, avoiding peak sun times (10 a.m. to 4 p.m., when the rays are strongest) and wearing protective clothing such as hats, sunglasses and long-sleeved shirts.   Examine your skin regularly so you become familiar with any moles or birthmarks. If a mole has changed in any way, you should have a health care provider examine the area. This includes a change in size, shape or color; the development of scaliness, bleeding, oozing, itchiness or pain; or the development of a sore that will not heal. If you have a lot of moles, it may be helpful to make note of moles using photographs or a \"mole map\".     For a guide to performing a skin exam, visit www.skincarephysicians.com/skincancernet/skin_examinations.html.      Healthy Lifestyle    For some cancer survivors, the experience is the motivation to making healthy lifestyle changes. It may seem insignificant, but these changes have been shown to reduce the risk of the cancer coming back or a new cancer developing. Below are some tips on adopting a healthier lifestyle. Maintaining a healthy weight is important in cancer prevention, as is physical activity and eating a healthy diet. Strive to incorporate all three pieces of the puzzle: healthy weight, balanced diet and regular exercise.    Recommendation Goal Comments   Maintain a healthy weight.     For more information, visit   http://www.nhlbi.nih.gov/health/public/heart/obesity/lose_wt/index.htm   www.win.niddk.nih.gov   Call the American Heart Association   1-403.923.9324   · Talk to your health " care team about what a healthy weight is for you, and take steps to reach and maintain that weight.  · For many people reaching their ideal weight can be a challenge; however, losing even 5 to 10 pounds can lower blood pressure, blood sugar and cholesterol levels.   · Weigh yourself weekly to monitor for weight gain/loss  Being overweight can increase your chance of your cancer coming back. Maintaining a healthy weight, physical activity and eating a healthy diet are all important in cancer prevention.   Being overweight can increase your chance of having high blood pressure, high blood sugar and/or high cholesterol, which can lead to heart disease, diabetes and stroke. If you would like more information about your blood sugar, cholesterol or blood pressure, talk with your primary care provider.      Eat a healthy diet, mostly from plant sources.     For more information, visit   http://www.[employer].gov/food-groups/  · Eat healthy, including plenty of fruits and vegetables daily.   · Drink more water, less soda and juice.   · Limit how much alcohol you drink (if you drink at all).  Strive to have two-thirds of your plate be vegetables, fruits, whole grains and beans, while one-third or less should be an animal product. Choose fish and chicken and limit red meat and processed meats. Limit intake of alcohol to two drinks per day.   Exercise.     To learn more about recommendations for diet, activity and weight, visit   AICR’s Guidelines for Survivors   http://preventcancer.aicr.org/site/PageServer?pagename=patients_survivors_guidelines   ACS Eat Healthy and Get Active   www.cancer.org/Healthy/EatHealthyGetActive/index · Experts recommend at least 30 minutes of moderate-to-vigorous activity per day, five days a week.  Research shows that exercise can help you control your weight, improve your energy level, and help you sleep at night. The key is to find a physical activity you enjoy such as walking, dancing or  gardening and do it regularly. If you have been inactive for a while, start out slowly. You can start out by exercising 10 minutes a day several days a week. If you feel dizzy, short of breath, or have chest pain during exercise, stop exercising and talk with your primary care doctor.   Do not use tobacco in any form.     For more information, visit   http://www.cdc.gov/tobacco/campaign/tips/  · If you use tobacco, quit as soon as possible.  Smoking is the most preventable cause of death in the [place] If you would like more information, ask your doctor or you can call a national hotline at 2(276)-QUIT-NOW.    Keep your bones healthy.   For more information, visit   www.niams.nih.gov/Health.../Bone/Bone_Health/bone_health_for_life   For the FRAX (Fracture Risk Assessment) tool, visit:   www.shef.ac.uk/FRAX/ , to estimate 10-year risks for fractures  · Ask your primary care provider about screening for osteoporosis beginning at age 65 or at a younger age if your bone fracture risk is increased.   · The 10-year risk for osteoporotic fractures can be calculated for individuals by using the FRAX tool and could help to guide screening decisions for women younger than 65 years.   · Maximize your bone health by eating healthy, getting enough calcium and vitamin D, and exercising regularly.  Certain cancer treatments, such as chemotherapy or hormonal therapy, can cause bone loss. In addition, after menopause, women can lose up to 20 percent of their bone density. The good news is that women can maximize their bone density by eating healthy, getting enough calcium and vitamin D, and exercising regularly.     Age (years) Calcium per day Vitamin D per day   19 to 49 1000 milligrams 600 units   50 or over 1200 milligrams 800 units     ·    Have regular check-ups by a healthcare professional.     For more information about healthy screening tests for men visit the [place].S. Department of Health and Human Services.    http://www.womenshealth.gov/screening-tests-and-vaccines/screening-tests-for-men/  For more information about adult vaccinations visit the CDC:   http://www.cdc.gov/vaccines/recs/schedules/adult-schedule.htm  · Keep up-to-date on general health screening tests, including cholesterol, blood pressure and glucose (blood sugar) levels.   · Get an annual influenza vaccine (flu shot).   · Get vaccinated with the pneumococcal vaccine, which prevents a type of pneumonia, and re-vaccinated as determined by your health care team.   · Don’t forget dental and eye health!  · The American Optometric Association recommends adults have their eyes examined every two years until age 60, then annually. People who wear glasses or corrective lenses or are at high risk for eye problems (i.e., diabetics, family history of eye disease) should be seen more frequently.   · The American Dental Association recommends adults see their dentist at least once a year.

## 2018-05-21 ENCOUNTER — TELEPHONE (OUTPATIENT)
Dept: ONCOLOGY | Facility: CLINIC | Age: 73
End: 2018-05-21

## 2018-05-21 DIAGNOSIS — Z13.820 SCREENING FOR OSTEOPOROSIS: Primary | ICD-10-CM

## 2018-05-21 DIAGNOSIS — N95.9 MENOPAUSAL AND PERIMENOPAUSAL DISORDER: ICD-10-CM

## 2018-05-21 NOTE — TELEPHONE ENCOUNTER
----- Message from Luz Elena Herndon MD sent at 5/21/2018  2:19 PM EDT -----  Please schedule the patient for a bone density test in one week.    Thank you

## 2018-05-29 ENCOUNTER — HOSPITAL ENCOUNTER (OUTPATIENT)
Dept: BONE DENSITY | Facility: HOSPITAL | Age: 73
Discharge: HOME | End: 2018-05-29
Attending: INTERNAL MEDICINE | Admitting: INTERNAL MEDICINE

## 2018-05-29 DIAGNOSIS — N95.9 MENOPAUSAL AND PERIMENOPAUSAL DISORDER: ICD-10-CM

## 2018-05-29 DIAGNOSIS — Z13.820 SCREENING FOR OSTEOPOROSIS: ICD-10-CM

## 2018-05-29 PROCEDURE — 77080 DXA BONE DENSITY AXIAL: CPT

## 2018-06-19 ENCOUNTER — OFFICE VISIT (OUTPATIENT)
Dept: ONCOLOGY | Facility: CLINIC | Age: 73
End: 2018-06-19

## 2018-06-19 ENCOUNTER — INFUSION (OUTPATIENT)
Dept: ONCOLOGY | Facility: HOSPITAL | Age: 73
End: 2018-06-19

## 2018-06-19 VITALS
OXYGEN SATURATION: 99 % | SYSTOLIC BLOOD PRESSURE: 134 MMHG | BODY MASS INDEX: 28.56 KG/M2 | RESPIRATION RATE: 16 BRPM | HEIGHT: 65 IN | TEMPERATURE: 97.6 F | HEART RATE: 81 BPM | DIASTOLIC BLOOD PRESSURE: 86 MMHG | WEIGHT: 171.4 LBS

## 2018-06-19 DIAGNOSIS — M85.852 OSTEOPENIA OF LEFT THIGH: ICD-10-CM

## 2018-06-19 DIAGNOSIS — Z45.2 FITTING AND ADJUSTMENT OF VASCULAR CATHETER: Primary | ICD-10-CM

## 2018-06-19 DIAGNOSIS — Z17.1 MALIGNANT NEOPLASM OF LOWER-INNER QUADRANT OF LEFT BREAST IN FEMALE, ESTROGEN RECEPTOR NEGATIVE (HCC): Primary | ICD-10-CM

## 2018-06-19 DIAGNOSIS — C50.312 MALIGNANT NEOPLASM OF LOWER-INNER QUADRANT OF LEFT BREAST IN FEMALE, ESTROGEN RECEPTOR NEGATIVE (HCC): ICD-10-CM

## 2018-06-19 DIAGNOSIS — C50.312 MALIGNANT NEOPLASM OF LOWER-INNER QUADRANT OF LEFT BREAST IN FEMALE, ESTROGEN RECEPTOR NEGATIVE (HCC): Primary | ICD-10-CM

## 2018-06-19 DIAGNOSIS — Z12.31 ENCOUNTER FOR SCREENING MAMMOGRAM FOR BREAST CANCER: ICD-10-CM

## 2018-06-19 DIAGNOSIS — Z17.1 MALIGNANT NEOPLASM OF LOWER-INNER QUADRANT OF LEFT BREAST IN FEMALE, ESTROGEN RECEPTOR NEGATIVE (HCC): ICD-10-CM

## 2018-06-19 DIAGNOSIS — E55.9 VITAMIN D DEFICIENCY: ICD-10-CM

## 2018-06-19 LAB
25(OH)D3 SERPL-MCNC: 39.4 NG/ML (ref 30–100)
ALBUMIN SERPL-MCNC: 3.8 G/DL (ref 3.5–5.2)
ALBUMIN/GLOB SERPL: 1.2 G/DL (ref 1.1–2.4)
ALP SERPL-CCNC: 95 U/L (ref 38–116)
ALT SERPL W P-5'-P-CCNC: 15 U/L (ref 0–33)
ANION GAP SERPL CALCULATED.3IONS-SCNC: 14.7 MMOL/L
AST SERPL-CCNC: 17 U/L (ref 0–32)
BASOPHILS # BLD AUTO: 0.05 10*3/MM3 (ref 0–0.1)
BASOPHILS NFR BLD AUTO: 1 % (ref 0–1.1)
BILIRUB SERPL-MCNC: 0.2 MG/DL (ref 0.1–1.2)
BUN BLD-MCNC: 15 MG/DL (ref 6–20)
BUN/CREAT SERPL: 15 (ref 7.3–30)
CALCIUM SPEC-SCNC: 9.6 MG/DL (ref 8.5–10.2)
CHLORIDE SERPL-SCNC: 100 MMOL/L (ref 98–107)
CO2 SERPL-SCNC: 24.3 MMOL/L (ref 22–29)
CREAT BLD-MCNC: 1 MG/DL (ref 0.6–1.1)
DEPRECATED RDW RBC AUTO: 44.2 FL (ref 37–49)
EOSINOPHIL # BLD AUTO: 0.11 10*3/MM3 (ref 0–0.36)
EOSINOPHIL NFR BLD AUTO: 2.3 % (ref 1–5)
ERYTHROCYTE [DISTWIDTH] IN BLOOD BY AUTOMATED COUNT: 14.2 % (ref 11.7–14.5)
GFR SERPL CREATININE-BSD FRML MDRD: 66 ML/MIN/1.73
GLOBULIN UR ELPH-MCNC: 3.3 GM/DL (ref 1.8–3.5)
GLUCOSE BLD-MCNC: 183 MG/DL (ref 74–124)
HCT VFR BLD AUTO: 40.3 % (ref 34–45)
HGB BLD-MCNC: 12.6 G/DL (ref 11.5–14.9)
IMM GRANULOCYTES # BLD: 0.02 10*3/MM3 (ref 0–0.03)
IMM GRANULOCYTES NFR BLD: 0.4 % (ref 0–0.5)
LYMPHOCYTES # BLD AUTO: 0.82 10*3/MM3 (ref 1–3.5)
LYMPHOCYTES NFR BLD AUTO: 16.9 % (ref 20–49)
MCH RBC QN AUTO: 26.8 PG (ref 27–33)
MCHC RBC AUTO-ENTMCNC: 31.3 G/DL (ref 32–35)
MCV RBC AUTO: 85.6 FL (ref 83–97)
MONOCYTES # BLD AUTO: 0.53 10*3/MM3 (ref 0.25–0.8)
MONOCYTES NFR BLD AUTO: 11 % (ref 4–12)
NEUTROPHILS # BLD AUTO: 3.31 10*3/MM3 (ref 1.5–7)
NEUTROPHILS NFR BLD AUTO: 68.4 % (ref 39–75)
NRBC BLD MANUAL-RTO: 0 /100 WBC (ref 0–0)
PLATELET # BLD AUTO: 192 10*3/MM3 (ref 150–375)
PMV BLD AUTO: 9.1 FL (ref 8.9–12.1)
POTASSIUM BLD-SCNC: 4.1 MMOL/L (ref 3.5–4.7)
PROT SERPL-MCNC: 7.1 G/DL (ref 6.3–8)
RBC # BLD AUTO: 4.71 10*6/MM3 (ref 3.9–5)
SODIUM BLD-SCNC: 139 MMOL/L (ref 134–145)
WBC NRBC COR # BLD: 4.84 10*3/MM3 (ref 4–10)

## 2018-06-19 PROCEDURE — 82306 VITAMIN D 25 HYDROXY: CPT | Performed by: INTERNAL MEDICINE

## 2018-06-19 PROCEDURE — 96523 IRRIG DRUG DELIVERY DEVICE: CPT | Performed by: INTERNAL MEDICINE

## 2018-06-19 PROCEDURE — 85025 COMPLETE CBC W/AUTO DIFF WBC: CPT | Performed by: INTERNAL MEDICINE

## 2018-06-19 PROCEDURE — 80053 COMPREHEN METABOLIC PANEL: CPT | Performed by: INTERNAL MEDICINE

## 2018-06-19 PROCEDURE — 25010000002 HEPARIN FLUSH (PORCINE) 100 UNIT/ML SOLUTION: Performed by: INTERNAL MEDICINE

## 2018-06-19 PROCEDURE — 99214 OFFICE O/P EST MOD 30 MIN: CPT | Performed by: INTERNAL MEDICINE

## 2018-06-19 PROCEDURE — 36415 COLL VENOUS BLD VENIPUNCTURE: CPT | Performed by: INTERNAL MEDICINE

## 2018-06-19 RX ORDER — SODIUM CHLORIDE 0.9 % (FLUSH) 0.9 %
10 SYRINGE (ML) INJECTION AS NEEDED
Status: CANCELLED | OUTPATIENT
Start: 2018-06-19

## 2018-06-19 RX ORDER — SODIUM CHLORIDE 0.9 % (FLUSH) 0.9 %
10 SYRINGE (ML) INJECTION AS NEEDED
Status: DISCONTINUED | OUTPATIENT
Start: 2018-06-19 | End: 2018-06-19 | Stop reason: HOSPADM

## 2018-06-19 RX ADMIN — Medication 10 ML: at 10:22

## 2018-06-19 RX ADMIN — SODIUM CHLORIDE, PRESERVATIVE FREE 500 UNITS: 5 INJECTION INTRAVENOUS at 10:22

## 2018-06-19 NOTE — PROGRESS NOTES
Subjective     REASON FOR FOLLOW UP:      Left breast cancer                       HISTORY OF PRESENT ILLNESS:    Tyrone Rand is a 73 y.o. patient with left breast cancer.  She is on adjuvant hormonal therapy with Arimidex. She reports some hot flashes. She is taking vitamin D2 2000 units daily. No back pain. No bone pain.         Past Medical History:   Diagnosis Date   • Breast cancer     Left   • Depression    • Diabetes mellitus    • GERD (gastroesophageal reflux disease)    • H/O Left breast mass 2017   • H/O vitamin D deficiency    • History of snoring    • Hyperlipidemia    • Hypertension    • Panic attack    • Poor sleep pattern    • Sleep apnea     CPAP       Past Surgical History:   Procedure Laterality Date   • BREAST LUMPECTOMY Left 09/2017   • COLON SURGERY     • COLONOSCOPY     • KIDNEY SURGERY  2011   • MOUTH SURGERY      TOOTH EXTRACTION   • TOOTH EXTRACTION           MEDICATIONS:    Current Outpatient Prescriptions:   •  anastrozole (ARIMIDEX) 1 MG tablet, Take 1 tablet by mouth Daily., Disp: 30 tablet, Rfl: 11  •  aspirin 81 MG tablet, Take 81 mg by mouth Daily., Disp: , Rfl:   •  cholecalciferol (VITAMIN D3) 1000 units tablet, Take 1,000 Units by mouth Daily., Disp: , Rfl:   •  metFORMIN (GLUCOPHAGE) 1000 MG tablet, Take 1,000 mg by mouth 2 (Two) Times a Day., Disp: , Rfl: 0  •  metoprolol succinate XL (TOPROL-XL) 25 MG 24 hr tablet, TAKE ONE TABLET BY MOUTH IN THE MORNING, Disp: , Rfl: 11  •  TRESIBA FLEXTOUCH 200 UNIT/ML solution pen-injector, INJECT 40 UNIT AT BEDTIME, Disp: , Rfl: 0  No current facility-administered medications for this visit.      ALLERGIES:  Allergies   Allergen Reactions   • Sulfa Antibiotics         Social History     Social History   • Marital status:      Spouse name: Pascual   • Number of children: 1   • Years of education: GED     Occupational History   •  Retired     Social History Main Topics   • Smoking status: Former Smoker     Packs/day: 1.00      "Years: 25.00     Quit date: 2000   • Smokeless tobacco: Not on file      Comment: smoked 25 years, 1 PPD   • Alcohol use Yes      Comment: social   • Drug use: No   • Sexual activity: Not on file     Other Topics Concern   • Not on file     Social History Narrative   • No narrative on file       Cancer-related family history is not on file.    REVIEW OF SYSTEMS:  GENERAL:  No Fever or chills. No weight change.  Fatigue is improving.   SKIN:  No skin rashes or lesions.  HEME/LYMPH: No Anemia. No Easy bruisability.   RESPIRATORY:  No Shortness of breath.   CVS:  No Chest pain. No Lower extremity swelling.  GI:  No Abdominal pain. No Nausea. No Vomiting. No Constipation. No Diarrhea. No Melena. No Hematochezia.  MUSCULOSKELETAL:  No Back pain.   NEUROLOGICAL:  Improvement in the memory.   PSYCHIATRIC:  No Anxiety. No Depression.         Objective   VITAL SIGNS:  Vitals:    06/19/18 1023   BP: 134/86   Pulse: 81   Resp: 16   Temp: 97.6 °F (36.4 °C)   TempSrc: Oral   SpO2: 99%   Weight: 77.7 kg (171 lb 6.4 oz)   Height: 166 cm (65.35\")   PainSc: 0-No pain       Wt Readings from Last 3 Encounters:   06/19/18 77.7 kg (171 lb 6.4 oz)   05/17/18 77.6 kg (171 lb)   05/02/18 78.7 kg (173 lb 6.4 oz)       PHYSICAL EXAMINATION  GENERAL:  The patient appears in good general condition, not in acute distress.  SKIN:  No skin rashes or lesions. No Ecchymosis or Petechiae.  HEAD:  Normocephalic.  EYES:  No Pallor. No Jaundice.   NECK:  Supple with no Thyromegaly or Masses.  LYMPHATICS:  No cervical or supraclavicular or axillary Lymphadenopathy.  CHEST:  Lungs clear to auscultation bilaterally. No added sounds.  CARDIAC:  Normal S1 & S2. No Murmurs.  ABDOMEN:  Soft. No tenderness. No Hepatomegaly. No Splenomegaly. No masses.  EXTREMITIES:  No Edema. No Calf tenderness. No Cyanosis.   NEUROLOGICAL:  No Focal neurological deficits.       RESULT REVIEW:     Results from last 7 days  Lab Units 06/19/18  1016   WBC 10*3/mm3 4.84   NEUTROS " ABS 10*3/mm3 3.31   HEMOGLOBIN g/dL 12.6   HEMATOCRIT % 40.3   PLATELETS 10*3/mm3 192       Results from last 7 days  Lab Units 06/19/18  1016   SODIUM mmol/L 139   POTASSIUM mmol/L 4.1   CHLORIDE mmol/L 100   CO2 mmol/L 24.3   BUN mg/dL 15   CREATININE mg/dL 1.00   CALCIUM mg/dL 9.6   ALBUMIN g/dL 3.80   BILIRUBIN mg/dL 0.2   ALK PHOS U/L 95   ALT (SGPT) U/L 15   AST (SGOT) U/L 17         LUMBAR SPINE AND LEFT HIP BONE DENSITOMETRY     HISTORY: This is a 73-year-old female who is postmenopausal. She has  history of breast cancer.     TECHNIQUE: The T score compares the patient's bone mineral density with  the peak bone mass of young normal patients. Patients with T-scores  between 1.0 and 2.5 standard deviations below the mean are osteopenic.  Patients with T-scores greater than 2.5 standard deviation below the  mean are osteoporotic.     FINDINGS: The bone mineral density in the lumbar spine has a T value of  +0.3 which is normal. The bone mineral density in the left femoral neck  has a T value of -1.7 representing moderate osteopenia. The patient has  a 10 year fracture risk for major osteoporotic fracture measuring 5.2%.  The risk for hip fracture measures 1.1%.     This report was finalized on 5/30/2018 4:30 PM by Dr. Yevgeniy Mohamud M.D.        Assessment/Plan   1.  Left breast cancer, invasive ductal carcinoma, grade 3, ER negative, SC positive at 20%, HER-2 frederic negative.  Ki-67 was 50-55% on the biopsy from 7/19/17.   · She underwent a lumpectomy and sentinel lymph node biopsy on 9/26/17.   · Pathology examination revealed a 1.6 cm tumor, poorly differentiated with a Ki-67 50-60%, ER negative, SC weakly positive at <10%. Her-2 is negative.   · Adjuvant chemotherapy with Cytoxan and Taxotere with Neulasta support was started on 11/6/17 and completed on 1/8/18.  · Patient received post lumpectomy radiation and was completed on 3/5/18.  · Patient started Arimidex on 5/2/18.    She is tolerating Aromasin except  for some hot flashes.     2.  Depression. She is feeling better.    3.  Patient has a port.  I recommended keeping it for 1 year as long as she is not having symptoms from it.     4.  Osteopenia in the left femoral neck. She is on Vitamin D 3 2000 units daily.    PLAN:    1.  Continue Arimidex for a total of 5 years.    2.  Obtain bilateral mammogram in late July 2018.    3.  Obtain vitamin D level. If it is low, we will start vitamin D 50,000 units weekly. Plan to repeat bone density in 2 years. If it is not improving, add Prolia.     4.  Return in 6 weeks for a port flush. We will see her in follow up in 12 weeks.            Luz Elena Herndon MD  06/19/18

## 2018-07-20 ENCOUNTER — HOSPITAL ENCOUNTER (OUTPATIENT)
Dept: MAMMOGRAPHY | Facility: HOSPITAL | Age: 73
Discharge: HOME | End: 2018-07-20
Attending: INTERNAL MEDICINE | Admitting: INTERNAL MEDICINE

## 2018-07-20 DIAGNOSIS — C50.312 MALIGNANT NEOPLASM OF LOWER-INNER QUADRANT OF LEFT BREAST IN FEMALE, ESTROGEN RECEPTOR NEGATIVE (HCC): ICD-10-CM

## 2018-07-20 DIAGNOSIS — Z17.1 MALIGNANT NEOPLASM OF LOWER-INNER QUADRANT OF LEFT BREAST IN FEMALE, ESTROGEN RECEPTOR NEGATIVE (HCC): ICD-10-CM

## 2018-07-20 DIAGNOSIS — Z12.31 ENCOUNTER FOR SCREENING MAMMOGRAM FOR BREAST CANCER: ICD-10-CM

## 2018-07-20 PROCEDURE — 77067 SCR MAMMO BI INCL CAD: CPT

## 2018-07-20 PROCEDURE — 77063 BREAST TOMOSYNTHESIS BI: CPT

## 2018-07-23 ENCOUNTER — TELEPHONE (OUTPATIENT)
Dept: ONCOLOGY | Facility: CLINIC | Age: 73
End: 2018-07-23

## 2018-07-23 NOTE — TELEPHONE ENCOUNTER
----- Message from Luz Elena Herndon MD sent at 7/23/2018  9:09 AM EDT -----  Please let patient know that Mammogram was benign.    Thank you

## 2018-07-24 ENCOUNTER — LAB (OUTPATIENT)
Dept: LAB | Facility: HOSPITAL | Age: 73
End: 2018-07-24
Attending: INTERNAL MEDICINE

## 2018-07-24 ENCOUNTER — TRANSCRIBE ORDERS (OUTPATIENT)
Dept: ADMINISTRATIVE | Facility: HOSPITAL | Age: 73
End: 2018-07-24

## 2018-07-24 DIAGNOSIS — N18.30 CHRONIC KIDNEY DISEASE, STAGE III (MODERATE) (HCC): ICD-10-CM

## 2018-07-24 DIAGNOSIS — I12.9 HYPERTENSIVE NEPHROPATHY: ICD-10-CM

## 2018-07-24 DIAGNOSIS — N18.30 CHRONIC KIDNEY DISEASE, STAGE III (MODERATE) (HCC): Primary | ICD-10-CM

## 2018-07-24 LAB
ANION GAP SERPL CALCULATED.3IONS-SCNC: 9.4 MMOL/L
BACTERIA UR QL AUTO: ABNORMAL /HPF
BASOPHILS # BLD AUTO: 0.07 10*3/MM3 (ref 0–0.2)
BASOPHILS NFR BLD AUTO: 1.1 % (ref 0–2)
BILIRUB UR QL STRIP: NEGATIVE
BUN BLD-MCNC: 17 MG/DL (ref 8–23)
BUN/CREAT SERPL: 16.5 (ref 7–25)
CALCIUM SPEC-SCNC: 9.8 MG/DL (ref 8.8–10.5)
CHLORIDE SERPL-SCNC: 102 MMOL/L (ref 98–107)
CLARITY UR: ABNORMAL
CO2 SERPL-SCNC: 28.6 MMOL/L (ref 22–29)
COLOR UR: ABNORMAL
CREAT BLD-MCNC: 1.03 MG/DL (ref 0.57–1)
DEPRECATED RDW RBC AUTO: 47.1 FL (ref 37–54)
EOSINOPHIL # BLD AUTO: 0.15 10*3/MM3 (ref 0.1–0.3)
EOSINOPHIL NFR BLD AUTO: 2.4 % (ref 0–4)
ERYTHROCYTE [DISTWIDTH] IN BLOOD BY AUTOMATED COUNT: 14.6 % (ref 11.5–14.5)
GFR SERPL CREATININE-BSD FRML MDRD: 63 ML/MIN/1.73
GLUCOSE BLD-MCNC: 81 MG/DL (ref 65–99)
GLUCOSE UR STRIP-MCNC: NEGATIVE MG/DL
HCT VFR BLD AUTO: 42.5 % (ref 37–47)
HGB BLD-MCNC: 13.2 G/DL (ref 12–16)
HGB UR QL STRIP.AUTO: NEGATIVE
HYALINE CASTS UR QL AUTO: ABNORMAL /LPF
IMM GRANULOCYTES # BLD: 0.02 10*3/MM3 (ref 0–0.03)
IMM GRANULOCYTES NFR BLD: 0.3 % (ref 0–0.5)
KETONES UR QL STRIP: NEGATIVE
LEUKOCYTE ESTERASE UR QL STRIP.AUTO: ABNORMAL
LYMPHOCYTES # BLD AUTO: 1.18 10*3/MM3 (ref 0.6–4.8)
LYMPHOCYTES NFR BLD AUTO: 19 % (ref 20–45)
MCH RBC QN AUTO: 27.7 PG (ref 27–31)
MCHC RBC AUTO-ENTMCNC: 31.1 G/DL (ref 31–37)
MCV RBC AUTO: 89.1 FL (ref 81–99)
MONOCYTES # BLD AUTO: 0.72 10*3/MM3 (ref 0–1)
MONOCYTES NFR BLD AUTO: 11.6 % (ref 3–8)
NEUTROPHILS # BLD AUTO: 4.07 10*3/MM3 (ref 1.5–8.3)
NEUTROPHILS NFR BLD AUTO: 65.6 % (ref 45–70)
NITRITE UR QL STRIP: NEGATIVE
NRBC BLD MANUAL-RTO: 0 /100 WBC (ref 0–0)
PH UR STRIP.AUTO: 5.5 [PH] (ref 4.5–8)
PLATELET # BLD AUTO: 211 10*3/MM3 (ref 140–500)
PMV BLD AUTO: 9.1 FL (ref 7.4–10.4)
POTASSIUM BLD-SCNC: 4.2 MMOL/L (ref 3.5–5.2)
PROT UR QL STRIP: NEGATIVE
RBC # BLD AUTO: 4.77 10*6/MM3 (ref 4.2–5.4)
RBC # UR: ABNORMAL /HPF
REF LAB TEST METHOD: ABNORMAL
SODIUM BLD-SCNC: 140 MMOL/L (ref 136–145)
SP GR UR STRIP: 1.02 (ref 1–1.03)
SQUAMOUS #/AREA URNS HPF: ABNORMAL /HPF
UROBILINOGEN UR QL STRIP: ABNORMAL
WBC NRBC COR # BLD: 6.21 10*3/MM3 (ref 4.8–10.8)
WBC UR QL AUTO: ABNORMAL /HPF

## 2018-07-24 PROCEDURE — 85025 COMPLETE CBC W/AUTO DIFF WBC: CPT

## 2018-07-24 PROCEDURE — 81001 URINALYSIS AUTO W/SCOPE: CPT

## 2018-07-24 PROCEDURE — 80048 BASIC METABOLIC PNL TOTAL CA: CPT

## 2018-07-24 PROCEDURE — 36415 COLL VENOUS BLD VENIPUNCTURE: CPT

## 2018-08-01 ENCOUNTER — INFUSION (OUTPATIENT)
Dept: ONCOLOGY | Facility: HOSPITAL | Age: 73
End: 2018-08-01

## 2018-08-01 VITALS
SYSTOLIC BLOOD PRESSURE: 121 MMHG | WEIGHT: 175.4 LBS | DIASTOLIC BLOOD PRESSURE: 71 MMHG | BODY MASS INDEX: 28.87 KG/M2 | TEMPERATURE: 97.5 F | HEART RATE: 63 BPM | OXYGEN SATURATION: 95 %

## 2018-08-01 DIAGNOSIS — Z45.2 FITTING AND ADJUSTMENT OF VASCULAR CATHETER: Primary | ICD-10-CM

## 2018-08-01 PROCEDURE — 96523 IRRIG DRUG DELIVERY DEVICE: CPT | Performed by: INTERNAL MEDICINE

## 2018-08-01 PROCEDURE — 25010000002 HEPARIN FLUSH (PORCINE) 100 UNIT/ML SOLUTION: Performed by: INTERNAL MEDICINE

## 2018-08-01 RX ORDER — SODIUM CHLORIDE 0.9 % (FLUSH) 0.9 %
10 SYRINGE (ML) INJECTION AS NEEDED
Status: DISCONTINUED | OUTPATIENT
Start: 2018-08-01 | End: 2018-08-01 | Stop reason: HOSPADM

## 2018-08-01 RX ORDER — ESCITALOPRAM OXALATE 10 MG/1
TABLET ORAL DAILY
Refills: 2 | COMMUNITY
Start: 2018-07-17 | End: 2019-08-22

## 2018-08-01 RX ORDER — SODIUM CHLORIDE 0.9 % (FLUSH) 0.9 %
10 SYRINGE (ML) INJECTION AS NEEDED
Status: CANCELLED | OUTPATIENT
Start: 2018-08-01

## 2018-08-01 RX ADMIN — HEPARIN 500 UNITS: 100 SYRINGE at 10:50

## 2018-09-11 ENCOUNTER — OFFICE VISIT (OUTPATIENT)
Dept: ONCOLOGY | Facility: CLINIC | Age: 73
End: 2018-09-11

## 2018-09-11 ENCOUNTER — INFUSION (OUTPATIENT)
Dept: ONCOLOGY | Facility: HOSPITAL | Age: 73
End: 2018-09-11

## 2018-09-11 VITALS
HEIGHT: 65 IN | HEART RATE: 64 BPM | DIASTOLIC BLOOD PRESSURE: 76 MMHG | RESPIRATION RATE: 16 BRPM | BODY MASS INDEX: 29.52 KG/M2 | SYSTOLIC BLOOD PRESSURE: 108 MMHG | TEMPERATURE: 97.4 F | WEIGHT: 177.2 LBS | OXYGEN SATURATION: 97 %

## 2018-09-11 DIAGNOSIS — M85.89 OSTEOPENIA OF MULTIPLE SITES: ICD-10-CM

## 2018-09-11 DIAGNOSIS — Z45.2 FITTING AND ADJUSTMENT OF VASCULAR CATHETER: ICD-10-CM

## 2018-09-11 DIAGNOSIS — Z17.1 MALIGNANT NEOPLASM OF LOWER-INNER QUADRANT OF LEFT BREAST IN FEMALE, ESTROGEN RECEPTOR NEGATIVE (HCC): ICD-10-CM

## 2018-09-11 DIAGNOSIS — M85.852 OSTEOPENIA OF LEFT THIGH: ICD-10-CM

## 2018-09-11 DIAGNOSIS — C50.312 MALIGNANT NEOPLASM OF LOWER-INNER QUADRANT OF LEFT BREAST IN FEMALE, ESTROGEN RECEPTOR NEGATIVE (HCC): ICD-10-CM

## 2018-09-11 DIAGNOSIS — Z17.1 MALIGNANT NEOPLASM OF LOWER-INNER QUADRANT OF LEFT BREAST IN FEMALE, ESTROGEN RECEPTOR NEGATIVE (HCC): Primary | ICD-10-CM

## 2018-09-11 DIAGNOSIS — N95.9 MENOPAUSAL AND PERIMENOPAUSAL DISORDER: ICD-10-CM

## 2018-09-11 DIAGNOSIS — C50.312 MALIGNANT NEOPLASM OF LOWER-INNER QUADRANT OF LEFT BREAST IN FEMALE, ESTROGEN RECEPTOR NEGATIVE (HCC): Primary | ICD-10-CM

## 2018-09-11 LAB
ALBUMIN SERPL-MCNC: 3.9 G/DL (ref 3.5–5.2)
ALBUMIN/GLOB SERPL: 1.2 G/DL (ref 1.1–2.4)
ALP SERPL-CCNC: 104 U/L (ref 38–116)
ALT SERPL W P-5'-P-CCNC: 18 U/L (ref 0–33)
ANION GAP SERPL CALCULATED.3IONS-SCNC: 11 MMOL/L
AST SERPL-CCNC: 16 U/L (ref 0–32)
BASOPHILS # BLD AUTO: 0.05 10*3/MM3 (ref 0–0.1)
BASOPHILS NFR BLD AUTO: 1.1 % (ref 0–1.1)
BILIRUB SERPL-MCNC: <0.2 MG/DL (ref 0.1–1.2)
BUN BLD-MCNC: 19 MG/DL (ref 6–20)
BUN/CREAT SERPL: 19.8 (ref 7.3–30)
CALCIUM SPEC-SCNC: 9.6 MG/DL (ref 8.5–10.2)
CHLORIDE SERPL-SCNC: 99 MMOL/L (ref 98–107)
CO2 SERPL-SCNC: 26 MMOL/L (ref 22–29)
CREAT BLD-MCNC: 0.96 MG/DL (ref 0.6–1.1)
DEPRECATED RDW RBC AUTO: 43.1 FL (ref 37–49)
EOSINOPHIL # BLD AUTO: 0.14 10*3/MM3 (ref 0–0.36)
EOSINOPHIL NFR BLD AUTO: 3 % (ref 1–5)
ERYTHROCYTE [DISTWIDTH] IN BLOOD BY AUTOMATED COUNT: 13.5 % (ref 11.7–14.5)
GFR SERPL CREATININE-BSD FRML MDRD: 69 ML/MIN/1.73
GLOBULIN UR ELPH-MCNC: 3.2 GM/DL (ref 1.8–3.5)
GLUCOSE BLD-MCNC: 151 MG/DL (ref 74–124)
HCT VFR BLD AUTO: 40.8 % (ref 34–45)
HGB BLD-MCNC: 12.9 G/DL (ref 11.5–14.9)
IMM GRANULOCYTES # BLD: 0.01 10*3/MM3 (ref 0–0.03)
IMM GRANULOCYTES NFR BLD: 0.2 % (ref 0–0.5)
LYMPHOCYTES # BLD AUTO: 0.95 10*3/MM3 (ref 1–3.5)
LYMPHOCYTES NFR BLD AUTO: 20 % (ref 20–49)
MCH RBC QN AUTO: 27.6 PG (ref 27–33)
MCHC RBC AUTO-ENTMCNC: 31.6 G/DL (ref 32–35)
MCV RBC AUTO: 87.2 FL (ref 83–97)
MONOCYTES # BLD AUTO: 0.56 10*3/MM3 (ref 0.25–0.8)
MONOCYTES NFR BLD AUTO: 11.8 % (ref 4–12)
NEUTROPHILS # BLD AUTO: 3.03 10*3/MM3 (ref 1.5–7)
NEUTROPHILS NFR BLD AUTO: 63.9 % (ref 39–75)
NRBC BLD MANUAL-RTO: 0 /100 WBC (ref 0–0)
PLATELET # BLD AUTO: 200 10*3/MM3 (ref 150–375)
PMV BLD AUTO: 9.7 FL (ref 8.9–12.1)
POTASSIUM BLD-SCNC: 4.5 MMOL/L (ref 3.5–4.7)
PROT SERPL-MCNC: 7.1 G/DL (ref 6.3–8)
RBC # BLD AUTO: 4.68 10*6/MM3 (ref 3.9–5)
SODIUM BLD-SCNC: 136 MMOL/L (ref 134–145)
WBC NRBC COR # BLD: 4.74 10*3/MM3 (ref 4–10)

## 2018-09-11 PROCEDURE — 85025 COMPLETE CBC W/AUTO DIFF WBC: CPT | Performed by: INTERNAL MEDICINE

## 2018-09-11 PROCEDURE — 99214 OFFICE O/P EST MOD 30 MIN: CPT | Performed by: INTERNAL MEDICINE

## 2018-09-11 PROCEDURE — 96523 IRRIG DRUG DELIVERY DEVICE: CPT | Performed by: INTERNAL MEDICINE

## 2018-09-11 PROCEDURE — 80053 COMPREHEN METABOLIC PANEL: CPT | Performed by: INTERNAL MEDICINE

## 2018-09-11 NOTE — PROGRESS NOTES
Subjective     REASON FOR FOLLOW UP:      Left breast cancer                       HISTORY OF PRESENT ILLNESS:    Tyrone Rand is a 73 y.o. patient with left breast cancer.  She is on adjuvant Arimidex.  She is tolerating it.  She develops occasional hot flashes about 3 every 24 hours.  They lasts for about 2 minutes.  There not interfering with her activities.    Patient states that her mood is good.  She is tolerating Lexapro.        Past Medical History:   Diagnosis Date   • Breast cancer (CMS/HCC)     Left   • Depression    • Diabetes mellitus (CMS/HCC)    • GERD (gastroesophageal reflux disease)    • H/O Left breast mass 2017   • H/O vitamin D deficiency    • History of snoring    • Hyperlipidemia    • Hypertension    • Panic attack    • Poor sleep pattern    • Sleep apnea     CPAP       Past Surgical History:   Procedure Laterality Date   • BREAST LUMPECTOMY Left 09/2017   • COLON SURGERY     • COLONOSCOPY     • KIDNEY SURGERY  2011   • MOUTH SURGERY      TOOTH EXTRACTION   • TOOTH EXTRACTION           MEDICATIONS:    Current Outpatient Prescriptions:   •  anastrozole (ARIMIDEX) 1 MG tablet, Take 1 tablet by mouth Daily., Disp: 30 tablet, Rfl: 11  •  aspirin 81 MG tablet, Take 81 mg by mouth Daily., Disp: , Rfl:   •  LÁZARO CONTOUR TEST test strip, TEST BLOOD SUGAR TWICE A DAY, Disp: , Rfl: 2  •  LÁZARO MICROLET LANCETS lancets, TEST BLOOD SUGAR TWICE A DAY, Disp: , Rfl: 3  •  cholecalciferol (VITAMIN D3) 1000 units tablet, Take 1,000 Units by mouth Daily., Disp: , Rfl:   •  escitalopram (LEXAPRO) 10 MG tablet, Take 10 mg by mouth Daily., Disp: , Rfl: 2  •  metFORMIN (GLUCOPHAGE) 1000 MG tablet, Take 1,000 mg by mouth 2 (Two) Times a Day., Disp: , Rfl: 0  •  metoprolol succinate XL (TOPROL-XL) 25 MG 24 hr tablet, TAKE ONE TABLET BY MOUTH IN THE MORNING, Disp: , Rfl: 11  •  NOVOFINE 32G X 6 MM misc, USE TWO TIMES A DAY, Disp: , Rfl: 1  •  TRESIBA FLEXTOUCH 200 UNIT/ML solution pen-injector, INJECT 40 UNIT AT  "BEDTIME, Disp: , Rfl: 0     ALLERGIES:  Allergies   Allergen Reactions   • Sulfa Antibiotics         Social History     Social History   • Marital status:      Spouse name: Pascual   • Number of children: 1   • Years of education: GED     Occupational History   •  Retired     Social History Main Topics   • Smoking status: Former Smoker     Packs/day: 1.00     Years: 25.00     Quit date: 2000   • Smokeless tobacco: Not on file      Comment: smoked 25 years, 1 PPD   • Alcohol use Yes      Comment: social   • Drug use: No   • Sexual activity: Not on file     Other Topics Concern   • Not on file     Social History Narrative   • No narrative on file       Cancer-related family history is not on file.    REVIEW OF SYSTEMS:  GENERAL:  Hot flashes.   SKIN: Negative for skin rash or lesion.  HEME/LYMPH: Negative for easy bruisability or enlarged lymph nodes.  RESPIRATORY:  Negative for shortness of breath, cough, sputum or hemoptysis.   CVS:  Negative for chest pain, lower extremity swelling or palpitations.   GI:  Negative for abdominal pain, nausea, vomiting, constipation, diarrhea, hematochezia or melena.   :  Negative for hematuria, dysuria or increased frequency.   MUSCULOSKELETAL:  Negative for back pain, joint pain or stiffness.  NEUROLOGICAL:  Negative for headaches, dizziness or numbness.  PSYCHIATRIC:  depression.       Objective   VITAL SIGNS:  Vitals:    09/11/18 1155   BP: 108/76   Pulse: 64   Resp: 16   Temp: 97.4 °F (36.3 °C)   SpO2: 97%   Weight: 80.4 kg (177 lb 3.2 oz)   Height: 166 cm (65.35\")   PainSc: 0-No pain       Wt Readings from Last 3 Encounters:   09/11/18 80.4 kg (177 lb 3.2 oz)   08/01/18 79.6 kg (175 lb 6.4 oz)   06/19/18 77.7 kg (171 lb 6.4 oz)       PHYSICAL EXAMINATION  GENERAL:  The patient appears in good general condition, not in acute distress.  SKIN:  No skin rashes or lesions. No Ecchymosis or Petechiae.  HEAD:  Normocephalic.  EYES:  No Pallor. No Jaundice.   NECK:  Supple " with no Thyromegaly or Masses.  LYMPHATICS:  No cervical or supraclavicular or axillary Lymphadenopathy.  CHEST:  Lungs clear to auscultation bilaterally. No added sounds.  Port in the right upper chest appears benign.  CARDIAC:  Normal S1 & S2. No Murmurs.  ABDOMEN:  Soft. No tenderness. No Hepatomegaly. No Splenomegaly. No masses.  EXTREMITIES:  No Edema. No Calf tenderness. No Cyanosis.   NEUROLOGICAL:  No Focal neurological deficits.       RESULT REVIEW:     Results from last 7 days  Lab Units 09/11/18  1127   WBC 10*3/mm3 4.74   NEUTROS ABS 10*3/mm3 3.03   HEMOGLOBIN g/dL 12.9   HEMATOCRIT % 40.8   PLATELETS 10*3/mm3 200       Results from last 7 days  Lab Units 09/11/18  1127   SODIUM mmol/L 136   POTASSIUM mmol/L 4.5   CHLORIDE mmol/L 99   CO2 mmol/L 26.0   BUN mg/dL 19   CREATININE mg/dL 0.96   CALCIUM mg/dL 9.6   ALBUMIN g/dL 3.90   BILIRUBIN mg/dL <0.2   ALK PHOS U/L 104   ALT (SGPT) U/L 18   AST (SGOT) U/L 16         EXAM, 7/20/2018:  1. Bilateral digital screening mammogram with CAD.  2. Bilateral digital screening breast tomosynthesis.     INDICATION:  73-year-old female status post left lumpectomy and chemoradiation  therapy after diagnosis of breast cancer here July 2017. This is her 1st  postoperative follow-up mammogram.     TECHNIQUE:  Bilateral digital screening mammogram images were obtained and reviewed  with CAD. Digital breast tomosynthesis images were also reviewed.     COMPARISON:  *  Mammograms, 6/28/2017, 7/11/2017 and 7/19/2017.     FINDINGS:  There are scattered areas of fibroglandular density. Postoperative  changes of lumpectomy surgery in the lower inner left breast including  multiple surgical clips. Surgical clips are also present in the left  axilla.     There is no evidence of local tumor recurrence, and there is no evidence  of malignancy elsewhere within either breast.     IMPRESSION:  1. Benign examination.  2. Post lumpectomy changes in the lower medial left breast.      BI-RADS CATEGORY 2: Benign Findings.    Assessment/Plan   1.  Left breast cancer, invasive ductal carcinoma, grade 3, ER negative, ND positive at 20%, HER-2 frederic negative.  Ki-67 was 50-55% on the biopsy from 7/19/17.   · She underwent a lumpectomy and sentinel lymph node biopsy on 9/26/17.   · Pathology examination revealed a 1.6 cm tumor, poorly differentiated with a Ki-67 50-60%, ER negative, ND weakly positive at <10%. Her-2 is negative.   · Adjuvant chemotherapy with Cytoxan and Taxotere with Neulasta support was started on 11/6/17 and completed on 1/8/18.  · Patient received post lumpectomy radiation and was completed on 3/5/18.  · Patient started Arimidex on 5/2/18.    She is tolerating Arimidex except for hot flashes.  The hot flashes are improving over time.  She she does not have evidence of local or systemic recurrence.    2.  Depression. She is feeling better.  She is on Lexapro.    3.  Patient has a port.  It is being maintained every 6 weeks.  No symptoms related to the port.    4.  Osteopenia in the left femoral neck. She is on Vitamin D 3 2000 units daily.  Vitamin D level is normal.    PLAN:    1.  Continue Arimidex for a total of 5 years.    2.  I will see her in follow-up in January 2019 with CBC and CMP.  If she has no evidence of recurrence, we will refer her back to her surgeon for removal of the port.    3.  Her next Mammogram will be in late July 2019.         Luz Elena Herndon MD  09/11/18

## 2018-10-23 ENCOUNTER — INFUSION (OUTPATIENT)
Dept: ONCOLOGY | Facility: HOSPITAL | Age: 73
End: 2018-10-23

## 2018-10-23 VITALS
BODY MASS INDEX: 29.5 KG/M2 | OXYGEN SATURATION: 94 % | DIASTOLIC BLOOD PRESSURE: 80 MMHG | WEIGHT: 179.2 LBS | TEMPERATURE: 97.8 F | SYSTOLIC BLOOD PRESSURE: 123 MMHG | HEART RATE: 67 BPM

## 2018-10-23 DIAGNOSIS — Z45.2 FITTING AND ADJUSTMENT OF VASCULAR CATHETER: Primary | ICD-10-CM

## 2018-10-23 PROCEDURE — 96523 IRRIG DRUG DELIVERY DEVICE: CPT | Performed by: INTERNAL MEDICINE

## 2018-10-23 RX ORDER — SODIUM CHLORIDE 0.9 % (FLUSH) 0.9 %
10 SYRINGE (ML) INJECTION AS NEEDED
Status: DISCONTINUED | OUTPATIENT
Start: 2018-10-23 | End: 2018-10-23 | Stop reason: HOSPADM

## 2018-10-23 RX ORDER — SODIUM CHLORIDE 0.9 % (FLUSH) 0.9 %
10 SYRINGE (ML) INJECTION AS NEEDED
Status: CANCELLED | OUTPATIENT
Start: 2018-10-23

## 2018-10-23 RX ADMIN — HEPARIN 500 UNITS: 100 SYRINGE at 10:56

## 2018-11-30 RX ORDER — SODIUM CHLORIDE 0.9 % (FLUSH) 0.9 %
10 SYRINGE (ML) INJECTION AS NEEDED
Status: CANCELLED | OUTPATIENT
Start: 2018-12-04

## 2018-12-04 ENCOUNTER — INFUSION (OUTPATIENT)
Dept: ONCOLOGY | Facility: HOSPITAL | Age: 73
End: 2018-12-04

## 2018-12-04 VITALS — TEMPERATURE: 97.9 F

## 2018-12-04 DIAGNOSIS — Z45.2 FITTING AND ADJUSTMENT OF VASCULAR CATHETER: Primary | ICD-10-CM

## 2018-12-04 PROCEDURE — 96523 IRRIG DRUG DELIVERY DEVICE: CPT | Performed by: INTERNAL MEDICINE

## 2018-12-04 RX ORDER — GUAIFENESIN/DEXTROMETHORPHAN 100-10MG/5
10 LIQUID (ML) ORAL 4 TIMES DAILY PRN
Refills: 0 | COMMUNITY
Start: 2018-11-29 | End: 2019-08-22

## 2018-12-04 RX ORDER — SODIUM CHLORIDE 0.9 % (FLUSH) 0.9 %
10 SYRINGE (ML) INJECTION AS NEEDED
Status: DISCONTINUED | OUTPATIENT
Start: 2018-12-04 | End: 2018-12-04 | Stop reason: HOSPADM

## 2018-12-04 RX ORDER — METHYLPREDNISOLONE 4 MG/1
4 TABLET ORAL
Refills: 0 | COMMUNITY
Start: 2018-11-29 | End: 2019-08-22

## 2018-12-04 RX ORDER — BENZONATATE 100 MG/1
100 CAPSULE ORAL 3 TIMES DAILY PRN
Refills: 0 | COMMUNITY
Start: 2018-11-29 | End: 2019-08-22

## 2018-12-04 RX ORDER — AMOXICILLIN AND CLAVULANATE POTASSIUM 875; 125 MG/1; MG/1
1 TABLET, FILM COATED ORAL EVERY 12 HOURS
Refills: 0 | COMMUNITY
Start: 2018-11-29 | End: 2019-01-15

## 2018-12-04 RX ORDER — SODIUM CHLORIDE 0.9 % (FLUSH) 0.9 %
10 SYRINGE (ML) INJECTION AS NEEDED
Status: CANCELLED | OUTPATIENT
Start: 2018-12-04

## 2018-12-04 RX ADMIN — SODIUM CHLORIDE, PRESERVATIVE FREE 10 ML: 5 INJECTION INTRAVENOUS at 11:14

## 2018-12-04 RX ADMIN — HEPARIN 500 UNITS: 100 SYRINGE at 11:14

## 2019-01-15 ENCOUNTER — INFUSION (OUTPATIENT)
Dept: ONCOLOGY | Facility: HOSPITAL | Age: 74
End: 2019-01-15

## 2019-01-15 ENCOUNTER — OFFICE VISIT (OUTPATIENT)
Dept: ONCOLOGY | Facility: CLINIC | Age: 74
End: 2019-01-15

## 2019-01-15 VITALS
OXYGEN SATURATION: 96 % | TEMPERATURE: 98.6 F | BODY MASS INDEX: 29.94 KG/M2 | HEART RATE: 77 BPM | HEIGHT: 65 IN | DIASTOLIC BLOOD PRESSURE: 59 MMHG | SYSTOLIC BLOOD PRESSURE: 110 MMHG | WEIGHT: 179.7 LBS | RESPIRATION RATE: 16 BRPM

## 2019-01-15 DIAGNOSIS — Z17.1 MALIGNANT NEOPLASM OF LOWER-INNER QUADRANT OF LEFT BREAST IN FEMALE, ESTROGEN RECEPTOR NEGATIVE (HCC): Primary | ICD-10-CM

## 2019-01-15 DIAGNOSIS — E55.9 VITAMIN D DEFICIENCY: ICD-10-CM

## 2019-01-15 DIAGNOSIS — Z45.2 FITTING AND ADJUSTMENT OF VASCULAR CATHETER: ICD-10-CM

## 2019-01-15 DIAGNOSIS — C50.312 MALIGNANT NEOPLASM OF LOWER-INNER QUADRANT OF LEFT BREAST IN FEMALE, ESTROGEN RECEPTOR NEGATIVE (HCC): Primary | ICD-10-CM

## 2019-01-15 DIAGNOSIS — Z12.31 VISIT FOR SCREENING MAMMOGRAM: ICD-10-CM

## 2019-01-15 DIAGNOSIS — M85.89 OSTEOPENIA OF MULTIPLE SITES: ICD-10-CM

## 2019-01-15 LAB
ALBUMIN SERPL-MCNC: 4 G/DL (ref 3.5–5.2)
ALBUMIN/GLOB SERPL: 1.3 G/DL (ref 1.1–2.4)
ALP SERPL-CCNC: 119 U/L (ref 38–116)
ALT SERPL W P-5'-P-CCNC: 16 U/L (ref 0–33)
ANION GAP SERPL CALCULATED.3IONS-SCNC: 12.2 MMOL/L
AST SERPL-CCNC: 16 U/L (ref 0–32)
BASOPHILS # BLD AUTO: 0.07 10*3/MM3 (ref 0–0.1)
BASOPHILS NFR BLD AUTO: 1.3 % (ref 0–1.1)
BILIRUB SERPL-MCNC: 0.2 MG/DL (ref 0.1–1.2)
BUN BLD-MCNC: 20 MG/DL (ref 6–20)
BUN/CREAT SERPL: 17.9 (ref 7.3–30)
CALCIUM SPEC-SCNC: 9.6 MG/DL (ref 8.5–10.2)
CHLORIDE SERPL-SCNC: 99 MMOL/L (ref 98–107)
CO2 SERPL-SCNC: 25.8 MMOL/L (ref 22–29)
CREAT BLD-MCNC: 1.12 MG/DL (ref 0.6–1.1)
DEPRECATED RDW RBC AUTO: 45.5 FL (ref 37–49)
EOSINOPHIL # BLD AUTO: 0.12 10*3/MM3 (ref 0–0.36)
EOSINOPHIL NFR BLD AUTO: 2.2 % (ref 1–5)
ERYTHROCYTE [DISTWIDTH] IN BLOOD BY AUTOMATED COUNT: 14.4 % (ref 11.7–14.5)
GFR SERPL CREATININE-BSD FRML MDRD: 57 ML/MIN/1.73
GLOBULIN UR ELPH-MCNC: 3.1 GM/DL (ref 1.8–3.5)
GLUCOSE BLD-MCNC: 249 MG/DL (ref 74–124)
HCT VFR BLD AUTO: 41.7 % (ref 34–45)
HGB BLD-MCNC: 13.2 G/DL (ref 11.5–14.9)
IMM GRANULOCYTES # BLD AUTO: 0.02 10*3/MM3 (ref 0–0.03)
IMM GRANULOCYTES NFR BLD AUTO: 0.4 % (ref 0–0.5)
LYMPHOCYTES # BLD AUTO: 0.83 10*3/MM3 (ref 1–3.5)
LYMPHOCYTES NFR BLD AUTO: 15.3 % (ref 20–49)
MCH RBC QN AUTO: 27.5 PG (ref 27–33)
MCHC RBC AUTO-ENTMCNC: 31.7 G/DL (ref 32–35)
MCV RBC AUTO: 86.9 FL (ref 83–97)
MONOCYTES # BLD AUTO: 0.64 10*3/MM3 (ref 0.25–0.8)
MONOCYTES NFR BLD AUTO: 11.8 % (ref 4–12)
NEUTROPHILS # BLD AUTO: 3.76 10*3/MM3 (ref 1.5–7)
NEUTROPHILS NFR BLD AUTO: 69 % (ref 39–75)
NRBC BLD AUTO-RTO: 0 /100 WBC (ref 0–0)
PLATELET # BLD AUTO: 186 10*3/MM3 (ref 150–375)
PMV BLD AUTO: 9.6 FL (ref 8.9–12.1)
POTASSIUM BLD-SCNC: 4.3 MMOL/L (ref 3.5–4.7)
PROT SERPL-MCNC: 7.1 G/DL (ref 6.3–8)
RBC # BLD AUTO: 4.8 10*6/MM3 (ref 3.9–5)
SODIUM BLD-SCNC: 137 MMOL/L (ref 134–145)
WBC NRBC COR # BLD: 5.44 10*3/MM3 (ref 4–10)

## 2019-01-15 PROCEDURE — 36591 DRAW BLOOD OFF VENOUS DEVICE: CPT | Performed by: INTERNAL MEDICINE

## 2019-01-15 PROCEDURE — 96523 IRRIG DRUG DELIVERY DEVICE: CPT | Performed by: INTERNAL MEDICINE

## 2019-01-15 PROCEDURE — 80053 COMPREHEN METABOLIC PANEL: CPT

## 2019-01-15 PROCEDURE — 85025 COMPLETE CBC W/AUTO DIFF WBC: CPT

## 2019-01-15 PROCEDURE — 99214 OFFICE O/P EST MOD 30 MIN: CPT | Performed by: INTERNAL MEDICINE

## 2019-01-15 RX ORDER — SODIUM CHLORIDE 0.9 % (FLUSH) 0.9 %
10 SYRINGE (ML) INJECTION AS NEEDED
Status: DISCONTINUED | OUTPATIENT
Start: 2019-01-15 | End: 2019-01-15 | Stop reason: HOSPADM

## 2019-01-15 RX ORDER — SODIUM CHLORIDE 0.9 % (FLUSH) 0.9 %
10 SYRINGE (ML) INJECTION AS NEEDED
Status: CANCELLED | OUTPATIENT
Start: 2019-01-15

## 2019-01-15 RX ORDER — BUPROPION HYDROCHLORIDE 150 MG/1
150 TABLET, EXTENDED RELEASE ORAL DAILY
Refills: 0 | COMMUNITY
Start: 2018-12-14 | End: 2020-02-06 | Stop reason: SDDI

## 2019-01-15 RX ADMIN — SODIUM CHLORIDE, PRESERVATIVE FREE 10 ML: 5 INJECTION INTRAVENOUS at 11:39

## 2019-01-15 RX ADMIN — SODIUM CHLORIDE, PRESERVATIVE FREE 500 UNITS: 5 INJECTION INTRAVENOUS at 11:39

## 2019-01-15 NOTE — PROGRESS NOTES
Subjective     CHIEF COMPLAINT:      Chief Complaint   Patient presents with   • Follow-up     no concerns       HISTORY OF PRESENT ILLNESS:     Tyrone Rand is a 73 y.o. female patient who returns today for follow up on left breast cancer.  She is on Arimidex.  She continues to tolerate it.  She reports occasional hot flashes.  They only last for a few minutes.  She is tolerating them.    Patient is not experiencing any back or bone pain.      REVIEW OF SYSTEMS:  Review of Systems   Constitutional: Negative for chills, fever and unexpected weight change.   HENT: Negative for mouth sores, nosebleeds, sore throat and voice change.    Eyes: Negative for visual disturbance.   Respiratory: Negative for cough and shortness of breath.    Cardiovascular: Negative for chest pain and leg swelling.   Gastrointestinal: Negative for abdominal pain, blood in stool, constipation, diarrhea, nausea and vomiting.   Genitourinary: Negative for dysuria, frequency and hematuria.   Musculoskeletal: Negative for arthralgias, back pain and joint swelling.   Skin: Negative for rash.   Neurological: Negative for dizziness, numbness and headaches.   Hematological: Negative for adenopathy. Does not bruise/bleed easily.   Psychiatric/Behavioral: Negative for dysphoric mood. The patient is not nervous/anxious.      I verified the ROS obtained by the MA.        Past Medical History:   Diagnosis Date   • Breast cancer (CMS/HCC)     Left   • Depression    • Diabetes mellitus (CMS/HCC)    • GERD (gastroesophageal reflux disease)    • H/O Left breast mass 2017   • H/O vitamin D deficiency    • History of snoring    • Hyperlipidemia    • Hypertension    • Panic attack    • Poor sleep pattern    • Sleep apnea     CPAP       Past Surgical History:   Procedure Laterality Date   • BREAST LUMPECTOMY Left 09/2017   • COLON SURGERY     • COLONOSCOPY     • KIDNEY SURGERY  2011   • MOUTH SURGERY      TOOTH EXTRACTION   • TOOTH EXTRACTION          Cancer-related family history is not on file.  Social History     Tobacco Use   • Smoking status: Former Smoker     Packs/day: 1.00     Years: 25.00     Pack years: 25.00     Last attempt to quit: 2000     Years since quittin.0   • Smokeless tobacco: Never Used   • Tobacco comment: smoked 25 years, 1 PPD   Substance Use Topics   • Alcohol use: Yes     Comment: social       MEDICATIONS:    Current Outpatient Medications:   •  amoxicillin-clavulanate (AUGMENTIN) 875-125 MG per tablet, Take 1 tablet by mouth Every 12 (Twelve) Hours., Disp: , Rfl: 0  •  anastrozole (ARIMIDEX) 1 MG tablet, Take 1 tablet by mouth Daily., Disp: 30 tablet, Rfl: 11  •  aspirin 81 MG tablet, Take 81 mg by mouth Daily., Disp: , Rfl:   •  LÁZARO CONTOUR TEST test strip, TEST BLOOD SUGAR TWICE A DAY, Disp: , Rfl: 2  •  LÁZARO MICROLET LANCETS lancets, TEST BLOOD SUGAR TWICE A DAY, Disp: , Rfl: 3  •  benzonatate (TESSALON) 100 MG capsule, Take 100 mg by mouth 3 (Three) Times a Day As Needed., Disp: , Rfl: 0  •  buPROPion SR (WELLBUTRIN SR) 150 MG 12 hr tablet, Take 150 mg by mouth Daily., Disp: , Rfl: 0  •  cholecalciferol (VITAMIN D3) 1000 units tablet, Take 1,000 Units by mouth Daily., Disp: , Rfl:   •  [employer] TUSSIN -20 MG/10ML liquid, Take 10 mL by mouth 4 (Four) Times a Day As Needed., Disp: , Rfl: 0  •  metFORMIN (GLUCOPHAGE) 1000 MG tablet, Take 1,000 mg by mouth 2 (Two) Times a Day., Disp: , Rfl: 0  •  MethylPREDNISolone (MEDROL, KIMBER,) 4 MG tablet, Take 4 mg by mouth. see package, Disp: , Rfl: 0  •  metoprolol succinate XL (TOPROL-XL) 25 MG 24 hr tablet, TAKE ONE TABLET BY MOUTH IN THE MORNING, Disp: , Rfl: 11  •  NOVOFINE 32G X 6 MM misc, USE TWO TIMES A DAY, Disp: , Rfl: 1  •  TRESIBA FLEXTOUCH 200 UNIT/ML solution pen-injector, INJECT 40 UNIT AT BEDTIME, Disp: , Rfl: 0  •  escitalopram (LEXAPRO) 10 MG tablet, Take  by mouth Daily. No longer taking, Disp: , Rfl: 2  No current facility-administered medications for this visit.  "    ALLERGIES:  Allergies   Allergen Reactions   • Sulfa Antibiotics          Objective   VITAL SIGNS:     Vitals:    01/15/19 1145   BP: 110/59   Pulse: 77   Resp: 16   Temp: 98.6 °F (37 °C)   TempSrc: Oral   SpO2: 96%   Weight: 81.5 kg (179 lb 11.2 oz)   Height: 166 cm (65.35\")   PainSc: 0-No pain     Body mass index is 29.58 kg/m².     Wt Readings from Last 3 Encounters:   01/15/19 81.5 kg (179 lb 11.2 oz)   10/23/18 81.3 kg (179 lb 3.2 oz)   09/11/18 80.4 kg (177 lb 3.2 oz)       PHYSICAL EXAMINATION:  GENERAL:  The patient appears in good general condition, not in acute distress.  SKIN: Warm and dry. No skin rashes, ecchymosis or petechiae.  HEAD:  Normocephalic.  EYES:  No Jaundice. No Pallor.   NECK:  Supple with Good ROM. No Thyromegaly. No Masses.  LYMPHATICS:  No cervical or supraclavicular or axillary lymphadenopathy.  CHEST: Normal respiratory effort. Lungs clear to auscultation.   ABDOMEN:  Soft. No tenderness. No Hepatomegaly. No Splenomegaly. No masses.  EXTREMITIES:  No Calf tenderness.  NEUROLOGICAL:  No Focal neurological deficits.         DIAGNOSTIC DATA:     Results from last 7 days   Lab Units  01/15/19   1133   WBC 10*3/mm3  5.44   NEUTROS ABS 10*3/mm3  3.76   HEMOGLOBIN g/dL  13.2   HEMATOCRIT %  41.7   PLATELETS 10*3/mm3  186     Results from last 7 days   Lab Units  01/15/19   1142   SODIUM mmol/L  137   POTASSIUM mmol/L  4.3   CHLORIDE mmol/L  99   CO2 mmol/L  25.8   BUN mg/dL  20   CREATININE mg/dL  1.12*   CALCIUM mg/dL  9.6   ALBUMIN g/dL  4.00   BILIRUBIN mg/dL  0.2   ALK PHOS U/L  119*   ALT (SGPT) U/L  16   AST (SGOT) U/L  16   GLUCOSE mg/dL  249*       Assessment/Plan   1.  Left breast cancer, invasive ductal carcinoma, grade 3, ER negative, IL positive at 20%, HER-2 frederic negative.  Ki-67 was 50-55% on the biopsy from 7/19/17.   · She underwent a lumpectomy and sentinel lymph node biopsy on 9/26/17.   · Pathology examination revealed a 1.6 cm tumor, poorly differentiated with a Ki-67 " 50-60%, ER negative, PA weakly positive at <10%. Her-2 is negative.   · Adjuvant chemotherapy with Cytoxan and Taxotere with Neulasta support was started on 11/6/17 and completed on 1/8/18.  · Patient received post lumpectomy radiation and was completed on 3/5/18.  · Patient started Arimidex on 5/2/18.    Patient is tolerating Arimidex.  She is having occasional hot flashes.  They are tolerable.  No clinical evidence of recurrence.    2.  Depression.  She is on Lexapro.  Symptoms have significantly improved.    3.  Patient has a port.  It was being maintained every 6 weeks.  Since she does not have clinical evidence of recurrence, I recommended removing the port.    4.  Osteopenia in the left femoral neck. She is on Vitamin D 3 2000 units daily.     PLAN:    1.  Continue Arimidex with plans to complete 5 years of adjuvant hormonal therapy.    2.  Referral back to her surgeon for removal of the port.    3.  I will see her in follow-up in August 2019 with a CBC CMP and a repeat vitamin D level.  Her next mammogram will be scheduled for July 2019.          Luz Elena Herndon MD  01/15/19

## 2019-01-15 NOTE — PROGRESS NOTES
Order placed for Referral to Dr Kulkarni for port removal, mammogram in July and CBC/CMP in August  Message sent to appt desk to arrange

## 2019-01-18 NOTE — TELEPHONE ENCOUNTER
call place to patient reguarding automated letter from [facility] mammo department. Patient made aware of actual MRI breast results. Patient confimed she is aware of results. Dr Herndon aware of notifying patient. No distress. Patient to call us with conerns.      By Tracy Alexandra, RT, ARRT             Close PreviousNext      Reason for Call

## 2019-02-13 ENCOUNTER — TRANSCRIBE ORDERS (OUTPATIENT)
Dept: ADMINISTRATIVE | Facility: HOSPITAL | Age: 74
End: 2019-02-13

## 2019-02-13 DIAGNOSIS — R41.3 MEMORY LOSS: Primary | ICD-10-CM

## 2019-02-20 ENCOUNTER — HOSPITAL ENCOUNTER (OUTPATIENT)
Dept: MRI IMAGING | Facility: HOSPITAL | Age: 74
Discharge: HOME | End: 2019-02-20
Admitting: PSYCHIATRY & NEUROLOGY

## 2019-02-20 DIAGNOSIS — R41.3 MEMORY LOSS: ICD-10-CM

## 2019-02-20 PROCEDURE — A9577 INJ MULTIHANCE: HCPCS | Performed by: PSYCHIATRY & NEUROLOGY

## 2019-02-20 PROCEDURE — 0 GADOBENATE DIMEGLUMINE 529 MG/ML SOLUTION: Performed by: PSYCHIATRY & NEUROLOGY

## 2019-02-20 PROCEDURE — 70553 MRI BRAIN STEM W/O & W/DYE: CPT

## 2019-02-20 RX ADMIN — GADOBENATE DIMEGLUMINE 16 ML: 529 INJECTION, SOLUTION INTRAVENOUS at 08:50

## 2019-05-01 RX ORDER — ANASTROZOLE 1 MG/1
TABLET ORAL
Qty: 30 TABLET | Refills: 8 | Status: SHIPPED | OUTPATIENT
Start: 2019-05-01 | End: 2020-03-12

## 2019-06-25 ENCOUNTER — TRANSCRIBE ORDERS (OUTPATIENT)
Dept: ADMINISTRATIVE | Facility: HOSPITAL | Age: 74
End: 2019-06-25

## 2019-06-25 ENCOUNTER — LAB (OUTPATIENT)
Dept: LAB | Facility: HOSPITAL | Age: 74
End: 2019-06-25

## 2019-06-25 DIAGNOSIS — I12.9 HYPERTENSIVE NEPHROPATHY: ICD-10-CM

## 2019-06-25 DIAGNOSIS — N18.30 CHRONIC KIDNEY DISEASE, STAGE III (MODERATE) (HCC): ICD-10-CM

## 2019-06-25 DIAGNOSIS — N18.30 CHRONIC KIDNEY DISEASE, STAGE III (MODERATE) (HCC): Primary | ICD-10-CM

## 2019-06-25 LAB
ANION GAP SERPL CALCULATED.3IONS-SCNC: 11.8 MMOL/L
BASOPHILS # BLD AUTO: 0.05 10*3/MM3 (ref 0–0.2)
BASOPHILS NFR BLD AUTO: 0.8 % (ref 0–1.5)
BILIRUB UR QL STRIP: NEGATIVE
BUN BLD-MCNC: 19 MG/DL (ref 8–23)
BUN/CREAT SERPL: 15.3 (ref 7–25)
CALCIUM SPEC-SCNC: 10.2 MG/DL (ref 8.6–10.5)
CHLORIDE SERPL-SCNC: 96 MMOL/L (ref 98–107)
CLARITY UR: CLEAR
CO2 SERPL-SCNC: 27.2 MMOL/L (ref 22–29)
COLOR UR: YELLOW
CREAT BLD-MCNC: 1.24 MG/DL (ref 0.57–1)
DEPRECATED RDW RBC AUTO: 46.3 FL (ref 37–54)
EOSINOPHIL # BLD AUTO: 0.18 10*3/MM3 (ref 0–0.4)
EOSINOPHIL NFR BLD AUTO: 2.9 % (ref 0.3–6.2)
ERYTHROCYTE [DISTWIDTH] IN BLOOD BY AUTOMATED COUNT: 14.6 % (ref 12.3–15.4)
GFR SERPL CREATININE-BSD FRML MDRD: 51 ML/MIN/1.73
GLUCOSE BLD-MCNC: 232 MG/DL (ref 65–99)
GLUCOSE UR STRIP-MCNC: ABNORMAL MG/DL
HCT VFR BLD AUTO: 43 % (ref 34–46.6)
HGB BLD-MCNC: 13.6 G/DL (ref 12–15.9)
HGB UR QL STRIP.AUTO: NEGATIVE
IMM GRANULOCYTES # BLD AUTO: 0.01 10*3/MM3 (ref 0–0.05)
IMM GRANULOCYTES NFR BLD AUTO: 0.2 % (ref 0–0.5)
KETONES UR QL STRIP: NEGATIVE
LEUKOCYTE ESTERASE UR QL STRIP.AUTO: NEGATIVE
LYMPHOCYTES # BLD AUTO: 1.8 10*3/MM3 (ref 0.7–3.1)
LYMPHOCYTES NFR BLD AUTO: 28.7 % (ref 19.6–45.3)
MCH RBC QN AUTO: 27.3 PG (ref 26.6–33)
MCHC RBC AUTO-ENTMCNC: 31.6 G/DL (ref 31.5–35.7)
MCV RBC AUTO: 86.3 FL (ref 79–97)
MONOCYTES # BLD AUTO: 0.5 10*3/MM3 (ref 0.1–0.9)
MONOCYTES NFR BLD AUTO: 8 % (ref 5–12)
NEUTROPHILS # BLD AUTO: 3.74 10*3/MM3 (ref 1.7–7)
NEUTROPHILS NFR BLD AUTO: 59.6 % (ref 42.7–76)
NITRITE UR QL STRIP: NEGATIVE
PH UR STRIP.AUTO: 6 [PH] (ref 5–8)
PLATELET # BLD AUTO: 231 10*3/MM3 (ref 140–450)
PMV BLD AUTO: 10.3 FL (ref 6–12)
POTASSIUM BLD-SCNC: 4.4 MMOL/L (ref 3.5–5.2)
PROT UR QL STRIP: NEGATIVE
RBC # BLD AUTO: 4.98 10*6/MM3 (ref 3.77–5.28)
SODIUM BLD-SCNC: 135 MMOL/L (ref 136–145)
SP GR UR STRIP: 1.02 (ref 1–1.03)
UROBILINOGEN UR QL STRIP: ABNORMAL
WBC NRBC COR # BLD: 6.27 10*3/MM3 (ref 3.4–10.8)

## 2019-06-25 PROCEDURE — 36415 COLL VENOUS BLD VENIPUNCTURE: CPT

## 2019-06-25 PROCEDURE — 80048 BASIC METABOLIC PNL TOTAL CA: CPT

## 2019-06-25 PROCEDURE — 85025 COMPLETE CBC W/AUTO DIFF WBC: CPT

## 2019-06-25 PROCEDURE — 81003 URINALYSIS AUTO W/O SCOPE: CPT

## 2019-07-23 ENCOUNTER — HOSPITAL ENCOUNTER (OUTPATIENT)
Dept: MAMMOGRAPHY | Facility: HOSPITAL | Age: 74
Discharge: HOME | End: 2019-07-23
Attending: INTERNAL MEDICINE | Admitting: INTERNAL MEDICINE

## 2019-07-23 DIAGNOSIS — Z12.31 VISIT FOR SCREENING MAMMOGRAM: ICD-10-CM

## 2019-07-23 DIAGNOSIS — C50.312 MALIGNANT NEOPLASM OF LOWER-INNER QUADRANT OF LEFT BREAST IN FEMALE, ESTROGEN RECEPTOR NEGATIVE (HCC): ICD-10-CM

## 2019-07-23 DIAGNOSIS — Z17.1 MALIGNANT NEOPLASM OF LOWER-INNER QUADRANT OF LEFT BREAST IN FEMALE, ESTROGEN RECEPTOR NEGATIVE (HCC): ICD-10-CM

## 2019-07-23 PROCEDURE — 77067 SCR MAMMO BI INCL CAD: CPT

## 2019-07-23 PROCEDURE — 77063 BREAST TOMOSYNTHESIS BI: CPT

## 2019-08-22 ENCOUNTER — LAB (OUTPATIENT)
Dept: LAB | Facility: HOSPITAL | Age: 74
End: 2019-08-22

## 2019-08-22 ENCOUNTER — OFFICE VISIT (OUTPATIENT)
Dept: ONCOLOGY | Facility: CLINIC | Age: 74
End: 2019-08-22

## 2019-08-22 VITALS
WEIGHT: 182.1 LBS | BODY MASS INDEX: 30.34 KG/M2 | OXYGEN SATURATION: 98 % | HEART RATE: 72 BPM | DIASTOLIC BLOOD PRESSURE: 82 MMHG | SYSTOLIC BLOOD PRESSURE: 126 MMHG | RESPIRATION RATE: 16 BRPM | TEMPERATURE: 98.4 F | HEIGHT: 65 IN

## 2019-08-22 DIAGNOSIS — E55.9 VITAMIN D DEFICIENCY: ICD-10-CM

## 2019-08-22 DIAGNOSIS — Z17.1 MALIGNANT NEOPLASM OF LOWER-INNER QUADRANT OF LEFT BREAST IN FEMALE, ESTROGEN RECEPTOR NEGATIVE (HCC): ICD-10-CM

## 2019-08-22 DIAGNOSIS — F32.9 CURRENT EPISODE OF MAJOR DEPRESSIVE DISORDER WITHOUT PRIOR EPISODE, UNSPECIFIED DEPRESSION EPISODE SEVERITY: ICD-10-CM

## 2019-08-22 DIAGNOSIS — C50.312 MALIGNANT NEOPLASM OF LOWER-INNER QUADRANT OF LEFT BREAST IN FEMALE, ESTROGEN RECEPTOR NEGATIVE (HCC): Primary | ICD-10-CM

## 2019-08-22 DIAGNOSIS — C50.312 MALIGNANT NEOPLASM OF LOWER-INNER QUADRANT OF LEFT BREAST IN FEMALE, ESTROGEN RECEPTOR NEGATIVE (HCC): ICD-10-CM

## 2019-08-22 DIAGNOSIS — Z17.1 MALIGNANT NEOPLASM OF LOWER-INNER QUADRANT OF LEFT BREAST IN FEMALE, ESTROGEN RECEPTOR NEGATIVE (HCC): Primary | ICD-10-CM

## 2019-08-22 DIAGNOSIS — M85.852 OSTEOPENIA OF LEFT THIGH: ICD-10-CM

## 2019-08-22 PROBLEM — Z45.2 FITTING AND ADJUSTMENT OF VASCULAR CATHETER: Status: RESOLVED | Noted: 2017-11-01 | Resolved: 2019-08-22

## 2019-08-22 LAB
25(OH)D3 SERPL-MCNC: 37.8 NG/ML (ref 30–100)
ALBUMIN SERPL-MCNC: 4 G/DL (ref 3.5–5.2)
ALBUMIN/GLOB SERPL: 1.3 G/DL (ref 1.1–2.4)
ALP SERPL-CCNC: 128 U/L (ref 38–116)
ALT SERPL W P-5'-P-CCNC: 13 U/L (ref 0–33)
ANION GAP SERPL CALCULATED.3IONS-SCNC: 12.4 MMOL/L (ref 5–15)
AST SERPL-CCNC: 14 U/L (ref 0–32)
BASOPHILS # BLD AUTO: 0.06 10*3/MM3 (ref 0–0.2)
BASOPHILS NFR BLD AUTO: 0.9 % (ref 0–1.5)
BILIRUB SERPL-MCNC: 0.2 MG/DL (ref 0.2–1.2)
BUN BLD-MCNC: 20 MG/DL (ref 6–20)
BUN/CREAT SERPL: 17.4 (ref 7.3–30)
CALCIUM SPEC-SCNC: 9.6 MG/DL (ref 8.5–10.2)
CHLORIDE SERPL-SCNC: 100 MMOL/L (ref 98–107)
CO2 SERPL-SCNC: 25.6 MMOL/L (ref 22–29)
CREAT BLD-MCNC: 1.15 MG/DL (ref 0.6–1.1)
DEPRECATED RDW RBC AUTO: 45.2 FL (ref 37–54)
EOSINOPHIL # BLD AUTO: 0.21 10*3/MM3 (ref 0–0.4)
EOSINOPHIL NFR BLD AUTO: 3.2 % (ref 0.3–6.2)
ERYTHROCYTE [DISTWIDTH] IN BLOOD BY AUTOMATED COUNT: 13.9 % (ref 12.3–15.4)
GFR SERPL CREATININE-BSD FRML MDRD: 56 ML/MIN/1.73
GLOBULIN UR ELPH-MCNC: 3.1 GM/DL (ref 1.8–3.5)
GLUCOSE BLD-MCNC: 226 MG/DL (ref 74–124)
HCT VFR BLD AUTO: 41.8 % (ref 34–46.6)
HGB BLD-MCNC: 12.9 G/DL (ref 12–15.9)
IMM GRANULOCYTES # BLD AUTO: 0.02 10*3/MM3 (ref 0–0.05)
IMM GRANULOCYTES NFR BLD AUTO: 0.3 % (ref 0–0.5)
LYMPHOCYTES # BLD AUTO: 1.33 10*3/MM3 (ref 0.7–3.1)
LYMPHOCYTES NFR BLD AUTO: 20.3 % (ref 19.6–45.3)
MCH RBC QN AUTO: 27.4 PG (ref 26.6–33)
MCHC RBC AUTO-ENTMCNC: 30.9 G/DL (ref 31.5–35.7)
MCV RBC AUTO: 88.7 FL (ref 79–97)
MONOCYTES # BLD AUTO: 0.56 10*3/MM3 (ref 0.1–0.9)
MONOCYTES NFR BLD AUTO: 8.5 % (ref 5–12)
NEUTROPHILS # BLD AUTO: 4.37 10*3/MM3 (ref 1.7–7)
NEUTROPHILS NFR BLD AUTO: 66.8 % (ref 42.7–76)
NRBC BLD AUTO-RTO: 0 /100 WBC (ref 0–0.2)
PLATELET # BLD AUTO: 196 10*3/MM3 (ref 140–450)
PMV BLD AUTO: 9.3 FL (ref 6–12)
POTASSIUM BLD-SCNC: 4.2 MMOL/L (ref 3.5–4.7)
PROT SERPL-MCNC: 7.1 G/DL (ref 6.3–8)
RBC # BLD AUTO: 4.71 10*6/MM3 (ref 3.77–5.28)
SODIUM BLD-SCNC: 138 MMOL/L (ref 134–145)
WBC NRBC COR # BLD: 6.55 10*3/MM3 (ref 3.4–10.8)

## 2019-08-22 PROCEDURE — 80053 COMPREHEN METABOLIC PANEL: CPT

## 2019-08-22 PROCEDURE — 82306 VITAMIN D 25 HYDROXY: CPT | Performed by: INTERNAL MEDICINE

## 2019-08-22 PROCEDURE — 36415 COLL VENOUS BLD VENIPUNCTURE: CPT

## 2019-08-22 PROCEDURE — 99214 OFFICE O/P EST MOD 30 MIN: CPT | Performed by: INTERNAL MEDICINE

## 2019-08-22 PROCEDURE — 85025 COMPLETE CBC W/AUTO DIFF WBC: CPT

## 2019-08-22 NOTE — PROGRESS NOTES
Subjective     CHIEF COMPLAINT:      Chief Complaint   Patient presents with   • Annual Exam     no concerns       HISTORY OF PRESENT ILLNESS:     Tyrone Rand is a 74 y.o. female patient who returns today for follow up on her left breast cancer.  She is on Arimidex.  She reports hot flashes but they have decreased in frequency now occurring about once nightly.  No arthritis symptoms.  She forgets to perform monthly breast self-exam.    Patient reports symptoms of depression.  She is undergoing treatment.  She reports lightheadedness with standing up since she was started on Wellbutrin.    Patient had her port removed since the last visit.    REVIEW OF SYSTEMS:  Review of Systems   Constitutional: Negative for chills, fever and unexpected weight change.   HENT: Negative for mouth sores, nosebleeds, sore throat and voice change.    Eyes: Negative for visual disturbance.   Respiratory: Negative for cough and shortness of breath.    Cardiovascular: Negative for chest pain and leg swelling.   Gastrointestinal: Negative for abdominal pain, blood in stool, constipation, diarrhea, nausea and vomiting.   Genitourinary: Negative for dysuria, frequency and hematuria.   Musculoskeletal: Negative for arthralgias, back pain and joint swelling.   Skin: Negative for rash.   Neurological: Positive for light-headedness. Negative for dizziness, numbness and headaches.   Hematological: Negative for adenopathy. Does not bruise/bleed easily.   Psychiatric/Behavioral: Negative for dysphoric mood. The patient is not nervous/anxious.      I verified the ROS obtained by the MA.      Past Medical History:   Diagnosis Date   • Breast cancer (CMS/HCC)     Left   • Depression    • Diabetes mellitus (CMS/[facility])    • GERD (gastroesophageal reflux disease)    • H/O Left breast mass 2017   • H/O vitamin D deficiency    • History of snoring    • Hyperlipidemia    • Hypertension    • Panic attack    • Poor sleep pattern    • Sleep apnea     CPAP        Past Surgical History:   Procedure Laterality Date   • BREAST LUMPECTOMY Left 2017   • COLON SURGERY     • COLONOSCOPY     • KIDNEY SURGERY     • MOUTH SURGERY      TOOTH EXTRACTION   • TOOTH EXTRACTION         Cancer-related family history is negative for Breast cancer.  Social History     Tobacco Use   • Smoking status: Former Smoker     Packs/day: 1.00     Years: 25.00     Pack years: 25.00     Last attempt to quit: 2000     Years since quittin.6   • Smokeless tobacco: Never Used   • Tobacco comment: smoked 25 years, 1 PPD   Substance Use Topics   • Alcohol use: Yes     Comment: social       MEDICATIONS:    Current Outpatient Medications:   •  anastrozole (ARIMIDEX) 1 MG tablet, TAKE 1 TABLET BY MOUTH EVERY DAY, Disp: 30 tablet, Rfl: 8  •  aspirin 81 MG tablet, Take 81 mg by mouth Daily., Disp: , Rfl:   •  LÁZARO CONTOUR TEST test strip, TEST BLOOD SUGAR TWICE A DAY, Disp: , Rfl: 2  •  LÁZARO MICROLET LANCETS lancets, TEST BLOOD SUGAR TWICE A DAY, Disp: , Rfl: 3  •  buPROPion SR (WELLBUTRIN SR) 150 MG 12 hr tablet, Take 150 mg by mouth Daily., Disp: , Rfl: 0  •  cholecalciferol (VITAMIN D3) 1000 units tablet, Take 1,000 Units by mouth Daily., Disp: , Rfl:   •  metFORMIN (GLUCOPHAGE) 1000 MG tablet, Take 1,000 mg by mouth 2 (Two) Times a Day., Disp: , Rfl: 0  •  metoprolol succinate XL (TOPROL-XL) 25 MG 24 hr tablet, TAKE ONE TABLET BY MOUTH IN THE MORNING, Disp: , Rfl: 11  •  NOVOFINE 32G X 6 MM misc, USE TWO TIMES A DAY, Disp: , Rfl: 1  •  TRESIBA FLEXTOUCH 200 UNIT/ML solution pen-injector, INJECT 40 UNIT AT BEDTIME, Disp: , Rfl: 0  •  benzonatate (TESSALON) 100 MG capsule, Take 100 mg by mouth 3 (Three) Times a Day As Needed., Disp: , Rfl: 0    ALLERGIES:  Allergies   Allergen Reactions   • Sulfa Antibiotics          Objective   VITAL SIGNS:     Vitals:    19 1438   BP: 126/82   Pulse: 72   Resp: 16   Temp: 98.4 °F (36.9 °C)   TempSrc: Oral   SpO2: 98%   Weight: 82.6 kg (182 lb 1.6 oz)  "  Height: 166 cm (65.35\")  Comment: new ht   PainSc: 0-No pain     Body mass index is 29.98 kg/m².     Wt Readings from Last 3 Encounters:   08/22/19 82.6 kg (182 lb 1.6 oz)   02/20/19 80.7 kg (178 lb)   01/15/19 81.5 kg (179 lb 11.2 oz)       PHYSICAL EXAMINATION:  GENERAL:  The patient appears in good general condition, not in acute distress.  SKIN: Warm and dry. No skin rashes. No ecchymosis.  HEAD:  Normocephalic.  EYES:  No Jaundice. No Pallor. Pupils equal. EOMI.  NECK:  Supple with Good ROM. No Thyromegaly. No Masses.  LYMPHATICS:  No cervical or supraclavicular or axillary lymphadenopathy.  BREASTS: Right breast exam unremarkable.  Left breast exam revealed lumpectomy incision at 8 o'clock position.  Underlying scar tissue.  No suspicious masses.  CHEST: Normal respiratory effort. Lungs clear to auscultation.   CARDIAC:  No edema.  ABDOMEN:  Soft. No tenderness. No Hepatomegaly. No Splenomegaly. No masses.  EXTREMITIES:  No clubbing. No deforming arthritis in the hands.  NEUROLOGICAL:  No Focal neurological deficits.         DIAGNOSTIC DATA:     Results from last 7 days   Lab Units 08/22/19  1424   WBC 10*3/mm3 6.55   NEUTROS ABS 10*3/mm3 4.37   HEMOGLOBIN g/dL 12.9   HEMATOCRIT % 41.8   PLATELETS 10*3/mm3 196           EXAM, 07/23/2019:  1. Bilateral digital screening mammogram with CAD.  2. Bilateral digital screening breast tomosynthesis.     INDICATION:  Routine screening. HISTORY of left breast cancer status post lumpectomy  in 2017. No current problems.     TECHNIQUE:  Bilateral digital screening mammogram images were obtained including  digital breast tomosynthesis and CAD review.     COMPARISON:  *  Screening mammogram, 07/20/2018, 07/19/2017, 07/11/2017, 06/28/2017,  04/18/2016     FINDINGS:  There are scattered areas of fibroglandular density. Lumpectomy sequela  in the lower inner quadrant left breast with metallic clips in place.  Additional metallic clips in the left axilla. No new dominant " nodule or  mass in either breast. There are benign calcifications. Faint nodularity  in both breasts with benign features unchanged. No other significant  change when compared with prior images. No mammographic evidence of  malignancy. Recommend repeat screening mammogram in one year.     IMPRESSION:  Benign screening mammogram.     BI-RADS CATEGORY 2: Benign Findings.        Women over the age of 40 undergoing screening mammography are entered  into a reminder system with target due date for the next mammogram.     This report was finalized on 7/23/2019 1:34 PM by Dr. Charles Garcia MD.         Assessment/Plan   1.  Left breast cancer, invasive ductal carcinoma, grade 3, ER negative, MS positive at 20%, HER-2 frederic negative.  Ki-67 was 50-55% on the biopsy from 7/19/17.   · She underwent a lumpectomy and sentinel lymph node biopsy on 9/26/17.   · Pathology examination revealed a 1.6 cm tumor, poorly differentiated with a Ki-67 50-60%, ER negative, MS weakly positive at <10%. Her-2 is negative.   · Adjuvant chemotherapy with Cytoxan and Taxotere with Neulasta support was started on 11/6/17 and completed on 1/8/18.  · Patient received post lumpectomy radiation and was completed on 3/5/18.  · Patient started Arimidex on 5/2/18.    Patient is tolerating Arimidex except for occasional hot flashes.  They are occurring about once nightly.  She is tolerating them.    2.  Depression.  She is on Wellbutrin and follows with psychiatry.    3.  Osteopenia in the left femoral neck. She is on Vitamin D 3 2000 units daily.     4.  Decreased memory.  She had MRI of the brain on 2/20/2019 and no evidence of brain metastasis was seen.    PLAN:    1.  Continue Arimidex.  Plan is for total of 5 years if tolerated.  2.  Obtain vitamin D level today.  3.  We discussed performing monthly breast self-exam.  4.  Follow-up in 6 months with CBC CMP and vitamin D level.  The next bone density will be in May 2020.  Next mammogram will be in July  2020.         Luz Elena Herndon MD  08/22/19

## 2020-02-06 ENCOUNTER — OFFICE VISIT (OUTPATIENT)
Dept: ONCOLOGY | Facility: CLINIC | Age: 75
End: 2020-02-06

## 2020-02-06 ENCOUNTER — LAB (OUTPATIENT)
Dept: LAB | Facility: HOSPITAL | Age: 75
End: 2020-02-06

## 2020-02-06 VITALS
OXYGEN SATURATION: 99 % | WEIGHT: 180.6 LBS | RESPIRATION RATE: 16 BRPM | HEART RATE: 77 BPM | DIASTOLIC BLOOD PRESSURE: 81 MMHG | TEMPERATURE: 98 F | SYSTOLIC BLOOD PRESSURE: 139 MMHG | BODY MASS INDEX: 30.09 KG/M2 | HEIGHT: 65 IN

## 2020-02-06 DIAGNOSIS — M85.89 OSTEOPENIA OF MULTIPLE SITES: ICD-10-CM

## 2020-02-06 DIAGNOSIS — E55.9 VITAMIN D DEFICIENCY: ICD-10-CM

## 2020-02-06 DIAGNOSIS — Z17.1 MALIGNANT NEOPLASM OF LOWER-INNER QUADRANT OF LEFT BREAST IN FEMALE, ESTROGEN RECEPTOR NEGATIVE (HCC): ICD-10-CM

## 2020-02-06 DIAGNOSIS — C50.312 MALIGNANT NEOPLASM OF LOWER-INNER QUADRANT OF LEFT BREAST IN FEMALE, ESTROGEN RECEPTOR NEGATIVE (HCC): Primary | ICD-10-CM

## 2020-02-06 DIAGNOSIS — C50.312 MALIGNANT NEOPLASM OF LOWER-INNER QUADRANT OF LEFT BREAST IN FEMALE, ESTROGEN RECEPTOR NEGATIVE (HCC): ICD-10-CM

## 2020-02-06 DIAGNOSIS — R74.8 ELEVATED ALKALINE PHOSPHATASE LEVEL: ICD-10-CM

## 2020-02-06 DIAGNOSIS — Z12.31 ENCOUNTER FOR SCREENING MAMMOGRAM FOR BREAST CANCER: ICD-10-CM

## 2020-02-06 DIAGNOSIS — Z17.1 MALIGNANT NEOPLASM OF LOWER-INNER QUADRANT OF LEFT BREAST IN FEMALE, ESTROGEN RECEPTOR NEGATIVE (HCC): Primary | ICD-10-CM

## 2020-02-06 LAB
25(OH)D3 SERPL-MCNC: 27.3 NG/ML (ref 30–100)
ALBUMIN SERPL-MCNC: 4.2 G/DL (ref 3.5–5.2)
ALBUMIN/GLOB SERPL: 1.3 G/DL (ref 1.1–2.4)
ALP SERPL-CCNC: 138 U/L (ref 38–116)
ALT SERPL W P-5'-P-CCNC: 15 U/L (ref 0–33)
ANION GAP SERPL CALCULATED.3IONS-SCNC: 12.8 MMOL/L (ref 5–15)
AST SERPL-CCNC: 16 U/L (ref 0–32)
BASOPHILS # BLD AUTO: 0.06 10*3/MM3 (ref 0–0.2)
BASOPHILS NFR BLD AUTO: 0.9 % (ref 0–1.5)
BILIRUB SERPL-MCNC: 0.2 MG/DL (ref 0.2–1.2)
BUN BLD-MCNC: 18 MG/DL (ref 6–20)
BUN/CREAT SERPL: 16.4 (ref 7.3–30)
CALCIUM SPEC-SCNC: 9.7 MG/DL (ref 8.5–10.2)
CHLORIDE SERPL-SCNC: 98 MMOL/L (ref 98–107)
CO2 SERPL-SCNC: 27.2 MMOL/L (ref 22–29)
CREAT BLD-MCNC: 1.1 MG/DL (ref 0.6–1.1)
DEPRECATED RDW RBC AUTO: 44.2 FL (ref 37–54)
EOSINOPHIL # BLD AUTO: 0.16 10*3/MM3 (ref 0–0.4)
EOSINOPHIL NFR BLD AUTO: 2.3 % (ref 0.3–6.2)
ERYTHROCYTE [DISTWIDTH] IN BLOOD BY AUTOMATED COUNT: 13.7 % (ref 12.3–15.4)
GFR SERPL CREATININE-BSD FRML MDRD: 58 ML/MIN/1.73
GLOBULIN UR ELPH-MCNC: 3.2 GM/DL (ref 1.8–3.5)
GLUCOSE BLD-MCNC: 232 MG/DL (ref 74–124)
HCT VFR BLD AUTO: 43.7 % (ref 34–46.6)
HGB BLD-MCNC: 13.4 G/DL (ref 12–15.9)
IMM GRANULOCYTES # BLD AUTO: 0.02 10*3/MM3 (ref 0–0.05)
IMM GRANULOCYTES NFR BLD AUTO: 0.3 % (ref 0–0.5)
LYMPHOCYTES # BLD AUTO: 1.06 10*3/MM3 (ref 0.7–3.1)
LYMPHOCYTES NFR BLD AUTO: 15.4 % (ref 19.6–45.3)
MCH RBC QN AUTO: 27 PG (ref 26.6–33)
MCHC RBC AUTO-ENTMCNC: 30.7 G/DL (ref 31.5–35.7)
MCV RBC AUTO: 87.9 FL (ref 79–97)
MONOCYTES # BLD AUTO: 0.65 10*3/MM3 (ref 0.1–0.9)
MONOCYTES NFR BLD AUTO: 9.5 % (ref 5–12)
NEUTROPHILS # BLD AUTO: 4.92 10*3/MM3 (ref 1.7–7)
NEUTROPHILS NFR BLD AUTO: 71.6 % (ref 42.7–76)
NRBC BLD AUTO-RTO: 0 /100 WBC (ref 0–0.2)
PLATELET # BLD AUTO: 235 10*3/MM3 (ref 140–450)
PMV BLD AUTO: 9.3 FL (ref 6–12)
POTASSIUM BLD-SCNC: 4.4 MMOL/L (ref 3.5–4.7)
PROT SERPL-MCNC: 7.4 G/DL (ref 6.3–8)
RBC # BLD AUTO: 4.97 10*6/MM3 (ref 3.77–5.28)
SODIUM BLD-SCNC: 138 MMOL/L (ref 134–145)
WBC NRBC COR # BLD: 6.87 10*3/MM3 (ref 3.4–10.8)

## 2020-02-06 PROCEDURE — 36415 COLL VENOUS BLD VENIPUNCTURE: CPT

## 2020-02-06 PROCEDURE — 85025 COMPLETE CBC W/AUTO DIFF WBC: CPT

## 2020-02-06 PROCEDURE — 82306 VITAMIN D 25 HYDROXY: CPT | Performed by: INTERNAL MEDICINE

## 2020-02-06 PROCEDURE — 80053 COMPREHEN METABOLIC PANEL: CPT

## 2020-02-06 PROCEDURE — 99214 OFFICE O/P EST MOD 30 MIN: CPT | Performed by: INTERNAL MEDICINE

## 2020-02-06 RX ORDER — BUSPIRONE HYDROCHLORIDE 7.5 MG/1
TABLET ORAL
Status: ON HOLD | COMMUNITY
Start: 2020-01-30 | End: 2021-08-04

## 2020-02-06 RX ORDER — DESVENLAFAXINE SUCCINATE 50 MG/1
TABLET, EXTENDED RELEASE ORAL
Status: ON HOLD | COMMUNITY
Start: 2020-01-30 | End: 2021-08-04

## 2020-02-06 NOTE — PROGRESS NOTES
Subjective     CHIEF COMPLAINT:      Chief Complaint   Patient presents with   • Follow-up     no concerns       HISTORY OF PRESENT ILLNESS:     Tyrone Rand is a 75 y.o. female patient who returns today for follow up on her left breast cancer.  She is accompanied by her .  She is taking Arimidex 1 mg daily.  She is tolerating it without problems with hot flashes.    Patient reports relapse of her depression.  She is on BuSpar and Pristiq.        REVIEW OF SYSTEMS:  Review of Systems   Constitutional: Negative for chills, fever and unexpected weight change.   HENT: Negative for mouth sores, nosebleeds, sore throat and voice change.    Eyes: Negative for visual disturbance.   Respiratory: Negative for cough and shortness of breath.    Cardiovascular: Negative for chest pain and leg swelling.   Gastrointestinal: Negative for abdominal pain, blood in stool, constipation, diarrhea, nausea and vomiting.   Genitourinary: Negative for dysuria, frequency and hematuria.   Musculoskeletal: Negative for arthralgias, back pain and joint swelling.   Skin: Negative for rash.   Neurological: Negative for dizziness, numbness and headaches.   Hematological: Negative for adenopathy. Does not bruise/bleed easily.   Psychiatric/Behavioral: Positive for dysphoric mood. The patient is not nervous/anxious.      I verified the ROS obtained by the MA.      Past Medical History:   Diagnosis Date   • Breast cancer (CMS/HCC)     Left   • Depression    • Diabetes mellitus (CMS/HCC)    • GERD (gastroesophageal reflux disease)    • H/O Left breast mass 2017   • H/O vitamin D deficiency    • History of snoring    • Hyperlipidemia    • Hypertension    • Panic attack    • Poor sleep pattern    • Sleep apnea     CPAP       Past Surgical History:   Procedure Laterality Date   • BREAST LUMPECTOMY Left 09/2017   • COLON SURGERY     • COLONOSCOPY     • KIDNEY SURGERY  2011   • MOUTH SURGERY      TOOTH EXTRACTION   • TOOTH EXTRACTION    "      Cancer-related family history is negative for Breast cancer.  Social History     Tobacco Use   • Smoking status: Former Smoker     Packs/day: 1.00     Years: 25.00     Pack years: 25.00     Last attempt to quit: 2000     Years since quittin.1   • Smokeless tobacco: Never Used   • Tobacco comment: smoked 25 years, 1 PPD   Substance Use Topics   • Alcohol use: Yes     Comment: social       MEDICATIONS:    Current Outpatient Medications:   •  anastrozole (ARIMIDEX) 1 MG tablet, TAKE 1 TABLET BY MOUTH EVERY DAY, Disp: 30 tablet, Rfl: 8  •  aspirin 81 MG tablet, Take 81 mg by mouth Daily., Disp: , Rfl:   •  LÁZARO CONTOUR TEST test strip, TEST BLOOD SUGAR TWICE A DAY, Disp: , Rfl: 2  •  LÁZARO MICROLET LANCETS lancets, TEST BLOOD SUGAR TWICE A DAY, Disp: , Rfl: 3  •  busPIRone (BUSPAR) 7.5 MG tablet, , Disp: , Rfl:   •  cholecalciferol (VITAMIN D3) 1000 units tablet, Take 1,000 Units by mouth Daily., Disp: , Rfl:   •  desvenlafaxine (PRISTIQ) 50 MG 24 hr tablet, , Disp: , Rfl:   •  metFORMIN (GLUCOPHAGE) 1000 MG tablet, Take 1,000 mg by mouth 2 (Two) Times a Day., Disp: , Rfl: 0  •  metoprolol succinate XL (TOPROL-XL) 25 MG 24 hr tablet, TAKE ONE TABLET BY MOUTH IN THE MORNING, Disp: , Rfl: 11  •  NOVOFINE 32G X 6 MM misc, USE TWO TIMES A DAY, Disp: , Rfl: 1  •  TRESIBA FLEXTOUCH 200 UNIT/ML solution pen-injector, INJECT 40 UNIT AT BEDTIME, Disp: , Rfl: 0    ALLERGIES:  Allergies   Allergen Reactions   • Sulfa Antibiotics Itching         Objective   VITAL SIGNS:     Vitals:    20 1531   BP: 139/81   Pulse: 77   Resp: 16   Temp: 98 °F (36.7 °C)   TempSrc: Oral   SpO2: 99%   Weight: 81.9 kg (180 lb 9.6 oz)   Height: 166 cm (65.35\")   PainSc: 0-No pain     Body mass index is 29.73 kg/m².     Wt Readings from Last 3 Encounters:   20 81.9 kg (180 lb 9.6 oz)   19 82.6 kg (182 lb 1.6 oz)   19 80.7 kg (178 lb)       PHYSICAL EXAMINATION:  GENERAL:  The patient appears in good general condition, " not in acute distress.  SKIN: Warm and dry. No skin rashes. No ecchymosis.  HEAD:  Normocephalic.  EYES:  No Jaundice. No Pallor. Pupils equal. EOMI.  NECK:  Supple with Good ROM. No Thyromegaly. No Masses.  LYMPHATICS:  No cervical or supraclavicular or axillary lymphadenopathy.  BREASTS: Right breast exam unremarkable.  Left breast exam revealed surgical changes at 7 o'clock position with underlying scar tissue.  No suspicious findings.  CHEST: Normal respiratory effort. Lungs clear to auscultation.   CARDIAC:  Normal S1 & S2. No murmur. No edema.  ABDOMEN:  Soft. No tenderness. No Hepatomegaly. No Splenomegaly. No masses.  NEUROLOGICAL:  No Focal neurological deficits.         DIAGNOSTIC DATA:     Results from last 7 days   Lab Units 02/06/20  1511   WBC 10*3/mm3 6.87   NEUTROS ABS 10*3/mm3 4.92   HEMOGLOBIN g/dL 13.4   HEMATOCRIT % 43.7   PLATELETS 10*3/mm3 235     Results from last 7 days   Lab Units 02/06/20  1511   SODIUM mmol/L 138   POTASSIUM mmol/L 4.4   CHLORIDE mmol/L 98   CO2 mmol/L 27.2   BUN mg/dL 18   CREATININE mg/dL 1.10   CALCIUM mg/dL 9.7   ALBUMIN g/dL 4.20   BILIRUBIN mg/dL 0.2   ALK PHOS U/L 138*   ALT (SGPT) U/L 15   AST (SGOT) U/L 16   GLUCOSE mg/dL 232*     Component      Latest Ref Rng & Units 4/11/2017 6/19/2018 8/22/2019 2/6/2020   25 Hydroxy, Vitamin D      30.0 - 100.0 ng/ml 49.7 39.4 37.8 27.3 (L)     Component      Latest Ref Rng & Units 9/11/2018 1/15/2019 8/22/2019 2/6/2020   Alkaline Phosphatase      38 - 116 U/L 104 119 (H) 128 (H) 138 (H)       Assessment/Plan   1.  Left breast cancer, invasive ductal carcinoma, grade 3, ER negative, OR positive at 20%, HER-2 frederic negative.  Ki-67 was 50-55% on the biopsy from 7/19/17.   · She underwent a lumpectomy and sentinel lymph node biopsy on 9/26/17.   · Pathology examination revealed a 1.6 cm tumor, poorly differentiated with a Ki-67 50-60%, ER negative, OR weakly positive at <10%. Her-2 is negative.   · Adjuvant chemotherapy with  Cytoxan and Taxotere with Neulasta support was started on 11/6/17 and completed on 1/8/18.  · Patient received post lumpectomy radiation and was completed on 3/5/18.  · Patient started Arimidex on 5/2/18.  · Patient is tolerating Arimidex well.  No clinical signs of recurrence.  · Hot flashes have improved.    2.  Depression.  She is following with psychiatry.  She is on BuSpar and Pristiq.    3.  Osteopenia in the left femoral neck. She is on Vitamin D 3 1000 units daily.  Vitamin D level has decreased on today's lab    4.  Elevated alkaline phosphatase level.  There is no recent trauma.  Liver enzymes are normal.    PLAN:    1.  Continue Arimidex 1 mg daily.  2.  Increase vitamin D to 2000 units daily.  3.  I would obtain a bone scan to evaluate for bone metastasis.  We will contact patient with the result.   4.  We will schedule the patient for bilateral mammograms and bone density in late July 2020.    We will see the patient follow-up in August 2020 with CBC CMP vitamin D level.      Luz Elena Herndon MD  02/06/20

## 2020-02-07 ENCOUNTER — TELEPHONE (OUTPATIENT)
Dept: ONCOLOGY | Facility: CLINIC | Age: 75
End: 2020-02-07

## 2020-02-07 NOTE — TELEPHONE ENCOUNTER
Called pt and informed her of Dr. Herndon's message. She v/u. Message sent to scheduling.     ----- Message from Luz Elena Herndon MD sent at 2/6/2020  6:02 PM EST -----  Please inform the patient that a lab test was mildly elevated (alkaline phosphatase).  I would recommend obtaining a bone scan in 1 week.  I placed the order. I tried to call the patient but there was no answer    Thank you

## 2020-02-07 NOTE — TELEPHONE ENCOUNTER
----- Message from Christine Vazquez RN sent at 2/7/2020  8:18 AM EST -----  Per Dr. Herndon, please schedule pt for a bone scan in 1 week. Pt would like to go to [facility] if that is an option.

## 2020-02-14 ENCOUNTER — APPOINTMENT (OUTPATIENT)
Dept: NUCLEAR MEDICINE | Facility: HOSPITAL | Age: 75
End: 2020-02-14

## 2020-02-18 ENCOUNTER — TELEPHONE (OUTPATIENT)
Dept: ONCOLOGY | Facility: CLINIC | Age: 75
End: 2020-02-18

## 2020-02-18 ENCOUNTER — HOSPITAL ENCOUNTER (OUTPATIENT)
Dept: NUCLEAR MEDICINE | Facility: HOSPITAL | Age: 75
Discharge: HOME | End: 2020-02-18

## 2020-02-18 DIAGNOSIS — R74.8 ELEVATED ALKALINE PHOSPHATASE LEVEL: ICD-10-CM

## 2020-02-18 DIAGNOSIS — Z17.1 MALIGNANT NEOPLASM OF LOWER-INNER QUADRANT OF LEFT BREAST IN FEMALE, ESTROGEN RECEPTOR NEGATIVE (HCC): ICD-10-CM

## 2020-02-18 DIAGNOSIS — C50.312 MALIGNANT NEOPLASM OF LOWER-INNER QUADRANT OF LEFT BREAST IN FEMALE, ESTROGEN RECEPTOR NEGATIVE (HCC): ICD-10-CM

## 2020-02-18 PROCEDURE — 0 TECHNETIUM MEDRONATE KIT: Performed by: INTERNAL MEDICINE

## 2020-02-18 PROCEDURE — A9503 TC99M MEDRONATE: HCPCS | Performed by: INTERNAL MEDICINE

## 2020-02-18 PROCEDURE — 78306 BONE IMAGING WHOLE BODY: CPT

## 2020-02-18 RX ORDER — TC 99M MEDRONATE 20 MG/10ML
22.7 INJECTION, POWDER, LYOPHILIZED, FOR SOLUTION INTRAVENOUS
Status: COMPLETED | OUTPATIENT
Start: 2020-02-18 | End: 2020-02-18

## 2020-02-18 RX ADMIN — Medication 22.7 MILLICURIE: at 10:09

## 2020-02-18 NOTE — TELEPHONE ENCOUNTER
I contacted the patient about the normal bone scan.  Pt v/u.    ----- Message from Luz Elena Herndon MD sent at 2/18/2020  3:21 PM EST -----  Please inform the patient that the bone scan was normal.     Thank you

## 2020-03-12 RX ORDER — ANASTROZOLE 1 MG/1
TABLET ORAL
Qty: 90 TABLET | Refills: 1 | Status: SHIPPED | OUTPATIENT
Start: 2020-03-12 | End: 2020-10-14 | Stop reason: SDUPTHER

## 2020-06-19 NOTE — TELEPHONE ENCOUNTER
See Scanned Documents.   Patient calling because palm of hands are discolored and peeling. Instructed was a side effect of chemo and to keep hands moist. Verbalized understanding.

## 2020-06-24 ENCOUNTER — TRANSCRIBE ORDERS (OUTPATIENT)
Dept: ADMINISTRATIVE | Facility: HOSPITAL | Age: 75
End: 2020-06-24

## 2020-06-24 ENCOUNTER — LAB (OUTPATIENT)
Dept: LAB | Facility: HOSPITAL | Age: 75
End: 2020-06-24

## 2020-06-24 DIAGNOSIS — E55.9 AVITAMINOSIS D: ICD-10-CM

## 2020-06-24 DIAGNOSIS — I12.9 HYPERTENSIVE NEPHROPATHY: ICD-10-CM

## 2020-06-24 DIAGNOSIS — N18.30 CHRONIC KIDNEY DISEASE, STAGE III (MODERATE) (HCC): Primary | ICD-10-CM

## 2020-06-24 DIAGNOSIS — N18.30 CHRONIC KIDNEY DISEASE, STAGE III (MODERATE) (HCC): ICD-10-CM

## 2020-06-24 LAB
ANION GAP SERPL CALCULATED.3IONS-SCNC: 12.6 MMOL/L (ref 5–15)
BUN BLD-MCNC: 17 MG/DL (ref 8–23)
BUN/CREAT SERPL: 15.3 (ref 7–25)
CALCIUM SPEC-SCNC: 9.6 MG/DL (ref 8.6–10.5)
CHLORIDE SERPL-SCNC: 97 MMOL/L (ref 98–107)
CO2 SERPL-SCNC: 24.4 MMOL/L (ref 22–29)
CREAT BLD-MCNC: 1.11 MG/DL (ref 0.57–1)
GFR SERPL CREATININE-BSD FRML MDRD: 58 ML/MIN/1.73
GLUCOSE BLD-MCNC: 237 MG/DL (ref 65–99)
POTASSIUM BLD-SCNC: 4.3 MMOL/L (ref 3.5–5.2)
SODIUM BLD-SCNC: 134 MMOL/L (ref 136–145)

## 2020-06-24 PROCEDURE — 82652 VIT D 1 25-DIHYDROXY: CPT

## 2020-06-24 PROCEDURE — 36415 COLL VENOUS BLD VENIPUNCTURE: CPT

## 2020-06-24 PROCEDURE — 80048 BASIC METABOLIC PNL TOTAL CA: CPT

## 2020-06-26 LAB — 1,25(OH)2D3 SERPL-MCNC: 64.6 PG/ML (ref 19.9–79.3)

## 2020-07-10 ENCOUNTER — APPOINTMENT (OUTPATIENT)
Dept: GENERAL RADIOLOGY | Facility: HOSPITAL | Age: 75
End: 2020-07-10

## 2020-07-10 ENCOUNTER — HOSPITAL ENCOUNTER (EMERGENCY)
Facility: HOSPITAL | Age: 75
Discharge: HOME | End: 2020-07-10
Attending: EMERGENCY MEDICINE | Admitting: EMERGENCY MEDICINE

## 2020-07-10 VITALS
TEMPERATURE: 97.6 F | HEART RATE: 70 BPM | RESPIRATION RATE: 16 BRPM | OXYGEN SATURATION: 97 % | WEIGHT: 176 LBS | HEIGHT: 66 IN | SYSTOLIC BLOOD PRESSURE: 143 MMHG | DIASTOLIC BLOOD PRESSURE: 62 MMHG | BODY MASS INDEX: 28.28 KG/M2

## 2020-07-10 DIAGNOSIS — M25.561 ACUTE PAIN OF RIGHT KNEE: ICD-10-CM

## 2020-07-10 DIAGNOSIS — M76.891 TENDONITIS OF KNEE, RIGHT: Primary | ICD-10-CM

## 2020-07-10 DIAGNOSIS — M76.51 PATELLAR TENDONITIS OF RIGHT KNEE: ICD-10-CM

## 2020-07-10 PROCEDURE — 99284 EMERGENCY DEPT VISIT MOD MDM: CPT | Performed by: EMERGENCY MEDICINE

## 2020-07-10 PROCEDURE — 99283 EMERGENCY DEPT VISIT LOW MDM: CPT

## 2020-07-10 PROCEDURE — 73562 X-RAY EXAM OF KNEE 3: CPT

## 2020-07-10 RX ORDER — MELOXICAM 7.5 MG/1
7.5 TABLET ORAL DAILY
Status: DISCONTINUED | OUTPATIENT
Start: 2020-07-10 | End: 2020-07-10 | Stop reason: HOSPADM

## 2020-07-10 RX ORDER — NABUMETONE 500 MG/1
500 TABLET, FILM COATED ORAL 2 TIMES DAILY PRN
Qty: 60 TABLET | Refills: 0 | Status: SHIPPED | OUTPATIENT
Start: 2020-07-10 | End: 2020-08-09

## 2020-07-10 RX ADMIN — MELOXICAM 7.5 MG: 7.5 TABLET ORAL at 19:09

## 2020-07-10 NOTE — DISCHARGE INSTRUCTIONS
Medication as directed.  Ice, rest, elevation.  Wear knee brace or knee immobilizer as discussed.  Follow-up with orthopedics as above.  Return to ED for medical emergencies.    Return to the emergency department with worsening symptoms, uncontrolled pain, inability to tolerate oral liquids, fever greater than 101°F not controlled by Tylenol or as needed with emergent concerns.

## 2020-07-10 NOTE — ED PROVIDER NOTES
Subjective   History of Present Illness  History of Present Illness    Chief complaint: Knee pain    Location: Right knee    Quality/Severity: Burning, stabbing/ 7 out of 10    Timing/Duration: Constant/started on Sunday evening, 5 days ago    Modifying Factors: Movement    Associated Symptoms: None    Narrative: Patient is a 75-year-old -American female who presented to the emergency department today complaining of knee pain for 5 days.  Patient states pain started on Sunday evening.  Patient denies any form of trauma-no falls, twisting injury, blunt force.  Patient denies any history of knee problems such as osteoarthritis. Patient is on an oral chemotherapy drug called anastrozole. denies fever chills, denies nausea vomiting diarrhea.  Patient has an appointment with her primary care provider on Monday.  Patient states it hurts to walk. No history of gout.    Review of Systems   All other systems reviewed and are negative.      Past Medical History:   Diagnosis Date   • Breast cancer (CMS/HCC)     Left   • Depression    • Diabetes mellitus (CMS/[facility])    • GERD (gastroesophageal reflux disease)    • H/O Left breast mass 2017   • H/O vitamin D deficiency    • History of snoring    • Hyperlipidemia    • Hypertension    • Panic attack    • Poor sleep pattern    • Sleep apnea     CPAP       Allergies   Allergen Reactions   • Sulfa Antibiotics Itching       Past Surgical History:   Procedure Laterality Date   • BREAST LUMPECTOMY Left 09/2017   • COLON SURGERY     • COLONOSCOPY     • KIDNEY SURGERY  2011   • MOUTH SURGERY      TOOTH EXTRACTION   • TOOTH EXTRACTION         Family History   Problem Relation Age of Onset   • Stroke Mother    • Heart disease Mother    • Hypertension Mother    • Breast cancer Neg Hx        Social History     Socioeconomic History   • Marital status:      Spouse name: Pascual   • Number of children: 1   • Years of education: GED   • Highest education level: Not on file    Occupational History     Employer: RETIRED   Tobacco Use   • Smoking status: Former Smoker     Packs/day: 1.00     Years: 25.00     Pack years: 25.00     Last attempt to quit: 2000     Years since quittin.5   • Smokeless tobacco: Never Used   • Tobacco comment: smoked 25 years, 1 PPD   Substance and Sexual Activity   • Alcohol use: Not Currently     Comment: social   • Drug use: No   • Sexual activity: Defer           Objective   Physical Exam   Musculoskeletal:        Right knee: She exhibits swelling. She exhibits no deformity, no laceration, no LCL laxity and no MCL laxity. Tenderness found.     Vitals:    07/10/20 1536   BP: 130/84   Pulse: 88   Resp: 16   Temp: 97.6 °F (36.4 °C)   SpO2: 99%     GENERAL: 76 YO Black female, a/o x 4, NAD  SKIN: Warm pink and dry   HEENT:  PERRLA, EOM intact, conjunctiva normal, sclera clear  NECK: supple, no JVD  LUNGS: Clear to auscultation bilaterally without wheezes, rales or rhonchi.  No accessory muscle use and no nasal flaring.  CARDIAC:  Regular rate and rhythm, S1-S2.  No murmurs, rubs or gallops.  No peripheral edema.  Equal pulses bilaterally.  ABDOMEN: Soft, nontender, nondistended.  No guarding or rebound tenderness.  Normal bowel sounds.  MUSCULOSKELETAL: Moves all extremities well.  No deformity. Right knee mild pain with active and passive flexion and extension, no lateral or medial laxity, Lachman's test negative, anterior drawer test negative, proximal swelling, tenderness with manipulation of patella and palpation of patellar tendon  NEURO: Cranial nerves II through XII grossly intact.  No gross focal deficits.  Alert.  Normal speech and motor.  PSYCH: Normal mood and affect      Procedures           ED Course  ED Course as of Jul 10 1858   Fri Jul 10, 2020   1757 Chemotherapy drug anastrozole does have arthralgia and arthritis listed as adverse reactions.    [ND]    Discussed patient with Dr. Bergeron, accompanied Dr. Bergeron to examine and discuss with  patient. Dr. Bergeron feels as thought this is patellar tendonitis. We will apply knee immobilizer, give walker, and prescription for Nabumetone, follow up with orthopedics.       [ND]   1857     Discharge plan and instructions were discussed with the patient who verbalized understanding and is in agreement with the plan, all questions were answered at this time.  Patient is aware of signs symptoms that would require immediate return to the emergency room.  Patient understands importance of following up with primary care provider for further evaluation and worsening concerns.    Patient remained afebrile, nontoxic-appearing, no acute respiratory distress throughout entire emergency room stay. Patient was discharged in improved stable condition.    [ND]      ED Course User Index  [ND] Naida Thomas PA      Xr Knee 3 View Right    Result Date: 7/10/2020  Narrative: CR Knee 3 Vws RT INDICATION: Right knee pain beginning prior to arrival COMPARISON: None available. FINDINGS: 3 view(s) of the right knee.  No fracture, dislocation, or effusion. No bone erosion or destruction.  No foreign body.     Impression: Negative right knee. Signer Name: Tyrone Milton MD  Signed: 7/10/2020 6:02 PM  Workstation Name: [id number]  [facility]    Reviewed Xr Knee 3 View Right. Independently viewed by me. Interpreted by radiologist. Discussed with Dr. Bergeron.                                         MDM  Number of Diagnoses or Management Options  Tendonitis of knee, right:      Amount and/or Complexity of Data Reviewed  Tests in the radiology section of CPT®: reviewed and ordered    Risk of Complications, Morbidity, and/or Mortality  Presenting problems: moderate  Diagnostic procedures: moderate  Management options: moderate  General comments: My diagnosis for lower extremity pain and injury includes but is not limited to hip fracture, femur fracture, hip dislocation, hip contusion, hip sprain, hip strain, pelvic  fracture, knee sprain, patella dislocation, knee dislocation, internal derangement of knee, fractures of the femur, tibia, fibula, ankle, foot and digits, ankle sprain, ankle dislocation, Lisfranc fracture, fracture dislocations of the digits, pulmonary embolism, claudication, peripheral vascular disease, gout, osteoarthritis, rheumatoid arthritis, bursitis, septic joint, poly-rheumatica, polyarthralgia and other inflammatory or infectious disease processes.      Patient Progress  Patient progress: stable      Final diagnoses:   Tendonitis of knee, right   Acute pain of right knee   Patellar tendonitis of right knee            Naida Thomas PA  07/10/20 0085       Naida Thomas PA  07/10/20 [id number]

## 2020-07-17 ENCOUNTER — OFFICE VISIT (OUTPATIENT)
Dept: ORTHOPEDIC SURGERY | Facility: CLINIC | Age: 75
End: 2020-07-17

## 2020-07-17 VITALS
HEIGHT: 66 IN | HEART RATE: 68 BPM | DIASTOLIC BLOOD PRESSURE: 77 MMHG | SYSTOLIC BLOOD PRESSURE: 122 MMHG | WEIGHT: 176 LBS | BODY MASS INDEX: 28.28 KG/M2

## 2020-07-17 DIAGNOSIS — M94.20 CHONDROMALACIA: Primary | ICD-10-CM

## 2020-07-17 PROCEDURE — 20610 DRAIN/INJ JOINT/BURSA W/O US: CPT | Performed by: NURSE PRACTITIONER

## 2020-07-17 PROCEDURE — 99213 OFFICE O/P EST LOW 20 MIN: CPT | Performed by: NURSE PRACTITIONER

## 2020-07-17 RX ORDER — LIDOCAINE HYDROCHLORIDE 10 MG/ML
8 INJECTION, SOLUTION EPIDURAL; INFILTRATION; INTRACAUDAL; PERINEURAL
Status: COMPLETED | OUTPATIENT
Start: 2020-07-17 | End: 2020-07-17

## 2020-07-17 RX ORDER — BETAMETHASONE SODIUM PHOSPHATE AND BETAMETHASONE ACETATE 3; 3 MG/ML; MG/ML
6 INJECTION, SUSPENSION INTRA-ARTICULAR; INTRALESIONAL; INTRAMUSCULAR; SOFT TISSUE
Status: COMPLETED | OUTPATIENT
Start: 2020-07-17 | End: 2020-07-17

## 2020-07-17 RX ADMIN — LIDOCAINE HYDROCHLORIDE 8 ML: 10 INJECTION, SOLUTION EPIDURAL; INFILTRATION; INTRACAUDAL; PERINEURAL at 15:15

## 2020-07-17 RX ADMIN — BETAMETHASONE SODIUM PHOSPHATE AND BETAMETHASONE ACETATE 6 MG: 3; 3 INJECTION, SUSPENSION INTRA-ARTICULAR; INTRALESIONAL; INTRAMUSCULAR; SOFT TISSUE at 15:15

## 2020-07-17 NOTE — PROGRESS NOTES
Subjective:     Patient ID: Tyrone Rand is a 75 y.o. female.    Chief Complaint:  Right knee pain  History of Present Illness  Tyrone Rand 75-year-old female presents to clinic for evaluation of her right knee.  On 2020 she was seated in a chair reclining she got up began experiencing pain at the right knee.  Maximal tenderness present at the medial joint line increased pain noted with all weightbearing activities, planting and twisting that is positional.  Pain did decrease however again on 7/10/2020 when she went to get up began experiencing pain again.  She is experiencing swelling denies previous injury to the right knee in the past.  She presented to [facility] ER x-rays were completed she was started on Relafen and encouraged to follow-up in our office.  Has been taking medication with minimal symptom relief.  Rates discomfort at worst a 5-6 out of 10 describes as mainly aching in nature denies of the knee is locking, catching or giving way but is fearful of putting pressure on the knee after standing secondary to pain.  Denies presence of numbness or tingling radiating down the right lower extremity.  Pain is not radiating to her groin or to the lateral aspect of her hip.  Has not tried any recent denies previous corticosteroid injections, Visco supplementation injections to the knee.  She has used ice which is helped with swelling.  Denies other concerns present time.     Social History     Occupational History     Employer: RETIRED   Tobacco Use   • Smoking status: Former Smoker     Packs/day: 1.00     Years: 25.00     Pack years: 25.00     Last attempt to quit: 2000     Years since quittin.5   • Smokeless tobacco: Never Used   • Tobacco comment: smoked 25 years, 1 PPD   Substance and Sexual Activity   • Alcohol use: Not Currently     Comment: social   • Drug use: No   • Sexual activity: Defer      Past Medical History:   Diagnosis Date   • Breast cancer (CMS/HCC)     Left    • Depression    • Diabetes mellitus (CMS/HCC)    • GERD (gastroesophageal reflux disease)    • H/O Left breast mass 2017   • H/O vitamin D deficiency    • History of snoring    • Hyperlipidemia    • Hypertension    • Panic attack    • Poor sleep pattern    • Sleep apnea     CPAP     Past Surgical History:   Procedure Laterality Date   • BREAST LUMPECTOMY Left 09/2017   • COLON SURGERY     • COLONOSCOPY     • KIDNEY SURGERY  2011   • MOUTH SURGERY      TOOTH EXTRACTION   • TOOTH EXTRACTION         Family History   Problem Relation Age of Onset   • Stroke Mother    • Heart disease Mother    • Hypertension Mother    • Breast cancer Neg Hx          Review of Systems   Constitutional: Negative for chills, diaphoresis, fever and unexpected weight change.   HENT: Negative for hearing loss, nosebleeds, sore throat and tinnitus.    Eyes: Negative for pain and visual disturbance.   Respiratory: Positive for shortness of breath. Negative for cough and wheezing.    Cardiovascular: Negative for chest pain and palpitations.   Gastrointestinal: Negative for abdominal pain, diarrhea, nausea and vomiting.   Endocrine: Negative for cold intolerance, heat intolerance and polydipsia.   Genitourinary: Negative for difficulty urinating, dysuria and hematuria.   Musculoskeletal: Negative for arthralgias, joint swelling and myalgias.   Skin: Negative for rash and wound.   Allergic/Immunologic: Negative for environmental allergies.   Neurological: Negative for dizziness, syncope and numbness.   Hematological: Does not bruise/bleed easily.   Psychiatric/Behavioral: Negative for dysphoric mood and sleep disturbance. The patient is nervous/anxious.            Objective:  Physical Exam    Vital signs reviewed.   General: No acute distress.  Eyes: conjunctiva clear; pupils equally round and reactive  ENT: external ears and nose atraumatic; oropharynx clear  CV: no peripheral edema  Resp: normal respiratory effort  Skin: no rashes or wounds;  "normal turgor  Psych: mood and affect appropriate; recent and remote memory intact    Vitals:    07/17/20 1430   BP: 122/77   BP Location: Right arm   Pulse: 68   Weight: 79.8 kg (176 lb)   Height: 167.6 cm (66\")         07/17/20  1430   Weight: 79.8 kg (176 lb)     Body mass index is 28.41 kg/m².      Right Knee Exam     Tenderness   The patient is experiencing tenderness in the medial joint line.    Range of Motion   Extension: 0   Flexion: 130     Tests   Kath:  Medial - negative Lateral - negative  Varus: negative Valgus: negative  Lachman:  Anterior - 1+    Posterior - negative  Drawer:  Anterior - negative    Posterior - negative  Patellar apprehension: negative    Other   Erythema: absent  Sensation: normal  Pulse: present  Swelling: mild  Effusion: no effusion present    Comments:  Positive crepitus throughout arc of motion  Negative active patellar compression test  Negative logroll exam  Negative Stinchfield exam                 Imaging:  Xr Knee 3 View Right    Result Date: 7/10/2020  Negative right knee. Signer Name: Tyrone Milton MD  Signed: 7/10/2020 6:02 PM  Workstation Name: [id number]  [facility]  Independently reviewed three-view x-ray imaging right knee no images available for comparison medial compartment narrowing no significant osteophyte formation noted on x-ray imaging which was nonweightbearing right knee     Assessment:        1. Chondromalacia           Plan:  1.  Discussed plan of care with patient.  Wishes to proceed with corticosteroid injection to her right knee.  I do recommend application of ice and injection site as well as ice medial joint line.  She was wrapped with Ace bandage plan to see her back in clinic in 6 weeks if she is not improving.  She verbalized understanding of information agrees with plan of care.  Denies other concerns present this time.  Large Joint Arthrocentesis: R knee  Date/Time: 7/17/2020 3:15 PM  Consent given by: patient  Site " marked: site marked  Timeout: Immediately prior to procedure a time out was called to verify the correct patient, procedure, equipment, support staff and site/side marked as required   Supporting Documentation  Indications: pain   Procedure Details  Location: knee - R knee  Preparation: Patient was prepped and draped in the usual sterile fashion  Needle size: 22 G  Approach: superior (LATERAL)  Medications administered: 8 mL lidocaine PF 1% 1 %; 6 mg betamethasone acetate-betamethasone sodium phosphate 6 (3-3) MG/ML  Patient tolerance: patient tolerated the procedure well with no immediate complications          Orders:  Orders Placed This Encounter   Procedures   • Large Joint Arthrocentesis: R knee       Medications:  No orders of the defined types were placed in this encounter.      Followup:  No follow-ups on file.    Tyrone Solo was seen today for pain and edema.    Diagnoses and all orders for this visit:    Chondromalacia    Other orders  -     Large Joint Arthrocentesis: R knee          I ordered and reviewed the STEPHANY today.     Dictated utilizing Dragon dictation

## 2020-07-24 ENCOUNTER — APPOINTMENT (OUTPATIENT)
Dept: BONE DENSITY | Facility: HOSPITAL | Age: 75
End: 2020-07-24

## 2020-07-24 ENCOUNTER — HOSPITAL ENCOUNTER (OUTPATIENT)
Dept: MAMMOGRAPHY | Facility: HOSPITAL | Age: 75
Discharge: HOME | End: 2020-07-24
Admitting: INTERNAL MEDICINE

## 2020-07-24 ENCOUNTER — APPOINTMENT (OUTPATIENT)
Dept: NUCLEAR MEDICINE | Facility: HOSPITAL | Age: 75
End: 2020-07-24

## 2020-07-24 DIAGNOSIS — M85.89 OSTEOPENIA OF MULTIPLE SITES: ICD-10-CM

## 2020-07-24 DIAGNOSIS — C50.312 MALIGNANT NEOPLASM OF LOWER-INNER QUADRANT OF LEFT BREAST IN FEMALE, ESTROGEN RECEPTOR NEGATIVE (HCC): ICD-10-CM

## 2020-07-24 DIAGNOSIS — Z12.31 ENCOUNTER FOR SCREENING MAMMOGRAM FOR BREAST CANCER: ICD-10-CM

## 2020-07-24 DIAGNOSIS — Z17.1 MALIGNANT NEOPLASM OF LOWER-INNER QUADRANT OF LEFT BREAST IN FEMALE, ESTROGEN RECEPTOR NEGATIVE (HCC): ICD-10-CM

## 2020-07-24 PROCEDURE — 77067 SCR MAMMO BI INCL CAD: CPT

## 2020-07-24 PROCEDURE — 77063 BREAST TOMOSYNTHESIS BI: CPT

## 2020-07-24 PROCEDURE — 77080 DXA BONE DENSITY AXIAL: CPT

## 2020-08-11 ENCOUNTER — OFFICE VISIT (OUTPATIENT)
Dept: ORTHOPEDIC SURGERY | Facility: CLINIC | Age: 75
End: 2020-08-11

## 2020-08-11 VITALS — BODY MASS INDEX: 28.28 KG/M2 | HEIGHT: 66 IN | WEIGHT: 176 LBS

## 2020-08-11 DIAGNOSIS — M94.20 CHONDROMALACIA: ICD-10-CM

## 2020-08-11 DIAGNOSIS — M70.61 GREATER TROCHANTERIC BURSITIS OF RIGHT HIP: Primary | ICD-10-CM

## 2020-08-11 PROCEDURE — 99212 OFFICE O/P EST SF 10 MIN: CPT | Performed by: NURSE PRACTITIONER

## 2020-08-11 NOTE — PROGRESS NOTES
Subjective:     Patient ID: Tyrone Rand is a 75 y.o. female.    Chief Complaint:  Follow-up chondromalacia right knee  Lateral hip pain, new issue to examiner  History of Present Illness  Tyrone Rand returns to clinic with spouse for follow-up right knee.  Did receive significant improvement in right knee corticosteroid injection continues to experience maximal tenderness the medial aspect of the knee.  She is also began noticing pain at the lateral aspect of the hip as well as the distal medial aspect of the thigh.  Increased pain at the hip when noted to sleep on the right side at night, pain present with seated, transitional activity such as in seated standing attempting to walk, ambulatory activities.  Continues to experience maximal tenderness the medial aspect of the knee as well.  Does have days where she is pain-free such as yesterday and then pain does return.  Denies any previous physical therapy.  Denies presence of numbness or tingling radiating down the right lower extremity.  Denies other concerns present time.     Social History     Occupational History     Employer: RETIRED   Tobacco Use   • Smoking status: Former Smoker     Packs/day: 1.00     Years: 25.00     Pack years: 25.00     Last attempt to quit: 2000     Years since quittin.6   • Smokeless tobacco: Never Used   • Tobacco comment: smoked 25 years, 1 PPD   Substance and Sexual Activity   • Alcohol use: Not Currently     Comment: social   • Drug use: No   • Sexual activity: Defer      Past Medical History:   Diagnosis Date   • Breast cancer (CMS/HCC)     Left   • Depression    • Diabetes mellitus (CMS/HCC)    • GERD (gastroesophageal reflux disease)    • H/O Left breast mass    • H/O vitamin D deficiency    • History of snoring    • Hyperlipidemia    • Hypertension    • Panic attack    • Poor sleep pattern    • Sleep apnea     CPAP     Past Surgical History:   Procedure Laterality Date   • BREAST LUMPECTOMY Left 2017   •  "COLON SURGERY     • COLONOSCOPY     • KIDNEY SURGERY  2011   • MOUTH SURGERY      TOOTH EXTRACTION   • TOOTH EXTRACTION         Family History   Problem Relation Age of Onset   • Stroke Mother    • Heart disease Mother    • Hypertension Mother    • Breast cancer Neg Hx          Review of Systems   Constitutional: Negative for chills, diaphoresis, fever and unexpected weight change.   HENT: Negative for hearing loss, nosebleeds, sore throat and tinnitus.    Eyes: Negative for pain and visual disturbance.   Respiratory: Negative for cough, shortness of breath and wheezing.    Cardiovascular: Negative for chest pain and palpitations.   Gastrointestinal: Negative for abdominal pain, diarrhea, nausea and vomiting.   Endocrine: Negative for cold intolerance, heat intolerance and polydipsia.   Genitourinary: Negative for difficulty urinating, dysuria and hematuria.   Musculoskeletal: Positive for arthralgias. Negative for joint swelling and myalgias.   Skin: Negative for rash and wound.   Allergic/Immunologic: Negative for environmental allergies.   Neurological: Negative for dizziness, syncope and numbness.   Hematological: Does not bruise/bleed easily.   Psychiatric/Behavioral: Negative for dysphoric mood and sleep disturbance. The patient is not nervous/anxious.            Objective:  Physical Exam  General: No acute distress.  Eyes: conjunctiva clear; pupils equally round and reactive  ENT: external ears and nose atraumatic; oropharynx clear  CV: no peripheral edema  Resp: normal respiratory effort  Skin: no rashes or wounds; normal turgor  Psych: mood and affect appropriate; recent and remote memory intact    Vitals:    08/11/20 1433   Weight: 79.8 kg (176 lb)   Height: 167.6 cm (66\")         08/11/20  1433   Weight: 79.8 kg (176 lb)     Body mass index is 28.41 kg/m².      Right Knee Exam     Tenderness   The patient is experiencing tenderness in the medial joint line.    Range of Motion   Extension: 0   Flexion: 130 "     Tests   Kath:  Medial - negative Lateral - negative  Varus: negative Valgus: negative  Lachman:  Anterior - 1+    Posterior - negative  Drawer:  Anterior - negative    Posterior - negative  Patellar apprehension: negative    Other   Erythema: absent  Sensation: normal  Swelling: mild  Effusion: no effusion present    Comments:  Positive crepitus throughout arc of motion  Negative active patellar compression test  Negative logroll exam  Negative Stinchfield exam      Right Hip Exam     Tenderness   The patient is experiencing tenderness in the greater trochanter.    Range of Motion   Abduction: 45   Adduction: 25   Extension: 0   Flexion: 90     Muscle Strength   Abduction: 4/5   Adduction: 4/5   Flexion: 4/5     Tests   LILIA: negative  Dominique: positive  Fadir:  Negative FADIR test    Other   Erythema: absent  Sensation: normal  Pulse: present               Assessment:        1. Greater trochanteric bursitis of right hip    2. Chondromalacia           Plan:  1.  Discussed plan of care with patient and spouse.  I do recommend home strengthening exercises were provided and demonstrated in clinic.  Does not wish to proceed with formal physical therapy at this time.  Plan to see her back in clinic if not improving discussed options including Visco supplementation injections right knee, corticosteroid injection greater trochanteric bursa of the right hip.  Encouraged to call with any questions concerns they have between now and follow-up.  Orders:  No orders of the defined types were placed in this encounter.      Medications:  No orders of the defined types were placed in this encounter.      Followup:  No follow-ups on file.    Tyrone Solo was seen today for follow-up and pain.    Diagnoses and all orders for this visit:    Greater trochanteric bursitis of right hip    Chondromalacia      Dictated utilizing Dragon dictation

## 2020-08-31 ENCOUNTER — OFFICE VISIT (OUTPATIENT)
Dept: ONCOLOGY | Facility: CLINIC | Age: 75
End: 2020-08-31

## 2020-08-31 ENCOUNTER — LAB (OUTPATIENT)
Dept: LAB | Facility: HOSPITAL | Age: 75
End: 2020-08-31

## 2020-08-31 VITALS
SYSTOLIC BLOOD PRESSURE: 127 MMHG | HEIGHT: 66 IN | RESPIRATION RATE: 16 BRPM | TEMPERATURE: 97.5 F | OXYGEN SATURATION: 99 % | WEIGHT: 178.9 LBS | BODY MASS INDEX: 28.75 KG/M2 | HEART RATE: 74 BPM | DIASTOLIC BLOOD PRESSURE: 64 MMHG

## 2020-08-31 DIAGNOSIS — E55.9 VITAMIN D DEFICIENCY: ICD-10-CM

## 2020-08-31 DIAGNOSIS — M85.89 OSTEOPENIA OF MULTIPLE SITES: ICD-10-CM

## 2020-08-31 DIAGNOSIS — C50.312 MALIGNANT NEOPLASM OF LOWER-INNER QUADRANT OF LEFT BREAST IN FEMALE, ESTROGEN RECEPTOR NEGATIVE (HCC): Primary | ICD-10-CM

## 2020-08-31 DIAGNOSIS — Z17.1 MALIGNANT NEOPLASM OF LOWER-INNER QUADRANT OF LEFT BREAST IN FEMALE, ESTROGEN RECEPTOR NEGATIVE (HCC): ICD-10-CM

## 2020-08-31 DIAGNOSIS — R74.8 ELEVATED ALKALINE PHOSPHATASE LEVEL: ICD-10-CM

## 2020-08-31 DIAGNOSIS — C50.312 MALIGNANT NEOPLASM OF LOWER-INNER QUADRANT OF LEFT BREAST IN FEMALE, ESTROGEN RECEPTOR NEGATIVE (HCC): ICD-10-CM

## 2020-08-31 DIAGNOSIS — Z17.1 MALIGNANT NEOPLASM OF LOWER-INNER QUADRANT OF LEFT BREAST IN FEMALE, ESTROGEN RECEPTOR NEGATIVE (HCC): Primary | ICD-10-CM

## 2020-08-31 LAB
25(OH)D3 SERPL-MCNC: 43.1 NG/ML (ref 30–100)
ALBUMIN SERPL-MCNC: 4 G/DL (ref 3.5–5.2)
ALBUMIN/GLOB SERPL: 1.2 G/DL (ref 1.1–2.4)
ALP SERPL-CCNC: 117 U/L (ref 38–116)
ALT SERPL W P-5'-P-CCNC: 11 U/L (ref 0–33)
ANION GAP SERPL CALCULATED.3IONS-SCNC: 12.5 MMOL/L (ref 5–15)
AST SERPL-CCNC: 16 U/L (ref 0–32)
BASOPHILS # BLD AUTO: 0.07 10*3/MM3 (ref 0–0.2)
BASOPHILS NFR BLD AUTO: 1 % (ref 0–1.5)
BILIRUB SERPL-MCNC: 0.2 MG/DL (ref 0.2–1.2)
BUN SERPL-MCNC: 19 MG/DL (ref 6–20)
BUN/CREAT SERPL: 16.8 (ref 7.3–30)
CALCIUM SPEC-SCNC: 9.8 MG/DL (ref 8.5–10.2)
CHLORIDE SERPL-SCNC: 100 MMOL/L (ref 98–107)
CO2 SERPL-SCNC: 24.5 MMOL/L (ref 22–29)
CREAT SERPL-MCNC: 1.13 MG/DL (ref 0.6–1.1)
DEPRECATED RDW RBC AUTO: 44.5 FL (ref 37–54)
EOSINOPHIL # BLD AUTO: 0.17 10*3/MM3 (ref 0–0.4)
EOSINOPHIL NFR BLD AUTO: 2.5 % (ref 0.3–6.2)
ERYTHROCYTE [DISTWIDTH] IN BLOOD BY AUTOMATED COUNT: 13.9 % (ref 12.3–15.4)
GFR SERPL CREATININE-BSD FRML MDRD: 57 ML/MIN/1.73
GLOBULIN UR ELPH-MCNC: 3.3 GM/DL (ref 1.8–3.5)
GLUCOSE SERPL-MCNC: 270 MG/DL (ref 74–124)
HCT VFR BLD AUTO: 43.1 % (ref 34–46.6)
HGB BLD-MCNC: 13.2 G/DL (ref 12–15.9)
IMM GRANULOCYTES # BLD AUTO: 0.03 10*3/MM3 (ref 0–0.05)
IMM GRANULOCYTES NFR BLD AUTO: 0.4 % (ref 0–0.5)
LYMPHOCYTES # BLD AUTO: 1.1 10*3/MM3 (ref 0.7–3.1)
LYMPHOCYTES NFR BLD AUTO: 16.3 % (ref 19.6–45.3)
MCH RBC QN AUTO: 26.9 PG (ref 26.6–33)
MCHC RBC AUTO-ENTMCNC: 30.6 G/DL (ref 31.5–35.7)
MCV RBC AUTO: 88 FL (ref 79–97)
MONOCYTES # BLD AUTO: 0.58 10*3/MM3 (ref 0.1–0.9)
MONOCYTES NFR BLD AUTO: 8.6 % (ref 5–12)
NEUTROPHILS NFR BLD AUTO: 4.81 10*3/MM3 (ref 1.7–7)
NEUTROPHILS NFR BLD AUTO: 71.2 % (ref 42.7–76)
NRBC BLD AUTO-RTO: 0 /100 WBC (ref 0–0.2)
PLATELET # BLD AUTO: 219 10*3/MM3 (ref 140–450)
PMV BLD AUTO: 9.6 FL (ref 6–12)
POTASSIUM SERPL-SCNC: 4.4 MMOL/L (ref 3.5–4.7)
PROT SERPL-MCNC: 7.3 G/DL (ref 6.3–8)
RBC # BLD AUTO: 4.9 10*6/MM3 (ref 3.77–5.28)
SODIUM SERPL-SCNC: 137 MMOL/L (ref 134–145)
WBC # BLD AUTO: 6.76 10*3/MM3 (ref 3.4–10.8)

## 2020-08-31 PROCEDURE — 85025 COMPLETE CBC W/AUTO DIFF WBC: CPT

## 2020-08-31 PROCEDURE — 99214 OFFICE O/P EST MOD 30 MIN: CPT | Performed by: INTERNAL MEDICINE

## 2020-08-31 PROCEDURE — 36415 COLL VENOUS BLD VENIPUNCTURE: CPT

## 2020-08-31 PROCEDURE — 82306 VITAMIN D 25 HYDROXY: CPT | Performed by: INTERNAL MEDICINE

## 2020-08-31 PROCEDURE — 80053 COMPREHEN METABOLIC PANEL: CPT

## 2020-08-31 NOTE — PROGRESS NOTES
Subjective     CHIEF COMPLAINT:      Chief Complaint   Patient presents with   • Follow-up     no concerns       HISTORY OF PRESENT ILLNESS:     Tyrone Rand is a 75 y.o. female patient who returns today for follow up on her left breast cancer.  She is on adjuvant Arimidex 1 mg daily.  She continues to tolerate it.  She has not been performing monthly breast self-exam.    Patient is taking vitamin D 2000 units daily.  She remains active.    REVIEW OF SYSTEMS:  Review of Systems   Constitutional: Negative for fatigue, fever and unexpected weight change.   HENT: Negative for nosebleeds and voice change.    Eyes: Negative for visual disturbance.   Respiratory: Negative for cough and shortness of breath.    Cardiovascular: Negative for chest pain and leg swelling.   Gastrointestinal: Negative for abdominal pain, blood in stool, constipation, diarrhea, nausea and vomiting.   Genitourinary: Negative for frequency and hematuria.   Musculoskeletal: Negative for back pain and joint swelling.   Skin: Negative for rash.   Neurological: Negative for dizziness and headaches.   Hematological: Negative for adenopathy. Does not bruise/bleed easily.   Psychiatric/Behavioral: Negative for dysphoric mood. The patient is not nervous/anxious.      I reviewed and verified the CC and ROS obtained by the MA.     Past Medical History:   Diagnosis Date   • Breast cancer (CMS/HCC)     Left   • Depression    • Diabetes mellitus (CMS/HCC)    • GERD (gastroesophageal reflux disease)    • H/O Left breast mass 2017   • H/O vitamin D deficiency    • History of snoring    • Hyperlipidemia    • Hypertension    • Panic attack    • Poor sleep pattern    • Sleep apnea     CPAP       Past Surgical History:   Procedure Laterality Date   • BREAST LUMPECTOMY Left 09/2017   • COLON SURGERY     • COLONOSCOPY     • KIDNEY SURGERY  2011   • MOUTH SURGERY      TOOTH EXTRACTION   • TOOTH EXTRACTION         Cancer-related family history is negative for Breast  "cancer.  Social History     Tobacco Use   • Smoking status: Former Smoker     Packs/day: 1.00     Years: 25.00     Pack years: 25.00     Last attempt to quit: 2000     Years since quittin.6   • Smokeless tobacco: Never Used   • Tobacco comment: smoked 25 years, 1 PPD   Substance Use Topics   • Alcohol use: Not Currently       MEDICATIONS:    Current Outpatient Medications:   •  anastrozole (ARIMIDEX) 1 MG tablet, TAKE 1 TABLET BY MOUTH EVERY DAY, Disp: 90 tablet, Rfl: 1  •  aspirin 81 MG tablet, Take 81 mg by mouth Daily., Disp: , Rfl:   •  LÁZARO CONTOUR TEST test strip, TEST BLOOD SUGAR TWICE A DAY, Disp: , Rfl: 2  •  LÁZARO MICROLET LANCETS lancets, TEST BLOOD SUGAR TWICE A DAY, Disp: , Rfl: 3  •  busPIRone (BUSPAR) 7.5 MG tablet, , Disp: , Rfl:   •  cholecalciferol (VITAMIN D3) 1000 units tablet, Take 1,000 Units by mouth Daily., Disp: , Rfl:   •  desvenlafaxine (PRISTIQ) 50 MG 24 hr tablet, , Disp: , Rfl:   •  metFORMIN (GLUCOPHAGE) 1000 MG tablet, Take 1,000 mg by mouth 2 (Two) Times a Day., Disp: , Rfl: 0  •  metoprolol succinate XL (TOPROL-XL) 25 MG 24 hr tablet, TAKE ONE TABLET BY MOUTH IN THE MORNING, Disp: , Rfl: 11  •  NOVOFINE 32G X 6 MM misc, USE TWO TIMES A DAY, Disp: , Rfl: 1  •  TRESIBA FLEXTOUCH 200 UNIT/ML solution pen-injector, INJECT 40 UNIT AT BEDTIME, Disp: , Rfl: 0    ALLERGIES:  Allergies   Allergen Reactions   • Sulfa Antibiotics Itching         Objective   VITAL SIGNS:     Vitals:    20 1119   BP: 127/64   Pulse: 74   Resp: 16   Temp: 97.5 °F (36.4 °C)   SpO2: 99%   Weight: 81.1 kg (178 lb 14.4 oz)   Height: 167.6 cm (65.98\")   PainSc: 0-No pain     Body mass index is 28.89 kg/m².     Wt Readings from Last 3 Encounters:   20 81.1 kg (178 lb 14.4 oz)   20 79.8 kg (176 lb)   20 79.8 kg (176 lb)       PHYSICAL EXAMINATION:  GENERAL:  The patient appears in good general condition, not in acute distress.  SKIN: No skin rashes. No ecchymosis.  HEAD:  " Normocephalic.  EYES:  No Jaundice. No Pallor. Pupils equal. EOMI.  NECK:  Supple with Good ROM. No Thyromegaly. No Masses.  LYMPHATICS:  No cervical or supraclavicular or axillary lymphadenopathy.  BREASTS: Right breast exam unremarkable.  Left breast exam revealed incision at 7:00 with underlying scar tissue.   CHEST: Normal respiratory effort. Lungs clear to auscultation.   CARDIAC:  Normal S1 & S2. No murmur. No edema.  ABDOMEN:  Soft. No tenderness. No Hepatomegaly. No Splenomegaly. No masses.  EXTREMITIES:  No clubbing. No deforming arthritis in the hands.   NEUROLOGICAL:  No Focal neurological deficits.       DIAGNOSTIC DATA:     Results from last 7 days   Lab Units 08/31/20  1040   WBC 10*3/mm3 6.76   NEUTROS ABS 10*3/mm3 4.81   HEMOGLOBIN g/dL 13.2   HEMATOCRIT % 43.1   PLATELETS 10*3/mm3 219     Results from last 7 days   Lab Units 08/31/20  1040   SODIUM mmol/L 137   POTASSIUM mmol/L 4.4   CHLORIDE mmol/L 100   CO2 mmol/L 24.5   BUN mg/dL 19   CREATININE mg/dL 1.13*   CALCIUM mg/dL 9.8   ALBUMIN g/dL 4.00   BILIRUBIN mg/dL 0.2   ALK PHOS U/L 117*   ALT (SGPT) U/L 11   AST (SGOT) U/L 16   GLUCOSE mg/dL 270*     EXAM, 07/24/2020:  1. Bilateral digital screening mammogram with CAD.  2. Bilateral digital screening breast tomosynthesis.     INDICATION:  Screening mammogram     TECHNIQUE:  Bilateral digital screening mammogram images were obtained and reviewed  with CAD. Digital breast tomosynthesis images were also reviewed.     COMPARISON:  *  Screening mammogram, 07/23/2019     FINDINGS:  There are scattered areas of fibroglandular density. Evidence of prior  left-sided lumpectomy. Occasional benign calcifications demonstrated. No  significant change when compared with prior images. No mammographic  evidence of malignancy. Recommend repeat screening mammogram in one  year.     IMPRESSION:  Benign mammogram     BI-RADS CATEGORY 2: Benign Findings.        Women over the age of 40 undergoing screening  mammography are entered  into a reminder system with target due date for the next mammogram.     This report was finalized on 7/24/2020 2:03 PM by Dr. Akbar Patiño MD.    DXA BONE MINERAL DENSITY MEASUREMENT, 07/24/2020     INDICATION:  Post menopausal Screening     COMPARISON:  none     TECHNIQUE:  DXA bone mineral density measurements were obtained at the lumbar spine  and left hip.     FINDINGS:  Left Hip neck    BMD 0.671 g/cm2.  T score -1.6,  Z score 0.6     Lspine  L1-L4   BMD  1.085 g/cm2.   T score 0.3,  Z score  2.9     Comment:none        IMPRESSION:  Osteopenia with a T score -1.6 in the left hip     This report was finalized on 7/24/2020 11:55 AM by Dr. Kalen Hicks MD.    Assessment/Plan   1.  Left breast cancer, invasive ductal carcinoma, grade 3, ER negative, NV positive at 20%, HER-2 frederic negative.  Ki-67 was 50-55% on the biopsy from 7/19/17.   · She underwent a lumpectomy and sentinel lymph node biopsy on 9/26/17.   · Pathology examination revealed a 1.6 cm tumor, poorly differentiated with a Ki-67 50-60%, ER negative, NV weakly positive at <10%. Her-2 is negative.   · Adjuvant chemotherapy with Cytoxan and Taxotere with Neulasta support was started on 11/6/17 and completed on 1/8/18.  · Patient received post lumpectomy radiation and was completed on 3/5/18.  · Patient started Arimidex on 5/2/18.  · Patient is tolerating Arimidex.  No evidence of recurrence.    2.  Osteopenia in the left femur neck.  Bone density on 7/24/2020 revealed a decreased bone density with a T score at -1.6 at the left hip.  Bone density in the L-spine was normal.  On review of the bone density from 5/29/2018, T score was -1.7 in the left hip.    3.  Elevated alkaline phosphatase level.  Highest level was 138 on 2/6/2020.  We obtained a bone scan on 2/18/2020 and there was no evidence of bone metastasis. Bone density improved and it is near normal today at 117.    PLAN:    1.  Continue Arimidex 1 mg daily.  2.  Obtain vitamin  D level today.  If it does not improve, we will switch her to vitamin D 50,000 units weekly.  3.  I asked the patient to make sure she is having total amount of calcium daily of 1500 mg.  She is not able to achieve that, asked to take 500-600 mg calcium tablet a day.  We discussed the need to remain active.  4.  Follow-up in 6 months with CBC CMP and vitamin D levels.          Luz Elena Herndon MD  08/31/20

## 2020-10-14 RX ORDER — ANASTROZOLE 1 MG/1
1 TABLET ORAL DAILY
Qty: 90 TABLET | Refills: 1 | Status: SHIPPED | OUTPATIENT
Start: 2020-10-14 | End: 2021-03-16 | Stop reason: SDUPTHER

## 2021-02-16 ENCOUNTER — TELEPHONE (OUTPATIENT)
Dept: ONCOLOGY | Facility: CLINIC | Age: 76
End: 2021-02-16

## 2021-02-16 ENCOUNTER — APPOINTMENT (OUTPATIENT)
Dept: LAB | Facility: HOSPITAL | Age: 76
End: 2021-02-16

## 2021-02-16 NOTE — TELEPHONE ENCOUNTER
Caller: CLIFF    Relationship to patient: PATIENT'S  (ON  [facility])    Best call back number:[phone]    Chief complaint:PATIENT NEEDS TO RESCHEDULE HER APPT FOR TODAY 2-16-21, WOULD LIKE TO GO OUT TO [place], PLEASE ADVISE?    Type of visit: LAB AND FOLLOWUP    Requested date: [place]    If rescheduling, when is the original appointment: 2-16-21    Additional notes:

## 2021-03-16 ENCOUNTER — LAB (OUTPATIENT)
Dept: LAB | Facility: HOSPITAL | Age: 76
End: 2021-03-16

## 2021-03-16 ENCOUNTER — OFFICE VISIT (OUTPATIENT)
Dept: ONCOLOGY | Facility: CLINIC | Age: 76
End: 2021-03-16

## 2021-03-16 VITALS
SYSTOLIC BLOOD PRESSURE: 115 MMHG | HEART RATE: 97 BPM | BODY MASS INDEX: 28.66 KG/M2 | OXYGEN SATURATION: 95 % | DIASTOLIC BLOOD PRESSURE: 76 MMHG | RESPIRATION RATE: 16 BRPM | WEIGHT: 178.3 LBS | TEMPERATURE: 97.8 F | HEIGHT: 66 IN

## 2021-03-16 DIAGNOSIS — Z17.1 MALIGNANT NEOPLASM OF LOWER-INNER QUADRANT OF LEFT BREAST IN FEMALE, ESTROGEN RECEPTOR NEGATIVE (HCC): Primary | ICD-10-CM

## 2021-03-16 DIAGNOSIS — C50.312 MALIGNANT NEOPLASM OF LOWER-INNER QUADRANT OF LEFT BREAST IN FEMALE, ESTROGEN RECEPTOR NEGATIVE (HCC): Primary | ICD-10-CM

## 2021-03-16 DIAGNOSIS — Z17.1 MALIGNANT NEOPLASM OF LOWER-INNER QUADRANT OF LEFT BREAST IN FEMALE, ESTROGEN RECEPTOR NEGATIVE (HCC): ICD-10-CM

## 2021-03-16 DIAGNOSIS — R74.8 ELEVATED ALKALINE PHOSPHATASE LEVEL: ICD-10-CM

## 2021-03-16 DIAGNOSIS — E55.9 VITAMIN D DEFICIENCY: ICD-10-CM

## 2021-03-16 DIAGNOSIS — C50.312 MALIGNANT NEOPLASM OF LOWER-INNER QUADRANT OF LEFT BREAST IN FEMALE, ESTROGEN RECEPTOR NEGATIVE (HCC): ICD-10-CM

## 2021-03-16 DIAGNOSIS — M85.89 OSTEOPENIA OF MULTIPLE SITES: ICD-10-CM

## 2021-03-16 DIAGNOSIS — Z12.31 SCREENING MAMMOGRAM, ENCOUNTER FOR: ICD-10-CM

## 2021-03-16 LAB
25(OH)D3 SERPL-MCNC: 51.3 NG/ML (ref 30–100)
ALBUMIN SERPL-MCNC: 4.2 G/DL (ref 3.5–5.2)
ALBUMIN/GLOB SERPL: 1.2 G/DL
ALP SERPL-CCNC: 126 U/L (ref 39–117)
ALT SERPL W P-5'-P-CCNC: 14 U/L (ref 1–33)
ANION GAP SERPL CALCULATED.3IONS-SCNC: 11 MMOL/L (ref 5–15)
AST SERPL-CCNC: 16 U/L (ref 1–32)
BASOPHILS # BLD AUTO: 0.08 10*3/MM3 (ref 0–0.2)
BASOPHILS NFR BLD AUTO: 1 % (ref 0–1.5)
BILIRUB SERPL-MCNC: <0.2 MG/DL (ref 0–1.2)
BUN SERPL-MCNC: 22 MG/DL (ref 8–23)
BUN/CREAT SERPL: 16.4 (ref 7–25)
CALCIUM SPEC-SCNC: 9.8 MG/DL (ref 8.6–10.5)
CHLORIDE SERPL-SCNC: 103 MMOL/L (ref 98–107)
CO2 SERPL-SCNC: 25 MMOL/L (ref 22–29)
CREAT SERPL-MCNC: 1.34 MG/DL (ref 0.57–1)
DEPRECATED RDW RBC AUTO: 45.2 FL (ref 37–54)
EOSINOPHIL # BLD AUTO: 0.2 10*3/MM3 (ref 0–0.4)
EOSINOPHIL NFR BLD AUTO: 2.5 % (ref 0.3–6.2)
ERYTHROCYTE [DISTWIDTH] IN BLOOD BY AUTOMATED COUNT: 14.6 % (ref 12.3–15.4)
GFR SERPL CREATININE-BSD FRML MDRD: 46 ML/MIN/1.73
GLOBULIN UR ELPH-MCNC: 3.5 GM/DL
GLUCOSE SERPL-MCNC: 121 MG/DL (ref 65–99)
HCT VFR BLD AUTO: 42.5 % (ref 34–46.6)
HGB BLD-MCNC: 13.8 G/DL (ref 12–15.9)
IMM GRANULOCYTES # BLD AUTO: 0.03 10*3/MM3 (ref 0–0.05)
IMM GRANULOCYTES NFR BLD AUTO: 0.4 % (ref 0–0.5)
LYMPHOCYTES # BLD AUTO: 1.65 10*3/MM3 (ref 0.7–3.1)
LYMPHOCYTES NFR BLD AUTO: 20.2 % (ref 19.6–45.3)
MCH RBC QN AUTO: 27.6 PG (ref 26.6–33)
MCHC RBC AUTO-ENTMCNC: 32.5 G/DL (ref 31.5–35.7)
MCV RBC AUTO: 85 FL (ref 79–97)
MONOCYTES # BLD AUTO: 0.89 10*3/MM3 (ref 0.1–0.9)
MONOCYTES NFR BLD AUTO: 10.9 % (ref 5–12)
NEUTROPHILS NFR BLD AUTO: 5.31 10*3/MM3 (ref 1.7–7)
NEUTROPHILS NFR BLD AUTO: 65 % (ref 42.7–76)
NRBC BLD AUTO-RTO: 0 /100 WBC (ref 0–0.2)
PLATELET # BLD AUTO: 236 10*3/MM3 (ref 140–450)
PMV BLD AUTO: 9.1 FL (ref 6–12)
POTASSIUM SERPL-SCNC: 4.4 MMOL/L (ref 3.5–5.2)
PROT SERPL-MCNC: 7.7 G/DL (ref 6–8.5)
RBC # BLD AUTO: 5 10*6/MM3 (ref 3.77–5.28)
SODIUM SERPL-SCNC: 139 MMOL/L (ref 136–145)
WBC # BLD AUTO: 8.16 10*3/MM3 (ref 3.4–10.8)

## 2021-03-16 PROCEDURE — 36415 COLL VENOUS BLD VENIPUNCTURE: CPT

## 2021-03-16 PROCEDURE — 82306 VITAMIN D 25 HYDROXY: CPT | Performed by: INTERNAL MEDICINE

## 2021-03-16 PROCEDURE — 99214 OFFICE O/P EST MOD 30 MIN: CPT | Performed by: INTERNAL MEDICINE

## 2021-03-16 PROCEDURE — 85025 COMPLETE CBC W/AUTO DIFF WBC: CPT

## 2021-03-16 PROCEDURE — 80053 COMPREHEN METABOLIC PANEL: CPT | Performed by: INTERNAL MEDICINE

## 2021-03-16 RX ORDER — ANASTROZOLE 1 MG/1
1 TABLET ORAL DAILY
Qty: 90 TABLET | Refills: 1 | Status: SHIPPED | OUTPATIENT
Start: 2021-03-16 | End: 2022-02-07 | Stop reason: SDUPTHER

## 2021-03-16 NOTE — PROGRESS NOTES
"Subjective     CHIEF COMPLAINT:      Chief Complaint   Patient presents with   • Follow-up     no concerns       HISTORY OF PRESENT ILLNESS:     Tyrone Rand is a 76 y.o. female patient who returns today for follow up on her left breast cancer.  She returns today for follow-up accompanied by her .  She is on Arimidex.  She is tolerating it well.  She is not having hot flashes or musculoskeletal side effects.  She has not been performing monthly breast self-exam.    Patient received 2 doses of the Covid vaccine and tolerated that well.    ROS:  Relevant ROS is in the HPI.     Past medical, surgical, social and family history were reviewed.     MEDICATIONS:    Current Outpatient Medications:   •  anastrozole (ARIMIDEX) 1 MG tablet, Take 1 tablet by mouth Daily., Disp: 90 tablet, Rfl: 1  •  aspirin 81 MG tablet, Take 81 mg by mouth Daily., Disp: , Rfl:   •  LÁZARO CONTOUR TEST test strip, TEST BLOOD SUGAR TWICE A DAY, Disp: , Rfl: 2  •  LÁZARO MICROLET LANCETS lancets, TEST BLOOD SUGAR TWICE A DAY, Disp: , Rfl: 3  •  busPIRone (BUSPAR) 7.5 MG tablet, , Disp: , Rfl:   •  cholecalciferol (VITAMIN D3) 1000 units tablet, Take 1,000 Units by mouth Daily., Disp: , Rfl:   •  desvenlafaxine (PRISTIQ) 50 MG 24 hr tablet, , Disp: , Rfl:   •  metoprolol succinate XL (TOPROL-XL) 25 MG 24 hr tablet, TAKE ONE TABLET BY MOUTH IN THE MORNING, Disp: , Rfl: 11  •  NOVOFINE 32G X 6 MM misc, USE TWO TIMES A DAY, Disp: , Rfl: 1  •  SITagliptin (JANUVIA) 100 MG tablet, Take 100 mg by mouth Daily., Disp: , Rfl:   •  TRESIBA FLEXTOUCH 200 UNIT/ML solution pen-injector, INJECT 40 UNIT AT BEDTIME, Disp: , Rfl: 0    Objective   VITAL SIGNS:     Vitals:    03/16/21 1552   BP: 115/76   Pulse: 97   Resp: 16   Temp: 97.8 °F (36.6 °C)   TempSrc: Temporal   SpO2: 95%   Weight: 80.9 kg (178 lb 4.8 oz)   Height: 167.6 cm (65.98\")   PainSc: 0-No pain     Body mass index is 28.79 kg/m².     Wt Readings from Last 5 Encounters:   03/16/21 80.9 kg " (178 lb 4.8 oz)   08/31/20 81.1 kg (178 lb 14.4 oz)   08/11/20 79.8 kg (176 lb)   07/17/20 79.8 kg (176 lb)   07/10/20 79.8 kg (176 lb)       PHYSICAL EXAMINATION:  GENERAL: The patient appears in good general condition, not in acute distress.   SKIN: No ecchymosis.  EYES: No jaundice.  LYMPHATICS: No cervical, supraclavicular or axillary lymphadenopathy.  BREASTS: Right breast exam is unremarkable.  Left breast exam reveals incision at the 7 o'clock position with scar tissue.  No suspicious masses.  No abnormal skin changes.  CHEST: Normal breath sounds bilaterally.  No added sounds.  CVS: Normal S1-S2.  No murmurs.  No edema.  ABDOMEN: Soft. No tenderness. No Hepatomegaly. No Splenomegaly. No masses.  EXTREMITIES: No deforming arthritis.     DIAGNOSTIC DATA:     Results from last 7 days   Lab Units 03/16/21  1522   WBC 10*3/mm3 8.16   NEUTROS ABS 10*3/mm3 5.31   HEMOGLOBIN g/dL 13.8   HEMATOCRIT % 42.5   PLATELETS 10*3/mm3 236     Results from last 7 days   Lab Units 03/16/21  1522   SODIUM mmol/L 139   POTASSIUM mmol/L 4.4   CHLORIDE mmol/L 103   CO2 mmol/L 25.0   BUN mg/dL 22   CREATININE mg/dL 1.34*   CALCIUM mg/dL 9.8   ALBUMIN g/dL 4.20   BILIRUBIN mg/dL <0.2   ALK PHOS U/L 126*   ALT (SGPT) U/L 14   AST (SGOT) U/L 16   GLUCOSE mg/dL 121*     Component      Latest Ref Rng & Units 8/22/2019 2/6/2020 8/31/2020 3/16/2021   25 Hydroxy, Vitamin D      30.0 - 100.0 ng/ml 37.8 27.3 (L) 43.1 51.3     Assessment/Plan   1.  Left breast cancer, invasive ductal carcinoma, grade 3, ER negative, IL positive at 20%, HER-2 frederic negative.  Ki-67 was 50-55% on the biopsy from 7/19/17.   · She underwent a lumpectomy and sentinel lymph node biopsy on 9/26/17.   · Pathology examination revealed a 1.6 cm tumor, poorly differentiated with a Ki-67 50-60%, ER negative, IL weakly positive at <10%. Her-2 is negative.   · Adjuvant chemotherapy with Cytoxan and Taxotere with Neulasta support was started on 11/6/17 and completed on  1/8/18.  · Patient received post lumpectomy radiation and was completed on 3/5/18.  · Patient started Arimidex on 5/2/18.  · Patient is tolerating Arimidex.  She is almost 3 years since she started the medicine.  · Patient is doing well with no evidence of recurrence.    2.  Osteopenia in the left femur neck.    · Bone density on 7/24/2020 revealed a decreased bone density with a T score at -1.6 at the left hip.  Bone density in the L-spine was normal.    · On review of the bone density from 5/29/2018, T score was -1.7 in the left hip.  · Patient is on over-the-counter vitamin D supplement  · Vitamin D level was normal at 43 on 8/31/2020.  · Vitamin D level improved to 51.3 today.    3.  Elevated alkaline phosphatase level.  Highest level was 138 on 2/6/2020.    · Bone scan on 2/18/2020 showed no evidence of bone metastasis.    · Alkaline phosphatase improved to 117 on 8/31/2020.  · Alkaline phosphatase increased to 126 today.  This is not considered a significant increase.    PLAN:    1.  Continue Arimidex 1 mg daily.  I recommended a minimum of 5 years which will be completed in May 2023.  2.  I recommended performing monthly breast self-exam.  3.  We will obtain bilateral mammograms in late July 2021.  4.  Follow-up in 6 months with CBC CMP and vitamin D level.        Luz Elena Herndon MD  03/16/21

## 2021-06-28 ENCOUNTER — TRANSCRIBE ORDERS (OUTPATIENT)
Dept: ADMINISTRATIVE | Facility: HOSPITAL | Age: 76
End: 2021-06-28

## 2021-06-28 ENCOUNTER — LAB (OUTPATIENT)
Dept: LAB | Facility: HOSPITAL | Age: 76
End: 2021-06-28

## 2021-06-28 DIAGNOSIS — E55.9 VITAMIN D DEFICIENCY DISEASE: ICD-10-CM

## 2021-06-28 DIAGNOSIS — N18.30 MALIGNANT HYPERTENSIVE HEART AND CHRONIC KIDNEY DISEASE STAGE III (HCC): ICD-10-CM

## 2021-06-28 DIAGNOSIS — I13.10 MALIGNANT HYPERTENSIVE HEART AND CHRONIC KIDNEY DISEASE STAGE III (HCC): Primary | ICD-10-CM

## 2021-06-28 DIAGNOSIS — N18.30 MALIGNANT HYPERTENSIVE HEART AND CHRONIC KIDNEY DISEASE STAGE III (HCC): Primary | ICD-10-CM

## 2021-06-28 DIAGNOSIS — C50.919 MALIGNANT NEOPLASM OF FEMALE BREAST, UNSPECIFIED ESTROGEN RECEPTOR STATUS, UNSPECIFIED LATERALITY, UNSPECIFIED SITE OF BREAST (HCC): ICD-10-CM

## 2021-06-28 DIAGNOSIS — I13.10 MALIGNANT HYPERTENSIVE HEART AND CHRONIC KIDNEY DISEASE STAGE III (HCC): ICD-10-CM

## 2021-06-28 LAB
25(OH)D3 SERPL-MCNC: 43.7 NG/ML (ref 30–100)
ALBUMIN SERPL-MCNC: 4 G/DL (ref 3.5–5.2)
ANION GAP SERPL CALCULATED.3IONS-SCNC: 11.4 MMOL/L (ref 5–15)
BACTERIA UR QL AUTO: ABNORMAL /HPF
BILIRUB UR QL STRIP: NEGATIVE
BUN SERPL-MCNC: 17 MG/DL (ref 8–23)
BUN/CREAT SERPL: 13.8 (ref 7–25)
CALCIUM SPEC-SCNC: 9.4 MG/DL (ref 8.6–10.5)
CHLORIDE SERPL-SCNC: 101 MMOL/L (ref 98–107)
CLARITY UR: ABNORMAL
CO2 SERPL-SCNC: 24.6 MMOL/L (ref 22–29)
COLOR UR: YELLOW
CREAT SERPL-MCNC: 1.23 MG/DL (ref 0.57–1)
CREAT UR-MCNC: 238 MG/DL
DEPRECATED RDW RBC AUTO: 40.8 FL (ref 37–54)
ERYTHROCYTE [DISTWIDTH] IN BLOOD BY AUTOMATED COUNT: 13.1 % (ref 12.3–15.4)
GFR SERPL CREATININE-BSD FRML MDRD: 51 ML/MIN/1.73
GLUCOSE SERPL-MCNC: 148 MG/DL (ref 65–99)
GLUCOSE UR STRIP-MCNC: NEGATIVE MG/DL
HCT VFR BLD AUTO: 40.7 % (ref 34–46.6)
HGB BLD-MCNC: 13.1 G/DL (ref 12–15.9)
HGB UR QL STRIP.AUTO: NEGATIVE
HYALINE CASTS UR QL AUTO: ABNORMAL /LPF
KETONES UR QL STRIP: ABNORMAL
LEUKOCYTE ESTERASE UR QL STRIP.AUTO: ABNORMAL
MCH RBC QN AUTO: 27.1 PG (ref 26.6–33)
MCHC RBC AUTO-ENTMCNC: 32.2 G/DL (ref 31.5–35.7)
MCV RBC AUTO: 84.3 FL (ref 79–97)
NITRITE UR QL STRIP: NEGATIVE
PH UR STRIP.AUTO: 5.5 [PH] (ref 5–8)
PHOSPHATE SERPL-MCNC: 3.2 MG/DL (ref 2.5–4.5)
PLATELET # BLD AUTO: 209 10*3/MM3 (ref 140–450)
PMV BLD AUTO: 10.2 FL (ref 6–12)
POTASSIUM SERPL-SCNC: 4.2 MMOL/L (ref 3.5–5.2)
PROT UR QL STRIP: ABNORMAL
PROT UR-MCNC: 18 MG/DL
PROT/CREAT UR: 75.6 MG/G CREA (ref 0–200)
RBC # BLD AUTO: 4.83 10*6/MM3 (ref 3.77–5.28)
RBC # UR: ABNORMAL /HPF
REF LAB TEST METHOD: ABNORMAL
SODIUM SERPL-SCNC: 137 MMOL/L (ref 136–145)
SP GR UR STRIP: 1.03 (ref 1–1.03)
SQUAMOUS #/AREA URNS HPF: ABNORMAL /HPF
UROBILINOGEN UR QL STRIP: ABNORMAL
WBC # BLD AUTO: 6.25 10*3/MM3 (ref 3.4–10.8)
WBC UR QL AUTO: ABNORMAL /HPF

## 2021-06-28 PROCEDURE — 80069 RENAL FUNCTION PANEL: CPT

## 2021-06-28 PROCEDURE — 82306 VITAMIN D 25 HYDROXY: CPT

## 2021-06-28 PROCEDURE — 84156 ASSAY OF PROTEIN URINE: CPT

## 2021-06-28 PROCEDURE — 82570 ASSAY OF URINE CREATININE: CPT

## 2021-06-28 PROCEDURE — 36415 COLL VENOUS BLD VENIPUNCTURE: CPT

## 2021-06-28 PROCEDURE — 85027 COMPLETE CBC AUTOMATED: CPT

## 2021-06-28 PROCEDURE — 81001 URINALYSIS AUTO W/SCOPE: CPT

## 2021-07-12 ENCOUNTER — HOSPITAL ENCOUNTER (OUTPATIENT)
Dept: MAMMOGRAPHY | Facility: HOSPITAL | Age: 76
End: 2021-07-12

## 2021-07-27 ENCOUNTER — HOSPITAL ENCOUNTER (OUTPATIENT)
Dept: MAMMOGRAPHY | Facility: HOSPITAL | Age: 76
Discharge: HOME | End: 2021-07-27
Admitting: INTERNAL MEDICINE

## 2021-07-27 DIAGNOSIS — Z17.1 MALIGNANT NEOPLASM OF LOWER-INNER QUADRANT OF LEFT BREAST IN FEMALE, ESTROGEN RECEPTOR NEGATIVE (HCC): ICD-10-CM

## 2021-07-27 DIAGNOSIS — Z12.31 SCREENING MAMMOGRAM, ENCOUNTER FOR: ICD-10-CM

## 2021-07-27 DIAGNOSIS — C50.312 MALIGNANT NEOPLASM OF LOWER-INNER QUADRANT OF LEFT BREAST IN FEMALE, ESTROGEN RECEPTOR NEGATIVE (HCC): ICD-10-CM

## 2021-07-27 PROCEDURE — 77067 SCR MAMMO BI INCL CAD: CPT

## 2021-07-27 PROCEDURE — 77063 BREAST TOMOSYNTHESIS BI: CPT

## 2021-07-28 ENCOUNTER — TELEPHONE (OUTPATIENT)
Dept: ONCOLOGY | Facility: CLINIC | Age: 76
End: 2021-07-28

## 2021-07-28 NOTE — TELEPHONE ENCOUNTER
----- Message from Luz Elena Herndon MD sent at 7/27/2021  4:20 PM EDT -----  Please inform the patient that her mammogram was clear.    Thank you

## 2021-08-03 ENCOUNTER — HOSPITAL ENCOUNTER (INPATIENT)
Facility: HOSPITAL | Age: 76
LOS: 2 days | Discharge: HOME | End: 2021-08-08
Attending: EMERGENCY MEDICINE | Admitting: SURGERY

## 2021-08-03 ENCOUNTER — APPOINTMENT (OUTPATIENT)
Dept: CT IMAGING | Facility: HOSPITAL | Age: 76
End: 2021-08-03

## 2021-08-03 DIAGNOSIS — K80.20 CALCULUS OF GALLBLADDER WITHOUT CHOLECYSTITIS WITHOUT OBSTRUCTION: Primary | ICD-10-CM

## 2021-08-03 DIAGNOSIS — R10.13 EPIGASTRIC PAIN: ICD-10-CM

## 2021-08-03 DIAGNOSIS — K80.42 CALCULUS OF BILE DUCT WITH ACUTE CHOLECYSTITIS WITHOUT OBSTRUCTION: ICD-10-CM

## 2021-08-03 LAB
ALBUMIN SERPL-MCNC: 4.2 G/DL (ref 3.5–5.2)
ALBUMIN/GLOB SERPL: 1.5 G/DL
ALP SERPL-CCNC: 138 U/L (ref 39–117)
ALT SERPL W P-5'-P-CCNC: 108 U/L (ref 1–33)
ANION GAP SERPL CALCULATED.3IONS-SCNC: 11.8 MMOL/L (ref 5–15)
AST SERPL-CCNC: 165 U/L (ref 1–32)
BACTERIA UR QL AUTO: ABNORMAL /HPF
BASOPHILS # BLD AUTO: 0.05 10*3/MM3 (ref 0–0.2)
BASOPHILS NFR BLD AUTO: 0.6 % (ref 0–1.5)
BILIRUB SERPL-MCNC: 0.7 MG/DL (ref 0–1.2)
BILIRUB UR QL STRIP: NEGATIVE
BUN SERPL-MCNC: 18 MG/DL (ref 8–23)
BUN/CREAT SERPL: 16.1 (ref 7–25)
CALCIUM SPEC-SCNC: 9.7 MG/DL (ref 8.6–10.5)
CHLORIDE SERPL-SCNC: 98 MMOL/L (ref 98–107)
CLARITY UR: CLEAR
CO2 SERPL-SCNC: 27.2 MMOL/L (ref 22–29)
COLOR UR: YELLOW
CREAT SERPL-MCNC: 1.12 MG/DL (ref 0.57–1)
DEPRECATED RDW RBC AUTO: 44.1 FL (ref 37–54)
EOSINOPHIL # BLD AUTO: 0.04 10*3/MM3 (ref 0–0.4)
EOSINOPHIL NFR BLD AUTO: 0.5 % (ref 0.3–6.2)
ERYTHROCYTE [DISTWIDTH] IN BLOOD BY AUTOMATED COUNT: 14.4 % (ref 12.3–15.4)
GFR SERPL CREATININE-BSD FRML MDRD: 57 ML/MIN/1.73
GLOBULIN UR ELPH-MCNC: 2.8 GM/DL
GLUCOSE SERPL-MCNC: 268 MG/DL (ref 65–99)
GLUCOSE UR STRIP-MCNC: ABNORMAL MG/DL
HCT VFR BLD AUTO: 42.4 % (ref 34–46.6)
HGB BLD-MCNC: 13.3 G/DL (ref 12–15.9)
HGB UR QL STRIP.AUTO: NEGATIVE
HYALINE CASTS UR QL AUTO: ABNORMAL /LPF
IMM GRANULOCYTES # BLD AUTO: 0.02 10*3/MM3 (ref 0–0.05)
IMM GRANULOCYTES NFR BLD AUTO: 0.2 % (ref 0–0.5)
KETONES UR QL STRIP: NEGATIVE
LEUKOCYTE ESTERASE UR QL STRIP.AUTO: ABNORMAL
LIPASE SERPL-CCNC: 30 U/L (ref 13–60)
LYMPHOCYTES # BLD AUTO: 0.73 10*3/MM3 (ref 0.7–3.1)
LYMPHOCYTES NFR BLD AUTO: 8.5 % (ref 19.6–45.3)
MCH RBC QN AUTO: 26.5 PG (ref 26.6–33)
MCHC RBC AUTO-ENTMCNC: 31.4 G/DL (ref 31.5–35.7)
MCV RBC AUTO: 84.6 FL (ref 79–97)
MONOCYTES # BLD AUTO: 0.64 10*3/MM3 (ref 0.1–0.9)
MONOCYTES NFR BLD AUTO: 7.5 % (ref 5–12)
NEUTROPHILS NFR BLD AUTO: 7.07 10*3/MM3 (ref 1.7–7)
NEUTROPHILS NFR BLD AUTO: 82.7 % (ref 42.7–76)
NITRITE UR QL STRIP: NEGATIVE
NRBC BLD AUTO-RTO: 0 /100 WBC (ref 0–0.2)
PH UR STRIP.AUTO: 6 [PH] (ref 4.5–8)
PLATELET # BLD AUTO: 190 10*3/MM3 (ref 140–450)
PMV BLD AUTO: 9.9 FL (ref 6–12)
POTASSIUM SERPL-SCNC: 4.1 MMOL/L (ref 3.5–5.2)
PROT SERPL-MCNC: 7 G/DL (ref 6–8.5)
PROT UR QL STRIP: ABNORMAL
RBC # BLD AUTO: 5.01 10*6/MM3 (ref 3.77–5.28)
RBC # UR: ABNORMAL /HPF
REF LAB TEST METHOD: ABNORMAL
SODIUM SERPL-SCNC: 137 MMOL/L (ref 136–145)
SP GR UR STRIP: 1.02 (ref 1–1.03)
SQUAMOUS #/AREA URNS HPF: ABNORMAL /HPF
UROBILINOGEN UR QL STRIP: ABNORMAL
WBC # BLD AUTO: 8.55 10*3/MM3 (ref 3.4–10.8)
WBC UR QL AUTO: ABNORMAL /HPF

## 2021-08-03 PROCEDURE — 85025 COMPLETE CBC W/AUTO DIFF WBC: CPT | Performed by: EMERGENCY MEDICINE

## 2021-08-03 PROCEDURE — 74177 CT ABD & PELVIS W/CONTRAST: CPT

## 2021-08-03 PROCEDURE — 99284 EMERGENCY DEPT VISIT MOD MDM: CPT

## 2021-08-03 PROCEDURE — 25010000002 ONDANSETRON PER 1 MG: Performed by: EMERGENCY MEDICINE

## 2021-08-03 PROCEDURE — 83690 ASSAY OF LIPASE: CPT | Performed by: EMERGENCY MEDICINE

## 2021-08-03 PROCEDURE — 93005 ELECTROCARDIOGRAM TRACING: CPT | Performed by: EMERGENCY MEDICINE

## 2021-08-03 PROCEDURE — 99283 EMERGENCY DEPT VISIT LOW MDM: CPT | Performed by: EMERGENCY MEDICINE

## 2021-08-03 PROCEDURE — 87086 URINE CULTURE/COLONY COUNT: CPT | Performed by: EMERGENCY MEDICINE

## 2021-08-03 PROCEDURE — 80074 ACUTE HEPATITIS PANEL: CPT | Performed by: NURSE PRACTITIONER

## 2021-08-03 PROCEDURE — 25010000002 MORPHINE PER 10 MG: Performed by: EMERGENCY MEDICINE

## 2021-08-03 PROCEDURE — 93010 ELECTROCARDIOGRAM REPORT: CPT | Performed by: INTERNAL MEDICINE

## 2021-08-03 PROCEDURE — 80053 COMPREHEN METABOLIC PANEL: CPT | Performed by: EMERGENCY MEDICINE

## 2021-08-03 PROCEDURE — 81001 URINALYSIS AUTO W/SCOPE: CPT | Performed by: EMERGENCY MEDICINE

## 2021-08-03 RX ORDER — ONDANSETRON 2 MG/ML
4 INJECTION INTRAMUSCULAR; INTRAVENOUS ONCE
Status: COMPLETED | OUTPATIENT
Start: 2021-08-03 | End: 2021-08-03

## 2021-08-03 RX ADMIN — SODIUM CHLORIDE, POTASSIUM CHLORIDE, SODIUM LACTATE AND CALCIUM CHLORIDE 1000 ML: 600; 310; 30; 20 INJECTION, SOLUTION INTRAVENOUS at 23:03

## 2021-08-03 RX ADMIN — MORPHINE SULFATE 4 MG: 4 INJECTION, SOLUTION INTRAMUSCULAR; INTRAVENOUS at 23:03

## 2021-08-03 RX ADMIN — ONDANSETRON 4 MG: 2 INJECTION INTRAMUSCULAR; INTRAVENOUS at 23:03

## 2021-08-04 ENCOUNTER — APPOINTMENT (OUTPATIENT)
Dept: ULTRASOUND IMAGING | Facility: HOSPITAL | Age: 76
End: 2021-08-04

## 2021-08-04 LAB
ALBUMIN SERPL-MCNC: 3.8 G/DL (ref 3.5–5.2)
ALBUMIN/GLOB SERPL: 1.1 G/DL
ALP SERPL-CCNC: 151 U/L (ref 39–117)
ALT SERPL W P-5'-P-CCNC: 302 U/L (ref 1–33)
AMYLASE SERPL-CCNC: 36 U/L (ref 28–100)
ANION GAP SERPL CALCULATED.3IONS-SCNC: 10.7 MMOL/L (ref 5–15)
AST SERPL-CCNC: 242 U/L (ref 1–32)
BACTERIA SPEC AEROBE CULT: NORMAL
BILIRUB SERPL-MCNC: 0.4 MG/DL (ref 0–1.2)
BUN SERPL-MCNC: 12 MG/DL (ref 8–23)
BUN/CREAT SERPL: 11.1 (ref 7–25)
CALCIUM SPEC-SCNC: 9.7 MG/DL (ref 8.6–10.5)
CHLORIDE SERPL-SCNC: 98 MMOL/L (ref 98–107)
CO2 SERPL-SCNC: 25.3 MMOL/L (ref 22–29)
CREAT SERPL-MCNC: 1.08 MG/DL (ref 0.57–1)
GFR SERPL CREATININE-BSD FRML MDRD: 59 ML/MIN/1.73
GLOBULIN UR ELPH-MCNC: 3.4 GM/DL
GLUCOSE BLDC GLUCOMTR-MCNC: 125 MG/DL (ref 70–130)
GLUCOSE BLDC GLUCOMTR-MCNC: 143 MG/DL (ref 70–130)
GLUCOSE BLDC GLUCOMTR-MCNC: 197 MG/DL (ref 70–130)
GLUCOSE BLDC GLUCOMTR-MCNC: 229 MG/DL (ref 70–130)
GLUCOSE SERPL-MCNC: 174 MG/DL (ref 65–99)
HAV IGM SERPL QL IA: NORMAL
HBV CORE IGM SERPL QL IA: NORMAL
HBV SURFACE AG SERPL QL IA: NORMAL
HCV AB SER DONR QL: NORMAL
LIPASE SERPL-CCNC: 18 U/L (ref 13–60)
MAGNESIUM SERPL-MCNC: 1.9 MG/DL (ref 1.6–2.4)
PHOSPHATE SERPL-MCNC: 3.6 MG/DL (ref 2.5–4.5)
POTASSIUM SERPL-SCNC: 4.9 MMOL/L (ref 3.5–5.2)
PROT SERPL-MCNC: 7.2 G/DL (ref 6–8.5)
QT INTERVAL: 451 MS
SARS-COV-2 RNA PNL SPEC NAA+PROBE: NOT DETECTED
SODIUM SERPL-SCNC: 134 MMOL/L (ref 136–145)
TROPONIN T SERPL-MCNC: <0.01 NG/ML (ref 0–0.03)

## 2021-08-04 PROCEDURE — 80053 COMPREHEN METABOLIC PANEL: CPT | Performed by: NURSE PRACTITIONER

## 2021-08-04 PROCEDURE — 76705 ECHO EXAM OF ABDOMEN: CPT

## 2021-08-04 PROCEDURE — 84100 ASSAY OF PHOSPHORUS: CPT | Performed by: STUDENT IN AN ORGANIZED HEALTH CARE EDUCATION/TRAINING PROGRAM

## 2021-08-04 PROCEDURE — 82962 GLUCOSE BLOOD TEST: CPT

## 2021-08-04 PROCEDURE — 25010000002 MORPHINE PER 10 MG: Performed by: STUDENT IN AN ORGANIZED HEALTH CARE EDUCATION/TRAINING PROGRAM

## 2021-08-04 PROCEDURE — 63710000001 INSULIN ASPART PER 5 UNITS: Performed by: STUDENT IN AN ORGANIZED HEALTH CARE EDUCATION/TRAINING PROGRAM

## 2021-08-04 PROCEDURE — G0378 HOSPITAL OBSERVATION PER HR: HCPCS

## 2021-08-04 PROCEDURE — 94799 UNLISTED PULMONARY SVC/PX: CPT

## 2021-08-04 PROCEDURE — 83735 ASSAY OF MAGNESIUM: CPT | Performed by: STUDENT IN AN ORGANIZED HEALTH CARE EDUCATION/TRAINING PROGRAM

## 2021-08-04 PROCEDURE — 83690 ASSAY OF LIPASE: CPT | Performed by: SURGERY

## 2021-08-04 PROCEDURE — 0 IOPAMIDOL PER 1 ML: Performed by: EMERGENCY MEDICINE

## 2021-08-04 PROCEDURE — 25010000002 CEFTRIAXONE SODIUM-DEXTROSE 1-3.74 GM-%(50ML) RECONSTITUTED SOLUTION: Performed by: NURSE PRACTITIONER

## 2021-08-04 PROCEDURE — 82150 ASSAY OF AMYLASE: CPT | Performed by: SURGERY

## 2021-08-04 PROCEDURE — 84484 ASSAY OF TROPONIN QUANT: CPT | Performed by: EMERGENCY MEDICINE

## 2021-08-04 PROCEDURE — 87635 SARS-COV-2 COVID-19 AMP PRB: CPT | Performed by: EMERGENCY MEDICINE

## 2021-08-04 PROCEDURE — 25010000002 ONDANSETRON PER 1 MG: Performed by: STUDENT IN AN ORGANIZED HEALTH CARE EDUCATION/TRAINING PROGRAM

## 2021-08-04 PROCEDURE — 99223 1ST HOSP IP/OBS HIGH 75: CPT | Performed by: STUDENT IN AN ORGANIZED HEALTH CARE EDUCATION/TRAINING PROGRAM

## 2021-08-04 PROCEDURE — 99203 OFFICE O/P NEW LOW 30 MIN: CPT | Performed by: SURGERY

## 2021-08-04 RX ORDER — SCOLOPAMINE TRANSDERMAL SYSTEM 1 MG/1
1 PATCH, EXTENDED RELEASE TRANSDERMAL
Status: DISCONTINUED | OUTPATIENT
Start: 2021-08-04 | End: 2021-08-08 | Stop reason: HOSPADM

## 2021-08-04 RX ORDER — MELATONIN
2000 DAILY
Status: DISCONTINUED | OUTPATIENT
Start: 2021-08-04 | End: 2021-08-08 | Stop reason: HOSPADM

## 2021-08-04 RX ORDER — NICOTINE POLACRILEX 4 MG
15 LOZENGE BUCCAL
Status: DISCONTINUED | OUTPATIENT
Start: 2021-08-04 | End: 2021-08-08 | Stop reason: HOSPADM

## 2021-08-04 RX ORDER — CEFTRIAXONE 1 G/50ML
1 INJECTION, SOLUTION INTRAVENOUS EVERY 24 HOURS
Status: DISCONTINUED | OUTPATIENT
Start: 2021-08-04 | End: 2021-08-08 | Stop reason: HOSPADM

## 2021-08-04 RX ORDER — MORPHINE SULFATE 2 MG/ML
2 INJECTION, SOLUTION INTRAMUSCULAR; INTRAVENOUS EVERY 4 HOURS PRN
Status: DISCONTINUED | OUTPATIENT
Start: 2021-08-04 | End: 2021-08-07

## 2021-08-04 RX ORDER — SODIUM CHLORIDE 9 MG/ML
40 INJECTION, SOLUTION INTRAVENOUS AS NEEDED
Status: DISCONTINUED | OUTPATIENT
Start: 2021-08-04 | End: 2021-08-08 | Stop reason: HOSPADM

## 2021-08-04 RX ORDER — NITROGLYCERIN 0.4 MG/1
0.4 TABLET SUBLINGUAL
Status: DISCONTINUED | OUTPATIENT
Start: 2021-08-04 | End: 2021-08-08 | Stop reason: HOSPADM

## 2021-08-04 RX ORDER — SODIUM CHLORIDE 9 MG/ML
100 INJECTION, SOLUTION INTRAVENOUS CONTINUOUS
Status: DISCONTINUED | OUTPATIENT
Start: 2021-08-04 | End: 2021-08-05

## 2021-08-04 RX ORDER — ASPIRIN 81 MG/1
81 TABLET ORAL DAILY
Status: DISCONTINUED | OUTPATIENT
Start: 2021-08-04 | End: 2021-08-04

## 2021-08-04 RX ORDER — SODIUM CHLORIDE 0.9 % (FLUSH) 0.9 %
10 SYRINGE (ML) INJECTION EVERY 12 HOURS SCHEDULED
Status: DISCONTINUED | OUTPATIENT
Start: 2021-08-04 | End: 2021-08-08 | Stop reason: HOSPADM

## 2021-08-04 RX ORDER — SODIUM CHLORIDE 0.9 % (FLUSH) 0.9 %
10 SYRINGE (ML) INJECTION AS NEEDED
Status: DISCONTINUED | OUTPATIENT
Start: 2021-08-04 | End: 2021-08-08 | Stop reason: HOSPADM

## 2021-08-04 RX ORDER — DEXTROSE MONOHYDRATE 25 G/50ML
25 INJECTION, SOLUTION INTRAVENOUS
Status: DISCONTINUED | OUTPATIENT
Start: 2021-08-04 | End: 2021-08-08 | Stop reason: HOSPADM

## 2021-08-04 RX ORDER — ACETAMINOPHEN 325 MG/1
650 TABLET ORAL EVERY 6 HOURS PRN
Status: DISCONTINUED | OUTPATIENT
Start: 2021-08-04 | End: 2021-08-08 | Stop reason: HOSPADM

## 2021-08-04 RX ORDER — METOPROLOL SUCCINATE 25 MG/1
25 TABLET, EXTENDED RELEASE ORAL EVERY MORNING
Status: DISCONTINUED | OUTPATIENT
Start: 2021-08-04 | End: 2021-08-08 | Stop reason: HOSPADM

## 2021-08-04 RX ORDER — ANASTROZOLE 1 MG/1
1 TABLET ORAL DAILY
Status: DISCONTINUED | OUTPATIENT
Start: 2021-08-04 | End: 2021-08-08 | Stop reason: HOSPADM

## 2021-08-04 RX ORDER — ONDANSETRON 2 MG/ML
4 INJECTION INTRAMUSCULAR; INTRAVENOUS EVERY 4 HOURS PRN
Status: DISCONTINUED | OUTPATIENT
Start: 2021-08-04 | End: 2021-08-08 | Stop reason: HOSPADM

## 2021-08-04 RX ADMIN — SCOPALAMINE 1 PATCH: 1 PATCH, EXTENDED RELEASE TRANSDERMAL at 11:03

## 2021-08-04 RX ADMIN — SODIUM CHLORIDE 100 ML/HR: 9 INJECTION, SOLUTION INTRAVENOUS at 12:39

## 2021-08-04 RX ADMIN — METRONIDAZOLE 500 MG: 500 INJECTION, SOLUTION INTRAVENOUS at 20:06

## 2021-08-04 RX ADMIN — ONDANSETRON 4 MG: 2 INJECTION INTRAMUSCULAR; INTRAVENOUS at 04:45

## 2021-08-04 RX ADMIN — SODIUM CHLORIDE, PRESERVATIVE FREE 10 ML: 5 INJECTION INTRAVENOUS at 02:23

## 2021-08-04 RX ADMIN — INSULIN ASPART 5 UNITS: 100 INJECTION, SOLUTION INTRAVENOUS; SUBCUTANEOUS at 06:29

## 2021-08-04 RX ADMIN — MORPHINE SULFATE 2 MG: 2 INJECTION, SOLUTION INTRAMUSCULAR; INTRAVENOUS at 08:45

## 2021-08-04 RX ADMIN — CEFTRIAXONE 1 G: 1 INJECTION, SOLUTION INTRAVENOUS at 11:03

## 2021-08-04 RX ADMIN — METOPROLOL SUCCINATE 25 MG: 25 TABLET, EXTENDED RELEASE ORAL at 06:29

## 2021-08-04 RX ADMIN — ONDANSETRON 4 MG: 2 INJECTION INTRAMUSCULAR; INTRAVENOUS at 08:43

## 2021-08-04 RX ADMIN — SODIUM CHLORIDE, PRESERVATIVE FREE 10 ML: 5 INJECTION INTRAVENOUS at 09:37

## 2021-08-04 RX ADMIN — IOPAMIDOL 100 ML: 755 INJECTION, SOLUTION INTRAVENOUS at 00:07

## 2021-08-04 RX ADMIN — INSULIN ASPART 3 UNITS: 100 INJECTION, SOLUTION INTRAVENOUS; SUBCUTANEOUS at 23:40

## 2021-08-04 RX ADMIN — ANASTROZOLE 1 MG: 1 TABLET ORAL at 11:02

## 2021-08-04 RX ADMIN — CHOLECALCIFEROL (VITAMIN D3) 25 MCG (1,000 UNIT) TABLET 2000 UNITS: TABLET at 09:04

## 2021-08-04 RX ADMIN — SODIUM CHLORIDE 100 ML/HR: 9 INJECTION, SOLUTION INTRAVENOUS at 02:23

## 2021-08-04 RX ADMIN — MORPHINE SULFATE 2 MG: 2 INJECTION, SOLUTION INTRAMUSCULAR; INTRAVENOUS at 04:45

## 2021-08-04 RX ADMIN — MORPHINE SULFATE 2 MG: 2 INJECTION, SOLUTION INTRAMUSCULAR; INTRAVENOUS at 17:48

## 2021-08-04 NOTE — ED PROVIDER NOTES
Subjective   76-year-old female presents with epigastric pain.  Patient states her pain started tonight around 5 hours prior to arrival after eating dinner.  Patient also had multiple episodes of nausea and vomiting.  Vomiting has stopped but patient still has pain.  Describes it as an achy pain.  Does not radiate.  No identified aggravating or relieving factors.  No medications prior to arrival.  Patient had a similar episode last night that lasted a few hours and resolved spontaneously.  She has not had a bowel movement today.  Denies any recent diarrhea or changes to bowel habits.  No urinary symptoms.  No chest pain, shortness of breath, cough.  No recent fevers or chills.  Patient denies history of abdominal surgeries.  Patient is a non-smoker.  Denies alcohol or illicit drug use.          Review of Systems   Constitutional: Negative.    HENT: Negative.    Respiratory: Negative.    Cardiovascular: Negative.    Gastrointestinal:        Per HPI   Endocrine:        Diabetic   Genitourinary: Negative.    Musculoskeletal: Negative.    Skin: Negative.    Neurological: Negative.    Hematological: Negative.    Psychiatric/Behavioral: Negative.        Past Medical History:   Diagnosis Date   • Breast cancer (CMS/HCC) 2017    Left   • Depression    • Diabetes mellitus (CMS/HCC)    • Drug therapy    • GERD (gastroesophageal reflux disease)    • H/O Left breast mass 2017   • H/O vitamin D deficiency    • History of snoring    • Hx of radiation therapy    • Hyperlipidemia    • Hypertension    • Panic attack    • Poor sleep pattern    • Sleep apnea     CPAP       Allergies   Allergen Reactions   • Sulfa Antibiotics Itching       Past Surgical History:   Procedure Laterality Date   • BREAST LUMPECTOMY Left 09/2017   • COLON SURGERY     • COLONOSCOPY     • KIDNEY SURGERY  2011   • MOUTH SURGERY      TOOTH EXTRACTION   • TOOTH EXTRACTION         Family History   Problem Relation Age of Onset   • Stroke Mother    • Heart disease  Mother    • Hypertension Mother    • Breast cancer Neg Hx        Social History     Socioeconomic History   • Marital status:      Spouse name: Pascual   • Number of children: 1   • Years of education: GED   • Highest education level: Not on file   Tobacco Use   • Smoking status: Former Smoker     Packs/day: 1.00     Years: 25.00     Pack years: 25.00     Quit date:      Years since quittin.6   • Smokeless tobacco: Never Used   • Tobacco comment: smoked 25 years, 1 PPD   Vaping Use   • Vaping Use: Unknown   Substance and Sexual Activity   • Alcohol use: Not Currently   • Drug use: No   • Sexual activity: Defer           Objective   Physical Exam  Constitutional:       General: She is not in acute distress.     Appearance: She is well-developed. She is not ill-appearing, toxic-appearing or diaphoretic.   HENT:      Head: Normocephalic and atraumatic.      Mouth/Throat:      Mouth: Mucous membranes are moist.      Pharynx: Oropharynx is clear.   Eyes:      Extraocular Movements: Extraocular movements intact.      Pupils: Pupils are equal, round, and reactive to light.   Cardiovascular:      Rate and Rhythm: Normal rate and regular rhythm.      Heart sounds: Normal heart sounds.   Pulmonary:      Effort: Pulmonary effort is normal. No respiratory distress.      Breath sounds: Normal breath sounds.   Abdominal:      General: Abdomen is flat.      Palpations: Abdomen is soft.      Tenderness: There is abdominal tenderness in the epigastric area. There is no right CVA tenderness, left CVA tenderness, guarding or rebound.   Musculoskeletal:         General: No swelling, tenderness, deformity or signs of injury. Normal range of motion.   Skin:     General: Skin is warm and dry.      Capillary Refill: Capillary refill takes less than 2 seconds.   Neurological:      General: No focal deficit present.      Mental Status: She is alert and oriented to person, place, and time.   Psychiatric:         Mood and  Affect: Mood normal.         Behavior: Behavior normal.         Procedures           ED Course  ED Course as of Aug 04 0103   Wed Aug 04, 2021   0101 Patient overall nontoxic-appearing.  Does have some epigastric tenderness.  Had some further episodes of dry heaving here without any profuse vomiting.  Labs show elevated liver enzymes, normal lipase.  CT shows cholelithiasis, abnormal enhancement of pancreas.  Suspect the patient is passing gallstones although no biliary obstruction present at time of CT.  On reevaluation patient states that her pain is unchanged since the time of arrival here.  Initially reported after pain medication that had gotten somewhat better.  Patient is generally poor historian and suspect some level of dementia.   is at bedside and discussed findings with both patient and .  Recommended admission for possible ultrasound and further symptomatic management overnight.  Patient does not really want to do this but also does not seem to have a lot of insight into what is going on and  is requesting admission so we will go ahead and admit patient.    [TD]      ED Course User Index  [TD] Cheo Paul MD                                           [facility]    Final diagnoses:   Calculus of gallbladder without cholecystitis without obstruction   Epigastric pain       ED Disposition  ED Disposition     ED Disposition Condition Comment    Decision to Admit  Level of Care: Telemetry [5]   Diagnosis: Calculus of gallbladder without cholecystitis without obstruction [106511]   Admitting Physician: KAROL HORTA [[id number]]   Attending Physician: KAROL HORTA [[id number]]            No follow-up provider specified.       Medication List      No changes were made to your prescriptions during this visit.          Cheo Paul MD  08/04/21 0103

## 2021-08-04 NOTE — PROGRESS NOTES
Follow up to Dr. Turner's H and P. Agree with current assessment and plan. Of note, patient had U/S of gallbladder which showed cholecystitis. Surgery was consulted and has planned for cholecystectomy tomorrow. Discussed with patient possibly trying oral intake of clear liquids, but she would like to hold off for now. Patient reported nausea was not improved with zofran, therefore scopolamine patch was added. Patient was started on IV rocephin for cholecystitis.

## 2021-08-04 NOTE — H&P
[facility] MEDICAL [facility] HOSPITALIST     Maria Esther So DO    CHIEF COMPLAINT: abdominal pain    HISTORY OF PRESENT ILLNESS:  76-year-old female with a past medical history of type 2 diabetes, GERD, hyperlipidemia, hypertension presented to the ED after having mid epigastric pain after eating dinner.  She is notes that she had multiple episodes of nausea and vomiting.  While her nausea and vomiting has improved she continues to have some epigastric pain that does not radiate.  She states that it happened similar last night after dinner that resolved spontaneously.  She denies any diarrhea, melena, hematemesis, stools.  He denies any fevers or chills.  No chest pain or shortness of breath.  Patient is here with her  and continues to have some abdominal pain.      Past Medical History:   Diagnosis Date   • Breast cancer (CMS/HCC) 2017    Left   • Depression    • Diabetes mellitus (CMS/HCC)    • Drug therapy    • GERD (gastroesophageal reflux disease)    • H/O Left breast mass    • H/O vitamin D deficiency    • History of snoring    • Hx of radiation therapy    • Hyperlipidemia    • Hypertension    • Panic attack    • Poor sleep pattern    • Sleep apnea     CPAP     Past Surgical History:   Procedure Laterality Date   • BREAST LUMPECTOMY Left 2017   • COLON SURGERY     • COLONOSCOPY     • KIDNEY SURGERY     • MOUTH SURGERY      TOOTH EXTRACTION   • TOOTH EXTRACTION       Family History   Problem Relation Age of Onset   • Stroke Mother    • Heart disease Mother    • Hypertension Mother    • Breast cancer Neg Hx      Social History     Tobacco Use   • Smoking status: Former Smoker     Packs/day: 1.00     Years: 25.00     Pack years: 25.00     Quit date:      Years since quittin.6   • Smokeless tobacco: Never Used   • Tobacco comment: smoked 25 years, 1 PPD   Vaping Use   • Vaping Use: Unknown   Substance Use Topics   • Alcohol use: Not Currently   • Drug use: No     Medications Prior  "to Admission   Medication Sig Dispense Refill Last Dose   • anastrozole (ARIMIDEX) 1 MG tablet Take 1 tablet by mouth Daily. 90 tablet 1    • aspirin 81 MG tablet Take 81 mg by mouth Daily.      • LÁZARO CONTOUR TEST test strip TEST BLOOD SUGAR TWICE A DAY  2    • LÁZARO MICROLET LANCETS lancets TEST BLOOD SUGAR TWICE A DAY  3    • metoprolol succinate XL (TOPROL-XL) 25 MG 24 hr tablet TAKE ONE TABLET BY MOUTH IN THE MORNING  11    • SITagliptin (JANUVIA) 100 MG tablet Take 100 mg by mouth Daily.      • TRESIBA FLEXTOUCH 200 UNIT/ML solution pen-injector 30 Units. 30 Units at night  0    • busPIRone (BUSPAR) 7.5 MG tablet       • cholecalciferol (VITAMIN D3) 1000 units tablet Take 2,000 Units by mouth Daily.      • desvenlafaxine (PRISTIQ) 50 MG 24 hr tablet       • NOVOFINE 32G X 6 MM misc USE TWO TIMES A DAY  1      Allergies:  Sulfa antibiotics    REVIEW OF SYSTEMS:  Please see the above history of present illness for pertinent positives and negatives.  The remainder of the patient's systems have been reviewed and are negative.   Vital Signs  Temp:  [98.7 °F (37.1 °C)] 98.7 °F (37.1 °C)  Heart Rate:  [61-77] 77  Resp:  [16] 16  BP: (126-179)/(71-96) 179/89  Oxygen Therapy  SpO2: 96 %  Pulse Oximetry Type: Continuous  Device (Oxygen Therapy): room air}  Body mass index is 28.41 kg/m².  Flowsheet Rows      First Filed Value   Admission Height  167.6 cm (66\") Documented at 08/03/2021 2223   Admission Weight  79.8 kg (176 lb) Documented at 08/03/2021 2223             Physical Exam:  Physical Exam   Constitutional: Patient appears well-developed and well-nourished and in no acute distress   HEENT:   Head: Normocephalic and atraumatic.   Eyes:  Pupils are equal, round, and reactive to light. EOM are intact. Sclerae are anicteric and noninjected.  Mouth and Throat: Patient has moist mucous membranes. Oropharynx is clear of any erythema or exudate.     Neck: Neck supple. No JVD present. No thyromegaly present. No " lymphadenopathy present.  Cardiovascular: Regular rate, regular rhythm, S1 normal and S2 normal.  Exam reveals no gallop and no friction rub.  No murmur heard.  Pulmonary/Chest: Lungs are clear to auscultation bilaterally. No respiratory distress. No wheezes. No rhonchi. No rales.   Abdominal: Soft. Bowel sounds are normal. No distension and no mass. There is no hepatosplenomegaly. Mild ttp to palpation predominantly midepigastric area  Musculoskeletal: Normal muscle tone  Extremities: No edema. Pulses are palpable in all 4 extremities.  Neurological: Patient is alert and oriented to person, place, and time. Cranial nerves II-XII are grossly intact with no focal deficits.  Skin: Skin is warm. No rash noted. Nails show no clubbing.  No cyanosis or erythema.  Pysch: cooperative, normal affect     Results Review:    I reviewed the patient's new clinical results.  Lab Results (most recent)     Procedure Component Value Units Date/Time    Troponin [[id number]]  (Normal) Collected: 08/04/21 0051    Specimen: Blood Updated: 08/04/21 0122     Troponin T <0.010 ng/mL     Narrative:      Troponin T Reference Range:  <= 0.03 ng/mL-   Negative for AMI  >0.03 ng/mL-     Abnormal for myocardial necrosis.  Clinicians would have to utilize clinical acumen, EKG, Troponin and serial changes to determine if it is an Acute Myocardial Infarction or myocardial injury due to an underlying chronic condition.       Results may be falsely decreased if patient taking Biotin.      COVID PRE-OP / PRE-PROCEDURE SCREENING ORDER (NO ISOLATION) - Swab, Nasal Cavity [[id number]] Collected: 08/04/21 0103    Specimen: Swab from Nasal Cavity Updated: 08/04/21 0107    Narrative:      The following orders were created for panel order COVID PRE-OP / PRE-PROCEDURE SCREENING ORDER (NO ISOLATION) - Swab, Nasal Cavity.  Procedure                               Abnormality         Status                     ---------                               -----------          ------                     COVID-19,[facility]...[[id number]]                      In process                   Please view results for these tests on the individual orders.    COVID-19,[facility]-HOUSE,Nasal Swab No Transport Media 3-4 HR TAT - Swab, Nasal Cavity [[id number]] Collected: 08/04/21 0103    Specimen: Swab from Nasal Cavity Updated: 08/04/21 0107    Comprehensive Metabolic Panel [[id number]]  (Abnormal) Collected: 08/03/21 2302    Specimen: Blood Updated: 08/03/21 2335     Glucose 268 mg/dL      BUN 18 mg/dL      Creatinine 1.12 mg/dL      Sodium 137 mmol/L      Potassium 4.1 mmol/L      Chloride 98 mmol/L      CO2 27.2 mmol/L      Calcium 9.7 mg/dL      Total Protein 7.0 g/dL      Albumin 4.20 g/dL      ALT (SGPT) 108 U/L      AST (SGOT) 165 U/L      Alkaline Phosphatase 138 U/L      Total Bilirubin 0.7 mg/dL      eGFR  African Amer 57 mL/min/1.73      Globulin 2.8 gm/dL      A/G Ratio 1.5 g/dL      BUN/Creatinine Ratio 16.1     Anion Gap 11.8 mmol/L     Narrative:      GFR Normal >60  Chronic Kidney Disease <60  Kidney Failure <15      Lipase [[id number]]  (Normal) Collected: 08/03/21 2302    Specimen: Blood Updated: 08/03/21 2335     Lipase 30 U/L     Urinalysis, Microscopic Only - Urine, Clean Catch [[id number]]  (Abnormal) Collected: 08/03/21 2302    Specimen: Urine, Clean Catch Updated: 08/03/21 2326     RBC, UA None Seen /HPF      WBC, UA 6-12 /HPF      Bacteria, UA 3+ /HPF      Squamous Epithelial Cells, UA 3-6 /HPF      Hyaline Casts, UA None Seen /LPF      Methodology Manual Light Microscopy    Urine Culture - Urine, Urine, Clean Catch [[id number]] Collected: 08/03/21 2302    Specimen: Urine, Clean Catch Updated: 08/03/21 2326    Urinalysis With Culture If Indicated - Urine, Clean Catch [[id number]]  (Abnormal) Collected: 08/03/21 2302    Specimen: Urine, Clean Catch Updated: 08/03/21 2318     Color, UA Yellow     Appearance, UA Clear     pH, UA 6.0     Specific [place], UA 1.025      Glucose, UA >=1000 mg/dL (3+)     Ketones, UA Negative     Bilirubin, UA Negative     Blood, UA Negative     Protein, UA 30 mg/dL (1+)     Leuk Esterase, UA Trace     Nitrite, UA Negative     Urobilinogen, UA 2.0 E.U./dL    CBC & Differential [[id number]]  (Abnormal) Collected: 08/03/21 2302    Specimen: Blood Updated: 08/03/21 2316    Narrative:      The following orders were created for panel order CBC & Differential.  Procedure                               Abnormality         Status                     ---------                               -----------         ------                     CBC Auto Differential[[id number]]        Abnormal            Final result                 Please view results for these tests on the individual orders.    CBC Auto Differential [[id number]]  (Abnormal) Collected: 08/03/21 2302    Specimen: Blood Updated: 08/03/21 2316     WBC 8.55 10*3/mm3      RBC 5.01 10*6/mm3      Hemoglobin 13.3 g/dL      Hematocrit 42.4 %      MCV 84.6 fL      MCH 26.5 pg      MCHC 31.4 g/dL      RDW 14.4 %      RDW-SD 44.1 fl      MPV 9.9 fL      Platelets 190 10*3/mm3      Neutrophil % 82.7 %      Lymphocyte % 8.5 %      Monocyte % 7.5 %      Eosinophil % 0.5 %      Basophil % 0.6 %      Immature Grans % 0.2 %      Neutrophils, Absolute 7.07 10*3/mm3      Lymphocytes, Absolute 0.73 10*3/mm3      Monocytes, Absolute 0.64 10*3/mm3      Eosinophils, Absolute 0.04 10*3/mm3      Basophils, Absolute 0.05 10*3/mm3      Immature Grans, Absolute 0.02 10*3/mm3      nRBC 0.0 /100 WBC           Imaging Results (Most Recent)     Procedure Component Value Units Date/Time    CT Abdomen Pelvis With Contrast [[id number]] Collected: 08/04/21 0024     Updated: 08/04/21 0027    Narrative:      CT Abdomen Pelvis W    INDICATION:   Epigastric pain. Symptoms began 5 hours prior to arrival following [place].    TECHNIQUE:   CT of the abdomen and pelvis with IV contrast. Coronal and sagittal reconstructions were obtained.  Radiation  dose reduction techniques included automated exposure control or exposure modulation based on body size. Count of known CT and cardiac nuc med  studies performed in previous 12 months: 0.     COMPARISON:   None available.    FINDINGS:  Abdomen: Included lung bases demonstrate emphysematous change. There is no effusion. Atherosclerotic aorta and ectasia of the distal thoracic aorta. Spleen is negative. There are clips associated with the medial limb right adrenal gland is otherwise  negative. There is nodularity of the left adrenal gland. The largest nodule measures 1.4 cm and there is a smaller nodule measuring 9 mm. These cannot be further characterized on this single phase study but absent any history of malignancy or risk  factors for adrenal malignancy are likely benign adenomas. Suggest comparison to prior outside studies initially to assess stability.    There is diminished attenuation and subtle edema of the pancreas suggestive of acute pancreatitis. Correlate with laboratory data. Mild hepatic steatosis. The gallbladder is distended up to 3.5 cm. There are multiple gallstones with peripheral rim  calcification, measuring up to 18 mm. No distinct CT evidence of acute cholecystitis at this time. There is no biliary ductal dilatation. Extrahepatic common bile duct measures up to 6 mm in.    The kidneys are nonobstructed and demonstrate cortical scarring. Tiny benign left renal cyst. No adenopathy.    Pelvis: Negative bladder and leiomyomatous change of the uterus. No drainable fluid collection. Extensive diverticulosis of the colon. No bowel obstruction. Appendix diminutive but normal. Redundancy of the ascending colon and cecum. Tiny fat-containing  right inguinal hernia. Fat-containing umbilical hernia. Degenerative changes. No suspicious bone lesion.  negative.      Impression:      1. Abnormal appearance of the pancreas suggestive of mild acute pancreatitis. Laboratory data would be complimentary.  2.  Cholelithiasis without additional CT evidence of acute cholecystitis. Gallbladder ultrasound would be complimentary along with laboratory data.  3. Normal appendix.  4. Left adrenal nodules, technically indeterminate but likely benign adenomas. Suggest initial comparison to prior outside studies to assess stability.          Signer Name: Charles Garcia MD   Signed: 8/4/2021 12:24 AM   Workstation Name: [facility]-    [facility]        reviewed    ECG/EMG Results (most recent)     Procedure Component Value Units Date/Time    ECG 12 Lead [[id number]] Collected: 08/03/21 2306     Updated: 08/03/21 2307     QT Interval 451 ms     Narrative:      HEART RATE= 62  bpm  RR Interval= 984  ms  MO Interval= 141  ms  P Horizontal Axis= 4  deg  P Front Axis= 52  deg  QRSD Interval= 141  ms  QT Interval= 451  ms  QRS Axis= -84  deg  T Wave Axis= 27  deg  - ABNORMAL ECG -  Sinus rhythm  Atrial premature complex  RBBB and LAFB  Inferior infarct, old  Electronically Signed By:   Date and Time of Study: 2021-08-03 23:06:03        reviewed    Assessment/Plan     Abdominal Pain: midepigastric with n/v after eating, likely cholelithiasis. No strading seen on CT abd/pelvis. With normal lipase. No urinary symptoms however UA dirtry. Will await final culture results, hold on abx at this time  - RUQ  - prn zofran  - pain control  - IVF@ 100  - NPO, if able to tolerate can start with clears in the AM    N/V: improving likely 2/2 to above  - plan as above    HTN:  - continue metoprolol 25 mg daily    Vitamin D Deficiency  - continue vitamin D supplements    Hx of Breast cancer  - continue anastrozole    Type 2 DM   - hold home agents  - start ISS + levemir 10 units daily    DVT ppx: loveox  GI ppx: NI  Code: full      Sonya Turner MD  08/04/21  01:36 EDT

## 2021-08-04 NOTE — PLAN OF CARE
Goal Outcome Evaluation:  Plan of Care Reviewed With: patient        Progress: no change  Outcome Summary: resting in bed with spouse at bedside, diet advanced to clears, remains on conteous ivf, npo after midnight for planed lap tawanna. Iv morphine /zofran given x1 for c/o pain, nausea, scopolamine patch applied, with positive results, no furher c/o voiced. VSS, tolerates ra, cont. Pulse ox and co2 entitle in place.

## 2021-08-04 NOTE — CONSULTS
General Surgery      Patient Care Team:  Maria Esther So DO as PCP - General (Internal Medicine)  Cris Mendieta DO as Referring Physician (Internal Medicine)  Luz Elena Herndon MD as Consulting Physician (Hematology and Oncology)    CHIEF COMPLAINT: Opinion regarding gallstones    HISTORY OF PRESENT ILLNESS:    This very pleasant 76-year-old female was admitted to the hospitalist service last night with gallstones.  On discussion with Tyrone Solo, on Monday evening she developed epigastric pain and vomiting after eating dinner.  She had eaten meatloaf with green beans.  She said vomiting helped the pain.  Then the next day she developed epigastric pain again.  Her pain was so severe she was unable to eat.  She came into the ER where she had a CT scan that showed an abnormal pancreas consistent with pancreatitis and gallstones.  She had an ultrasound done today that also showed gallstones.  She says her pain is improved but it is still present.  She points to the epigastric and right upper quadrant area.  She does not smoke or use tobacco products.  She does have a history of breast cancer.  She has no chest pain or shortness of breath.  She has no fevers or chills.  She has no other complaints.      Past Medical History:   Diagnosis Date   • Breast cancer (CMS/HCC) 2017    Left   • Depression    • Diabetes mellitus (CMS/HCC)    • Drug therapy    • GERD (gastroesophageal reflux disease)    • H/O Left breast mass 2017   • H/O vitamin D deficiency    • History of snoring    • Hx of radiation therapy    • Hyperlipidemia    • Hypertension    • Panic attack    • Poor sleep pattern    • Sleep apnea     CPAP     Past Surgical History:   Procedure Laterality Date   • BREAST LUMPECTOMY Left 09/2017   • COLON SURGERY     • COLONOSCOPY     • KIDNEY SURGERY  2011   • MOUTH SURGERY      TOOTH EXTRACTION   • TOOTH EXTRACTION       Family History   Problem Relation Age of Onset   • Stroke Mother    • Heart disease  "Mother    • Hypertension Mother    • Breast cancer Neg Hx      Social History     Tobacco Use   • Smoking status: Former Smoker     Packs/day: 1.00     Years: 25.00     Pack years: 25.00     Quit date:      Years since quittin.6   • Smokeless tobacco: Never Used   • Tobacco comment: smoked 25 years, 1 PPD   Vaping Use   • Vaping Use: Unknown   Substance Use Topics   • Alcohol use: Not Currently   • Drug use: No     Medications Prior to Admission   Medication Sig Dispense Refill Last Dose   • anastrozole (ARIMIDEX) 1 MG tablet Take 1 tablet by mouth Daily. 90 tablet 1 2021   • aspirin 81 MG tablet Take 81 mg by mouth Daily.   2021   • LÁZARO CONTOUR TEST test strip TEST BLOOD SUGAR TWICE A DAY  2    • LÁZARO MICROLET LANCETS lancets TEST BLOOD SUGAR TWICE A DAY  3    • metoprolol succinate XL (TOPROL-XL) 25 MG 24 hr tablet TAKE ONE TABLET BY MOUTH IN THE MORNING  11 2021   • SITagliptin (JANUVIA) 100 MG tablet Take 100 mg by mouth Daily.      • TRESIBA FLEXTOUCH 200 UNIT/ML solution pen-injector 30 Units. 30 Units at night  0 2021   • cholecalciferol (VITAMIN D3) 1000 units tablet Take 2,000 Units by mouth Daily.   2021   • NOVOFINE 32G X 6 MM misc USE TWO TIMES A DAY  1 Unknown at Unknown time     Allergies:  Sulfa antibiotics    REVIEW OF SYSTEMS:  Please see the above history of present illness for pertinent positives and negatives.  The remainder of the patient's systems have been reviewed and are negative.     Vital Signs  Temp:  [98 °F (36.7 °C)-98.7 °F (37.1 °C)] 98.4 °F (36.9 °C)  Heart Rate:  [61-77] 76  Resp:  [16-20] 20  BP: (126-179)/(71-96) 179/81    Flowsheet Rows      First Filed Value   Admission Height  167.6 cm (66\") Documented at 2021   Admission Weight  79.8 kg (176 lb) Documented at 2021           Physical Exam:  Physical Exam   Constitutional: Patient appears well-developed and well-nourished and in no acute distress   HEENT:   Head: Normocephalic " and atraumatic.   Mouth and Throat: Patient has moist mucous membranes. Oropharynx is clear of any erythema or exudate.     Neck: Neck supple. No JVD present. No thyromegaly present. No lymphadenopathy present.  Cardiovascular: Regular rate, regular rhythm.  Pulmonary/Chest: Lungs are clear to auscultation bilaterally.  Abdominal: soft, no bowel sounds, tender in the epigastric area and ruq  Musculoskeletal: Normal posture.  Extremities: No edema.No deformities noted  Neurological: Patient is alert and oriented.  Psychological:   Mood and behavior appropriate.  Skin: Skin is warm and dry.    Debilities/Disabilities Identified: None    Emotional Behavior: Appropriate           Results Review:    I reviewed the patient's new clinical results.  Lab Results (most recent)     Procedure Component Value Units Date/Time    POC Glucose Once [[id number]]  (Abnormal) Collected: 08/04/21 0616    Specimen: Blood Updated: 08/04/21 0623     Glucose 229 mg/dL      Comment: RN Notified R and V Meter: [id number] : [id number] Arelis RENEE       COVID PRE-OP / PRE-PROCEDURE SCREENING ORDER (NO ISOLATION) - Swab, Nasal Cavity [[id number]]  (Normal) Collected: 08/04/21 0103    Specimen: Swab from Nasal Cavity Updated: 08/04/21 0141    Narrative:      The following orders were created for panel order COVID PRE-OP / PRE-PROCEDURE SCREENING ORDER (NO ISOLATION) - Swab, Nasal Cavity.  Procedure                               Abnormality         Status                     ---------                               -----------         ------                     COVID-19,[facility]...[[id number]]  Normal              Final result                 Please view results for these tests on the individual orders.    COVID-19,[facility]HOU[facility],Nasal Swab No Transport Media 3-4 HR TAT - Swab, Nasal Cavity [[id number]]  (Normal) Collected: 08/04/21 0103    Specimen: Swab from Nasal Cavity Updated: 08/04/21 0141     COVID19 Not Detected     Narrative:      Fact sheet for providers: https://www.fda.gov/media/697448/download     Fact sheet for patients: https://www.fda.gov/media/736125/download    Test performed by PCR.    Consider negative results in combination with clinical observations, patient history, and epidemiological information.    Troponin [[id number]]  (Normal) Collected: 08/04/21 0051    Specimen: Blood Updated: 08/04/21 0122     Troponin T <0.010 ng/mL     Narrative:      Troponin T Reference Range:  <= 0.03 ng/mL-   Negative for AMI  >0.03 ng/mL-     Abnormal for myocardial necrosis.  Clinicians would have to utilize clinical acumen, EKG, Troponin and serial changes to determine if it is an Acute Myocardial Infarction or myocardial injury due to an underlying chronic condition.       Results may be falsely decreased if patient taking Biotin.      Comprehensive Metabolic Panel [[id number]]  (Abnormal) Collected: 08/03/21 2302    Specimen: Blood Updated: 08/03/21 2335     Glucose 268 mg/dL      BUN 18 mg/dL      Creatinine 1.12 mg/dL      Sodium 137 mmol/L      Potassium 4.1 mmol/L      Chloride 98 mmol/L      CO2 27.2 mmol/L      Calcium 9.7 mg/dL      Total Protein 7.0 g/dL      Albumin 4.20 g/dL      ALT (SGPT) 108 U/L      AST (SGOT) 165 U/L      Alkaline Phosphatase 138 U/L      Total Bilirubin 0.7 mg/dL      eGFR  African Amer 57 mL/min/1.73      Globulin 2.8 gm/dL      A/G Ratio 1.5 g/dL      BUN/Creatinine Ratio 16.1     Anion Gap 11.8 mmol/L     Narrative:      GFR Normal >60  Chronic Kidney Disease <60  Kidney Failure <15      Lipase [[id number]]  (Normal) Collected: 08/03/21 2302    Specimen: Blood Updated: 08/03/21 2335     Lipase 30 U/L     Urinalysis, Microscopic Only - Urine, Clean Catch [[id number]]  (Abnormal) Collected: 08/03/21 2302    Specimen: Urine, Clean Catch Updated: 08/03/21 2326     RBC, UA None Seen /HPF      WBC, UA 6-12 /HPF      Bacteria, UA 3+ /HPF      Squamous Epithelial Cells, UA 3-6 /HPF      Hyaline Casts,  UA None Seen /LPF      Methodology Manual Light Microscopy    Urine Culture - Urine, Urine, Clean Catch [[id number]] Collected: 08/03/21 2302    Specimen: Urine, Clean Catch Updated: 08/03/21 2326    Urinalysis With Culture If Indicated - Urine, Clean Catch [[id number]]  (Abnormal) Collected: 08/03/21 2302    Specimen: Urine, Clean Catch Updated: 08/03/21 2318     Color, UA Yellow     Appearance, UA Clear     pH, UA 6.0     Specific Gravity, UA 1.025     Glucose, UA >=1000 mg/dL (3+)     Ketones, UA Negative     Bilirubin, UA Negative     Blood, UA Negative     Protein, UA 30 mg/dL (1+)     Leuk Esterase, UA Trace     Nitrite, UA Negative     Urobilinogen, UA 2.0 E.U./dL    CBC & Differential [[id number]]  (Abnormal) Collected: 08/03/21 2302    Specimen: Blood Updated: 08/03/21 2316    Narrative:      The following orders were created for panel order CBC & Differential.  Procedure                               Abnormality         Status                     ---------                               -----------         ------                     CBC Auto Differential[[id number]]        Abnormal            Final result                 Please view results for these tests on the individual orders.    CBC Auto Differential [[id number]]  (Abnormal) Collected: 08/03/21 2302    Specimen: Blood Updated: 08/03/21 2316     WBC 8.55 10*3/mm3      RBC 5.01 10*6/mm3      Hemoglobin 13.3 g/dL      Hematocrit 42.4 %      MCV 84.6 fL      MCH 26.5 pg      MCHC 31.4 g/dL      RDW 14.4 %      RDW-SD 44.1 fl      MPV 9.9 fL      Platelets 190 10*3/mm3      Neutrophil % 82.7 %      Lymphocyte % 8.5 %      Monocyte % 7.5 %      Eosinophil % 0.5 %      Basophil % 0.6 %      Immature Grans % 0.2 %      Neutrophils, Absolute 7.07 10*3/mm3      Lymphocytes, Absolute 0.73 10*3/mm3      Monocytes, Absolute 0.64 10*3/mm3      Eosinophils, Absolute 0.04 10*3/mm3      Basophils, Absolute 0.05 10*3/mm3      Immature Grans, Absolute 0.02 10*3/mm3      nRBC  0.0 /100 WBC           Imaging Results (Most Recent)     Procedure Component Value Units Date/Time    US Abdomen Limited [[id number]] Collected: 08/04/21 0818     Updated: 08/04/21 0824    Narrative:      ULTRASOUND ABDOMEN, LIMITED, 08/04/2021         HISTORY:  76-year-old female admitted to the hospital last evening after new onset  postprandial abdomen pain with nausea and vomiting. CT examination  showed cholelithiasis and questioned mild pancreatitis. Serum lipase  level was normal. White blood cell count is normal.     TECHNIQUE:  Grayscale ultrasound imaging of the right upper abdomen.     COMPARISON:  *  CT abdomen/pelvis, 08/03/2021.     FINDINGS:  The gallbladder is markedly abnormal in appearance. Cholelithiasis is  present with at least one large gallstone measuring about 2.5 cm. There  is also a large amount of compacted biliary sludge within the  gallbladder lumen. The gallbladder wall is diffusely thickened, the  gallbladder is tender with direct pressure.     There is no intrahepatic or extrahepatic bile duct dilatation (CBD 5  mm).     Liver is normal in size and appearance, although somewhat poorly seen.  Partially obscured pancreas is negative where visualized. Right kidney  is negative with no hydronephrosis.       Impression:      1.  Abnormal gallbladder. Cholelithiasis with dense biliary sludge  filling a thick-walled, nontender gallbladder. Concern for acute  cholecystitis.  2.  Consider radionuclide hepatobiliary scan to confirm acute  cholecystitis if clinically indicated (after halting opioid pain  medication for at least 6 hours).     This report was finalized on 8/4/2021 8:22 AM by Dr. Jose Chambers MD.       CT Abdomen Pelvis With Contrast [[id number]] Collected: 08/04/21 0024     Updated: 08/04/21 0027    Narrative:      CT Abdomen Pelvis W    INDICATION:   Epigastric pain. Symptoms began 5 hours prior to arrival following [facility].    TECHNIQUE:   CT of the abdomen and pelvis with IV  contrast. Coronal and sagittal reconstructions were obtained.  Radiation dose reduction techniques included automated exposure control or exposure modulation based on body size. Count of known CT and cardiac nuc med  studies performed in previous 12 months: 0.     COMPARISON:   None available.    FINDINGS:  Abdomen: Included lung bases demonstrate emphysematous change. There is no effusion. Atherosclerotic aorta and ectasia of the distal thoracic aorta. Spleen is negative. There are clips associated with the medial limb right adrenal gland is otherwise  negative. There is nodularity of the left adrenal gland. The largest nodule measures 1.4 cm and there is a smaller nodule measuring 9 mm. These cannot be further characterized on this single phase study but absent any history of malignancy or risk  factors for adrenal malignancy are likely benign adenomas. Suggest comparison to prior outside studies initially to assess stability.    There is diminished attenuation and subtle edema of the pancreas suggestive of acute pancreatitis. Correlate with laboratory data. Mild hepatic steatosis. The gallbladder is distended up to 3.5 cm. There are multiple gallstones with peripheral rim  calcification, measuring up to 18 mm. No distinct CT evidence of acute cholecystitis at this time. There is no biliary ductal dilatation. Extrahepatic common bile duct measures up to 6 mm in.    The kidneys are nonobstructed and demonstrate cortical scarring. Tiny benign left renal cyst. No adenopathy.    Pelvis: Negative bladder and leiomyomatous change of the uterus. No drainable fluid collection. Extensive diverticulosis of the colon. No bowel obstruction. Appendix diminutive but normal. Redundancy of the ascending colon and cecum. Tiny fat-containing  right inguinal hernia. Fat-containing umbilical hernia. Degenerative changes. No suspicious bone lesion.  negative.      Impression:      1. Abnormal appearance of the pancreas suggestive  of mild acute pancreatitis. Laboratory data would be complimentary.  2. Cholelithiasis without additional CT evidence of acute cholecystitis. Gallbladder ultrasound would be complimentary along with laboratory data.  3. Normal appendix.  4. Left adrenal nodules, technically indeterminate but likely benign adenomas. Suggest initial comparison to prior outside studies to assess stability.          Signer Name: Charles Garcia MD   Signed: 8/4/2021 12:24 AM   Workstation Name: [email or web address]    [facility]        reviewed    ECG/EMG Results (most recent)     Procedure Component Value Units Date/Time    ECG 12 Lead [[id number]] Collected: 08/03/21 2306     Updated: 08/04/21 0703     QT Interval 451 ms     Narrative:      HEART RATE= 62  bpm  RR Interval= 984  ms  FL Interval= 141  ms  P Horizontal Axis= 4  deg  P Front Axis= 52  deg  QRSD Interval= 141  ms  QT Interval= 451  ms  QRS Axis= -84  deg  T Wave Axis= 27  deg  - ABNORMAL ECG -  Sinus rhythm  Atrial premature complex  RBBB and LAFB  Inferior infarct, old  NO PRIOR TRACING AVAILABLE FOR COMPARISON  Electronically Signed By: Hans Isaac ([facility]) 04-Aug-2021 07:02:57  Date and Time of Study: 2021-08-03 23:06:03            Assessment/Plan     Gallstone pancreatitis    I discussed with Tyrone Solo, her  and daughter, the benefits and risks of performing a laparoscopic cholecystectomy with intraoperative cholangiogram possible open procedure.  Benefits and risks not limited to but including: Bleeding, infection, hernia formation, having to convert to an open procedure, injury to intra-abdominal structures, intra-abdominal abscess, intra-abdominal biloma, bile leak, DVT, PE, atelectasis, pneumonia, anesthetic complications.  They appeared to understand and is willing to proceed.    I will add her on for tomorrow    I discussed the patients findings and my recommendations with patient and the hospitalist service.     Yasmeen Andrade,  DO  08/04/21  10:40 EDT

## 2021-08-04 NOTE — CASE MANAGEMENT/SOCIAL WORK
Discharge Planning Assessment  [facility]     Patient Name: Tyrone Rand  MRN: [id number]  Today's Date: 8/4/2021    Admit Date: 8/3/2021    Discharge Needs Assessment     Row Name 08/04/21 1245       Living Environment    Lives With  spouse    Name(s) of Who Lives With Patient  Lawerence,     Current Living Arrangements  home/apartment/condo    Duration at Residence  30 years    Potentially Unsafe Housing Conditions  -- none verbalized    Primary Care Provided by  self    Provides Primary Care For  no one    Family Caregiver if Needed  spouse    Family Caregiver Names  Pascual, mira    Quality of Family Relationships  supportive;helpful    Able to Return to Prior Arrangements  yes       Resource/Environmental Concerns    Resource/Environmental Concerns  none    Transportation Concerns  car, none       Transition Planning    Patient/Family Anticipates Transition to  home with family    Patient/Family Anticipated Services at Transition  none    Transportation Anticipated  family or friend will provide       Discharge Needs Assessment    Readmission Within the Last 30 Days  no previous admission in last 30 days    Current Outpatient/Agency/Support Group  -- none    Equipment Currently Used at Home  -- none    Concerns to be Addressed  no discharge needs identified    Anticipated Changes Related to Illness  none    Equipment Needed After Discharge  none    Outpatient/Agency/Support Group Needs  -- none    Discharge Facility/Level of Care Needs  -- none    Provided Post Acute Provider List?  Refused    Refused Provider List Comment  Pt declined    Provided Post Acute Provider Quality & Resource List?  Refused    Refused Quality and Resource List Comment  Pt declined    Current Discharge Risk  -- none identified        Discharge Plan     Row Name 08/04/21 1246       Plan    Plan  Discharge home with her     Patient/Family in Agreement with Plan  yes    Plan Comments  I spoke with the patient and  her  at bedside with her permission.  I introduced myself and explained my role as . I verified the information on the facesheet and the patients PCP as Dr. So.   The patient uses [facility] in [place] and she can afford her medications and is able to obtain them up.  The patient has an advanced directive and a copy of same will be provided at her convenience.  The patient lives in a single story home with her .  There are 6 stairs to enter the home and she is able to enter and maneuver around the home with no issues.   She is independent with her ADL’s.  Her  will pick her up at discharge.  The patient denied having or needing DME at discharge.  I offered the patient information regarding home health and other community resources and the patient declined the information.  The patient will discharge home with her family to assist as needed and she is agreeable to that plan.   She denied having any further questions or concerns at this time.  CM will continue to follow for further needs.        Continued Care and Services - Admitted Since 8/3/2021    Coordination has not been started for this encounter.         Demographic Summary     Row Name 08/04/21 9136       General Information    Admission Type  observation    Arrived From  home    Referral Source  admission list    Reason for Consult  discharge planning    Preferred Language  English     Used During This Interaction  no       Contact Information    Permission Granted to Share Info With          Functional Status    No documentation.       Psychosocial    No documentation.       Abuse/Neglect    No documentation.       Legal    No documentation.       Substance Abuse    No documentation.       Patient Forms    No documentation.           Yasmeen Arce RN

## 2021-08-04 NOTE — PLAN OF CARE
Goal Outcome Evaluation:  Plan of Care Reviewed With: patient        Progress: no change  Outcome Summary: admitted from ER with abdominal pain, pt NPO at MN, IVFs cont, VSS, resting well throughout the night, will continue to monitor

## 2021-08-04 NOTE — PROGRESS NOTES
Adult Nutrition  Assessment/PES    Patient Name:  Tyrone Rand  YOB: 1945  MRN: [id number]  Admit Date:  8/3/2021    Assessment Date:  8/4/2021    Comments:  Adv diet as indicated post op.  Will cont to follow and monitor    Reason for Assessment     Row Name 08/04/21 1509          Reason for Assessment    Reason For Assessment  identified at risk by screening criteria     Diagnosis  gastrointestinal disease abd pain, gallstones pending lap tawanna hx DM CA HTN     Identified At Risk by Screening Criteria  MST SCORE 2+         Nutrition/Diet History     Row Name 08/04/21 1510          Nutrition/Diet History    Typical Food/Fluid Intake  Spoke w pt, spouse & RN also present permission given. NKFA. Denies issue chew/swallow. Reports 1-2 # wt loss. Denies food allergies. Denies edu needs. Reports has DM but doesn't really follow at home like she should.         Anthropometrics     Row Name 08/04/21 1510          Anthropometrics    Weight  -- 176.4#        Body Mass Index (BMI)    BMI Assessment  BMI 25-29.9: overweight         Labs/Tests/Procedures/Meds     Row Name 08/04/21 1511          Labs/Procedures/Meds    Lab Results Reviewed  reviewed     Lab Results Comments  glu 268, 229        Diagnostic Tests/Procedures    Diagnostic Test/Procedure Reviewed  reviewed        Medications    Pertinent Medications Reviewed  reviewed     Pertinent Medications Comments  vitamin D, novolog, levemir           Estimated/Assessed Needs     Row Name 08/04/21 1511          Estimated/Assessed Needs    Additional Documentation  Calorie Requirements (Group);Fluid Requirements (Group);Protein Requirements (Group)        Calorie Requirements    Estimated Calorie Need Method  [place]-St Aga     Estimated Calorie Requirement Comment  1558 kcal ( mifflin 1.2 )        Protein Requirements    Est Protein Requirement Amount (gms/kg)  1.0 gm protein 80 gm pro        Fluid Requirements    Estimated Fluid Requirement Method  RDA  Method 1558 ml         Nutrition Prescription Ordered     Row Name 08/04/21 1511          Nutrition Prescription PO    Current PO Diet  NPO         Evaluation of Received Nutrient/Fluid Intake     Row Name 08/04/21 1512          Fluid Intake Evaluation    Oral Fluid (mL)  -- insufficient data     IV Fluid (mL)  -- pt on IVF        PO Evaluation    Number of Days PO Intake Evaluated  Insufficient Data               Problem/Interventions:  Problem 1     Row Name 08/04/21 1512          Nutrition Diagnoses Problem 1    Problem 1  Predicted Suboptimal Intake     Etiology (related to)  Medical Diagnosis     Signs/Symptoms (evidenced by)  NPO               Intervention Goal     Row Name 08/04/21 1512          Intervention Goal    General  Meet nutritional needs for age/condition     PO  Establish PO;PO intake (%);Advance diet     PO Intake %  50 % or greater     Weight  No significant weight loss         Nutrition Intervention     Row Name 08/04/21 1512          Nutrition Intervention    RD/Tech Action  Follow Tx progress           Education/Evaluation     Row Name 08/04/21 1513          Education    Education  Education topics Edu pt that gallbladder helps digest fat, suggest avoiding high fat , frited,greasy foods post op until seen by MD for surgery follow up. Usually body adapts.     Education Topics  Fat        Monitor/Evaluation    Monitor  Per protocol;I&O;PO intake;Pertinent labs;Weight;Symptoms     Education Follow-up  Other (comment) pt verbalized understanding.           Electronically signed by:  Ashley Fonseca RD  08/04/21 15:14 EDT

## 2021-08-05 ENCOUNTER — APPOINTMENT (OUTPATIENT)
Dept: GENERAL RADIOLOGY | Facility: HOSPITAL | Age: 76
End: 2021-08-05

## 2021-08-05 ENCOUNTER — ANESTHESIA EVENT (OUTPATIENT)
Dept: PERIOP | Facility: HOSPITAL | Age: 76
End: 2021-08-05

## 2021-08-05 ENCOUNTER — ANESTHESIA (OUTPATIENT)
Dept: PERIOP | Facility: HOSPITAL | Age: 76
End: 2021-08-05

## 2021-08-05 PROBLEM — C50.312 MALIGNANT NEOPLASM OF LOWER-INNER QUADRANT OF LEFT BREAST IN FEMALE, ESTROGEN RECEPTOR NEGATIVE: Chronic | Status: ACTIVE | Noted: 2017-08-03

## 2021-08-05 PROBLEM — Z17.1 MALIGNANT NEOPLASM OF LOWER-INNER QUADRANT OF LEFT BREAST IN FEMALE, ESTROGEN RECEPTOR NEGATIVE: Chronic | Status: ACTIVE | Noted: 2017-08-03

## 2021-08-05 PROBLEM — K80.42 CALCULUS OF BILE DUCT WITH ACUTE CHOLECYSTITIS WITHOUT OBSTRUCTION: Status: ACTIVE | Noted: 2021-08-03

## 2021-08-05 LAB
ALBUMIN SERPL-MCNC: 3.7 G/DL (ref 3.5–5.2)
ALP SERPL-CCNC: 131 U/L (ref 39–117)
ALT SERPL W P-5'-P-CCNC: 245 U/L (ref 1–33)
AMYLASE SERPL-CCNC: 35 U/L (ref 28–100)
ANION GAP SERPL CALCULATED.3IONS-SCNC: 9 MMOL/L (ref 5–15)
AST SERPL-CCNC: 127 U/L (ref 1–32)
BASOPHILS # BLD AUTO: 0.05 10*3/MM3 (ref 0–0.2)
BASOPHILS NFR BLD AUTO: 0.5 % (ref 0–1.5)
BILIRUB CONJ SERPL-MCNC: <0.2 MG/DL (ref 0–0.3)
BILIRUB INDIRECT SERPL-MCNC: ABNORMAL MG/DL
BILIRUB SERPL-MCNC: 0.2 MG/DL (ref 0–1.2)
BUN SERPL-MCNC: 11 MG/DL (ref 8–23)
BUN/CREAT SERPL: 9.7 (ref 7–25)
CALCIUM SPEC-SCNC: 9.3 MG/DL (ref 8.6–10.5)
CHLORIDE SERPL-SCNC: 101 MMOL/L (ref 98–107)
CO2 SERPL-SCNC: 25 MMOL/L (ref 22–29)
CREAT SERPL-MCNC: 1.13 MG/DL (ref 0.57–1)
DEPRECATED RDW RBC AUTO: 44.2 FL (ref 37–54)
EOSINOPHIL # BLD AUTO: 0.11 10*3/MM3 (ref 0–0.4)
EOSINOPHIL NFR BLD AUTO: 1.1 % (ref 0.3–6.2)
ERYTHROCYTE [DISTWIDTH] IN BLOOD BY AUTOMATED COUNT: 14.6 % (ref 12.3–15.4)
GFR SERPL CREATININE-BSD FRML MDRD: 56 ML/MIN/1.73
GLUCOSE BLDC GLUCOMTR-MCNC: 124 MG/DL (ref 70–130)
GLUCOSE BLDC GLUCOMTR-MCNC: 135 MG/DL (ref 70–130)
GLUCOSE BLDC GLUCOMTR-MCNC: 145 MG/DL (ref 70–130)
GLUCOSE SERPL-MCNC: 127 MG/DL (ref 65–99)
HCT VFR BLD AUTO: 44.1 % (ref 34–46.6)
HGB BLD-MCNC: 13.9 G/DL (ref 12–15.9)
IMM GRANULOCYTES # BLD AUTO: 0.03 10*3/MM3 (ref 0–0.05)
IMM GRANULOCYTES NFR BLD AUTO: 0.3 % (ref 0–0.5)
LIPASE SERPL-CCNC: 11 U/L (ref 13–60)
LYMPHOCYTES # BLD AUTO: 0.78 10*3/MM3 (ref 0.7–3.1)
LYMPHOCYTES NFR BLD AUTO: 7.5 % (ref 19.6–45.3)
MCH RBC QN AUTO: 26.3 PG (ref 26.6–33)
MCHC RBC AUTO-ENTMCNC: 31.5 G/DL (ref 31.5–35.7)
MCV RBC AUTO: 83.4 FL (ref 79–97)
MONOCYTES # BLD AUTO: 0.85 10*3/MM3 (ref 0.1–0.9)
MONOCYTES NFR BLD AUTO: 8.2 % (ref 5–12)
NEUTROPHILS NFR BLD AUTO: 8.6 10*3/MM3 (ref 1.7–7)
NEUTROPHILS NFR BLD AUTO: 82.4 % (ref 42.7–76)
NRBC BLD AUTO-RTO: 0 /100 WBC (ref 0–0.2)
PLAT MORPH BLD: NORMAL
PLATELET # BLD AUTO: 125 10*3/MM3 (ref 140–450)
PMV BLD AUTO: 11.2 FL (ref 6–12)
POTASSIUM SERPL-SCNC: 4.8 MMOL/L (ref 3.5–5.2)
PROT SERPL-MCNC: 6.7 G/DL (ref 6–8.5)
RBC # BLD AUTO: 5.29 10*6/MM3 (ref 3.77–5.28)
RBC MORPH BLD: NORMAL
SODIUM SERPL-SCNC: 135 MMOL/L (ref 136–145)
WBC # BLD AUTO: 10.42 10*3/MM3 (ref 3.4–10.8)
WBC MORPH BLD: NORMAL

## 2021-08-05 PROCEDURE — 83690 ASSAY OF LIPASE: CPT | Performed by: SURGERY

## 2021-08-05 PROCEDURE — 25010000002 ONDANSETRON PER 1 MG: Performed by: STUDENT IN AN ORGANIZED HEALTH CARE EDUCATION/TRAINING PROGRAM

## 2021-08-05 PROCEDURE — 63710000001 INSULIN DETEMIR PER 5 UNITS: Performed by: STUDENT IN AN ORGANIZED HEALTH CARE EDUCATION/TRAINING PROGRAM

## 2021-08-05 PROCEDURE — 25010000002 IOPAMIDOL 61 % SOLUTION: Performed by: SURGERY

## 2021-08-05 PROCEDURE — 85007 BL SMEAR W/DIFF WBC COUNT: CPT | Performed by: STUDENT IN AN ORGANIZED HEALTH CARE EDUCATION/TRAINING PROGRAM

## 2021-08-05 PROCEDURE — 25010000002 CEFTRIAXONE SODIUM-DEXTROSE 1-3.74 GM-%(50ML) RECONSTITUTED SOLUTION: Performed by: NURSE PRACTITIONER

## 2021-08-05 PROCEDURE — 80076 HEPATIC FUNCTION PANEL: CPT | Performed by: STUDENT IN AN ORGANIZED HEALTH CARE EDUCATION/TRAINING PROGRAM

## 2021-08-05 PROCEDURE — 94799 UNLISTED PULMONARY SVC/PX: CPT

## 2021-08-05 PROCEDURE — 25010000002 ONDANSETRON PER 1 MG: Performed by: REGISTERED NURSE

## 2021-08-05 PROCEDURE — 25010000002 MORPHINE PER 10 MG: Performed by: STUDENT IN AN ORGANIZED HEALTH CARE EDUCATION/TRAINING PROGRAM

## 2021-08-05 PROCEDURE — 25010000002 FENTANYL CITRATE (PF) 50 MCG/ML SOLUTION: Performed by: NURSE ANESTHETIST, CERTIFIED REGISTERED

## 2021-08-05 PROCEDURE — G0378 HOSPITAL OBSERVATION PER HR: HCPCS

## 2021-08-05 PROCEDURE — C1889 IMPLANT/INSERT DEVICE, NOC: HCPCS | Performed by: SURGERY

## 2021-08-05 PROCEDURE — 25010000002 NEOSTIGMINE 10 MG/10ML SOLUTION: Performed by: NURSE ANESTHETIST, CERTIFIED REGISTERED

## 2021-08-05 PROCEDURE — 85025 COMPLETE CBC W/AUTO DIFF WBC: CPT | Performed by: STUDENT IN AN ORGANIZED HEALTH CARE EDUCATION/TRAINING PROGRAM

## 2021-08-05 PROCEDURE — 0FT44ZZ RESECTION OF GALLBLADDER, PERCUTANEOUS ENDOSCOPIC APPROACH: ICD-10-PCS | Performed by: SURGERY

## 2021-08-05 PROCEDURE — 99203 OFFICE O/P NEW LOW 30 MIN: CPT | Performed by: INTERNAL MEDICINE

## 2021-08-05 PROCEDURE — 25010000002 DEXAMETHASONE PER 1 MG: Performed by: REGISTERED NURSE

## 2021-08-05 PROCEDURE — 74300 X-RAY BILE DUCTS/PANCREAS: CPT

## 2021-08-05 PROCEDURE — 47563 LAPARO CHOLECYSTECTOMY/GRAPH: CPT | Performed by: SURGERY

## 2021-08-05 PROCEDURE — 25010000002 MORPHINE PER 10 MG: Performed by: SURGERY

## 2021-08-05 PROCEDURE — 25010000002 PHENYLEPHRINE PER 1 ML: Performed by: NURSE ANESTHETIST, CERTIFIED REGISTERED

## 2021-08-05 PROCEDURE — 99232 SBSQ HOSP IP/OBS MODERATE 35: CPT | Performed by: INTERNAL MEDICINE

## 2021-08-05 PROCEDURE — 82962 GLUCOSE BLOOD TEST: CPT

## 2021-08-05 PROCEDURE — 25010000002 PROPOFOL 10 MG/ML EMULSION: Performed by: NURSE ANESTHETIST, CERTIFIED REGISTERED

## 2021-08-05 PROCEDURE — 88304 TISSUE EXAM BY PATHOLOGIST: CPT | Performed by: SURGERY

## 2021-08-05 PROCEDURE — 82150 ASSAY OF AMYLASE: CPT | Performed by: SURGERY

## 2021-08-05 PROCEDURE — 80048 BASIC METABOLIC PNL TOTAL CA: CPT | Performed by: STUDENT IN AN ORGANIZED HEALTH CARE EDUCATION/TRAINING PROGRAM

## 2021-08-05 PROCEDURE — BF131ZZ FLUOROSCOPY OF GALLBLADDER AND BILE DUCTS USING LOW OSMOLAR CONTRAST: ICD-10-PCS | Performed by: SURGERY

## 2021-08-05 PROCEDURE — 25010000002 HYDROMORPHONE PER 4 MG: Performed by: NURSE ANESTHETIST, CERTIFIED REGISTERED

## 2021-08-05 PROCEDURE — C1887 CATHETER, GUIDING: HCPCS | Performed by: SURGERY

## 2021-08-05 DEVICE — FLOSEAL HEMOSTATIC MATRIX, 5ML
Type: IMPLANTABLE DEVICE | Site: ABDOMEN | Status: FUNCTIONAL
Brand: FLOSEAL HEMOSTATIC MATRIX

## 2021-08-05 DEVICE — LIGAMAX 5 MM ENDOSCOPIC MULTIPLE CLIP APPLIER
Type: IMPLANTABLE DEVICE | Site: ABDOMEN | Status: FUNCTIONAL
Brand: LIGAMAX

## 2021-08-05 DEVICE — ABSORBABLE HEMOSTAT (OXIDIZED REGENERATED CELLULOSE, U.S.P.)
Type: IMPLANTABLE DEVICE | Site: ABDOMEN | Status: FUNCTIONAL
Brand: SURGICEL

## 2021-08-05 RX ORDER — GLYCOPYRROLATE 0.2 MG/ML
INJECTION INTRAMUSCULAR; INTRAVENOUS AS NEEDED
Status: DISCONTINUED | OUTPATIENT
Start: 2021-08-05 | End: 2021-08-05 | Stop reason: SURG

## 2021-08-05 RX ORDER — SODIUM CHLORIDE, SODIUM LACTATE, POTASSIUM CHLORIDE, CALCIUM CHLORIDE 600; 310; 30; 20 MG/100ML; MG/100ML; MG/100ML; MG/100ML
INJECTION, SOLUTION INTRAVENOUS CONTINUOUS PRN
Status: DISCONTINUED | OUTPATIENT
Start: 2021-08-05 | End: 2021-08-05 | Stop reason: SURG

## 2021-08-05 RX ORDER — KETAMINE HYDROCHLORIDE 10 MG/ML
INJECTION INTRAMUSCULAR; INTRAVENOUS AS NEEDED
Status: DISCONTINUED | OUTPATIENT
Start: 2021-08-05 | End: 2021-08-05 | Stop reason: SURG

## 2021-08-05 RX ORDER — MAGNESIUM HYDROXIDE 1200 MG/15ML
LIQUID ORAL AS NEEDED
Status: DISCONTINUED | OUTPATIENT
Start: 2021-08-05 | End: 2021-08-05 | Stop reason: HOSPADM

## 2021-08-05 RX ORDER — FAMOTIDINE 10 MG/ML
20 INJECTION, SOLUTION INTRAVENOUS
Status: COMPLETED | OUTPATIENT
Start: 2021-08-05 | End: 2021-08-05

## 2021-08-05 RX ORDER — ONDANSETRON 2 MG/ML
4 INJECTION INTRAMUSCULAR; INTRAVENOUS ONCE AS NEEDED
Status: COMPLETED | OUTPATIENT
Start: 2021-08-05 | End: 2021-08-05

## 2021-08-05 RX ORDER — CEFAZOLIN SODIUM 2 G/50ML
2 SOLUTION INTRAVENOUS ONCE
Status: DISCONTINUED | OUTPATIENT
Start: 2021-08-05 | End: 2021-08-05

## 2021-08-05 RX ORDER — FENTANYL CITRATE 50 UG/ML
INJECTION, SOLUTION INTRAMUSCULAR; INTRAVENOUS AS NEEDED
Status: DISCONTINUED | OUTPATIENT
Start: 2021-08-05 | End: 2021-08-05 | Stop reason: SURG

## 2021-08-05 RX ORDER — DEXMEDETOMIDINE HYDROCHLORIDE 100 UG/ML
INJECTION, SOLUTION INTRAVENOUS AS NEEDED
Status: DISCONTINUED | OUTPATIENT
Start: 2021-08-05 | End: 2021-08-05 | Stop reason: SURG

## 2021-08-05 RX ORDER — LIDOCAINE HYDROCHLORIDE 20 MG/ML
INJECTION, SOLUTION INFILTRATION; PERINEURAL AS NEEDED
Status: DISCONTINUED | OUTPATIENT
Start: 2021-08-05 | End: 2021-08-05 | Stop reason: SURG

## 2021-08-05 RX ORDER — ONDANSETRON 2 MG/ML
4 INJECTION INTRAMUSCULAR; INTRAVENOUS ONCE AS NEEDED
Status: DISCONTINUED | OUTPATIENT
Start: 2021-08-05 | End: 2021-08-05 | Stop reason: HOSPADM

## 2021-08-05 RX ORDER — SODIUM CHLORIDE 9 MG/ML
INJECTION, SOLUTION INTRAVENOUS AS NEEDED
Status: DISCONTINUED | OUTPATIENT
Start: 2021-08-05 | End: 2021-08-05 | Stop reason: HOSPADM

## 2021-08-05 RX ORDER — NEOSTIGMINE METHYLSULFATE 1 MG/ML
INJECTION, SOLUTION INTRAVENOUS AS NEEDED
Status: DISCONTINUED | OUTPATIENT
Start: 2021-08-05 | End: 2021-08-05 | Stop reason: SURG

## 2021-08-05 RX ORDER — EPHEDRINE SULFATE 50 MG/ML
INJECTION, SOLUTION INTRAVENOUS AS NEEDED
Status: DISCONTINUED | OUTPATIENT
Start: 2021-08-05 | End: 2021-08-05 | Stop reason: SURG

## 2021-08-05 RX ORDER — HYDROMORPHONE HYDROCHLORIDE 1 MG/ML
0.5 INJECTION, SOLUTION INTRAMUSCULAR; INTRAVENOUS; SUBCUTANEOUS
Status: COMPLETED | OUTPATIENT
Start: 2021-08-05 | End: 2021-08-05

## 2021-08-05 RX ORDER — SODIUM CHLORIDE, SODIUM LACTATE, POTASSIUM CHLORIDE, CALCIUM CHLORIDE 600; 310; 30; 20 MG/100ML; MG/100ML; MG/100ML; MG/100ML
100 INJECTION, SOLUTION INTRAVENOUS CONTINUOUS
Status: DISCONTINUED | OUTPATIENT
Start: 2021-08-05 | End: 2021-08-07

## 2021-08-05 RX ORDER — PROPOFOL 10 MG/ML
VIAL (ML) INTRAVENOUS AS NEEDED
Status: DISCONTINUED | OUTPATIENT
Start: 2021-08-05 | End: 2021-08-05 | Stop reason: SURG

## 2021-08-05 RX ORDER — ROCURONIUM BROMIDE 10 MG/ML
INJECTION, SOLUTION INTRAVENOUS AS NEEDED
Status: DISCONTINUED | OUTPATIENT
Start: 2021-08-05 | End: 2021-08-05 | Stop reason: SURG

## 2021-08-05 RX ORDER — DEXAMETHASONE SODIUM PHOSPHATE 4 MG/ML
8 INJECTION, SOLUTION INTRA-ARTICULAR; INTRALESIONAL; INTRAMUSCULAR; INTRAVENOUS; SOFT TISSUE ONCE AS NEEDED
Status: COMPLETED | OUTPATIENT
Start: 2021-08-05 | End: 2021-08-05

## 2021-08-05 RX ORDER — HYDROMORPHONE HYDROCHLORIDE 1 MG/ML
0.5 INJECTION, SOLUTION INTRAMUSCULAR; INTRAVENOUS; SUBCUTANEOUS
Status: DISCONTINUED | OUTPATIENT
Start: 2021-08-05 | End: 2021-08-05 | Stop reason: HOSPADM

## 2021-08-05 RX ADMIN — FENTANYL CITRATE 50 MCG: 50 INJECTION INTRAMUSCULAR; INTRAVENOUS at 13:31

## 2021-08-05 RX ADMIN — SODIUM CHLORIDE, POTASSIUM CHLORIDE, SODIUM LACTATE AND CALCIUM CHLORIDE: 600; 310; 30; 20 INJECTION, SOLUTION INTRAVENOUS at 12:27

## 2021-08-05 RX ADMIN — KETAMINE HYDROCHLORIDE 10 MG: 10 INJECTION, SOLUTION INTRAMUSCULAR; INTRAVENOUS at 13:48

## 2021-08-05 RX ADMIN — LIDOCAINE HYDROCHLORIDE 100 MG: 20 INJECTION, SOLUTION INFILTRATION; PERINEURAL at 13:08

## 2021-08-05 RX ADMIN — PHENYLEPHRINE HYDROCHLORIDE 100 MCG: 10 INJECTION, SOLUTION INTRAMUSCULAR; INTRAVENOUS; SUBCUTANEOUS at 13:23

## 2021-08-05 RX ADMIN — PROPOFOL 150 MG: 10 INJECTION, EMULSION INTRAVENOUS at 13:08

## 2021-08-05 RX ADMIN — HYDROMORPHONE HYDROCHLORIDE 0.5 MG: 1 INJECTION, SOLUTION INTRAMUSCULAR; INTRAVENOUS; SUBCUTANEOUS at 15:11

## 2021-08-05 RX ADMIN — ONDANSETRON 4 MG: 2 INJECTION INTRAMUSCULAR; INTRAVENOUS at 09:08

## 2021-08-05 RX ADMIN — KETAMINE HYDROCHLORIDE 20 MG: 10 INJECTION, SOLUTION INTRAMUSCULAR; INTRAVENOUS at 13:08

## 2021-08-05 RX ADMIN — DEXMEDETOMIDINE 6 MCG: 100 INJECTION, SOLUTION, CONCENTRATE INTRAVENOUS at 13:34

## 2021-08-05 RX ADMIN — DEXAMETHASONE SODIUM PHOSPHATE 8 MG: 4 INJECTION, SOLUTION INTRAMUSCULAR; INTRAVENOUS at 12:31

## 2021-08-05 RX ADMIN — ONDANSETRON 4 MG: 2 INJECTION INTRAMUSCULAR; INTRAVENOUS at 12:31

## 2021-08-05 RX ADMIN — MORPHINE SULFATE 2 MG: 2 INJECTION, SOLUTION INTRAMUSCULAR; INTRAVENOUS at 21:19

## 2021-08-05 RX ADMIN — SODIUM CHLORIDE, POTASSIUM CHLORIDE, SODIUM LACTATE AND CALCIUM CHLORIDE 100 ML/HR: 600; 310; 30; 20 INJECTION, SOLUTION INTRAVENOUS at 16:11

## 2021-08-05 RX ADMIN — HYDROMORPHONE HYDROCHLORIDE 0.5 MG: 1 INJECTION, SOLUTION INTRAMUSCULAR; INTRAVENOUS; SUBCUTANEOUS at 15:20

## 2021-08-05 RX ADMIN — NEOSTIGMINE METHYLSULFATE 4 MG: 1 INJECTION INTRAVENOUS at 14:13

## 2021-08-05 RX ADMIN — METRONIDAZOLE 500 MG: 500 INJECTION, SOLUTION INTRAVENOUS at 05:19

## 2021-08-05 RX ADMIN — FAMOTIDINE 20 MG: 10 INJECTION, SOLUTION INTRAVENOUS at 12:31

## 2021-08-05 RX ADMIN — METOPROLOL SUCCINATE 25 MG: 25 TABLET, EXTENDED RELEASE ORAL at 06:24

## 2021-08-05 RX ADMIN — MORPHINE SULFATE 2 MG: 2 INJECTION, SOLUTION INTRAMUSCULAR; INTRAVENOUS at 02:21

## 2021-08-05 RX ADMIN — INSULIN DETEMIR 10 UNITS: 100 INJECTION, SOLUTION SUBCUTANEOUS at 09:09

## 2021-08-05 RX ADMIN — FENTANYL CITRATE 50 MCG: 50 INJECTION INTRAMUSCULAR; INTRAVENOUS at 13:35

## 2021-08-05 RX ADMIN — DEXMEDETOMIDINE 6 MCG: 100 INJECTION, SOLUTION, CONCENTRATE INTRAVENOUS at 13:05

## 2021-08-05 RX ADMIN — METRONIDAZOLE 500 MG: 500 INJECTION, SOLUTION INTRAVENOUS at 19:47

## 2021-08-05 RX ADMIN — ROCURONIUM BROMIDE 5 MG: 10 INJECTION INTRAVENOUS at 13:08

## 2021-08-05 RX ADMIN — SODIUM CHLORIDE 100 ML/HR: 9 INJECTION, SOLUTION INTRAVENOUS at 01:14

## 2021-08-05 RX ADMIN — GLYCOPYRROLATE 0.4 MG: 0.2 INJECTION INTRAMUSCULAR; INTRAVENOUS at 14:13

## 2021-08-05 RX ADMIN — CEFTRIAXONE 1 G: 1 INJECTION, SOLUTION INTRAVENOUS at 10:28

## 2021-08-05 RX ADMIN — METRONIDAZOLE 500 MG: 500 INJECTION, SOLUTION INTRAVENOUS at 13:09

## 2021-08-05 RX ADMIN — ROCURONIUM BROMIDE 25 MG: 10 INJECTION INTRAVENOUS at 13:10

## 2021-08-05 RX ADMIN — ONDANSETRON 4 MG: 2 INJECTION INTRAMUSCULAR; INTRAVENOUS at 02:13

## 2021-08-05 RX ADMIN — EPHEDRINE SULFATE 10 MG: 50 INJECTION, SOLUTION INTRAVENOUS at 13:27

## 2021-08-05 NOTE — PLAN OF CARE
Goal Outcome Evaluation:  Plan of Care Reviewed With: patient, spouse, daughter        Progress: improving  Outcome Summary: Post-op sedated; no c/o pain; HONEY drain with bloody drainage; lap choly done today; clear liquid diet; NPO after midnight for ERCP tomorrow; IVFs cont; labs in am

## 2021-08-05 NOTE — PLAN OF CARE
Goal Outcome Evaluation:  Plan of Care Reviewed With: patient        Progress: no change  Outcome Summary: pt treated with zofran and morhpine x1 overnight for nausea and pain, VSS, up to bathroom with standby assist, resting well throughout the night, room air, spouse at bedside, IVFs cont, NPO at MN, plan is for bertha stacy today, will continue to monitor

## 2021-08-05 NOTE — INTERVAL H&P NOTE
"  H&P reviewed. The patient was examined and there are no changes to the H&P.      No debilities     /83 (BP Location: Right arm, Patient Position: Lying)   Pulse 94   Temp 98.9 °F (37.2 °C) (Oral)   Resp 16   Ht 167.6 cm (66\")   Wt 80 kg (176 lb 6.4 oz)   SpO2 100%   BMI 28.47 kg/m²         "

## 2021-08-05 NOTE — ANESTHESIA POSTPROCEDURE EVALUATION
Patient: Tyrone Rand    Procedure Summary     Date: 08/05/21 Room / Location: [facility] OR 1 /  [facility] OR    Anesthesia Start: 1259 Anesthesia Stop: 1427    Procedure: CHOLECYSTECTOMY LAPAROSCOPIC INTRAOPERATIVE CHOLANGIOGRAM (N/A Abdomen) Diagnosis:       Calculus of gallbladder without cholecystitis without obstruction      (Calculus of gallbladder without cholecystitis without obstruction [K80.20])    Surgeons: Yasmeen Andrade DO Provider: Rosy Calhoun CRNA    Anesthesia Type: general ASA Status: 3          Anesthesia Type: general    Vitals  Vitals Value Taken Time   /60 08/05/21 1540   Temp 97.8 °F (36.6 °C) 08/05/21 1424   Pulse 73 08/05/21 1545   Resp 14 08/05/21 1540   SpO2 90 % 08/05/21 1546   Vitals shown include unvalidated device data.        Post Anesthesia Care and Evaluation    Patient location during evaluation: bedside  Patient participation: complete - patient participated  Level of consciousness: awake and alert  Pain score: 2  Pain management: adequate  Airway patency: patent  Anesthetic complications: No anesthetic complications  PONV Status: none  Cardiovascular status: acceptable  Respiratory status: acceptable  Hydration status: acceptable

## 2021-08-05 NOTE — PROGRESS NOTES
"SERVICE: [facility] HOSPITALIST    CONSULTANTS: Surgery    CHIEF COMPLAINT: Abdominal pain    SUBJECTIVE: Patient seen and examined at bedside. She reports she has a headache currently. She thinks it is related to not eating and to her abdominal pain. She continues to have RUQ abdominal pain, but the pain medication has helped. She also has had nausea, but medication has helped. She says she is ready for surgery. She denies any other complaints including chest pain, fever, chills.     OBJECTIVE:    /83 (BP Location: Right arm, Patient Position: Lying)   Pulse 94   Temp 98.9 °F (37.2 °C) (Oral)   Resp 16   Ht 167.6 cm (66\")   Wt 80 kg (176 lb 6.4 oz)   SpO2 100%   BMI 28.47 kg/m²     MEDS/LABS REVIEWED AND ORDERED    anastrozole, 1 mg, Oral, Daily  cefTRIAXone, 1 g, Intravenous, Q24H  cholecalciferol, 2,000 Units, Oral, Daily  insulin aspart, 0-14 Units, Subcutaneous, Q6H  insulin detemir, 10 Units, Subcutaneous, Daily  metoprolol succinate XL, 25 mg, Oral, QAM  metroNIDAZOLE, , ,   metroNIDAZOLE, 500 mg, Intravenous, Q8H  Scopolamine, 1 patch, Transdermal, Q72H  sodium chloride, 10 mL, Intravenous, Q12H        Physical Exam  Constitutional:       General: She is not in acute distress.     Appearance: Normal appearance. She is not ill-appearing or toxic-appearing.   HENT:      Head: Normocephalic and atraumatic.      Nose: Nose normal.      Mouth/Throat:      Mouth: Mucous membranes are dry.      Pharynx: Oropharynx is clear.   Eyes:      Extraocular Movements: Extraocular movements intact.   Cardiovascular:      Rate and Rhythm: Normal rate and regular rhythm.      Pulses: Normal pulses.      Heart sounds: Normal heart sounds.   Pulmonary:      Effort: Pulmonary effort is normal.      Breath sounds: Normal breath sounds. No wheezing or rales.   Abdominal:      General: Abdomen is flat. Bowel sounds are normal. There is no distension.      Palpations: Abdomen is soft.      Tenderness: " There is abdominal tenderness. There is no guarding.   Musculoskeletal:         General: No swelling, deformity or signs of injury. Normal range of motion.      Cervical back: Normal range of motion.   Skin:     General: Skin is warm and dry.   Neurological:      General: No focal deficit present.      Mental Status: She is alert and oriented to person, place, and time. Mental status is at baseline.   Psychiatric:         Mood and Affect: Mood normal.         Behavior: Behavior normal.         Thought Content: Thought content normal.         Judgment: Judgment normal.         LAB/DIAGNOSTICS:    Lab Results (last 24 hours)     Procedure Component Value Units Date/Time    Basic Metabolic Panel [[id number]]  (Abnormal) Collected: 08/05/21 0618    Specimen: Blood Updated: 08/05/21 0700     Glucose 127 mg/dL      BUN 11 mg/dL      Creatinine 1.13 mg/dL      Sodium 135 mmol/L      Potassium 4.8 mmol/L      Comment: Slight hemolysis detected by analyzer. Results may be affected.        Chloride 101 mmol/L      CO2 25.0 mmol/L      Calcium 9.3 mg/dL      eGFR   Amer 56 mL/min/1.73      BUN/Creatinine Ratio 9.7     Anion Gap 9.0 mmol/L     Narrative:      GFR Normal >60  Chronic Kidney Disease <60  Kidney Failure <15      CBC & Differential [[id number]]  (Abnormal) Collected: 08/05/21 0618    Specimen: Blood Updated: 08/05/21 0657    Narrative:      The following orders were created for panel order CBC & Differential.  Procedure                               Abnormality         Status                     ---------                               -----------         ------                     CBC Auto Differential[[id number]]        Abnormal            Final result               Scan Slide[[id number]]                   Normal              Final result                 Please view results for these tests on the individual orders.    Scan Slide [[id number]]  (Normal) Collected: 08/05/21 0618    Specimen: Blood Updated: 08/05/21  0657     RBC Morphology Normal     WBC Morphology Normal     Platelet Morphology Normal    CBC Auto Differential [[id number]]  (Abnormal) Collected: 08/05/21 0618    Specimen: Blood Updated: 08/05/21 0644     WBC 10.42 10*3/mm3      RBC 5.29 10*6/mm3      Hemoglobin 13.9 g/dL      Hematocrit 44.1 %      MCV 83.4 fL      MCH 26.3 pg      MCHC 31.5 g/dL      RDW 14.6 %      RDW-SD 44.2 fl      MPV 11.2 fL      Platelets 125 10*3/mm3      Neutrophil % 82.4 %      Lymphocyte % 7.5 %      Monocyte % 8.2 %      Eosinophil % 1.1 %      Basophil % 0.5 %      Immature Grans % 0.3 %      Neutrophils, Absolute 8.60 10*3/mm3      Lymphocytes, Absolute 0.78 10*3/mm3      Monocytes, Absolute 0.85 10*3/mm3      Eosinophils, Absolute 0.11 10*3/mm3      Basophils, Absolute 0.05 10*3/mm3      Immature Grans, Absolute 0.03 10*3/mm3      nRBC 0.0 /100 WBC     POC Glucose Once [[id number]]  (Normal) Collected: 08/05/21 0617    Specimen: Blood Updated: 08/05/21 0624     Glucose 124 mg/dL      Comment: Meter: [id number] : [id number] [place] PCA       POC Glucose Once [[id number]]  (Abnormal) Collected: 08/04/21 2334    Specimen: Blood Updated: 08/04/21 2341     Glucose 197 mg/dL      Comment: Meter: [id number] : [id number] [place] PCA       Magnesium [[id number]]  (Normal) Collected: 08/04/21 1034    Specimen: Blood Updated: 08/04/21 2256     Magnesium 1.9 mg/dL     Phosphorus [[id number]]  (Normal) Collected: 08/04/21 1034    Specimen: Blood Updated: 08/04/21 2256     Phosphorus 3.6 mg/dL     Urine Culture - Urine, Urine, Clean Catch [[id number]] Collected: 08/03/21 2302    Specimen: Urine, Clean Catch Updated: 08/04/21 2213     Urine Culture 25,000 CFU/mL Mixed Indigo Isolated    Narrative:      Specimen contains mixed organisms of questionable pathogenicity which indicates contamination with commensal indigo.  Further identification is unlikely to provide clinically useful information.  Suggest recollection.    POC Glucose  Once [[id number]]  (Normal) Collected: 08/04/21 1806    Specimen: Blood Updated: 08/04/21 1813     Glucose 125 mg/dL      Comment: Meter: [id number] : [id number] Shanon Prajapati CNA       Hepatitis Panel, Acute [[id number]]  (Normal) Collected: 08/03/21 2300    Specimen: Blood Updated: 08/04/21 1807     Hepatitis B Surface Ag Non-Reactive     Hep A IgM Non-Reactive     Hep B C IgM Non-Reactive     Hepatitis C Ab Non-Reactive    Narrative:      Results may be falsely decreased if patient taking Biotin.     POC Glucose Once [[id number]]  (Abnormal) Collected: 08/04/21 1219    Specimen: Blood Updated: 08/04/21 1225     Glucose 143 mg/dL      Comment: Meter: [id number] : [id number] Shanon Prajapati CNA           ECG 12 Lead   Final Result   HEART RATE= 62  bpm   RR Interval= 984  ms   ME Interval= 141  ms   P Horizontal Axis= 4  deg   P Front Axis= 52  deg   QRSD Interval= 141  ms   QT Interval= 451  ms   QRS Axis= -84  deg   T Wave Axis= 27  deg   - ABNORMAL ECG -   Sinus rhythm   Atrial premature complex   RBBB and LAFB   Inferior infarct, old   NO PRIOR TRACING AVAILABLE FOR COMPARISON   Electronically Signed By: Hans Isaac ([facility]) 04-Aug-2021 07:02:57   Date and Time of Study: 2021-08-03 23:06:03        Results for orders placed in visit on 08/29/17    SCANNED - ECHOCARDIOGRAM    CT Abdomen Pelvis With Contrast    Result Date: 8/4/2021  1. Abnormal appearance of the pancreas suggestive of mild acute pancreatitis. Laboratory data would be complimentary. 2. Cholelithiasis without additional CT evidence of acute cholecystitis. Gallbladder ultrasound would be complimentary along with laboratory data. 3. Normal appendix. 4. Left adrenal nodules, technically indeterminate but likely benign adenomas. Suggest initial comparison to prior outside studies to assess stability. Signer Name: Charles Garcia MD  Signed: 8/4/2021 12:24 AM  Workstation Name: [email or web address]  [facility] Abdomen  "Limited    Result Date: 8/4/2021  1.  Abnormal gallbladder. Cholelithiasis with dense biliary sludge filling a thick-walled, nontender gallbladder. Concern for acute cholecystitis. 2.  Consider radionuclide hepatobiliary scan to confirm acute cholecystitis if clinically indicated (after halting opioid pain medication for at least 6 hours).  This report was finalized on 8/4/2021 8:22 AM by Dr. Jose Chambers MD.          ASSESSMENT/PLAN:    Acute cholecystitis  -as seen with U/S gallbladder on 8/4/21  -Surgery consulted, plans for cholecystectomy 8/5/21, patient has been NPO since midnight.   -PRN Zofran for nausea.   -Gentle IVF due to NPO status.     Nausea and vomiting have improved with treatment as per above.     HTN: continue metoprolol 25mg daily.     Vitamin D deficiency: continue Vitamin D supplement after no longer NPO.     History of breast cancer: continue anastrazole after no longer NPO.     Type 2 DM   -holding home agents  -continue SSI, and levemir 10 units daily    DVT ppx: loveox  GI ppx: NI  Code: full      PLAN FOR DISPOSITION: MIGUE Varela DO  Hospitalist, [facility]  08/05/21  07:41 EDT    \"Dictated utilizing Dragon dictation\"  "

## 2021-08-05 NOTE — CASE MANAGEMENT/SOCIAL WORK
Continued Stay Note  TATI Vo     Patient Name: Tyrone Rand  MRN: [id number]  Today's Date: 8/5/2021    Admit Date: 8/3/2021    Discharge Plan     Row Name 08/05/21 1138       Plan    Plan Comments  Patient in surgery.  Will follow up tomorrow.  Plan is home with family.        Discharge Codes    No documentation.             Yanni Garcia RN

## 2021-08-05 NOTE — ANESTHESIA PREPROCEDURE EVALUATION
Anesthesia Evaluation     Patient summary reviewed and Nursing notes reviewed   NPO Solid Status: > 8 hours  NPO Liquid Status: > 8 hours           Airway   Mallampati: II  TM distance: >3 FB  Neck ROM: full  No difficulty expected  Dental - normal exam     Pulmonary - normal exam   (+) a smoker (quit 25 yrs ago ) Former, shortness of breath, sleep apnea,   Cardiovascular - normal exam  Exercise tolerance: poor (<4 METS)    ECG reviewed  Patient on routine beta blocker and Beta blocker given within 24 hours of surgery    (+) hypertension,     ROS comment: ABNORMAL ECG -  Sinus rhythm  Atrial premature complex  RBBB and LAFB  Inferior infarct, old  NO PRIOR TRACING AVAILABLE FOR COMPARISON  Electronically Signed By: Hans Isaac ([facility]) 04-Aug-2021 07:02:57  Date and Time of Study: 2021-08-03 23:06:03    2017 ECHO in media EF 65%    Neuro/Psych  (+) headaches, dementia,     GI/Hepatic/Renal/Endo    (+) morbid obesity, GERD well controlled,  renal disease CRI, diabetes mellitus type 2 well controlled,     Musculoskeletal     Abdominal   (+) scaphoid   Substance History      OB/GYN negative ob/gyn ROS         Other   arthritis,    history of cancer remission                  Anesthesia Plan    ASA 3     general     intravenous induction     Anesthetic plan, all risks, benefits, and alternatives have been provided, discussed and informed consent has been obtained with: patient.  Use of blood products discussed with patient  Consented to blood products.

## 2021-08-05 NOTE — ANESTHESIA PROCEDURE NOTES
Airway  Urgency: elective    Date/Time: 8/5/2021 1:09 PM  Airway not difficult    General Information and Staff    Patient location during procedure: OR  CRNA: Rosy Calhoun CRNA    Indications and Patient Condition  Indications for airway management: airway protection    Preoxygenated: yes  MILS maintained throughout  Mask difficulty assessment: 1 - vent by mask    Final Airway Details  Final airway type: endotracheal airway      Successful airway: ETT  Cuffed: yes   Successful intubation technique: direct laryngoscopy  Facilitating devices/methods: intubating stylet  Endotracheal tube insertion site: oral  Blade: Sonny  Blade size: 3  ETT size (mm): 7.5  Cormack-Lehane Classification: grade IIa - partial view of glottis  Placement verified by: chest auscultation and capnometry   Cuff volume (mL): 8  Measured from: lips  ETT/EBT  to lips (cm): 21  Assessment: lips, teeth, and gum same as pre-op and atraumatic intubation

## 2021-08-05 NOTE — OP NOTE
PREOPERATIVE DIAGNO acute cholecystitis with cholelithiasis   POSTOPERATIVE DIAGNOSIS: Gangrenous cholecystitis with cholelithiasis and choledocholithiasis   PROCEDURE: Laparoscopic cholecystectomy with Intraoperative cholangiogram  SURGEON/STAFF: Raymond  ASST: Kayode Crain-certified first assistant who was responsible for retraction, suturing, and camera operation without his assistance the case would not have been possible  SPECIMENS: Gallbladder.  INTRAOPERATIVE COMPLICATIONS: None.  ANESTHESIA: General.  BLOOD LOSS:  100 ml  COUNTS: Needle and sponge counts correct.  Findings: A gangrenous gallbladder with a large gallstone.  Her cystic duct was dilated.  Her common bile duct had a stone in it and there was no spill of dye in the duodenum.    Clinical Note: This very pleasant patient presented to my office with the aforementioned complaints.  Benefits and risks of a laparoscopic cholecystectomy with intraoperative cholangiogram possible open procedure were discussed.  Benefits and risks not limited to but including:Bleeding, infection, hernia formation, injury to intra-abdominal structures, bile leak, biloma, intra-abdominal abscess, DVT, PE, atelectasis pneumonia, anesthesia complications. She agreed and was consented for operative management without duress.     PROCEDURE: The patient was brought to the operating room in stable condition. Perioperative antibiotics were given and sequential compression devices applied. She was then laid supine on the operating room table. General anesthesia was induced by the Anesthesia Service without difficulty.     At this time, the patient's abdomen was accessed at the level of the umbilicus in open cutdown technique and insertion of a 12-mm trocar. The abdomen was then insufflated. Brief survey of the abdominal cavity revealed no evidence of injury from insertion of the trocar, or no other intraabdominal pathology. At this time the patient was placed in steep reverse  Trendelenburg and a slightly left-side down position. Three additional 5-mm trocars were placed, all in the standard position. This was under direct vision.       The peritoneal attachment of the gallbladder at the level of the infundibulum was scored from laterally to medially, terminating at the level of the hepatic edge. The peritoneum was gently stripped down, exposing the underlying cystic artery and cystic duct. This was also done on the lateral side of the gallbladder by reflecting the gallbladder medially. The peritoneal attachment here was again gently dissected inferiorly. After completely exposing the cystic duct as well as cystic artery, a retro-cystic window was created. These 2 structures, the cystic duct and cystic artery, were further delineated. A firm critical view was obtained, visualizing healthy-appearing hepatic tissue behind the 2 structures, with no evidence of looping, bridging or tenting of the common bile duct.     A clip was placed distally at the cystic duct and a cystic duct incision with laparoscopic scissors was performed. A percutaneous cholangio-catheter was inserted into the patient abdomen. The catheter was placed and secured into the cystic duct. Cholangiogram was performed that showed a stone in the common bile duct.  There was no spill of dye into the duodenum.. The cholangiocatheter was removed and the distal portion of thecystic duct was then clipped twice and ligated.  The cystic artery was then triply clipped and ligated.  It was inspected and seen to be in intact. The gallbladder was then carefully dissected off the hepatic bed using hook electrocautery. It was firmly adherent to the underlying bed, and an effort careful and meticulous hemostasis was undertaken. The gallbladder, after being removed from the hepatic bed, was placed in an EndoCatch bag. It was removed through the umbilical trocar without difficulty.    Next, the umbilical trocar was reinserted into the  abdomen and the liver bed was inspected. Hemostasis was perfected using FloSeal and Surgicel. Copious irrigation was carried out. The clips were intact and there was no bleeding or bile leakage noted.  A 10 Bengali flat HONEY drain was placed in the liver bed.  The trocars were then removed under direct vision, air was deflated from the abdomen, and a Martínez trocar site fascia was closed with 0 Vicryl suture.  The incisions were then closed with 40 Vicryls suture. Sterile dressings were applied.    Anesthesia was reversed, the ET tube and OG tube were removed.  And the patient was taken into the recovery area in stable postoperative condition having tolerated the procedure well.    STEPHANIE Andrade DO

## 2021-08-05 NOTE — CONSULTS
"Patient Care Team:  Maria Esther So DO as PCP - General (Internal Medicine)  Cris Mendieta DO as Referring Physician (Internal Medicine)  Luz Elena Herndon MD as Consulting Physician (Hematology and Oncology)    CHIEF COMPLAINT: CBD Stone    HISTORY OF PRESENT ILLNESS:  75yo Pt of Dr Triplett's S/P Lap Choletoday and the IOC revealed a CBD stone despite a normal BIli and 5 mm CBD rpe-op. The Pt is sedated and resting comfortably, History was provided by family in attendance. Had a normal colon 6-8 years ago at [facility]. Has history of Breast Cancer.  REviewed her IOC and besides the intraductal Lucency the contrast did not flow into the SB so may have an impacted stone though no meniscus was visible.  Her Lipase was normal and I reviewed th eCT films and disagree with the \"subtle inflammatory changes\" no mass is seen.    Past Medical History:   Diagnosis Date   • Breast cancer (CMS/HCC) 2017    Left   • Depression    • Diabetes mellitus (CMS/HCC)    • Drug therapy    • GERD (gastroesophageal reflux disease)    • H/O Left breast mass    • H/O vitamin D deficiency    • History of snoring    • Hx of radiation therapy    • Hyperlipidemia    • Hypertension    • Panic attack    • Poor sleep pattern    • Sleep apnea     CPAP     Past Surgical History:   Procedure Laterality Date   • BREAST LUMPECTOMY Left 2017   • COLON SURGERY     • COLONOSCOPY     • KIDNEY SURGERY     • MOUTH SURGERY      TOOTH EXTRACTION   • TOOTH EXTRACTION       Family History   Problem Relation Age of Onset   • Stroke Mother    • Heart disease Mother    • Hypertension Mother    • Breast cancer Neg Hx      Social History     Tobacco Use   • Smoking status: Former Smoker     Packs/day: 1.00     Years: 25.00     Pack years: 25.00     Quit date:      Years since quittin.6   • Smokeless tobacco: Never Used   • Tobacco comment: smoked 25 years, 1 PPD   Vaping Use   • Vaping Use: Unknown   Substance Use Topics   • Alcohol use: " "Not Currently   • Drug use: No     Medications Prior to Admission   Medication Sig Dispense Refill Last Dose   • anastrozole (ARIMIDEX) 1 MG tablet Take 1 tablet by mouth Daily. 90 tablet 1 8/2/2021   • aspirin 81 MG tablet Take 81 mg by mouth Daily.   8/2/2021   • LÁZARO CONTOUR TEST test strip TEST BLOOD SUGAR TWICE A DAY  2    • LÁZARO MICROLET LANCETS lancets TEST BLOOD SUGAR TWICE A DAY  3    • metoprolol succinate XL (TOPROL-XL) 25 MG 24 hr tablet TAKE ONE TABLET BY MOUTH IN THE MORNING  11 8/2/2021   • SITagliptin (JANUVIA) 100 MG tablet Take 100 mg by mouth Daily.      • TRESIBA FLEXTOUCH 200 UNIT/ML solution pen-injector 30 Units. 30 Units at night  0 8/2/2021   • cholecalciferol (VITAMIN D3) 1000 units tablet Take 2,000 Units by mouth Daily.   8/2/2021   • NOVOFINE 32G X 6 MM misc USE TWO TIMES A DAY  1 Unknown at Unknown time     Allergies:  Sulfa antibiotics    REVIEW OF SYSTEMS:  Please see the above history of present illness for pertinent positives and negatives.  The remainder of the patient's systems have been reviewed and are negative.     Vital Signs  Temp:  [97.8 °F (36.6 °C)-98.9 °F (37.2 °C)] 98 °F (36.7 °C)  Heart Rate:  [] 58  Resp:  [10-19] 12  BP: (103-152)/(47-95) 128/59    Flowsheet Rows      First Filed Value   Admission Height  167.6 cm (66\") Documented at 08/03/2021 2223   Admission Weight  79.8 kg (176 lb) Documented at 08/03/2021 2223           Physical Exam:  Physical Exam   Constitutional: Patient appears well-developed and well-nourished and in no acute distress   HEENT:   Head: Normocephalic and atraumatic.   Eyes:  Pupils are equal, round, and reactive to light. EOM are intact. Sclerae are anicteric and non-injected.  Mouth and Throat: Patient has moist mucous membranes. Oropharynx is clear of any erythema or exudate.     Neck: Neck supple. No JVD present. No thyromegaly present. No lymphadenopathy present.  Cardiovascular: Regular rate, regular rhythm, S1 normal and S2 " normal.  Exam reveals no gallop and no friction rub.  No murmur heard.  Pulmonary/Chest: Lungs are clear to auscultation bilaterally. No respiratory distress. No wheezes. No rhonchi. No rales.   Abdominal: Soft. Bowel sounds are normal. No distension and no mass. There is no hepatosplenomegaly. There is mild diffuse tenderness.   Musculoskeletal: Normal Muscle tone  Extremities: No edema. Pulses are palpable in all 4 extremities.  Neurological: Patient is alert and oriented to person, place, and time. Cranial nerves II-XII are grossly intact with no focal deficits.  Skin: Skin is warm. No rash noted. Nails show no clubbing.  No cyanosis or erythema.    Debilities/Disabilities Identified: None  Emotional Behavior: Appropriate     Results Review:   I reviewed the patient's new clinical results.    Lab Results (most recent)     Procedure Component Value Units Date/Time    POC Glucose Once [[id number]]  (Abnormal) Collected: 08/05/21 1602    Specimen: Blood Updated: 08/05/21 1608     Glucose 135 mg/dL      Comment: Meter: [id number] : [id number] Shanon Prajapati CNA       Tissue Pathology Exam [[id number]] Collected: 08/05/21 1355    Specimen: Tissue from Gallbladder Updated: 08/05/21 1444    Hepatic Function Panel [[id number]]  (Abnormal) Collected: 08/05/21 0618    Specimen: Blood Updated: 08/05/21 1204     Total Protein 6.7 g/dL      Albumin 3.70 g/dL      ALT (SGPT) 245 U/L      AST (SGOT) 127 U/L      Comment: Specimen hemolyzed.  Results may be affected.        Alkaline Phosphatase 131 U/L      Total Bilirubin 0.2 mg/dL      Bilirubin, Direct <0.2 mg/dL      Comment: Specimen hemolyzed. Results may be affected.        Bilirubin, Indirect --     Comment: Unable to calculate       Lipase [[id number]]  (Abnormal) Collected: 08/05/21 0618    Specimen: Blood Updated: 08/05/21 1202     Lipase 11 U/L     Amylase [[id number]]  (Normal) Collected: 08/05/21 0618    Specimen: Blood Updated: 08/05/21 1202     Amylase 35 U/L      Basic Metabolic Panel [[id number]]  (Abnormal) Collected: 08/05/21 0618    Specimen: Blood Updated: 08/05/21 0700     Glucose 127 mg/dL      BUN 11 mg/dL      Creatinine 1.13 mg/dL      Sodium 135 mmol/L      Potassium 4.8 mmol/L      Comment: Slight hemolysis detected by analyzer. Results may be affected.        Chloride 101 mmol/L      CO2 25.0 mmol/L      Calcium 9.3 mg/dL      eGFR   Amer 56 mL/min/1.73      BUN/Creatinine Ratio 9.7     Anion Gap 9.0 mmol/L     Narrative:      GFR Normal >60  Chronic Kidney Disease <60  Kidney Failure <15      CBC & Differential [[id number]]  (Abnormal) Collected: 08/05/21 0618    Specimen: Blood Updated: 08/05/21 0657    Narrative:      The following orders were created for panel order CBC & Differential.  Procedure                               Abnormality         Status                     ---------                               -----------         ------                     CBC Auto Differential[[id number]]        Abnormal            Final result               Scan Slide[[id number]]                   Normal              Final result                 Please view results for these tests on the individual orders.    Scan Slide [[id number]]  (Normal) Collected: 08/05/21 0618    Specimen: Blood Updated: 08/05/21 0657     RBC Morphology Normal     WBC Morphology Normal     Platelet Morphology Normal    CBC Auto Differential [[id number]]  (Abnormal) Collected: 08/05/21 0618    Specimen: Blood Updated: 08/05/21 0644     WBC 10.42 10*3/mm3      RBC 5.29 10*6/mm3      Hemoglobin 13.9 g/dL      Hematocrit 44.1 %      MCV 83.4 fL      MCH 26.3 pg      MCHC 31.5 g/dL      RDW 14.6 %      RDW-SD 44.2 fl      MPV 11.2 fL      Platelets 125 10*3/mm3      Neutrophil % 82.4 %      Lymphocyte % 7.5 %      Monocyte % 8.2 %      Eosinophil % 1.1 %      Basophil % 0.5 %      Immature Grans % 0.3 %      Neutrophils, Absolute 8.60 10*3/mm3      Lymphocytes, Absolute 0.78 10*3/mm3      Monocytes,  Absolute 0.85 10*3/mm3      Eosinophils, Absolute 0.11 10*3/mm3      Basophils, Absolute 0.05 10*3/mm3      Immature Grans, Absolute 0.03 10*3/mm3      nRBC 0.0 /100 WBC     POC Glucose Once [[id number]]  (Normal) Collected: 08/05/21 0617    Specimen: Blood Updated: 08/05/21 0624     Glucose 124 mg/dL      Comment: Meter: [id number] : [id number] Bull RENEE       Magnesium [[id number]]  (Normal) Collected: 08/04/21 1034    Specimen: Blood Updated: 08/04/21 2256     Magnesium 1.9 mg/dL     Phosphorus [[id number]]  (Normal) Collected: 08/04/21 1034    Specimen: Blood Updated: 08/04/21 2256     Phosphorus 3.6 mg/dL     Urine Culture - Urine, Urine, Clean Catch [[id number]] Collected: 08/03/21 2302    Specimen: Urine, Clean Catch Updated: 08/04/21 2213     Urine Culture 25,000 CFU/mL Mixed Indigo Isolated    Narrative:      Specimen contains mixed organisms of questionable pathogenicity which indicates contamination with commensal indigo.  Further identification is unlikely to provide clinically useful information.  Suggest recollection.    Hepatitis Panel, Acute [[id number]]  (Normal) Collected: 08/03/21 2300    Specimen: Blood Updated: 08/04/21 1807     Hepatitis B Surface Ag Non-Reactive     Hep A IgM Non-Reactive     Hep B C IgM Non-Reactive     Hepatitis C Ab Non-Reactive    Narrative:      Results may be falsely decreased if patient taking Biotin.     Comprehensive Metabolic Panel [[id number]]  (Abnormal) Collected: 08/04/21 1034    Specimen: Blood Updated: 08/04/21 1126     Glucose 174 mg/dL      BUN 12 mg/dL      Creatinine 1.08 mg/dL      Sodium 134 mmol/L      Potassium 4.9 mmol/L      Comment: Specimen hemolyzed.  Results may be affected. Patient is a difficult stick        Chloride 98 mmol/L      CO2 25.3 mmol/L      Calcium 9.7 mg/dL      Total Protein 7.2 g/dL      Albumin 3.80 g/dL      ALT (SGPT) 302 U/L      Comment: Specimen hemolyzed.  Results may be affected.        AST (SGOT) 242 U/L      Comment:  Specimen hemolyzed.  Results may be affected.        Alkaline Phosphatase 151 U/L      Total Bilirubin 0.4 mg/dL      eGFR  African Amer 59 mL/min/1.73      Globulin 3.4 gm/dL      A/G Ratio 1.1 g/dL      BUN/Creatinine Ratio 11.1     Anion Gap 10.7 mmol/L     Narrative:      GFR Normal >60  Chronic Kidney Disease <60  Kidney Failure <15      Amylase [[id number]]  (Normal) Collected: 08/04/21 1034    Specimen: Blood Updated: 08/04/21 1109     Amylase 36 U/L     Lipase [[id number]]  (Normal) Collected: 08/04/21 1034    Specimen: Blood Updated: 08/04/21 1109     Lipase 18 U/L     COVID PRE-OP / PRE-PROCEDURE SCREENING ORDER (NO ISOLATION) - Swab, Nasal Cavity [[id number]]  (Normal) Collected: 08/04/21 0103    Specimen: Swab from Nasal Cavity Updated: 08/04/21 0141    Narrative:      The following orders were created for panel order COVID PRE-OP / PRE-PROCEDURE SCREENING ORDER (NO ISOLATION) - Swab, Nasal Cavity.  Procedure                               Abnormality         Status                     ---------                               -----------         ------                     COVID-19,[facility]...[[id number]]  Normal              Final result                 Please view results for these tests on the individual orders.    COVID-19,[facility],Nasal Swab No Transport Media 3-4 HR TAT - Swab, Nasal Cavity [[id number]]  (Normal) Collected: 08/04/21 0103    Specimen: Swab from Nasal Cavity Updated: 08/04/21 0141     COVID19 Not Detected    Narrative:      Fact sheet for providers: https://www.fda.gov/media/465087/download     Fact sheet for patients: https://www.fda.gov/media/656990/download    Test performed by PCR.    Consider negative results in combination with clinical observations, patient history, and epidemiological information.    Troponin [[id number]]  (Normal) Collected: 08/04/21 0051    Specimen: Blood Updated: 08/04/21 0122     Troponin T <0.010 ng/mL     Narrative:      Troponin T Reference  Range:  <= 0.03 ng/mL-   Negative for AMI  >0.03 ng/mL-     Abnormal for myocardial necrosis.  Clinicians would have to utilize clinical acumen, EKG, Troponin and serial changes to determine if it is an Acute Myocardial Infarction or myocardial injury due to an underlying chronic condition.       Results may be falsely decreased if patient taking Biotin.      Comprehensive Metabolic Panel [[id number]]  (Abnormal) Collected: 08/03/21 2302    Specimen: Blood Updated: 08/03/21 2335     Glucose 268 mg/dL      BUN 18 mg/dL      Creatinine 1.12 mg/dL      Sodium 137 mmol/L      Potassium 4.1 mmol/L      Chloride 98 mmol/L      CO2 27.2 mmol/L      Calcium 9.7 mg/dL      Total Protein 7.0 g/dL      Albumin 4.20 g/dL      ALT (SGPT) 108 U/L      AST (SGOT) 165 U/L      Alkaline Phosphatase 138 U/L      Total Bilirubin 0.7 mg/dL      eGFR  African Amer 57 mL/min/1.73      Globulin 2.8 gm/dL      A/G Ratio 1.5 g/dL      BUN/Creatinine Ratio 16.1     Anion Gap 11.8 mmol/L     Narrative:      GFR Normal >60  Chronic Kidney Disease <60  Kidney Failure <15      Urinalysis, Microscopic Only - Urine, Clean Catch [[id number]]  (Abnormal) Collected: 08/03/21 2302    Specimen: Urine, Clean Catch Updated: 08/03/21 2326     RBC, UA None Seen /HPF      WBC, UA 6-12 /HPF      Bacteria, UA 3+ /HPF      Squamous Epithelial Cells, UA 3-6 /HPF      Hyaline Casts, UA None Seen /LPF      Methodology Manual Light Microscopy    Urinalysis With Culture If Indicated - Urine, Clean Catch [[id number]]  (Abnormal) Collected: 08/03/21 2302    Specimen: Urine, Clean Catch Updated: 08/03/21 2318     Color, UA Yellow     Appearance, UA Clear     pH, UA 6.0     Specific Gravity, UA 1.025     Glucose, UA >=1000 mg/dL (3+)     Ketones, UA Negative     Bilirubin, UA Negative     Blood, UA Negative     Protein, UA 30 mg/dL (1+)     Leuk Esterase, UA Trace     Nitrite, UA Negative     Urobilinogen, UA 2.0 E.U./dL    CBC & Differential [[id number]]  (Abnormal)  Collected: 08/03/21 2302    Specimen: Blood Updated: 08/03/21 2316    Narrative:      The following orders were created for panel order CBC & Differential.  Procedure                               Abnormality         Status                     ---------                               -----------         ------                     CBC Auto Differential[[id number]]        Abnormal            Final result                 Please view results for these tests on the individual orders.    CBC Auto Differential [[id number]]  (Abnormal) Collected: 08/03/21 2302    Specimen: Blood Updated: 08/03/21 2316     WBC 8.55 10*3/mm3      RBC 5.01 10*6/mm3      Hemoglobin 13.3 g/dL      Hematocrit 42.4 %      MCV 84.6 fL      MCH 26.5 pg      MCHC 31.4 g/dL      RDW 14.4 %      RDW-SD 44.1 fl      MPV 9.9 fL      Platelets 190 10*3/mm3      Neutrophil % 82.7 %      Lymphocyte % 8.5 %      Monocyte % 7.5 %      Eosinophil % 0.5 %      Basophil % 0.6 %      Immature Grans % 0.2 %      Neutrophils, Absolute 7.07 10*3/mm3      Lymphocytes, Absolute 0.73 10*3/mm3      Monocytes, Absolute 0.64 10*3/mm3      Eosinophils, Absolute 0.04 10*3/mm3      Basophils, Absolute 0.05 10*3/mm3      Immature Grans, Absolute 0.02 10*3/mm3      nRBC 0.0 /100 WBC           Imaging Results (Most Recent)     Procedure Component Value Units Date/Time    FL Cholangiogram Operative [[id number]] Collected: 08/05/21 1530     Updated: 08/05/21 1533    Narrative:      FLUOROSCOPY DURING INTRAOPERATIVE CHOLANGIOGRAM, 08/05/2021     HISTORY:  Fluoroscopy during laparoscopic cholecystectomy and intraoperative  cholangiogram performed earlier today by Dr. Andrade.     REPORT:  C-arm fluoroscopy was provided by radiology personnel in the OR during  intraoperative cholangiogram. Fluoroscopy time was recorded as 25  seconds. Spot images recorded for documentation purposes: 1. The image  shows a rounded filling defect within the distal common bile duct above  the ampulla  compatible with bile duct stone. In addition, there is no  passage of contrast from the dilated extrahepatic bile duct into the  duodenum, implying potential ampullary obstruction due to stone or  stricture. Please see operative note for details.     This report was finalized on 8/5/2021 3:31 PM by Dr. Jose Chambers MD.       US Abdomen Limited [[id number]] Collected: 08/04/21 0818     Updated: 08/04/21 0824    Narrative:      ULTRASOUND ABDOMEN, LIMITED, 08/04/2021         HISTORY:  76-year-old female admitted to the hospital last evening after new onset  postprandial abdomen pain with nausea and vomiting. CT examination  showed cholelithiasis and questioned mild pancreatitis. Serum lipase  level was normal. White blood cell count is normal.     TECHNIQUE:  Grayscale ultrasound imaging of the right upper abdomen.     COMPARISON:  *  CT abdomen/pelvis, 08/03/2021.     FINDINGS:  The gallbladder is markedly abnormal in appearance. Cholelithiasis is  present with at least one large gallstone measuring about 2.5 cm. There  is also a large amount of compacted biliary sludge within the  gallbladder lumen. The gallbladder wall is diffusely thickened, the  gallbladder is tender with direct pressure.     There is no intrahepatic or extrahepatic bile duct dilatation (CBD 5  mm).     Liver is normal in size and appearance, although somewhat poorly seen.  Partially obscured pancreas is negative where visualized. Right kidney  is negative with no hydronephrosis.       Impression:      1.  Abnormal gallbladder. Cholelithiasis with dense biliary sludge  filling a thick-walled, nontender gallbladder. Concern for acute  cholecystitis.  2.  Consider radionuclide hepatobiliary scan to confirm acute  cholecystitis if clinically indicated (after halting opioid pain  medication for at least 6 hours).     This report was finalized on 8/4/2021 8:22 AM by Dr. Jose Chambers MD.       CT Abdomen Pelvis With Contrast [[id number]]  Collected: 08/04/21 0024     Updated: 08/04/21 0027    Narrative:      CT Abdomen Pelvis W    INDICATION:   Epigastric pain. Symptoms began 5 hours prior to arrival following [facility].    TECHNIQUE:   CT of the abdomen and pelvis with IV contrast. Coronal and sagittal reconstructions were obtained.  Radiation dose reduction techniques included automated exposure control or exposure modulation based on body size. Count of known CT and cardiac nuc med  studies performed in previous 12 months: 0.     COMPARISON:   None available.    FINDINGS:  Abdomen: Included lung bases demonstrate emphysematous change. There is no effusion. Atherosclerotic aorta and ectasia of the distal thoracic aorta. Spleen is negative. There are clips associated with the medial limb right adrenal gland is otherwise  negative. There is nodularity of the left adrenal gland. The largest nodule measures 1.4 cm and there is a smaller nodule measuring 9 mm. These cannot be further characterized on this single phase study but absent any history of malignancy or risk  factors for adrenal malignancy are likely benign adenomas. Suggest comparison to prior outside studies initially to assess stability.    There is diminished attenuation and subtle edema of the pancreas suggestive of acute pancreatitis. Correlate with laboratory data. Mild hepatic steatosis. The gallbladder is distended up to 3.5 cm. There are multiple gallstones with peripheral rim  calcification, measuring up to 18 mm. No distinct CT evidence of acute cholecystitis at this time. There is no biliary ductal dilatation. Extrahepatic common bile duct measures up to 6 mm in.    The kidneys are nonobstructed and demonstrate cortical scarring. Tiny benign left renal cyst. No adenopathy.    Pelvis: Negative bladder and leiomyomatous change of the uterus. No drainable fluid collection. Extensive diverticulosis of the colon. No bowel obstruction. Appendix diminutive but normal. Redundancy of the  ascending colon and cecum. Tiny fat-containing  right inguinal hernia. Fat-containing umbilical hernia. Degenerative changes. No suspicious bone lesion.  negative.      Impression:      1. Abnormal appearance of the pancreas suggestive of mild acute pancreatitis. Laboratory data would be complimentary.  2. Cholelithiasis without additional CT evidence of acute cholecystitis. Gallbladder ultrasound would be complimentary along with laboratory data.  3. Normal appendix.  4. Left adrenal nodules, technically indeterminate but likely benign adenomas. Suggest initial comparison to prior outside studies to assess stability.          Signer Name: Charles Garcia MD   Signed: 8/4/2021 12:24 AM   Workstation Name: [email or web address]    [facility]        reviewed    ECG/EMG Results (most recent)     Procedure Component Value Units Date/Time    ECG 12 Lead [[id number]] Collected: 08/03/21 2306     Updated: 08/04/21 0703     QT Interval 451 ms     Narrative:      HEART RATE= 62  bpm  RR Interval= 984  ms  [place] Interval= 141  ms  P Horizontal Axis= 4  deg  P Front Axis= 52  deg  QRSD Interval= 141  ms  QT Interval= 451  ms  QRS Axis= -84  deg  T Wave Axis= 27  deg  - ABNORMAL ECG -  Sinus rhythm  Atrial premature complex  RBBB and LAFB  Inferior infarct, old  NO PRIOR TRACING AVAILABLE FOR COMPARISON  Electronically Signed By: Hans Isaac ([facility]) 04-Aug-2021 07:02:57  Date and Time of Study: 2021-08-03 23:06:03        reviewed    Assessment/Plan   CBD Stone   Cholecystitis- Lap Valery 8/5/20    Will recheck labs in the AM and as long as she is holding her own will proceed to ERCP tomorrow PM around 1600 if schedule allows. So NPO after 1000.    I discussed the patient's findings and my recommendations with patient.     Wally Mercado MD  08/05/21  16:52 EDT    Time: NOt recorded.

## 2021-08-06 ENCOUNTER — ANESTHESIA (OUTPATIENT)
Dept: PERIOP | Facility: HOSPITAL | Age: 76
End: 2021-08-06

## 2021-08-06 ENCOUNTER — ANESTHESIA EVENT (OUTPATIENT)
Dept: PERIOP | Facility: HOSPITAL | Age: 76
End: 2021-08-06

## 2021-08-06 ENCOUNTER — APPOINTMENT (OUTPATIENT)
Dept: GENERAL RADIOLOGY | Facility: HOSPITAL | Age: 76
End: 2021-08-06

## 2021-08-06 PROBLEM — K80.50 COMMON BILE DUCT CALCULUS: Status: ACTIVE | Noted: 2021-08-06

## 2021-08-06 LAB
ALBUMIN SERPL-MCNC: 3.2 G/DL (ref 3.5–5.2)
ALBUMIN/GLOB SERPL: 1.2 G/DL
ALP SERPL-CCNC: 116 U/L (ref 39–117)
ALT SERPL W P-5'-P-CCNC: 220 U/L (ref 1–33)
ANION GAP SERPL CALCULATED.3IONS-SCNC: 9.9 MMOL/L (ref 5–15)
AST SERPL-CCNC: 101 U/L (ref 1–32)
BASOPHILS # BLD AUTO: 0.01 10*3/MM3 (ref 0–0.2)
BASOPHILS NFR BLD AUTO: 0.1 % (ref 0–1.5)
BILIRUB SERPL-MCNC: 0.3 MG/DL (ref 0–1.2)
BUN SERPL-MCNC: 11 MG/DL (ref 8–23)
BUN/CREAT SERPL: 10.1 (ref 7–25)
CALCIUM SPEC-SCNC: 9.3 MG/DL (ref 8.6–10.5)
CHLORIDE SERPL-SCNC: 101 MMOL/L (ref 98–107)
CO2 SERPL-SCNC: 26.1 MMOL/L (ref 22–29)
CREAT SERPL-MCNC: 1.09 MG/DL (ref 0.57–1)
DEPRECATED RDW RBC AUTO: 43.6 FL (ref 37–54)
EOSINOPHIL # BLD AUTO: 0 10*3/MM3 (ref 0–0.4)
EOSINOPHIL NFR BLD AUTO: 0 % (ref 0.3–6.2)
ERYTHROCYTE [DISTWIDTH] IN BLOOD BY AUTOMATED COUNT: 14.3 % (ref 12.3–15.4)
GFR SERPL CREATININE-BSD FRML MDRD: 59 ML/MIN/1.73
GLOBULIN UR ELPH-MCNC: 2.7 GM/DL
GLUCOSE BLDC GLUCOMTR-MCNC: 107 MG/DL (ref 70–130)
GLUCOSE BLDC GLUCOMTR-MCNC: 122 MG/DL (ref 70–130)
GLUCOSE BLDC GLUCOMTR-MCNC: 123 MG/DL (ref 70–130)
GLUCOSE BLDC GLUCOMTR-MCNC: 140 MG/DL (ref 70–130)
GLUCOSE SERPL-MCNC: 143 MG/DL (ref 65–99)
HCT VFR BLD AUTO: 38 % (ref 34–46.6)
HGB BLD-MCNC: 12 G/DL (ref 12–15.9)
IMM GRANULOCYTES # BLD AUTO: 0.05 10*3/MM3 (ref 0–0.05)
IMM GRANULOCYTES NFR BLD AUTO: 0.4 % (ref 0–0.5)
LYMPHOCYTES # BLD AUTO: 0.76 10*3/MM3 (ref 0.7–3.1)
LYMPHOCYTES NFR BLD AUTO: 5.9 % (ref 19.6–45.3)
MAGNESIUM SERPL-MCNC: 1.7 MG/DL (ref 1.6–2.4)
MCH RBC QN AUTO: 26.5 PG (ref 26.6–33)
MCHC RBC AUTO-ENTMCNC: 31.6 G/DL (ref 31.5–35.7)
MCV RBC AUTO: 83.9 FL (ref 79–97)
MONOCYTES # BLD AUTO: 1.18 10*3/MM3 (ref 0.1–0.9)
MONOCYTES NFR BLD AUTO: 9.2 % (ref 5–12)
NEUTROPHILS NFR BLD AUTO: 10.86 10*3/MM3 (ref 1.7–7)
NEUTROPHILS NFR BLD AUTO: 84.4 % (ref 42.7–76)
NRBC BLD AUTO-RTO: 0 /100 WBC (ref 0–0.2)
PHOSPHATE SERPL-MCNC: 3.2 MG/DL (ref 2.5–4.5)
PLATELET # BLD AUTO: 169 10*3/MM3 (ref 140–450)
PMV BLD AUTO: 10.2 FL (ref 6–12)
POTASSIUM SERPL-SCNC: 4.6 MMOL/L (ref 3.5–5.2)
PROT SERPL-MCNC: 5.9 G/DL (ref 6–8.5)
RBC # BLD AUTO: 4.53 10*6/MM3 (ref 3.77–5.28)
SODIUM SERPL-SCNC: 137 MMOL/L (ref 136–145)
WBC # BLD AUTO: 12.86 10*3/MM3 (ref 3.4–10.8)

## 2021-08-06 PROCEDURE — 25010000002 IOPAMIDOL 61 % SOLUTION: Performed by: INTERNAL MEDICINE

## 2021-08-06 PROCEDURE — 25010000002 ONDANSETRON PER 1 MG: Performed by: NURSE ANESTHETIST, CERTIFIED REGISTERED

## 2021-08-06 PROCEDURE — 63710000001 INSULIN DETEMIR PER 5 UNITS: Performed by: SURGERY

## 2021-08-06 PROCEDURE — 82962 GLUCOSE BLOOD TEST: CPT

## 2021-08-06 PROCEDURE — 85025 COMPLETE CBC W/AUTO DIFF WBC: CPT | Performed by: SURGERY

## 2021-08-06 PROCEDURE — 25010000002 PROPOFOL 10 MG/ML EMULSION: Performed by: NURSE ANESTHETIST, CERTIFIED REGISTERED

## 2021-08-06 PROCEDURE — 25010000002 DEXAMETHASONE PER 1 MG: Performed by: NURSE ANESTHETIST, CERTIFIED REGISTERED

## 2021-08-06 PROCEDURE — 94799 UNLISTED PULMONARY SVC/PX: CPT

## 2021-08-06 PROCEDURE — 83735 ASSAY OF MAGNESIUM: CPT | Performed by: SURGERY

## 2021-08-06 PROCEDURE — 99232 SBSQ HOSP IP/OBS MODERATE 35: CPT | Performed by: INTERNAL MEDICINE

## 2021-08-06 PROCEDURE — C1769 GUIDE WIRE: HCPCS | Performed by: INTERNAL MEDICINE

## 2021-08-06 PROCEDURE — 25010000002 MORPHINE PER 10 MG: Performed by: SURGERY

## 2021-08-06 PROCEDURE — 84100 ASSAY OF PHOSPHORUS: CPT | Performed by: SURGERY

## 2021-08-06 PROCEDURE — 43264 ERCP REMOVE DUCT CALCULI: CPT | Performed by: INTERNAL MEDICINE

## 2021-08-06 PROCEDURE — 43262 ENDO CHOLANGIOPANCREATOGRAPH: CPT | Performed by: INTERNAL MEDICINE

## 2021-08-06 PROCEDURE — 80053 COMPREHEN METABOLIC PANEL: CPT | Performed by: SURGERY

## 2021-08-06 PROCEDURE — 0FC98ZZ EXTIRPATION OF MATTER FROM COMMON BILE DUCT, VIA NATURAL OR ARTIFICIAL OPENING ENDOSCOPIC: ICD-10-PCS | Performed by: INTERNAL MEDICINE

## 2021-08-06 PROCEDURE — 74330 X-RAY BILE/PANC ENDOSCOPY: CPT

## 2021-08-06 PROCEDURE — 25010000002 CEFTRIAXONE SODIUM-DEXTROSE 1-3.74 GM-%(50ML) RECONSTITUTED SOLUTION: Performed by: SURGERY

## 2021-08-06 PROCEDURE — 25010000002 SUCCINYLCHOLINE PER 20 MG: Performed by: NURSE ANESTHETIST, CERTIFIED REGISTERED

## 2021-08-06 RX ORDER — MAGNESIUM HYDROXIDE 1200 MG/15ML
LIQUID ORAL AS NEEDED
Status: DISCONTINUED | OUTPATIENT
Start: 2021-08-06 | End: 2021-08-06 | Stop reason: HOSPADM

## 2021-08-06 RX ORDER — FAMOTIDINE 10 MG/ML
20 INJECTION, SOLUTION INTRAVENOUS
Status: DISCONTINUED | OUTPATIENT
Start: 2021-08-06 | End: 2021-08-06 | Stop reason: HOSPADM

## 2021-08-06 RX ORDER — DEXAMETHASONE SODIUM PHOSPHATE 4 MG/ML
4 INJECTION, SOLUTION INTRA-ARTICULAR; INTRALESIONAL; INTRAMUSCULAR; INTRAVENOUS; SOFT TISSUE ONCE AS NEEDED
Status: COMPLETED | OUTPATIENT
Start: 2021-08-06 | End: 2021-08-06

## 2021-08-06 RX ORDER — PROPOFOL 10 MG/ML
VIAL (ML) INTRAVENOUS AS NEEDED
Status: DISCONTINUED | OUTPATIENT
Start: 2021-08-06 | End: 2021-08-06 | Stop reason: SURG

## 2021-08-06 RX ORDER — LIDOCAINE HYDROCHLORIDE 20 MG/ML
INJECTION, SOLUTION INFILTRATION; PERINEURAL AS NEEDED
Status: DISCONTINUED | OUTPATIENT
Start: 2021-08-06 | End: 2021-08-06 | Stop reason: SURG

## 2021-08-06 RX ORDER — ONDANSETRON 2 MG/ML
4 INJECTION INTRAMUSCULAR; INTRAVENOUS ONCE AS NEEDED
Status: COMPLETED | OUTPATIENT
Start: 2021-08-06 | End: 2021-08-06

## 2021-08-06 RX ORDER — ONDANSETRON 2 MG/ML
4 INJECTION INTRAMUSCULAR; INTRAVENOUS ONCE
Status: COMPLETED | OUTPATIENT
Start: 2021-08-06 | End: 2021-08-06

## 2021-08-06 RX ORDER — FENTANYL CITRATE 50 UG/ML
25 INJECTION, SOLUTION INTRAMUSCULAR; INTRAVENOUS
Status: DISCONTINUED | OUTPATIENT
Start: 2021-08-06 | End: 2021-08-06 | Stop reason: HOSPADM

## 2021-08-06 RX ORDER — SUCCINYLCHOLINE CHLORIDE 20 MG/ML
INJECTION INTRAMUSCULAR; INTRAVENOUS AS NEEDED
Status: DISCONTINUED | OUTPATIENT
Start: 2021-08-06 | End: 2021-08-06 | Stop reason: SURG

## 2021-08-06 RX ORDER — SODIUM CHLORIDE, SODIUM LACTATE, POTASSIUM CHLORIDE, CALCIUM CHLORIDE 600; 310; 30; 20 MG/100ML; MG/100ML; MG/100ML; MG/100ML
100 INJECTION, SOLUTION INTRAVENOUS CONTINUOUS
Status: DISCONTINUED | OUTPATIENT
Start: 2021-08-06 | End: 2021-08-07

## 2021-08-06 RX ADMIN — FAMOTIDINE 20 MG: 10 INJECTION, SOLUTION INTRAVENOUS at 17:09

## 2021-08-06 RX ADMIN — METRONIDAZOLE 500 MG: 500 INJECTION, SOLUTION INTRAVENOUS at 12:58

## 2021-08-06 RX ADMIN — SODIUM CHLORIDE, PRESERVATIVE FREE 10 ML: 5 INJECTION INTRAVENOUS at 20:09

## 2021-08-06 RX ADMIN — PROPOFOL 50 MG: 10 INJECTION, EMULSION INTRAVENOUS at 17:55

## 2021-08-06 RX ADMIN — SODIUM CHLORIDE, POTASSIUM CHLORIDE, SODIUM LACTATE AND CALCIUM CHLORIDE 100 ML/HR: 600; 310; 30; 20 INJECTION, SOLUTION INTRAVENOUS at 16:10

## 2021-08-06 RX ADMIN — METRONIDAZOLE 500 MG: 500 INJECTION, SOLUTION INTRAVENOUS at 20:09

## 2021-08-06 RX ADMIN — CEFTRIAXONE 1 G: 1 INJECTION, SOLUTION INTRAVENOUS at 09:58

## 2021-08-06 RX ADMIN — CHOLECALCIFEROL (VITAMIN D3) 25 MCG (1,000 UNIT) TABLET 2000 UNITS: TABLET at 08:20

## 2021-08-06 RX ADMIN — METOPROLOL SUCCINATE 25 MG: 25 TABLET, EXTENDED RELEASE ORAL at 06:47

## 2021-08-06 RX ADMIN — INSULIN DETEMIR 10 UNITS: 100 INJECTION, SOLUTION SUBCUTANEOUS at 08:24

## 2021-08-06 RX ADMIN — SODIUM CHLORIDE, POTASSIUM CHLORIDE, SODIUM LACTATE AND CALCIUM CHLORIDE 100 ML/HR: 600; 310; 30; 20 INJECTION, SOLUTION INTRAVENOUS at 03:40

## 2021-08-06 RX ADMIN — SUCCINYLCHOLINE CHLORIDE 120 MG: 20 INJECTION, SOLUTION INTRAMUSCULAR; INTRAVENOUS at 17:34

## 2021-08-06 RX ADMIN — SODIUM CHLORIDE, POTASSIUM CHLORIDE, SODIUM LACTATE AND CALCIUM CHLORIDE 100 ML/HR: 600; 310; 30; 20 INJECTION, SOLUTION INTRAVENOUS at 20:08

## 2021-08-06 RX ADMIN — DEXAMETHASONE SODIUM PHOSPHATE 4 MG: 4 INJECTION, SOLUTION INTRAMUSCULAR; INTRAVENOUS at 17:09

## 2021-08-06 RX ADMIN — ONDANSETRON 4 MG: 2 INJECTION INTRAMUSCULAR; INTRAVENOUS at 17:09

## 2021-08-06 RX ADMIN — ONDANSETRON 4 MG: 2 INJECTION INTRAMUSCULAR; INTRAVENOUS at 18:51

## 2021-08-06 RX ADMIN — LIDOCAINE HYDROCHLORIDE 100 MG: 20 INJECTION, SOLUTION INFILTRATION; PERINEURAL at 17:34

## 2021-08-06 RX ADMIN — PROPOFOL 150 MG: 10 INJECTION, EMULSION INTRAVENOUS at 17:34

## 2021-08-06 RX ADMIN — METRONIDAZOLE 500 MG: 500 INJECTION, SOLUTION INTRAVENOUS at 04:01

## 2021-08-06 RX ADMIN — MORPHINE SULFATE 2 MG: 2 INJECTION, SOLUTION INTRAMUSCULAR; INTRAVENOUS at 03:58

## 2021-08-06 RX ADMIN — ANASTROZOLE 1 MG: 1 TABLET ORAL at 08:21

## 2021-08-06 NOTE — PROGRESS NOTES
"SERVICE: [facility] HOSPITALIST    CONSULTANTS: Surgery, GI    CHIEF COMPLAINT: Abdominal pain    SUBJECTIVE: Patient seen and examined at bedside.  She continues to report 5 out of 10 abdominal pain, scribed as soreness.  Pain is located in the right upper quadrant.  She denies any nausea or vomiting.  She reports she was able to drink some water prior to being made n.p.o. over midnight.  She denies any other acute complaints including fever, chills, dizziness, lightheadedness, leg pain or swelling.    OBJECTIVE:    /58 (BP Location: Right arm, Patient Position: Lying)   Pulse 59   Temp 98.2 °F (36.8 °C) (Oral)   Resp 16   Ht 167.6 cm (66\")   Wt 80 kg (176 lb 6.4 oz)   SpO2 95%   BMI 28.47 kg/m²     MEDS/LABS REVIEWED AND ORDERED    anastrozole, 1 mg, Oral, Daily  cefTRIAXone, 1 g, Intravenous, Q24H  cholecalciferol, 2,000 Units, Oral, Daily  insulin aspart, 0-14 Units, Subcutaneous, Q6H  insulin detemir, 10 Units, Subcutaneous, Daily  metoprolol succinate XL, 25 mg, Oral, QAM  metroNIDAZOLE, 500 mg, Intravenous, Q8H  Scopolamine, 1 patch, Transdermal, Q72H  sodium chloride, 10 mL, Intravenous, Q12H        Physical Exam  Constitutional:       General: She is not in acute distress.     Appearance: She is obese. She is not ill-appearing or toxic-appearing.   HENT:      Head: Normocephalic and atraumatic.      Nose: Nose normal.      Mouth/Throat:      Mouth: Mucous membranes are moist.      Pharynx: Oropharynx is clear.   Eyes:      Extraocular Movements: Extraocular movements intact.   Cardiovascular:      Rate and Rhythm: Normal rate and regular rhythm.      Pulses: Normal pulses.      Heart sounds: Normal heart sounds.   Pulmonary:      Effort: Pulmonary effort is normal. No respiratory distress.      Breath sounds: Normal breath sounds. No wheezing or rales.   Abdominal:      General: Bowel sounds are normal. There is no distension.      Palpations: Abdomen is soft.      Tenderness: " There is abdominal tenderness. There is guarding. There is no rebound.   Musculoskeletal:         General: No swelling, deformity or signs of injury. Normal range of motion.      Cervical back: Normal range of motion. No rigidity.   Skin:     General: Skin is warm and dry.      Coloration: Skin is not jaundiced.   Neurological:      General: No focal deficit present.      Mental Status: She is alert and oriented to person, place, and time. Mental status is at baseline.   Psychiatric:         Mood and Affect: Mood normal.         Behavior: Behavior normal.         Thought Content: Thought content normal.         Judgment: Judgment normal.         LAB/DIAGNOSTICS:    Lab Results (last 24 hours)     Procedure Component Value Units Date/Time    POC Glucose Once [[id number]]  (Normal) Collected: 08/06/21 0722    Specimen: Blood Updated: 08/06/21 0729     Glucose 122 mg/dL      Comment: Meter: [id number] : [id number] Hira Gustavo Aden IVELISSE       Comprehensive Metabolic Panel [[id number]]  (Abnormal) Collected: 08/06/21 0604    Specimen: Blood Updated: 08/06/21 0651     Glucose 143 mg/dL      BUN 11 mg/dL      Creatinine 1.09 mg/dL      Sodium 137 mmol/L      Potassium 4.6 mmol/L      Chloride 101 mmol/L      CO2 26.1 mmol/L      Calcium 9.3 mg/dL      Total Protein 5.9 g/dL      Albumin 3.20 g/dL      ALT (SGPT) 220 U/L      AST (SGOT) 101 U/L      Alkaline Phosphatase 116 U/L      Total Bilirubin 0.3 mg/dL      eGFR  African Amer 59 mL/min/1.73      Globulin 2.7 gm/dL      A/G Ratio 1.2 g/dL      BUN/Creatinine Ratio 10.1     Anion Gap 9.9 mmol/L     Narrative:      GFR Normal >60  Chronic Kidney Disease <60  Kidney Failure <15      Phosphorus [[id number]]  (Normal) Collected: 08/06/21 0604    Specimen: Blood Updated: 08/06/21 0651     Phosphorus 3.2 mg/dL     Magnesium [[id number]]  (Normal) Collected: 08/06/21 0604    Specimen: Blood Updated: 08/06/21 0651     Magnesium 1.7 mg/dL     POC Glucose Once [[id number]]  (Normal)  Collected: 08/06/21 0630    Specimen: Blood Updated: 08/06/21 0637     Glucose 123 mg/dL      Comment: Meter: [id number] : [id number] Bull Hinton PCA       CBC & Differential [[id number]]  (Abnormal) Collected: 08/06/21 0604    Specimen: Blood Updated: 08/06/21 0629    Narrative:      The following orders were created for panel order CBC & Differential.  Procedure                               Abnormality         Status                     ---------                               -----------         ------                     CBC Auto Differential[[id number]]        Abnormal            Final result                 Please view results for these tests on the individual orders.    CBC Auto Differential [[id number]]  (Abnormal) Collected: 08/06/21 0604    Specimen: Blood Updated: 08/06/21 0629     WBC 12.86 10*3/mm3      RBC 4.53 10*6/mm3      Hemoglobin 12.0 g/dL      Hematocrit 38.0 %      MCV 83.9 fL      MCH 26.5 pg      MCHC 31.6 g/dL      RDW 14.3 %      RDW-SD 43.6 fl      MPV 10.2 fL      Platelets 169 10*3/mm3      Neutrophil % 84.4 %      Lymphocyte % 5.9 %      Monocyte % 9.2 %      Eosinophil % 0.0 %      Basophil % 0.1 %      Immature Grans % 0.4 %      Neutrophils, Absolute 10.86 10*3/mm3      Lymphocytes, Absolute 0.76 10*3/mm3      Monocytes, Absolute 1.18 10*3/mm3      Eosinophils, Absolute 0.00 10*3/mm3      Basophils, Absolute 0.01 10*3/mm3      Immature Grans, Absolute 0.05 10*3/mm3      nRBC 0.0 /100 WBC     POC Glucose Once [[id number]]  (Abnormal) Collected: 08/06/21 0000    Specimen: Blood Updated: 08/06/21 0006     Glucose 140 mg/dL      Comment: Meter: [id number] : [id number] Bull Hinton PCA       POC Glucose Once [[id number]]  (Abnormal) Collected: 08/05/21 1808    Specimen: Blood Updated: 08/05/21 1815     Glucose 145 mg/dL      Comment: Meter: [id number] : [id number] Shanon CIFUENTESA       POC Glucose Once [[id number]]  (Abnormal) Collected: 08/05/21 1602    Specimen: Blood  Updated: 08/05/21 1608     Glucose 135 mg/dL      Comment: Meter: [id number] : [id number] Shanon Prajapati CNA       Tissue Pathology Exam [[id number]] Collected: 08/05/21 1355    Specimen: Tissue from Gallbladder Updated: 08/05/21 1444    Hepatic Function Panel [[id number]]  (Abnormal) Collected: 08/05/21 0618    Specimen: Blood Updated: 08/05/21 1204     Total Protein 6.7 g/dL      Albumin 3.70 g/dL      ALT (SGPT) 245 U/L      AST (SGOT) 127 U/L      Comment: Specimen hemolyzed.  Results may be affected.        Alkaline Phosphatase 131 U/L      Total Bilirubin 0.2 mg/dL      Bilirubin, Direct <0.2 mg/dL      Comment: Specimen hemolyzed. Results may be affected.        Bilirubin, Indirect --     Comment: Unable to calculate       Lipase [[id number]]  (Abnormal) Collected: 08/05/21 0618    Specimen: Blood Updated: 08/05/21 1202     Lipase 11 U/L     Amylase [[id number]]  (Normal) Collected: 08/05/21 0618    Specimen: Blood Updated: 08/05/21 1202     Amylase 35 U/L         ECG 12 Lead   Final Result   HEART RATE= 62  bpm   RR Interval= 984  ms   MT Interval= 141  ms   P Horizontal Axis= 4  deg   P Front Axis= 52  deg   QRSD Interval= 141  ms   QT Interval= 451  ms   QRS Axis= -84  deg   T Wave Axis= 27  deg   - ABNORMAL ECG -   Sinus rhythm   Atrial premature complex   RBBB and LAFB   Inferior infarct, old   NO PRIOR TRACING AVAILABLE FOR COMPARISON   Electronically Signed By: Hans Isaac ([facility]) 04-Aug-2021 07:02:57   Date and Time of Study: 2021-08-03 23:06:03        Results for orders placed in visit on 08/29/17    SCANNED - ECHOCARDIOGRAM    US Abdomen Limited    Result Date: 8/4/2021  1.  Abnormal gallbladder. Cholelithiasis with dense biliary sludge filling a thick-walled, nontender gallbladder. Concern for acute cholecystitis. 2.  Consider radionuclide hepatobiliary scan to confirm acute cholecystitis if clinically indicated (after halting opioid pain medication for at least 6 hours).  This report was  "finalized on 8/4/2021 8:22 AM by Dr. Jose Chambers MD.          ASSESSMENT/PLAN:  Acute cholecystitis  -GI and Surgery following  -as seen with U/S gallbladder on 8/4/21  cholecystectomy 8/5/21, stone seen in CBD, plan for ERCP by GI 8/6/21.   -PRN Zofran for nausea.   -Gentle IVF due to NPO status.   -Leukocytosis likely reactive from surgery, will monitor.   -IV Rocephin and Flagyl as per surgery.      HTN: continue metoprolol 25mg daily.  Blood pressure remains at goal on current treatment.     Vitamin D deficiency: continue Vitamin D supplement after no longer NPO.      History of breast cancer: continue anastrazole after no longer NPO.      Type 2 DM   -holding home agents  -continue SSI, and levemir 10 units daily    Obesity: BMI 28.47. Plan to  patient on diet and exercise.      DVT ppx: Holding Lovenox due to Surgery. SCDs placed.   GI ppx: NI  Code: full        PLAN FOR DISPOSITION: MIGUE Varela DO  Hospitalist, [facility]  08/06/21  07:39 EDT    \"Dictated utilizing Dragon dictation\"  "

## 2021-08-06 NOTE — PLAN OF CARE
Goal Outcome Evaluation:  Plan of Care Reviewed With: patient, spouse        Progress: improving  Outcome Summary: No pain med given this shift; encouraged to amb; IVFs cont; HONEY drain intact with bloody drainage; remains NPO for ERCP scheduled for 1630

## 2021-08-06 NOTE — ANESTHESIA PREPROCEDURE EVALUATION
Anesthesia Evaluation     Patient summary reviewed and Nursing notes reviewed   NPO Solid Status: > 8 hours  NPO Liquid Status: > 8 hours           Airway   Mallampati: II  TM distance: >3 FB  Neck ROM: full  No difficulty expected  Dental - normal exam     Pulmonary - normal exam   (+) a smoker (quit 25 yrs ago ) Former, shortness of breath, sleep apnea,   Cardiovascular - normal exam  Exercise tolerance: poor (<4 METS)    ECG reviewed  Patient on routine beta blocker and Beta blocker given within 24 hours of surgery  Rhythm: regular  Rate: normal    (+) hypertension,     ROS comment: ABNORMAL ECG -  Sinus rhythm  Atrial premature complex  RBBB and LAFB  Inferior infarct, old  NO PRIOR TRACING AVAILABLE FOR COMPARISON  Electronically Signed By: Hnas Isaac ([facility]) 04-Aug-2021 07:02:57  Date and Time of Study: 2021-08-03 23:06:03    2017 ECHO in media EF 65%    Neuro/Psych  (+) headaches, dementia,     GI/Hepatic/Renal/Endo    (+) morbid obesity, GERD well controlled,  renal disease CRI, diabetes mellitus type 2 well controlled,     Musculoskeletal     Abdominal    Substance History      OB/GYN negative ob/gyn ROS         Other   arthritis,    history of cancer remission                      Anesthesia Plan    ASA 3     general     intravenous induction     Anesthetic plan, all risks, benefits, and alternatives have been provided, discussed and informed consent has been obtained with: patient.  Use of blood products discussed with patient  Consented to blood products.

## 2021-08-06 NOTE — PROGRESS NOTES
Patient: Tyrone Rand  Procedure(s):  CHOLECYSTECTOMY LAPAROSCOPIC INTRAOPERATIVE CHOLANGIOGRAM  Anesthesia type: general    Patient location: [facility] Surgical Floor  Last vitals:   Vitals:    08/06/21 1107   BP: 137/67   Pulse: 56   Resp: 18   Temp: 98.3 °F (36.8 °C)   SpO2: 98%     Level of consciousness: awake, alert and oriented    Post-anesthesia pain: adequate analgesia  Airway patency: patent  Respiratory: unassisted  Cardiovascular: stable and blood pressure at baseline  Hydration: euvolemic    Anesthetic complications: no

## 2021-08-06 NOTE — CASE MANAGEMENT/SOCIAL WORK
Continued Stay Note  TATI Vo     Patient Name: Tyrone Rand  MRN: [id number]  Today's Date: 8/6/2021    Admit Date: 8/3/2021    Discharge Plan     Row Name 08/06/21 1600       Plan    Plan  plan home w     Plan Comments  Follow up visit with patient, she is sitting up at side of bed with family visiting. IMM explained, signed and copy provided. Plan remains to return home with her  at [place]. CM will continue to follow.        Discharge Codes    No documentation.             All Barry RN

## 2021-08-06 NOTE — PROGRESS NOTES
Surgery Progress Note   Chief Complaint: Postoperative day 1    Subjective     Interval History:     Tyrone Solo is postoperative day 1 from a laparoscopic cholecystectomy with cholangiograms that showed a common bile duct gallstone.  She is doing well.  She has no nausea.  She has some right-sided tenderness.      Objective     Vital Signs  Temp:  [97.1 °F (36.2 °C)-98.6 °F (37 °C)] 98.2 °F (36.8 °C)  Heart Rate:  [56-70] 59  Resp:  [10-19] 16  BP: (103-150)/(40-95) 105/58  Body mass index is 28.47 kg/m².    Intake/Output Summary (Last 24 hours) at 8/6/2021 0654  Last data filed at 8/6/2021 0646  Gross per 24 hour   Intake 2793.34 ml   Output 1435 ml   Net 1358.34 ml     I/O this shift:  In: 1581.7 [I.V.:1381.7; IV Piggyback:200]  Out: 675 [Urine:580; Drains:95]       Physical Exam:   General: patient awake, alert and cooperative   Abdomen: Soft, bowel sounds noted, dressings are clean dry and intact   HONEY: Dark drainage noted does not appear bilious at this time   Extremities: no rash or edema   Neurologic: Normal mood and behavior     Results Review:     I reviewed the patient's new clinical results.      WBC No results found for: WBCS   HGB Hemoglobin   Date Value Ref Range Status   08/06/2021 12.0 12.0 - 15.9 g/dL Final   08/05/2021 13.9 12.0 - 15.9 g/dL Final   08/03/2021 13.3 12.0 - 15.9 g/dL Final      HCT Hematocrit   Date Value Ref Range Status   08/06/2021 38.0 34.0 - 46.6 % Final   08/05/2021 44.1 34.0 - 46.6 % Final   08/03/2021 42.4 34.0 - 46.6 % Final      Platlets No results found for: LABPLAT     PT/INR:  No results found for: PROTIME/No results found for: INR    Sodium Sodium   Date Value Ref Range Status   08/06/2021 137 136 - 145 mmol/L Final   08/05/2021 135 (L) 136 - 145 mmol/L Final   08/04/2021 134 (L) 136 - 145 mmol/L Final   08/03/2021 137 136 - 145 mmol/L Final      Potassium Potassium   Date Value Ref Range Status   08/06/2021 4.6 3.5 - 5.2 mmol/L Final   08/05/2021 4.8 3.5 - 5.2  mmol/L Final     Comment:     Slight hemolysis detected by analyzer. Results may be affected.   08/04/2021 4.9 3.5 - 5.2 mmol/L Final     Comment:     Specimen hemolyzed.  Results may be affected. Patient is a difficult stick   08/03/2021 4.1 3.5 - 5.2 mmol/L Final      Chloride Chloride   Date Value Ref Range Status   08/06/2021 101 98 - 107 mmol/L Final   08/05/2021 101 98 - 107 mmol/L Final   08/04/2021 98 98 - 107 mmol/L Final   08/03/2021 98 98 - 107 mmol/L Final      Bicarbonate No results found for: PLASMABICARB   BUN BUN   Date Value Ref Range Status   08/06/2021 11 8 - 23 mg/dL Final   08/05/2021 11 8 - 23 mg/dL Final   08/04/2021 12 8 - 23 mg/dL Final   08/03/2021 18 8 - 23 mg/dL Final      Creatinine Creatinine   Date Value Ref Range Status   08/06/2021 1.09 (H) 0.57 - 1.00 mg/dL Final   08/05/2021 1.13 (H) 0.57 - 1.00 mg/dL Final   08/04/2021 1.08 (H) 0.57 - 1.00 mg/dL Final   08/03/2021 1.12 (H) 0.57 - 1.00 mg/dL Final      Calcium Calcium   Date Value Ref Range Status   08/06/2021 9.3 8.6 - 10.5 mg/dL Final   08/05/2021 9.3 8.6 - 10.5 mg/dL Final   08/04/2021 9.7 8.6 - 10.5 mg/dL Final   08/03/2021 9.7 8.6 - 10.5 mg/dL Final      Magnesium  AST  ALT  Bilirubin, Total  AlkPhos  Albumin    Amylase  Lipase    Radiology: Magnesium   Date Value Ref Range Status   08/06/2021 1.7 1.6 - 2.4 mg/dL Final   08/04/2021 1.9 1.6 - 2.4 mg/dL Final     No components found for: AST.*  No components found for: ALT.*  No results found for: BILIRUBIN    No components found for: ALKPHOS.*  No components found for: ALBUMIN.*      No components found for: AMYLASE.*  No components found for: LIPASE.*            Imaging Results (Most Recent)     Procedure Component Value Units Date/Time    FL Cholangiogram Operative [[id number]] Collected: 08/05/21 1530     Updated: 08/05/21 1533    Narrative:      FLUOROSCOPY DURING INTRAOPERATIVE CHOLANGIOGRAM, 08/05/2021     HISTORY:  Fluoroscopy during laparoscopic cholecystectomy and  intraoperative  cholangiogram performed earlier today by Dr. Andrade.     REPORT:  C-arm fluoroscopy was provided by radiology personnel in the OR during  intraoperative cholangiogram. Fluoroscopy time was recorded as 25  seconds. Spot images recorded for documentation purposes: 1. The image  shows a rounded filling defect within the distal common bile duct above  the ampulla compatible with bile duct stone. In addition, there is no  passage of contrast from the dilated extrahepatic bile duct into the  duodenum, implying potential ampullary obstruction due to stone or  stricture. Please see operative note for details.     This report was finalized on 8/5/2021 3:31 PM by Dr. Jose Chambers MD.       US Abdomen Limited [[id number]] Collected: 08/04/21 0818     Updated: 08/04/21 0824    Narrative:      ULTRASOUND ABDOMEN, LIMITED, 08/04/2021         HISTORY:  76-year-old female admitted to the hospital last evening after new onset  postprandial abdomen pain with nausea and vomiting. CT examination  showed cholelithiasis and questioned mild pancreatitis. Serum lipase  level was normal. White blood cell count is normal.     TECHNIQUE:  Grayscale ultrasound imaging of the right upper abdomen.     COMPARISON:  *  CT abdomen/pelvis, 08/03/2021.     FINDINGS:  The gallbladder is markedly abnormal in appearance. Cholelithiasis is  present with at least one large gallstone measuring about 2.5 cm. There  is also a large amount of compacted biliary sludge within the  gallbladder lumen. The gallbladder wall is diffusely thickened, the  gallbladder is tender with direct pressure.     There is no intrahepatic or extrahepatic bile duct dilatation (CBD 5  mm).     Liver is normal in size and appearance, although somewhat poorly seen.  Partially obscured pancreas is negative where visualized. Right kidney  is negative with no hydronephrosis.       Impression:      1.  Abnormal gallbladder. Cholelithiasis with dense biliary  sludge  filling a thick-walled, nontender gallbladder. Concern for acute  cholecystitis.  2.  Consider radionuclide hepatobiliary scan to confirm acute  cholecystitis if clinically indicated (after halting opioid pain  medication for at least 6 hours).     This report was finalized on 8/4/2021 8:22 AM by Dr. Jose Chambers MD.       CT Abdomen Pelvis With Contrast [[id number]] Collected: 08/04/21 0024     Updated: 08/04/21 0027    Narrative:      CT Abdomen Pelvis W    INDICATION:   Epigastric pain. Symptoms began 5 hours prior to arrival following [place].    TECHNIQUE:   CT of the abdomen and pelvis with IV contrast. Coronal and sagittal reconstructions were obtained.  Radiation dose reduction techniques included automated exposure control or exposure modulation based on body size. Count of known CT and cardiac nuc med  studies performed in previous 12 months: 0.     COMPARISON:   None available.    FINDINGS:  Abdomen: Included lung bases demonstrate emphysematous change. There is no effusion. Atherosclerotic aorta and ectasia of the distal thoracic aorta. Spleen is negative. There are clips associated with the medial limb right adrenal gland is otherwise  negative. There is nodularity of the left adrenal gland. The largest nodule measures 1.4 cm and there is a smaller nodule measuring 9 mm. These cannot be further characterized on this single phase study but absent any history of malignancy or risk  factors for adrenal malignancy are likely benign adenomas. Suggest comparison to prior outside studies initially to assess stability.    There is diminished attenuation and subtle edema of the pancreas suggestive of acute pancreatitis. Correlate with laboratory data. Mild hepatic steatosis. The gallbladder is distended up to 3.5 cm. There are multiple gallstones with peripheral rim  calcification, measuring up to 18 mm. No distinct CT evidence of acute cholecystitis at this time. There is no biliary ductal  dilatation. Extrahepatic common bile duct measures up to 6 mm in.    The kidneys are nonobstructed and demonstrate cortical scarring. Tiny benign left renal cyst. No adenopathy.    Pelvis: Negative bladder and leiomyomatous change of the uterus. No drainable fluid collection. Extensive diverticulosis of the colon. No bowel obstruction. Appendix diminutive but normal. Redundancy of the ascending colon and cecum. Tiny fat-containing  right inguinal hernia. Fat-containing umbilical hernia. Degenerative changes. No suspicious bone lesion.  negative.      Impression:      1. Abnormal appearance of the pancreas suggestive of mild acute pancreatitis. Laboratory data would be complimentary.  2. Cholelithiasis without additional CT evidence of acute cholecystitis. Gallbladder ultrasound would be complimentary along with laboratory data.  3. Normal appendix.  4. Left adrenal nodules, technically indeterminate but likely benign adenomas. Suggest initial comparison to prior outside studies to assess stability.          Signer Name: Charles Garcia MD   Signed: 8/4/2021 12:24 AM   Workstation Name: [facility]    [facility]             lactated ringers, 100 mL/hr, Last Rate: 100 mL/hr (08/06/21 0602)          Assessment/Plan     Patient Active Problem List   Diagnosis Code   • Abnormal electrocardiogram R94.31   • Chronic kidney disease, stage III (moderate) (CMS/HCC) N18.30   • Cognitive complaints R41.9   • Dementia (CMS/HCC) F03.90   • Depression F32.9   • Uncontrolled type 2 diabetes mellitus (CMS/HCC) E11.65   • Diabetes mellitus (CMS/HCC) E11.9   • Dysuria R30.0   • Gastroesophageal reflux disease K21.9   • Headache R51.9   • Hyperlipidemia E78.5   • Hypertension I10   • Obesity (BMI 30-39.9) E66.9   • Obstructive sleep apnea syndrome G47.33   • Ventricular premature beats I49.3   • Renal mass N28.89   • Shortness of breath R06.02   • Dyssomnia G47.9   • Type 2 diabetes mellitus (CMS/HCC) E11.9    • Vitamin D deficiency E55.9   • Weight gain R63.5   • Calculus of gallbladder without cholecystitis without obstruction K80.20   • Malignant neoplasm of lower-inner quadrant of left breast in female, estrogen receptor negative (CMS/HCC) C50.312, Z17.1   • Menopausal and perimenopausal disorder  N95.9   • Chondromalacia M94.20   • Greater trochanteric bursitis of right hip M70.61   • Calculus of bile duct with acute cholecystitis without obstruction K80.42       POD 1  --Dr. Mercado will be performing an ERCP for her common bile duct stone today  --If all goes well and her drain can be removed before discharge as long as the output is nonbilious  --Dr. Duque will be rounding this weekend and next week      Yasmeen Andrade DO  08/06/21  06:54 EDT

## 2021-08-06 NOTE — ANESTHESIA PROCEDURE NOTES
Airway  Urgency: elective    Date/Time: 8/6/2021 5:35 PM  Airway not difficult    General Information and Staff    Patient location during procedure: OR  CRNA: Joe Mayers CRNA    Indications and Patient Condition  Indications for airway management: airway protection    Preoxygenated: yes  Mask difficulty assessment: 0 - not attempted    Final Airway Details  Final airway type: endotracheal airway      Successful airway: ETT  Cuffed: yes   Successful intubation technique: direct laryngoscopy  Endotracheal tube insertion site: oral  Blade: Castro  Blade size: 2  ETT size (mm): 7.0  Cormack-Lehane Classification: grade I - full view of glottis  Placement verified by: chest auscultation and capnometry   Measured from: lips  ETT/EBT  to lips (cm): 21  Number of attempts at approach: 1  Assessment: lips, teeth, and gum same as pre-op and atraumatic intubation

## 2021-08-06 NOTE — OP NOTE
ENDOSCOPIC RETROGRADE CHOLANGIOPANCREATOGRAPHY  Procedure Report    Patient Name:  Tyrone Rand  YOB: 1945    Date of Surgery:  8/6/2021     Indications:  Calculus of bile duct with acute cholecystitis without obstruction [K80.42]      Pre-op Diagnosis:   Calculus of bile duct with acute cholecystitis without obstruction [K80.42]    Post-Op Diagnosis Codes:     * Calculus of bile duct with acute cholecystitis without obstruction [K80.42]         Procedure/CPT® Codes:      Procedure(s):  ENDOSCOPIC RETROGRADE CHOLANGIOPANCREATOGRAPHY    Staff:  Surgeon(s):  Wally Mercado MD         Anesthesia: General    Estimated Blood Loss: none    Specimens: None    Implants:    Nothing was implanted during the procedure      Description of Procedure: After having signed informed consent she was brought to the operating room and had general anesthesia induced. She was intubated, she was placed in a prone head to the right position with bite block between her incisors. The scope was introduced in the oropharynx and advanced under direct visualization with ease into the esophagus, through the distal esophagus. There were no mucosal abnormalities noted throughout the esophageal or gastric exam. The scope was advanced through the pylorus and duodenal bulb, advanced around the angle of the second and third portion of the duodenum, shortened back into position. The ampulla was normal appearing, it was draining bile and a sphincterotome was placed into the ampulla and a wire was passed freely up into the proximal hepatic. A cholangiogram showed mild dilation of the duct, no filling of the cystic duct and no leak. There was a single filling defect in the proximal half of the common bile duct. A sphincterotomy was performed with excellent hemostasis. An exchange for balloon catheter was then performed. The balloon was passed proximal to the filling defect, inflated, and withdrawn. A single stone was  removed easily from the common bile duct. A second sweep was made. No further stones were identified. An occlusion cholangiogram showed again mild dilation of the duct without any other filling defects. A third pull through was performed without any further stones removed. The wire and catheter were removed. The scope was withdrawn back into the stomach. A drainage film showed no evidence of contrast from the pancreatic duct and at this time normal caliber common bile duct that had been decompressed. The scope was taken from the patient. She tolerated the procedure well. She was extubated and sent to the recovery room in good condition.           Findings:  Normal Ampulla  Mild CBD Dilation  Single Filling Defect  S/P Sphincterotomy  And Balloon Stone Extraction     Complications: None    Recommendations: Ice Chips tonight , continue IVF and recheck Labs in the AM, Continue Abx as per Surgery      Wally Mercado MD     Date: 8/6/2021  Time: 18:24 EDT

## 2021-08-06 NOTE — ANESTHESIA POSTPROCEDURE EVALUATION
Patient: Tyrone Rand    Procedure Summary     Date: 08/06/21 Room / Location: [facility] OR 1 /  [facility] OR    Anesthesia Start: 1729 Anesthesia Stop: 1830    Procedure: ENDOSCOPIC RETROGRADE CHOLANGIOPANCREATOGRAPHY (N/A ) Diagnosis:       Calculus of bile duct with acute cholecystitis without obstruction      (Calculus of bile duct with acute cholecystitis without obstruction [K80.42])    Surgeons: Wally Mercado MD Provider: Joe Mayers CRNA    Anesthesia Type: general ASA Status: 3          Anesthesia Type: general    Vitals  Vitals Value Taken Time   /90 08/06/21 1920   Temp 98.4 °F (36.9 °C) 08/06/21 1835   Pulse 72 08/06/21 1924   Resp 15 08/06/21 1915   SpO2 95 % 08/06/21 1924   Vitals shown include unvalidated device data.        Post Anesthesia Care and Evaluation    Patient location during evaluation: PACU  Patient participation: complete - patient participated  Level of consciousness: awake and alert  Pain score: 2  Pain management: adequate  Airway patency: patent  Anesthetic complications: No anesthetic complications  PONV Status: none  Cardiovascular status: acceptable  Respiratory status: acceptable  Hydration status: acceptable

## 2021-08-06 NOTE — NURSING NOTE
WHEN PATIENT FIRST ARRIVED TO PACU- SHE WAS MUMBLE WITH TALKING- I FINALLY UNDERSTOOD SHE SAID SPIT- I SUCTIONED LARGE AMOUNT OF SPUTUM - PATIENT SAID SHE DIDN'T WANT TO SWALLOW- HER THROAT WAS SORE- SHE HAD SOME ICE SINCE AND IS NOW SWALLOWING BETTER

## 2021-08-06 NOTE — BRIEF OP NOTE
ENDOSCOPIC RETROGRADE CHOLANGIOPANCREATOGRAPHY  Progress Note    Tyrone Rand  8/6/2021    Pre-op Diagnosis:   Calculus of bile duct with acute cholecystitis without obstruction [K80.42]       Post-Op Diagnosis Codes:     * Calculus of bile duct with acute cholecystitis without obstruction [K80.42]    Procedure/CPT® Codes:        Procedure(s):  ENDOSCOPIC RETROGRADE CHOLANGIOPANCREATOGRAPHY    Surgeon(s):  Wally Mercado MD    Anesthesia: General    Staff:   Circulator: Alexandrea Damon RN  Scrub Person: Lorrie hCatman         Estimated Blood Loss: none    Urine Voided: * No values recorded between 8/6/2021  5:29 PM and 8/6/2021  6:11 PM *    Specimens:                None          Drains:   Closed/Suction Drain RLQ Bulb (Active)   Site Description Unable to view 08/06/21 1648   Dressing Status Clean;Dry;Intact 08/06/21 1648   Drainage Appearance Bloody 08/06/21 1648   Status To bulb suction 08/06/21 1648   Output (mL) 30 mL 08/06/21 1300       Findings: Normal Ampulla  Mild CBD Dilation  Single Filling Defect  S/P Sphincterotomy  And Balloon Stone Extraction    Complications: None          Wally Mercado MD     Date: 8/6/2021  Time: 18:23 EDT

## 2021-08-06 NOTE — PLAN OF CARE
Goal Outcome Evaluation:  Plan of Care Reviewed With: patient, spouse        Progress: improving  Outcome Summary: VSS, prn pain med effective, HONEY drain with dark brown/bloody drainage, post op day 1, ERCP planned for today, NPO since midnight, IVF continue.

## 2021-08-07 LAB
ALBUMIN SERPL-MCNC: 3 G/DL (ref 3.5–5.2)
ALBUMIN/GLOB SERPL: 1 G/DL
ALP SERPL-CCNC: 106 U/L (ref 39–117)
ALT SERPL W P-5'-P-CCNC: 135 U/L (ref 1–33)
ANION GAP SERPL CALCULATED.3IONS-SCNC: 6.9 MMOL/L (ref 5–15)
AST SERPL-CCNC: 42 U/L (ref 1–32)
BASOPHILS # BLD AUTO: 0.01 10*3/MM3 (ref 0–0.2)
BASOPHILS NFR BLD AUTO: 0.1 % (ref 0–1.5)
BILIRUB SERPL-MCNC: 0.3 MG/DL (ref 0–1.2)
BUN SERPL-MCNC: 14 MG/DL (ref 8–23)
BUN/CREAT SERPL: 12.7 (ref 7–25)
CALCIUM SPEC-SCNC: 9.1 MG/DL (ref 8.6–10.5)
CHLORIDE SERPL-SCNC: 103 MMOL/L (ref 98–107)
CO2 SERPL-SCNC: 28.1 MMOL/L (ref 22–29)
CREAT SERPL-MCNC: 1.1 MG/DL (ref 0.57–1)
DEPRECATED RDW RBC AUTO: 44.6 FL (ref 37–54)
EOSINOPHIL # BLD AUTO: 0.02 10*3/MM3 (ref 0–0.4)
EOSINOPHIL NFR BLD AUTO: 0.2 % (ref 0.3–6.2)
ERYTHROCYTE [DISTWIDTH] IN BLOOD BY AUTOMATED COUNT: 14.4 % (ref 12.3–15.4)
GFR SERPL CREATININE-BSD FRML MDRD: 58 ML/MIN/1.73
GLOBULIN UR ELPH-MCNC: 2.9 GM/DL
GLUCOSE BLDC GLUCOMTR-MCNC: 114 MG/DL (ref 70–130)
GLUCOSE BLDC GLUCOMTR-MCNC: 134 MG/DL (ref 70–130)
GLUCOSE BLDC GLUCOMTR-MCNC: 140 MG/DL (ref 70–130)
GLUCOSE BLDC GLUCOMTR-MCNC: 151 MG/DL (ref 70–130)
GLUCOSE SERPL-MCNC: 114 MG/DL (ref 65–99)
HCT VFR BLD AUTO: 38.4 % (ref 34–46.6)
HGB BLD-MCNC: 11.9 G/DL (ref 12–15.9)
IMM GRANULOCYTES # BLD AUTO: 0.05 10*3/MM3 (ref 0–0.05)
IMM GRANULOCYTES NFR BLD AUTO: 0.5 % (ref 0–0.5)
LIPASE SERPL-CCNC: 9 U/L (ref 13–60)
LYMPHOCYTES # BLD AUTO: 1.01 10*3/MM3 (ref 0.7–3.1)
LYMPHOCYTES NFR BLD AUTO: 9.7 % (ref 19.6–45.3)
MAGNESIUM SERPL-MCNC: 1.8 MG/DL (ref 1.6–2.4)
MCH RBC QN AUTO: 26.3 PG (ref 26.6–33)
MCHC RBC AUTO-ENTMCNC: 31 G/DL (ref 31.5–35.7)
MCV RBC AUTO: 85 FL (ref 79–97)
MONOCYTES # BLD AUTO: 0.91 10*3/MM3 (ref 0.1–0.9)
MONOCYTES NFR BLD AUTO: 8.7 % (ref 5–12)
NEUTROPHILS NFR BLD AUTO: 8.46 10*3/MM3 (ref 1.7–7)
NEUTROPHILS NFR BLD AUTO: 80.8 % (ref 42.7–76)
NRBC BLD AUTO-RTO: 0 /100 WBC (ref 0–0.2)
PHOSPHATE SERPL-MCNC: 3.1 MG/DL (ref 2.5–4.5)
PLATELET # BLD AUTO: 200 10*3/MM3 (ref 140–450)
PMV BLD AUTO: 9.8 FL (ref 6–12)
POTASSIUM SERPL-SCNC: 4 MMOL/L (ref 3.5–5.2)
PROT SERPL-MCNC: 5.9 G/DL (ref 6–8.5)
RBC # BLD AUTO: 4.52 10*6/MM3 (ref 3.77–5.28)
SODIUM SERPL-SCNC: 138 MMOL/L (ref 136–145)
WBC # BLD AUTO: 10.46 10*3/MM3 (ref 3.4–10.8)

## 2021-08-07 PROCEDURE — 84100 ASSAY OF PHOSPHORUS: CPT | Performed by: INTERNAL MEDICINE

## 2021-08-07 PROCEDURE — 83735 ASSAY OF MAGNESIUM: CPT | Performed by: INTERNAL MEDICINE

## 2021-08-07 PROCEDURE — 63710000001 INSULIN DETEMIR PER 5 UNITS: Performed by: INTERNAL MEDICINE

## 2021-08-07 PROCEDURE — 99232 SBSQ HOSP IP/OBS MODERATE 35: CPT | Performed by: INTERNAL MEDICINE

## 2021-08-07 PROCEDURE — 94799 UNLISTED PULMONARY SVC/PX: CPT

## 2021-08-07 PROCEDURE — 80053 COMPREHEN METABOLIC PANEL: CPT | Performed by: INTERNAL MEDICINE

## 2021-08-07 PROCEDURE — 85025 COMPLETE CBC W/AUTO DIFF WBC: CPT | Performed by: INTERNAL MEDICINE

## 2021-08-07 PROCEDURE — 83690 ASSAY OF LIPASE: CPT | Performed by: INTERNAL MEDICINE

## 2021-08-07 PROCEDURE — 99024 POSTOP FOLLOW-UP VISIT: CPT | Performed by: SURGERY

## 2021-08-07 PROCEDURE — 25010000002 CEFTRIAXONE SODIUM-DEXTROSE 1-3.74 GM-%(50ML) RECONSTITUTED SOLUTION: Performed by: INTERNAL MEDICINE

## 2021-08-07 PROCEDURE — 82962 GLUCOSE BLOOD TEST: CPT

## 2021-08-07 RX ADMIN — SODIUM CHLORIDE, PRESERVATIVE FREE 10 ML: 5 INJECTION INTRAVENOUS at 09:11

## 2021-08-07 RX ADMIN — METRONIDAZOLE 500 MG: 500 INJECTION, SOLUTION INTRAVENOUS at 19:44

## 2021-08-07 RX ADMIN — CHOLECALCIFEROL (VITAMIN D3) 25 MCG (1,000 UNIT) TABLET 2000 UNITS: TABLET at 09:05

## 2021-08-07 RX ADMIN — ANASTROZOLE 1 MG: 1 TABLET ORAL at 09:05

## 2021-08-07 RX ADMIN — METRONIDAZOLE 500 MG: 500 INJECTION, SOLUTION INTRAVENOUS at 12:20

## 2021-08-07 RX ADMIN — SODIUM CHLORIDE, PRESERVATIVE FREE 10 ML: 5 INJECTION INTRAVENOUS at 21:03

## 2021-08-07 RX ADMIN — METRONIDAZOLE 500 MG: 500 INJECTION, SOLUTION INTRAVENOUS at 04:27

## 2021-08-07 RX ADMIN — ACETAMINOPHEN 650 MG: 325 TABLET, FILM COATED ORAL at 11:35

## 2021-08-07 RX ADMIN — METOPROLOL SUCCINATE 25 MG: 25 TABLET, EXTENDED RELEASE ORAL at 09:05

## 2021-08-07 RX ADMIN — INSULIN DETEMIR 10 UNITS: 100 INJECTION, SOLUTION SUBCUTANEOUS at 09:00

## 2021-08-07 RX ADMIN — CEFTRIAXONE 1 G: 1 INJECTION, SOLUTION INTRAVENOUS at 11:34

## 2021-08-07 NOTE — PROGRESS NOTES
"SERVICE: [facility] HOSPITALIST    CONSULTANTS: GI, general surgery    CHIEF COMPLAINT: Abdominal pain    SUBJECTIVE: Patient seen and examined at bedside.  Patient is in good spirits this morning.  Family is at bedside.  Patient reports that she has had some nausea with eating.  However her nausea is now improved.  She reports she has taken Tylenol for abdominal pain.  She also reports pain in her abdomen with coughing.  We discussed use of incentive spirometer.  Patient denies any other acute complaints at this time and specifically denies chest pain, palpitations, fever, chills, leg pain or swelling.    OBJECTIVE:    /91 (BP Location: Right arm, Patient Position: Lying)   Pulse 79   Temp 99.1 °F (37.3 °C) (Oral)   Resp 16   Ht 167.6 cm (66\")   Wt 80 kg (176 lb 6.4 oz)   SpO2 94%   BMI 28.47 kg/m²     MEDS/LABS REVIEWED AND ORDERED    anastrozole, 1 mg, Oral, Daily  cefTRIAXone, 1 g, Intravenous, Q24H  cholecalciferol, 2,000 Units, Oral, Daily  insulin aspart, 0-14 Units, Subcutaneous, Q6H  insulin detemir, 10 Units, Subcutaneous, Daily  metoprolol succinate XL, 25 mg, Oral, QAM  metroNIDAZOLE, 500 mg, Intravenous, Q8H  Scopolamine, 1 patch, Transdermal, Q72H  sodium chloride, 10 mL, Intravenous, Q12H        Physical Exam  Constitutional:       General: She is not in acute distress.     Appearance: She is obese. She is not toxic-appearing.   HENT:      Head: Normocephalic and atraumatic.      Nose: Nose normal.      Mouth/Throat:      Mouth: Mucous membranes are dry.      Pharynx: Oropharynx is clear.   Eyes:      Extraocular Movements: Extraocular movements intact.      Conjunctiva/sclera: Conjunctivae normal.   Cardiovascular:      Rate and Rhythm: Normal rate and regular rhythm.      Pulses: Normal pulses.      Heart sounds: Normal heart sounds. No murmur heard.     Pulmonary:      Effort: Pulmonary effort is normal. No respiratory distress.      Breath sounds: Normal breath " sounds. No wheezing.   Abdominal:      Tenderness: There is abdominal tenderness.      Comments: HONEY drain in place with sanguinous drainage   Musculoskeletal:         General: No swelling or deformity. Normal range of motion.      Cervical back: Normal range of motion. No rigidity.   Skin:     General: Skin is warm and dry.   Neurological:      General: No focal deficit present.      Mental Status: She is alert. Mental status is at baseline.   Psychiatric:         Mood and Affect: Mood normal.         Behavior: Behavior normal.         Thought Content: Thought content normal.         Judgment: Judgment normal.         LAB/DIAGNOSTICS:    Lab Results (last 24 hours)     Procedure Component Value Units Date/Time    POC Glucose Once [[id number]]  (Abnormal) Collected: 08/07/21 0023    Specimen: Blood Updated: 08/07/21 0030     Glucose 134 mg/dL      Comment: Meter: [id number] : [id number] Mendez Giorgio PCA       POC Glucose Once [[id number]]  (Normal) Collected: 08/06/21 1201    Specimen: Blood Updated: 08/06/21 1207     Glucose 107 mg/dL      Comment: Meter: [id number] : [id number] Hira Aden CNNORIS           ECG 12 Lead   Final Result   HEART RATE= 62  bpm   RR Interval= 984  ms   TN Interval= 141  ms   P Horizontal Axis= 4  deg   P Front Axis= 52  deg   QRSD Interval= 141  ms   QT Interval= 451  ms   QRS Axis= -84  deg   T Wave Axis= 27  deg   - ABNORMAL ECG -   Sinus rhythm   Atrial premature complex   RBBB and LAFB   Inferior infarct, old   NO PRIOR TRACING AVAILABLE FOR COMPARISON   Electronically Signed By: [facility]) 04-Aug-2021 07:02:57   Date and Time of Study: 2021-08-03 23:06:03        Results for orders placed in visit on 08/29/17    SCANNED - ECHOCARDIOGRAM    No radiology results for the last day      ASSESSMENT/PLAN:  Acute cholecystitis with CBD stone s/p cholecystectomy 8/5/2021, ERCP 8/6/2021.   -GI and Surgery following  -PRN Zofran for nausea.   -Gentle IVF discontinued as patient is  "on clear liquid diet will encourage p.o. intake.  And to advance diet as tolerated.  Patient remains hesitant at this time to go beyond liquids  -Leukocytosis likely reactive from surgery, will monitor.   -IV Rocephin and Flagyl as per surgery. With advancing diet, plan to transition to oral options.       HTN: continue metoprolol 25mg daily.  Blood pressure remains at goal on current treatment.     Vitamin D deficiency: continue Vitamin D supplement after no longer NPO.      History of breast cancer: continue anastrazole      Type 2 DM   -holding home agents  -continue SSI, and levemir 10 units daily until diet is advanced further     Obesity: BMI 28.47. Plan to  patient on diet and exercise.      DVT ppx: Holding Lovenox due to Surgery. SCDs placed.   GI ppx: NI  Code: full        PLAN FOR DISPOSITION: MIGUE Varela DO  Hospitalist, [facility]  08/07/21  07:10 EDT    \"Dictated utilizing Dragon dictation\"  "

## 2021-08-07 NOTE — PLAN OF CARE
Goal Outcome Evaluation:  Plan of Care Reviewed With: patient, daughter            Patient vss. NAD noted. HONEY drain removed. Patient tolerated well. Diet advanced doing well. Daughter remains at bedside. IV ABX

## 2021-08-07 NOTE — PROGRESS NOTES
Chief Complaint: POD # 2    Subjective   Pt tolerating clear liquids, pain much better, no N&V, ambulating in messer  Objective     Vital signs in last 24 hours:  Temp:  [97.3 °F (36.3 °C)-99.1 °F (37.3 °C)] 98 °F (36.7 °C)  Heart Rate:  [62-82] 76  Resp:  [15-20] 16  BP: (123-177)/(61-95) 123/72    Intake/Output last 3 shifts:  I/O last 3 completed shifts:  In: 3518.3 [P.O.:500; I.V.:2718.3; IV Piggyback:300]  Out: 2190 [Urine:2055; Drains:125]    Intake/Output this shift:  I/O this shift:  In: 2400 [P.O.:1200; I.V.:1100; IV Piggyback:100]  Out: 720 [Urine:700; Drains:20]    Physical Exam:  Respiratory: CTA, good inspiratory effort  CV: RRR  Abd: + BS, soft, ND, usual post-op tenderness, no rebound, no guarding, incision dressings dry, HONEY serosanguinous  Results from last 7 days   Lab Units 08/07/21  0721   WBC 10*3/mm3 10.46   HEMOGLOBIN g/dL 11.9*   HEMATOCRIT % 38.4   PLATELETS 10*3/mm3 200     Results from last 7 days   Lab Units 08/07/21  0721   SODIUM mmol/L 138   POTASSIUM mmol/L 4.0   CHLORIDE mmol/L 103   CO2 mmol/L 28.1   BUN mg/dL 14   CREATININE mg/dL 1.10*   CALCIUM mg/dL 9.1   BILIRUBIN mg/dL 0.3   ALK PHOS U/L 106   ALT (SGPT) U/L 135*   AST (SGOT) U/L 42*   GLUCOSE mg/dL 114*     Assessment/Plan   S/P Lap tawanna with IOC  S/P ERCP  Advance diet, d/c HONEY   Possible d/c in am     Dari Duque MD  General, Minimally Invasive and Endoscopic Surgery  [facility], [place], operated by [facility]    [address]    [address]  [place] [address]               [place] [address]    P: [phone]  F: [phone]    Cc:  Maria Esther So DO

## 2021-08-07 NOTE — PLAN OF CARE
Goal Outcome Evaluation:  Plan of Care Reviewed With: patient           Outcome Summary: Patient VSS. Dressing CDI. Patient denied nausea or pain this shift. Up to the bathroom with assistance without difficulty. IV flagyl administered this shift. Patient resting quietly in bed at this time.

## 2021-08-08 VITALS
BODY MASS INDEX: 28.35 KG/M2 | WEIGHT: 176.4 LBS | DIASTOLIC BLOOD PRESSURE: 76 MMHG | SYSTOLIC BLOOD PRESSURE: 117 MMHG | HEIGHT: 66 IN | HEART RATE: 80 BPM | TEMPERATURE: 98.1 F | OXYGEN SATURATION: 95 % | RESPIRATION RATE: 16 BRPM

## 2021-08-08 LAB
GLUCOSE BLDC GLUCOMTR-MCNC: 114 MG/DL (ref 70–130)
GLUCOSE BLDC GLUCOMTR-MCNC: 151 MG/DL (ref 70–130)

## 2021-08-08 PROCEDURE — 25010000002 CEFTRIAXONE SODIUM-DEXTROSE 1-3.74 GM-%(50ML) RECONSTITUTED SOLUTION: Performed by: INTERNAL MEDICINE

## 2021-08-08 PROCEDURE — 82962 GLUCOSE BLOOD TEST: CPT

## 2021-08-08 PROCEDURE — 99238 HOSP IP/OBS DSCHRG MGMT 30/<: CPT | Performed by: HOSPITALIST

## 2021-08-08 PROCEDURE — 63710000001 INSULIN DETEMIR PER 5 UNITS: Performed by: INTERNAL MEDICINE

## 2021-08-08 PROCEDURE — 99024 POSTOP FOLLOW-UP VISIT: CPT | Performed by: SURGERY

## 2021-08-08 RX ORDER — OXYCODONE HYDROCHLORIDE AND ACETAMINOPHEN 5; 325 MG/1; MG/1
1 TABLET ORAL EVERY 4 HOURS PRN
Qty: 30 TABLET | Refills: 0 | Status: SHIPPED | OUTPATIENT
Start: 2021-08-08 | End: 2021-08-13

## 2021-08-08 RX ADMIN — METRONIDAZOLE 500 MG: 500 INJECTION, SOLUTION INTRAVENOUS at 04:43

## 2021-08-08 RX ADMIN — CHOLECALCIFEROL (VITAMIN D3) 25 MCG (1,000 UNIT) TABLET 2000 UNITS: TABLET at 10:16

## 2021-08-08 RX ADMIN — INSULIN DETEMIR 10 UNITS: 100 INJECTION, SOLUTION SUBCUTANEOUS at 10:15

## 2021-08-08 RX ADMIN — ANASTROZOLE 1 MG: 1 TABLET ORAL at 10:52

## 2021-08-08 RX ADMIN — ACETAMINOPHEN 650 MG: 325 TABLET, FILM COATED ORAL at 04:46

## 2021-08-08 RX ADMIN — SODIUM CHLORIDE, PRESERVATIVE FREE 10 ML: 5 INJECTION INTRAVENOUS at 10:17

## 2021-08-08 RX ADMIN — CEFTRIAXONE 1 G: 1 INJECTION, SOLUTION INTRAVENOUS at 10:17

## 2021-08-08 RX ADMIN — METOPROLOL SUCCINATE 25 MG: 25 TABLET, EXTENDED RELEASE ORAL at 10:16

## 2021-08-08 NOTE — DISCHARGE SUMMARY
"Tyrone Rand  1945  [id number]    Hospitalists Discharge Summary    Date of Admission: 8/3/2021  Date of Discharge:  8/8/2021    Primary Discharge Diagnoses:   1.  Acute cholecystitis with CBD stone, s/p laparoscopic cholecystectomy with IOC 8/5/2021  2.  S/P ERCP 8/6/2021    Secondary Discharge Diagnoses:   1.  Transaminitis/elevated alkaline phosphatase  2.  Diabetes Mellitus, Type 2  3.  Hypertension  4.  Hyperlipidemia  5.  Vitamin D deficiency  6.  GERD  7.  LAURA on CPAP  8.  History of breast cancer    History of Present Illness:  \"76-year-old female with a past medical history of type 2 diabetes, GERD, hyperlipidemia, hypertension presented to the ED after having mid epigastric pain after eating dinner.  She is notes that she had multiple episodes of nausea and vomiting.  While her nausea and vomiting has improved she continues to have some epigastric pain that does not radiate.  She states that it happened similar last night after dinner that resolved spontaneously.  She denies any diarrhea, melena, hematemesis, stools.  He denies any fevers or chills.  No chest pain or shortness of breath.  Patient is here with her  and continues to have some abdominal pain.\"    Hospital Course:  Patient was followed in consultation by surgery and GI above and noted procedures due to cholecystitis and note of large calculus in bile duct, and underwent cholecystectomy and ERCP, sphincterotomy and balloon stone extraction. She received IV Flagyl and Rocephin, diet was advanced without difficulty. Discharged in stable condition.    PCP  Patient Care Team:  Maria Esther So DO as PCP - General (Internal Medicine)  Cris Mendieta DO as Referring Physician (Internal Medicine)  Luz Elena Herndon MD as Consulting Physician (Hematology and Oncology)    Consults:   Consults     Date and Time Order Name Status Description    8/5/2021  3:55 PM Inpatient Gastroenterology Consult Completed     8/4/2021  9:42 AM " Inpatient General Surgery Consult Completed           Operations and Procedures Performed:  Procedure(s):  ENDOSCOPIC RETROGRADE CHOLANGIOPANCREATOGRAPHY     CT Abdomen Pelvis With Contrast    Result Date: 8/4/2021  Narrative: CT Abdomen Pelvis W INDICATION: Epigastric pain. Symptoms began 5 hours prior to arrival following [place]. TECHNIQUE: CT of the abdomen and pelvis with IV contrast. Coronal and sagittal reconstructions were obtained.  Radiation dose reduction techniques included automated exposure control or exposure modulation based on body size. Count of known CT and cardiac nuc med studies performed in previous 12 months: 0. COMPARISON: None available. FINDINGS: Abdomen: Included lung bases demonstrate emphysematous change. There is no effusion. Atherosclerotic aorta and ectasia of the distal thoracic aorta. Spleen is negative. There are clips associated with the medial limb right adrenal gland is otherwise negative. There is nodularity of the left adrenal gland. The largest nodule measures 1.4 cm and there is a smaller nodule measuring 9 mm. These cannot be further characterized on this single phase study but absent any history of malignancy or risk factors for adrenal malignancy are likely benign adenomas. Suggest comparison to prior outside studies initially to assess stability. There is diminished attenuation and subtle edema of the pancreas suggestive of acute pancreatitis. Correlate with laboratory data. Mild hepatic steatosis. The gallbladder is distended up to 3.5 cm. There are multiple gallstones with peripheral rim calcification, measuring up to 18 mm. No distinct CT evidence of acute cholecystitis at this time. There is no biliary ductal dilatation. Extrahepatic common bile duct measures up to 6 mm in. The kidneys are nonobstructed and demonstrate cortical scarring. Tiny benign left renal cyst. No adenopathy. Pelvis: Negative bladder and leiomyomatous change of the uterus. No drainable fluid  collection. Extensive diverticulosis of the colon. No bowel obstruction. Appendix diminutive but normal. Redundancy of the ascending colon and cecum. Tiny fat-containing right inguinal hernia. Fat-containing umbilical hernia. Degenerative changes. No suspicious bone lesion.  negative.     Impression: 1. Abnormal appearance of the pancreas suggestive of mild acute pancreatitis. Laboratory data would be complimentary. 2. Cholelithiasis without additional CT evidence of acute cholecystitis. Gallbladder ultrasound would be complimentary along with laboratory data. 3. Normal appendix. 4. Left adrenal nodules, technically indeterminate but likely benign adenomas. Suggest initial comparison to prior outside studies to assess stability. Signer Name: Charles Garcia MD  Signed: 8/4/2021 12:24 AM  Workstation Name: [id number]  [facility] ERCP pancreatic and biliary ducts    Result Date: 8/6/2021  Narrative: FLUOROSCOPY DURING ERCP, 8/6/2021 HISTORY: Gallstone, abnormal cholangiogram from earlier same day REPORT: C-arm fluoroscopy was provided by radiology personnel in the OR during ERCP. Fluoroscopy time was recorded as 3 minutes and 50 seconds. 6spot images were recorded for documentation purposes. There is no definite evidence of intrahepatic biliary ductal dilation. The filling defect identified in the distended common bile duct on the prior cholangiogram is not definitively seen on the current study and contrast is noted to pass through the ampulla into the duodenum. The filling defect/stone identified on the prior exam may have been removed. Please see operative note for details. Signer Name: Rosy Murdock MD  Signed: 8/6/2021 7:23 PM  Workstation Name: [id number]  [facility] Abdomen Limited    Result Date: 8/4/2021  Narrative: ULTRASOUND ABDOMEN, LIMITED, 08/04/2021     HISTORY: 76-year-old female admitted to the hospital last evening after new onset  postprandial abdomen pain with nausea and vomiting. CT examination showed cholelithiasis and questioned mild pancreatitis. Serum lipase level was normal. White blood cell count is normal.  TECHNIQUE: Grayscale ultrasound imaging of the right upper abdomen.  COMPARISON: *  CT abdomen/pelvis, 08/03/2021.  FINDINGS: The gallbladder is markedly abnormal in appearance. Cholelithiasis is present with at least one large gallstone measuring about 2.5 cm. There is also a large amount of compacted biliary sludge within the gallbladder lumen. The gallbladder wall is diffusely thickened, the gallbladder is tender with direct pressure.  There is no intrahepatic or extrahepatic bile duct dilatation (CBD 5 mm).  Liver is normal in size and appearance, although somewhat poorly seen. Partially obscured pancreas is negative where visualized. Right kidney is negative with no hydronephrosis.      Impression: 1.  Abnormal gallbladder. Cholelithiasis with dense biliary sludge filling a thick-walled, nontender gallbladder. Concern for acute cholecystitis. 2.  Consider radionuclide hepatobiliary scan to confirm acute cholecystitis if clinically indicated (after halting opioid pain medication for at least 6 hours).  This report was finalized on 8/4/2021 8:22 AM by Dr. Jose Chambers MD.      FL Cholangiogram Operative    Result Date: 8/5/2021  Narrative: FLUOROSCOPY DURING INTRAOPERATIVE CHOLANGIOGRAM, 08/05/2021  HISTORY: Fluoroscopy during laparoscopic cholecystectomy and intraoperative cholangiogram performed earlier today by Dr. Andrade.  REPORT: C-arm fluoroscopy was provided by radiology personnel in the OR during intraoperative cholangiogram. Fluoroscopy time was recorded as 25 seconds. Spot images recorded for documentation purposes: 1. The image shows a rounded filling defect within the distal common bile duct above the ampulla compatible with bile duct stone. In addition, there is no passage of contrast from the dilated  extrahepatic bile duct into the duodenum, implying potential ampullary obstruction due to stone or stricture. Please see operative note for details.  This report was finalized on 8/5/2021 3:31 PM by Dr. Jose Chambers MD.      Mammo Screening Digital Tomosynthesis Bilateral With CAD    Result Date: 7/27/2021  Narrative: EXAM, 07/27/2021: 1. Bilateral digital screening mammogram with CAD. 2. Bilateral digital screening breast tomosynthesis.  INDICATION: 76-year-old female status post left lumpectomy and chemoradiation therapy for breast cancer in 2017.  TECHNIQUE: Bilateral digital screening mammogram images were obtained including digital breast tomosynthesis and CAD review.  COMPARISON: *  Screening mammogram, 07/24/2020, 7/23/2019 and 7/20/2018.  FINDINGS: There are scattered areas of fibroglandular density. Stable post lumpectomy changes in the lower inner left breast including surgical clips. There are also surgical clips in the left axilla. No significant change when compared with prior images. No mammographic evidence of malignancy. Recommend repeat screening mammogram in one year.      Impression: Negative annual bilateral screening mammogram.  BI-RADS CATEGORY 1: Negative   Women over the age of 40 undergoing screening mammography are entered into a reminder system with target due date for the next mammogram.  This report was finalized on 7/27/2021 3:58 PM by Dr. Jose Chambers MD.        Allergies:  is allergic to sulfa antibiotics.    Armando  Reviewed    Discharge Medications:     Discharge Medications      New Medications      Instructions Start Date   oxyCODONE-acetaminophen 5-325 MG per tablet  Commonly known as: Percocet   1 tablet, Oral, Every 4 Hours PRN         Continue These Medications      Instructions Start Date   anastrozole 1 MG tablet  Commonly known as: ARIMIDEX   1 mg, Oral, Daily      aspirin 81 MG tablet   81 mg, Oral, Daily      Domi Contour Test test strip  Generic drug: glucose  blood   TEST BLOOD SUGAR TWICE A DAY      Domi Microlet Lancets lancets   TEST BLOOD SUGAR TWICE A DAY      cholecalciferol 25 MCG (1000 UT) tablet  Commonly known as: VITAMIN D3   2,000 Units, Oral, Daily      metoprolol succinate XL 25 MG 24 hr tablet  Commonly known as: TOPROL-XL   TAKE ONE TABLET BY MOUTH IN THE MORNING      NovoFine 32G X 6 MM misc  Generic drug: Insulin Pen Needle   USE TWO TIMES A DAY      SITagliptin 100 MG tablet  Commonly known as: JANUVIA   100 mg, Oral, Daily      Tresiba FlexTouch 200 UNIT/ML solution pen-injector pen injection  Generic drug: Insulin Degludec   30 Units. 30 Units at night             Last Lab Results:   Lab Results (most recent)     Procedure Component Value Units Date/Time    POC Glucose Once [[id number]]  (Abnormal) Collected: 08/08/21 1147    Specimen: Blood Updated: 08/08/21 1154     Glucose 151 mg/dL      Comment: Meter: [id number] : [id number] Hira Aden CNA       POC Glucose Once [[id number]]  (Normal) Collected: 08/08/21 0727    Specimen: Blood Updated: 08/08/21 0733     Glucose 114 mg/dL      Comment: Meter: [id number] : [id number] Hira Aden CNA       Comprehensive Metabolic Panel [[id number]]  (Abnormal) Collected: 08/07/21 0721    Specimen: Blood Updated: 08/07/21 0800     Glucose 114 mg/dL      BUN 14 mg/dL      Creatinine 1.10 mg/dL      Sodium 138 mmol/L      Potassium 4.0 mmol/L      Chloride 103 mmol/L      CO2 28.1 mmol/L      Calcium 9.1 mg/dL      Total Protein 5.9 g/dL      Albumin 3.00 g/dL      ALT (SGPT) 135 U/L      AST (SGOT) 42 U/L      Alkaline Phosphatase 106 U/L      Total Bilirubin 0.3 mg/dL      eGFR  African Amer 58 mL/min/1.73      Globulin 2.9 gm/dL      A/G Ratio 1.0 g/dL      BUN/Creatinine Ratio 12.7     Anion Gap 6.9 mmol/L     Narrative:      GFR Normal >60  Chronic Kidney Disease <60  Kidney Failure <15      Lipase [[id number]]  (Abnormal) Collected: 08/07/21 0721    Specimen: Blood Updated: 08/07/21 0800     Lipase  9 U/L     Phosphorus [[id number]]  (Normal) Collected: 08/07/21 0721    Specimen: Blood Updated: 08/07/21 0800     Phosphorus 3.1 mg/dL     Magnesium [[id number]]  (Normal) Collected: 08/07/21 0721    Specimen: Blood Updated: 08/07/21 0800     Magnesium 1.8 mg/dL     CBC & Differential [[id number]]  (Abnormal) Collected: 08/07/21 0721    Specimen: Blood Updated: 08/07/21 0747    Narrative:      The following orders were created for panel order CBC & Differential.  Procedure                               Abnormality         Status                     ---------                               -----------         ------                     CBC Auto Differential[[id number]]        Abnormal            Final result                 Please view results for these tests on the individual orders.    CBC Auto Differential [[id number]]  (Abnormal) Collected: 08/07/21 0721    Specimen: Blood Updated: 08/07/21 0747     WBC 10.46 10*3/mm3      RBC 4.52 10*6/mm3      Hemoglobin 11.9 g/dL      Hematocrit 38.4 %      MCV 85.0 fL      MCH 26.3 pg      MCHC 31.0 g/dL      RDW 14.4 %      RDW-SD 44.6 fl      MPV 9.8 fL      Platelets 200 10*3/mm3      Neutrophil % 80.8 %      Lymphocyte % 9.7 %      Monocyte % 8.7 %      Eosinophil % 0.2 %      Basophil % 0.1 %      Immature Grans % 0.5 %      Neutrophils, Absolute 8.46 10*3/mm3      Lymphocytes, Absolute 1.01 10*3/mm3      Monocytes, Absolute 0.91 10*3/mm3      Eosinophils, Absolute 0.02 10*3/mm3      Basophils, Absolute 0.01 10*3/mm3      Immature Grans, Absolute 0.05 10*3/mm3      nRBC 0.0 /100 WBC     Comprehensive Metabolic Panel [[id number]]  (Abnormal) Collected: 08/06/21 0604    Specimen: Blood Updated: 08/06/21 0651     Glucose 143 mg/dL      BUN 11 mg/dL      Creatinine 1.09 mg/dL      Sodium 137 mmol/L      Potassium 4.6 mmol/L      Chloride 101 mmol/L      CO2 26.1 mmol/L      Calcium 9.3 mg/dL      Total Protein 5.9 g/dL      Albumin 3.20 g/dL      ALT (SGPT) 220 U/L      AST (SGOT)  101 U/L      Alkaline Phosphatase 116 U/L      Total Bilirubin 0.3 mg/dL      eGFR  African Amer 59 mL/min/1.73      Globulin 2.7 gm/dL      A/G Ratio 1.2 g/dL      BUN/Creatinine Ratio 10.1     Anion Gap 9.9 mmol/L     Narrative:      GFR Normal >60  Chronic Kidney Disease <60  Kidney Failure <15      Phosphorus [[id number]]  (Normal) Collected: 08/06/21 0604    Specimen: Blood Updated: 08/06/21 0651     Phosphorus 3.2 mg/dL     Magnesium [[id number]]  (Normal) Collected: 08/06/21 0604    Specimen: Blood Updated: 08/06/21 0651     Magnesium 1.7 mg/dL     CBC & Differential [[id number]]  (Abnormal) Collected: 08/06/21 0604    Specimen: Blood Updated: 08/06/21 0629    Narrative:      The following orders were created for panel order CBC & Differential.  Procedure                               Abnormality         Status                     ---------                               -----------         ------                     CBC Auto Differential[[id number]]        Abnormal            Final result                 Please view results for these tests on the individual orders.    CBC Auto Differential [[id number]]  (Abnormal) Collected: 08/06/21 0604    Specimen: Blood Updated: 08/06/21 0629     WBC 12.86 10*3/mm3      RBC 4.53 10*6/mm3      Hemoglobin 12.0 g/dL      Hematocrit 38.0 %      MCV 83.9 fL      MCH 26.5 pg      MCHC 31.6 g/dL      RDW 14.3 %      RDW-SD 43.6 fl      MPV 10.2 fL      Platelets 169 10*3/mm3      Neutrophil % 84.4 %      Lymphocyte % 5.9 %      Monocyte % 9.2 %      Eosinophil % 0.0 %      Basophil % 0.1 %      Immature Grans % 0.4 %      Neutrophils, Absolute 10.86 10*3/mm3      Lymphocytes, Absolute 0.76 10*3/mm3      Monocytes, Absolute 1.18 10*3/mm3      Eosinophils, Absolute 0.00 10*3/mm3      Basophils, Absolute 0.01 10*3/mm3      Immature Grans, Absolute 0.05 10*3/mm3      nRBC 0.0 /100 WBC     Tissue Pathology Exam [[id number]] Collected: 08/05/21 1355    Specimen: Tissue from Gallbladder  Updated: 08/05/21 1444    Hepatic Function Panel [[id number]]  (Abnormal) Collected: 08/05/21 0618    Specimen: Blood Updated: 08/05/21 1204     Total Protein 6.7 g/dL      Albumin 3.70 g/dL      ALT (SGPT) 245 U/L      AST (SGOT) 127 U/L      Comment: Specimen hemolyzed.  Results may be affected.        Alkaline Phosphatase 131 U/L      Total Bilirubin 0.2 mg/dL      Bilirubin, Direct <0.2 mg/dL      Comment: Specimen hemolyzed. Results may be affected.        Bilirubin, Indirect --     Comment: Unable to calculate       Lipase [[id number]]  (Abnormal) Collected: 08/05/21 0618    Specimen: Blood Updated: 08/05/21 1202     Lipase 11 U/L     Amylase [[id number]]  (Normal) Collected: 08/05/21 0618    Specimen: Blood Updated: 08/05/21 1202     Amylase 35 U/L     Basic Metabolic Panel [[id number]]  (Abnormal) Collected: 08/05/21 0618    Specimen: Blood Updated: 08/05/21 0700     Glucose 127 mg/dL      BUN 11 mg/dL      Creatinine 1.13 mg/dL      Sodium 135 mmol/L      Potassium 4.8 mmol/L      Comment: Slight hemolysis detected by analyzer. Results may be affected.        Chloride 101 mmol/L      CO2 25.0 mmol/L      Calcium 9.3 mg/dL      eGFR   Amer 56 mL/min/1.73      BUN/Creatinine Ratio 9.7     Anion Gap 9.0 mmol/L     Narrative:      GFR Normal >60  Chronic Kidney Disease <60  Kidney Failure <15      Scan Slide [[id number]]  (Normal) Collected: 08/05/21 0618    Specimen: Blood Updated: 08/05/21 0657     RBC Morphology Normal     WBC Morphology Normal     Platelet Morphology Normal    Urine Culture - Urine, Urine, Clean Catch [[id number]] Collected: 08/03/21 2302    Specimen: Urine, Clean Catch Updated: 08/04/21 2213     Urine Culture 25,000 CFU/mL Mixed Miladys Isolated    Narrative:      Specimen contains mixed organisms of questionable pathogenicity which indicates contamination with commensal miladys.  Further identification is unlikely to provide clinically useful information.  Suggest recollection.    Hepatitis  Panel, Acute [[id number]]  (Normal) Collected: 08/03/21 2300    Specimen: Blood Updated: 08/04/21 1807     Hepatitis B Surface Ag Non-Reactive     Hep A IgM Non-Reactive     Hep B C IgM Non-Reactive     Hepatitis C Ab Non-Reactive    Narrative:      Results may be falsely decreased if patient taking Biotin.     Amylase [[id number]]  (Normal) Collected: 08/04/21 1034    Specimen: Blood Updated: 08/04/21 1109     Amylase 36 U/L     COVID PRE-OP / PRE-PROCEDURE SCREENING ORDER (NO ISOLATION) - Swab, Nasal Cavity [[id number]]  (Normal) Collected: 08/04/21 0103    Specimen: Swab from Nasal Cavity Updated: 08/04/21 0141    Narrative:      The following orders were created for panel order COVID PRE-OP / PRE-PROCEDURE SCREENING ORDER (NO ISOLATION) - Swab, Nasal Cavity.  Procedure                               Abnormality         Status                     ---------                               -----------         ------                     COVID-19,[facility]...[[id number]]  Normal              Final result                 Please view results for these tests on the individual orders.    COVID-19,[facility] IN-HOUSE,Nasal Swab No Transport Media 3-4 HR TAT - Swab, Nasal Cavity [[id number]]  (Normal) Collected: 08/04/21 0103    Specimen: Swab from Nasal Cavity Updated: 08/04/21 0141     COVID19 Not Detected    Narrative:      Fact sheet for providers: https://www.fda.gov/media/099914/download     Fact sheet for patients: https://www.fda.gov/media/577965/download    Test performed by PCR.    Consider negative results in combination with clinical observations, patient history, and epidemiological information.    Troponin [[id number]]  (Normal) Collected: 08/04/21 0051    Specimen: Blood Updated: 08/04/21 0122     Troponin T <0.010 ng/mL     Narrative:      Troponin T Reference Range:  <= 0.03 ng/mL-   Negative for AMI  >0.03 ng/mL-     Abnormal for myocardial necrosis.  Clinicians would have to utilize clinical acumen, EKG,  Troponin and serial changes to determine if it is an Acute Myocardial Infarction or myocardial injury due to an underlying chronic condition.       Results may be falsely decreased if patient taking Biotin.      Urinalysis, Microscopic Only - Urine, Clean Catch [[id number]]  (Abnormal) Collected: 08/03/21 2302    Specimen: Urine, Clean Catch Updated: 08/03/21 2326     RBC, UA None Seen /HPF      WBC, UA 6-12 /HPF      Bacteria, UA 3+ /HPF      Squamous Epithelial Cells, UA 3-6 /HPF      Hyaline Casts, UA None Seen /LPF      Methodology Manual Light Microscopy    Urinalysis With Culture If Indicated - Urine, Clean Catch [[id number]]  (Abnormal) Collected: 08/03/21 2302    Specimen: Urine, Clean Catch Updated: 08/03/21 2318     Color, UA Yellow     Appearance, UA Clear     pH, UA 6.0     Specific Gravity, UA 1.025     Glucose, UA >=1000 mg/dL (3+)     Ketones, UA Negative     Bilirubin, UA Negative     Blood, UA Negative     Protein, UA 30 mg/dL (1+)     Leuk Esterase, UA Trace     Nitrite, UA Negative     Urobilinogen, UA 2.0 E.U./dL        Imaging Results (Most Recent)     Procedure Component Value Units Date/Time    FL ERCP pancreatic and biliary ducts [[id number]] Collected: 08/06/21 1923     Updated: 08/06/21 1925    Narrative:      FLUOROSCOPY DURING ERCP, 8/6/2021    HISTORY:  Gallstone, abnormal cholangiogram from earlier same day    REPORT:  C-arm fluoroscopy was provided by radiology personnel in the OR during ERCP. Fluoroscopy time was recorded as 3 minutes and 50 seconds. 6spot images were recorded for documentation purposes. There is no definite evidence of intrahepatic biliary ductal  dilation. The filling defect identified in the distended common bile duct on the prior cholangiogram is not definitively seen on the current study and contrast is noted to pass through the ampulla into the duodenum. The filling defect/stone identified on  the prior exam may have been removed. Please see operative note for  details.    Signer Name: Rosy Murdock MD   Signed: 8/6/2021 7:23 PM   Workstation Name: [id number]    [facility] Cholangiogram Operative [[id number]] Collected: 08/05/21 1530     Updated: 08/05/21 1533    Narrative:      FLUOROSCOPY DURING INTRAOPERATIVE CHOLANGIOGRAM, 08/05/2021     HISTORY:  Fluoroscopy during laparoscopic cholecystectomy and intraoperative  cholangiogram performed earlier today by Dr. Andrade.     REPORT:  C-arm fluoroscopy was provided by radiology personnel in the OR during  intraoperative cholangiogram. Fluoroscopy time was recorded as 25  seconds. Spot images recorded for documentation purposes: 1. The image  shows a rounded filling defect within the distal common bile duct above  the ampulla compatible with bile duct stone. In addition, there is no  passage of contrast from the dilated extrahepatic bile duct into the  duodenum, implying potential ampullary obstruction due to stone or  stricture. Please see operative note for details.     This report was finalized on 8/5/2021 3:31 PM by Dr. Jose Chambers MD.       US Abdomen Limited [[id number]] Collected: 08/04/21 0818     Updated: 08/04/21 0824    Narrative:      ULTRASOUND ABDOMEN, LIMITED, 08/04/2021         HISTORY:  76-year-old female admitted to the hospital last evening after new onset  postprandial abdomen pain with nausea and vomiting. CT examination  showed cholelithiasis and questioned mild pancreatitis. Serum lipase  level was normal. White blood cell count is normal.     TECHNIQUE:  Grayscale ultrasound imaging of the right upper abdomen.     COMPARISON:  *  CT abdomen/pelvis, 08/03/2021.     FINDINGS:  The gallbladder is markedly abnormal in appearance. Cholelithiasis is  present with at least one large gallstone measuring about 2.5 cm. There  is also a large amount of compacted biliary sludge within the  gallbladder lumen. The gallbladder wall is diffusely thickened, the  gallbladder is tender  with direct pressure.     There is no intrahepatic or extrahepatic bile duct dilatation (CBD 5  mm).     Liver is normal in size and appearance, although somewhat poorly seen.  Partially obscured pancreas is negative where visualized. Right kidney  is negative with no hydronephrosis.       Impression:      1.  Abnormal gallbladder. Cholelithiasis with dense biliary sludge  filling a thick-walled, nontender gallbladder. Concern for acute  cholecystitis.  2.  Consider radionuclide hepatobiliary scan to confirm acute  cholecystitis if clinically indicated (after halting opioid pain  medication for at least 6 hours).     This report was finalized on 8/4/2021 8:22 AM by Dr. Jose Chambers MD.       CT Abdomen Pelvis With Contrast [[id number]] Collected: 08/04/21 0024     Updated: 08/04/21 0027    Narrative:      CT Abdomen Pelvis W    INDICATION:   Epigastric pain. Symptoms began 5 hours prior to arrival following [facility].    TECHNIQUE:   CT of the abdomen and pelvis with IV contrast. Coronal and sagittal reconstructions were obtained.  Radiation dose reduction techniques included automated exposure control or exposure modulation based on body size. Count of known CT and cardiac nuc med  studies performed in previous 12 months: 0.     COMPARISON:   None available.    FINDINGS:  Abdomen: Included lung bases demonstrate emphysematous change. There is no effusion. Atherosclerotic aorta and ectasia of the distal thoracic aorta. Spleen is negative. There are clips associated with the medial limb right adrenal gland is otherwise  negative. There is nodularity of the left adrenal gland. The largest nodule measures 1.4 cm and there is a smaller nodule measuring 9 mm. These cannot be further characterized on this single phase study but absent any history of malignancy or risk  factors for adrenal malignancy are likely benign adenomas. Suggest comparison to prior outside studies initially to assess stability.    There is  diminished attenuation and subtle edema of the pancreas suggestive of acute pancreatitis. Correlate with laboratory data. Mild hepatic steatosis. The gallbladder is distended up to 3.5 cm. There are multiple gallstones with peripheral rim  calcification, measuring up to 18 mm. No distinct CT evidence of acute cholecystitis at this time. There is no biliary ductal dilatation. Extrahepatic common bile duct measures up to 6 mm in.    The kidneys are nonobstructed and demonstrate cortical scarring. Tiny benign left renal cyst. No adenopathy.    Pelvis: Negative bladder and leiomyomatous change of the uterus. No drainable fluid collection. Extensive diverticulosis of the colon. No bowel obstruction. Appendix diminutive but normal. Redundancy of the ascending colon and cecum. Tiny fat-containing  right inguinal hernia. Fat-containing umbilical hernia. Degenerative changes. No suspicious bone lesion.  negative.      Impression:      1. Abnormal appearance of the pancreas suggestive of mild acute pancreatitis. Laboratory data would be complimentary.  2. Cholelithiasis without additional CT evidence of acute cholecystitis. Gallbladder ultrasound would be complimentary along with laboratory data.  3. Normal appendix.  4. Left adrenal nodules, technically indeterminate but likely benign adenomas. Suggest initial comparison to prior outside studies to assess stability.          Signer Name: Charles Garcia MD   Signed: 8/4/2021 12:24 AM   Workstation Name: [id number]    [facility]          PROCEDURES  Procedure(s):  ENDOSCOPIC RETROGRADE CHOLANGIOPANCREATOGRAPHY    Condition on Discharge: Stable    Physical Exam at Discharge  Vital Signs  Temp:  [97.8 °F (36.6 °C)-98.1 °F (36.7 °C)] 98.1 °F (36.7 °C)  Heart Rate:  [80-82] 80  Resp:  [16] 16  BP: (117-126)/(76-82) 117/76   Body mass index is 28.47 kg/m².      Physical Exam:  Physical Exam   Constitutional: Patient appears stated age and in no acute  distress   Cardiovascular: Regular rate, regular rhythm, S1 normal and S2 normal.  Exam reveals no gallop and no friction rub.  No murmur heard.  Pulmonary/Chest: Lungs are clear to auscultation bilaterally. No respiratory distress. No wheezes. No rhonchi. No rales.   Abdominal: Soft. Bowel sounds are normal. There is no tenderness.   Musculoskeletal: Normal Muscle tone  Extremities: No edema. No asymmetry.  Neurological: Cranial nerves II-XII are grossly intact with no focal deficits.  Skin: Skin is warm. No rash noted. Dressings intact.    Discharge Disposition  Home    Visiting Nurse:    No    Home PT/OT:  No    Home Safety Evaluation:  No    DME  None new    Discharge Diet:   Advance as tolerated    Activity at Discharge:  As tolerated    Follow-up Appointments  Future Appointments   Date Time Provider Department Center   8/20/2021  8:45 AM Yasmeen Andrade DO [facility]   8/31/2021  1:10 PM LAB CHAIR 5 [facility]   8/31/2021  1:40 PM Luz Elena Herndon MD [facility]     Additional Instructions for the Follow-ups that You Need to Schedule     Discharge Follow-up with PCP   As directed       Currently Documented PCP:    Maria Esther So DO    PCP Phone Number:    [phone]     Follow Up Details: Within 2 weeks         Discharge Follow-up with Specified Provider: Dr. Andrade as scheduled   As directed      To: Dr. Andrade as scheduled               Test Results Pending at Discharge: To be followed up by Dr. Andrade  Pending Labs     Order Current Status    Tissue Pathology Exam In process           BOB Thorpe  08/08/21  21:24 EDT    Time: <30 minutes

## 2021-08-08 NOTE — PROGRESS NOTES
Chief Complaint: POD # 3    Subjective   Tolerating reg diet, no N&V  Objective     Vital signs in last 24 hours:  Temp:  [97 °F (36.1 °C)-98.1 °F (36.7 °C)] 98.1 °F (36.7 °C)  Heart Rate:  [73-82] 80  Resp:  [16] 16  BP: (117-126)/(63-82) 117/76    Intake/Output last 3 shifts:  I/O last 3 completed shifts:  In: 3260 [P.O.:2060; I.V.:1100; IV Piggyback:100]  Out: 1220 [Urine:1200; Drains:20]    Intake/Output this shift:  I/O this shift:  In: 240 [P.O.:240]  Out: -     Physical Exam:  Respiratory: CTA, good inspiratory effort  CV: RRR  Abd: + BS, soft, ND, usual post-op tenderness, no rebound, no guarding, incisions C/D/I      Assessment/Plan   S/P Lap tawanna with IOC  S/P ERCP  Okay to d/c home       Dari Duque MD  General, Minimally Invasive and Endoscopic Surgery  [facility]    [address] [facility] [address] [place] [address]   [address]    [address]  [place] [address]               [place] [address]    P: [phone]  F: [phone]    Cc:  Maria Esther So DO

## 2021-08-08 NOTE — PLAN OF CARE
Goal Outcome Evaluation:  Plan of Care Reviewed With: patient           Outcome Summary: Patient denied pain this shift, VSS. Ambulating to the bathroom without difficulty. Dressing CDI.

## 2021-08-08 NOTE — CASE MANAGEMENT/SOCIAL WORK
Case Management Discharge Note      Final Note: Discharged home    Provided Post Acute Provider List?: Refused  Refused Provider List Comment: Pt declined  Provided Post Acute Provider Quality & Resource List?: Refused  Refused Quality and Resource List Comment: Pt declined    Selected Continued Care - Discharged on 8/8/2021 Admission date: 8/3/2021 - Discharge disposition: Home or Self Care    Destination    No services have been selected for the patient.              Durable Medical Equipment    No services have been selected for the patient.              Dialysis/Infusion    No services have been selected for the patient.              Home Medical Care    No services have been selected for the patient.              Therapy    No services have been selected for the patient.              Community Resources    No services have been selected for the patient.              Community & DME    No services have been selected for the patient.                       Final Discharge Disposition Code: 01 - home or self-care

## 2021-08-09 ENCOUNTER — READMISSION MANAGEMENT (OUTPATIENT)
Dept: CALL CENTER | Facility: HOSPITAL | Age: 76
End: 2021-08-09

## 2021-08-09 LAB
LAB AP CASE REPORT: NORMAL
PATH REPORT.FINAL DX SPEC: NORMAL
PATH REPORT.GROSS SPEC: NORMAL

## 2021-08-09 NOTE — OUTREACH NOTE
Prep Survey      Responses   [facility] facility patient discharged from?  [facility]   Is LACE score < 7 ?  No   Emergency Room discharge w/ pulse ox?  No   Eligibility  Readm Mgmt   Discharge diagnosis  bertha choley   Does the patient have one of the following disease processes/diagnoses(primary or secondary)?  General Surgery   Does the patient have Home health ordered?  No   Is there a DME ordered?  No   Prep survey completed?  Yes          Keysha Martell RN

## 2021-08-12 ENCOUNTER — READMISSION MANAGEMENT (OUTPATIENT)
Dept: CALL CENTER | Facility: HOSPITAL | Age: 76
End: 2021-08-12

## 2021-08-12 NOTE — OUTREACH NOTE
General Surgery Week 1 Survey      Responses   [facility] patient discharged from?  [facility]   Does the patient have one of the following disease processes/diagnoses(primary or secondary)?  General Surgery   Week 1 attempt successful?  Yes   Call start time  1224   Call end time  1226   Discharge diagnosis  bertha cruz   Is patient permission given to speak with other caregiver?  No   Meds reviewed with patient/caregiver?  Yes   Is the patient having any side effects they believe may be caused by any medication additions or changes?  No   Does the patient have all medications related to this admission filled (includes all antibiotics, pain medications, etc.)  Yes   Is the patient taking all medications as directed (includes completed medication regime)?  Yes   Does the patient have a follow up appointment scheduled with their surgeon?  Yes   Has the patient kept scheduled appointments due by today?  Yes   Psychosocial issues?  No   Did the patient receive a copy of their discharge instructions?  Yes   Nursing interventions  Reviewed instructions with patient   What is the patient's perception of their health status since discharge?  Improving   Nursing interventions  Nurse provided patient education   Is the patient /caregiver able to teach back basic post-op care?  Continue use of incentive spirometry at least 1 week post discharge, Practice 'cough and deep breath', Drive as instructed by MD in discharge instructions, Take showers only when approved by MD-sponge bathe until then, No tub bath, swimming, or hot tub until instructed by MD, Keep incision areas clean,dry and protected, Do not remove steri-strips, Lifting as instructed by MD in discharge instructions   Is the patient/caregiver able to teach back signs and symptoms of incisional infection?  Increased redness, swelling or pain at the incisonal site, Increased drainage or bleeding, Incisional warmth, Pus or odor from incision, Fever   Is the  patient/caregiver able to teach back steps to recovery at home?  Set small, achievable goals for return to baseline health, Rest and rebuild strength, gradually increase activity, Weigh daily, Practice good oral hygiene, Eat a well-balance diet, Make a list of questions for surgeon's appointment   If the patient is a current smoker, are they able to teach back resources for cessation?  Not a smoker   Week 1 call completed?  Yes          Malreen Garvey RN

## 2021-08-27 ENCOUNTER — OFFICE VISIT (OUTPATIENT)
Dept: SURGERY | Facility: CLINIC | Age: 76
End: 2021-08-27

## 2021-08-27 VITALS
SYSTOLIC BLOOD PRESSURE: 118 MMHG | WEIGHT: 176.37 LBS | DIASTOLIC BLOOD PRESSURE: 84 MMHG | OXYGEN SATURATION: 97 % | RESPIRATION RATE: 20 BRPM | BODY MASS INDEX: 28.34 KG/M2 | HEART RATE: 78 BPM | HEIGHT: 66 IN

## 2021-08-27 DIAGNOSIS — Z90.49 STATUS POST LAPAROSCOPIC CHOLECYSTECTOMY: Primary | ICD-10-CM

## 2021-08-27 PROCEDURE — 99024 POSTOP FOLLOW-UP VISIT: CPT | Performed by: SURGERY

## 2021-08-27 NOTE — PROGRESS NOTES
Tyrone is here following her laparoscopic cholecystectomy.  Her only complaint is forgetfulness and decreased PO intake   General:  Awake and alert with no acute distress  Eyes:  No icterus  Abdomen:  Soft, non-tender  Incision:  Clean, dry, intact  Assessment:  Status post laparoscopic cholecystectomy  We discussed supplementing her diet with Boost.  Tyrone and her  said she has a follow up with her PCP.  We discussed mentioning her forgetfulness and depression to her PCP.  Tyrone may follow up anytime as needed.

## 2021-08-31 ENCOUNTER — OFFICE VISIT (OUTPATIENT)
Dept: ONCOLOGY | Facility: CLINIC | Age: 76
End: 2021-08-31

## 2021-08-31 ENCOUNTER — LAB (OUTPATIENT)
Dept: LAB | Facility: HOSPITAL | Age: 76
End: 2021-08-31

## 2021-08-31 VITALS
HEIGHT: 66 IN | WEIGHT: 167.5 LBS | SYSTOLIC BLOOD PRESSURE: 122 MMHG | BODY MASS INDEX: 26.92 KG/M2 | OXYGEN SATURATION: 99 % | DIASTOLIC BLOOD PRESSURE: 71 MMHG | HEART RATE: 69 BPM | RESPIRATION RATE: 16 BRPM | TEMPERATURE: 97.3 F

## 2021-08-31 DIAGNOSIS — Z17.1 MALIGNANT NEOPLASM OF LOWER-INNER QUADRANT OF LEFT BREAST IN FEMALE, ESTROGEN RECEPTOR NEGATIVE (HCC): Primary | ICD-10-CM

## 2021-08-31 DIAGNOSIS — C50.312 MALIGNANT NEOPLASM OF LOWER-INNER QUADRANT OF LEFT BREAST IN FEMALE, ESTROGEN RECEPTOR NEGATIVE (HCC): ICD-10-CM

## 2021-08-31 DIAGNOSIS — Z17.1 MALIGNANT NEOPLASM OF LOWER-INNER QUADRANT OF LEFT BREAST IN FEMALE, ESTROGEN RECEPTOR NEGATIVE (HCC): ICD-10-CM

## 2021-08-31 DIAGNOSIS — E27.8 ADRENAL NODULE (HCC): ICD-10-CM

## 2021-08-31 DIAGNOSIS — E55.9 VITAMIN D DEFICIENCY: ICD-10-CM

## 2021-08-31 DIAGNOSIS — M85.89 OSTEOPENIA OF MULTIPLE SITES: ICD-10-CM

## 2021-08-31 DIAGNOSIS — C50.312 MALIGNANT NEOPLASM OF LOWER-INNER QUADRANT OF LEFT BREAST IN FEMALE, ESTROGEN RECEPTOR NEGATIVE (HCC): Primary | ICD-10-CM

## 2021-08-31 LAB
25(OH)D3 SERPL-MCNC: 50.9 NG/ML (ref 30–100)
ALBUMIN SERPL-MCNC: 4 G/DL (ref 3.5–5.2)
ALBUMIN/GLOB SERPL: 1.1 G/DL (ref 1.1–2.4)
ALP SERPL-CCNC: 116 U/L (ref 38–116)
ALT SERPL W P-5'-P-CCNC: 13 U/L (ref 0–33)
ANION GAP SERPL CALCULATED.3IONS-SCNC: 11 MMOL/L (ref 5–15)
AST SERPL-CCNC: 19 U/L (ref 0–32)
BASOPHILS # BLD AUTO: 0.08 10*3/MM3 (ref 0–0.2)
BASOPHILS NFR BLD AUTO: 1.3 % (ref 0–1.5)
BILIRUB SERPL-MCNC: 0.2 MG/DL (ref 0.2–1.2)
BUN SERPL-MCNC: 17 MG/DL (ref 6–20)
BUN/CREAT SERPL: 14.7 (ref 7.3–30)
CALCIUM SPEC-SCNC: 9.8 MG/DL (ref 8.5–10.2)
CHLORIDE SERPL-SCNC: 101 MMOL/L (ref 98–107)
CO2 SERPL-SCNC: 24 MMOL/L (ref 22–29)
CREAT SERPL-MCNC: 1.16 MG/DL (ref 0.6–1.1)
DEPRECATED RDW RBC AUTO: 43.7 FL (ref 37–54)
EOSINOPHIL # BLD AUTO: 0.17 10*3/MM3 (ref 0–0.4)
EOSINOPHIL NFR BLD AUTO: 2.8 % (ref 0.3–6.2)
ERYTHROCYTE [DISTWIDTH] IN BLOOD BY AUTOMATED COUNT: 14.2 % (ref 12.3–15.4)
GFR SERPL CREATININE-BSD FRML MDRD: 55 ML/MIN/1.73
GLOBULIN UR ELPH-MCNC: 3.6 GM/DL (ref 1.8–3.5)
GLUCOSE SERPL-MCNC: 173 MG/DL (ref 74–124)
HCT VFR BLD AUTO: 43.5 % (ref 34–46.6)
HGB BLD-MCNC: 13.4 G/DL (ref 12–15.9)
IMM GRANULOCYTES # BLD AUTO: 0.02 10*3/MM3 (ref 0–0.05)
IMM GRANULOCYTES NFR BLD AUTO: 0.3 % (ref 0–0.5)
LYMPHOCYTES # BLD AUTO: 1.13 10*3/MM3 (ref 0.7–3.1)
LYMPHOCYTES NFR BLD AUTO: 18.6 % (ref 19.6–45.3)
MCH RBC QN AUTO: 26.3 PG (ref 26.6–33)
MCHC RBC AUTO-ENTMCNC: 30.8 G/DL (ref 31.5–35.7)
MCV RBC AUTO: 85.5 FL (ref 79–97)
MONOCYTES # BLD AUTO: 0.44 10*3/MM3 (ref 0.1–0.9)
MONOCYTES NFR BLD AUTO: 7.2 % (ref 5–12)
NEUTROPHILS NFR BLD AUTO: 4.25 10*3/MM3 (ref 1.7–7)
NEUTROPHILS NFR BLD AUTO: 69.8 % (ref 42.7–76)
NRBC BLD AUTO-RTO: 0 /100 WBC (ref 0–0.2)
PLATELET # BLD AUTO: 250 10*3/MM3 (ref 140–450)
PMV BLD AUTO: 9.4 FL (ref 6–12)
POTASSIUM SERPL-SCNC: 3.9 MMOL/L (ref 3.5–4.7)
PROT SERPL-MCNC: 7.6 G/DL (ref 6.3–8)
RBC # BLD AUTO: 5.09 10*6/MM3 (ref 3.77–5.28)
SODIUM SERPL-SCNC: 136 MMOL/L (ref 134–145)
WBC # BLD AUTO: 6.09 10*3/MM3 (ref 3.4–10.8)

## 2021-08-31 PROCEDURE — 82306 VITAMIN D 25 HYDROXY: CPT | Performed by: INTERNAL MEDICINE

## 2021-08-31 PROCEDURE — 85025 COMPLETE CBC W/AUTO DIFF WBC: CPT

## 2021-08-31 PROCEDURE — 99214 OFFICE O/P EST MOD 30 MIN: CPT | Performed by: INTERNAL MEDICINE

## 2021-08-31 PROCEDURE — 36415 COLL VENOUS BLD VENIPUNCTURE: CPT

## 2021-08-31 PROCEDURE — 80053 COMPREHEN METABOLIC PANEL: CPT

## 2021-08-31 NOTE — PROGRESS NOTES
"Subjective     CHIEF COMPLAINT:      Chief Complaint   Patient presents with   • Follow-up     no concerns       HISTORY OF PRESENT ILLNESS:     Tyrone Rand is a 76 y.o. female patient who returns today for follow up on her left breast cancer.  She returns today for follow-up accompanied by her .  She underwent cholecystectomy on 8/5/2021.  She underwent ERCP on 8/6/2021.  A single stone was removed.  She continues to have discomfort in the right abdomen.    Patient is tolerating Arimidex.  She is not having any new symptoms.    Patient is taking vitamin D 3 2000 units daily.    ROS:  Pertinent ROS is in the HPI.     Past medical, surgical, social and family history were reviewed.     MEDICATIONS:    Current Outpatient Medications:   •  anastrozole (ARIMIDEX) 1 MG tablet, Take 1 tablet by mouth Daily., Disp: 90 tablet, Rfl: 1  •  aspirin 81 MG tablet, Take 81 mg by mouth Daily., Disp: , Rfl:   •  LÁZARO CONTOUR TEST test strip, TEST BLOOD SUGAR TWICE A DAY, Disp: , Rfl: 2  •  LÁZARO MICROLET LANCETS lancets, TEST BLOOD SUGAR TWICE A DAY, Disp: , Rfl: 3  •  cholecalciferol (VITAMIN D3) 1000 units tablet, Take 2,000 Units by mouth Daily., Disp: , Rfl:   •  metoprolol succinate XL (TOPROL-XL) 25 MG 24 hr tablet, TAKE ONE TABLET BY MOUTH IN THE MORNING, Disp: , Rfl: 11  •  NOVOFINE 32G X 6 MM misc, USE TWO TIMES A DAY, Disp: , Rfl: 1  •  TRESIBA FLEXTOUCH 200 UNIT/ML solution pen-injector, 30 Units. 30 Units at night, Disp: , Rfl: 0  •  SITagliptin (JANUVIA) 100 MG tablet, Take 100 mg by mouth Daily., Disp: , Rfl:     Objective   VITAL SIGNS:     Vitals:    08/31/21 1343   BP: 122/71   Pulse: 69   Resp: 16   Temp: 97.3 °F (36.3 °C)   TempSrc: Temporal   SpO2: 99%   Weight: 76 kg (167 lb 8 oz)   Height: 167.6 cm (65.98\")   PainSc: 0-No pain     Body mass index is 27.05 kg/m².     Wt Readings from Last 5 Encounters:   08/31/21 76 kg (167 lb 8 oz)   08/27/21 80 kg (176 lb 5.9 oz)   08/04/21 80 kg (176 lb 6.4 oz) "   03/16/21 80.9 kg (178 lb 4.8 oz)   08/31/20 81.1 kg (178 lb 14.4 oz)       PHYSICAL EXAMINATION:  GENERAL: The patient appears in fair general condition, not in acute distress.   SKIN: No ecchymosis.  EYES: No jaundice.  LYMPHATICS: No cervical, supraclavicular or axillary lymphadenopathy.  BREASTS: Right breast exam revealed tenderness at 6:00 and 10:00 positions.  Left breast exam revealed the surgical incision at 7:00 with underlying scar tissue and tenderness.  No suspicious masses.  CHEST: Normal respiratory effort.  Lungs clear bilaterally.  No added sounds.  CVS: Normal S1-S2.  No murmurs  ABDOMEN: Soft.  Right upper quadrant tenderness. No Hepatomegaly. No Splenomegaly. No masses.  EXTREMITIES: No deforming arthritis.   PSYCH: Patient has decreased memory.    DIAGNOSTIC DATA:     Results from last 7 days   Lab Units 08/31/21  1318   WBC 10*3/mm3 6.09   NEUTROS ABS 10*3/mm3 4.25   HEMOGLOBIN g/dL 13.4   HEMATOCRIT % 43.5   PLATELETS 10*3/mm3 250     Results from last 7 days   Lab Units 08/31/21  1318   SODIUM mmol/L 136   POTASSIUM mmol/L 3.9   CHLORIDE mmol/L 101   CO2 mmol/L 24.0   BUN mg/dL 17   CREATININE mg/dL 1.16*   CALCIUM mg/dL 9.8   ALBUMIN g/dL 4.00   BILIRUBIN mg/dL 0.2   ALK PHOS U/L 116   ALT (SGPT) U/L 13   AST (SGOT) U/L 19   GLUCOSE mg/dL 173*     Component      Latest Ref Rng & Units 3/16/2021 6/28/2021 8/31/2021   25 Hydroxy, Vitamin D      30.0 - 100.0 ng/ml 51.3 43.7 50.9     CT abdomen pelvis on 8/4/2021:  1. Abnormal appearance of the pancreas suggestive of mild acute pancreatitis. Laboratory data would be complimentary.  2. Cholelithiasis without additional CT evidence of acute cholecystitis. Gallbladder ultrasound would be complimentary along with laboratory data.  3. Normal appendix.  4. Left adrenal nodules, technically indeterminate but likely benign adenomas. Suggest initial comparison to prior outside studies to assess stability.     Pathology exam from 8/5/2021:  Gallbladder,  Cholecystectomy:                 A.  Acute calculous cholecystitis with cholesterolosis, edema and hemorrhage.               B.  Minimal adhesed hepatic parenchyma with mild macrovesicular steatosis.    Bilateral mammograms on 7/27/2021:  Negative bilateral screening mammogram.  BI-RADS Category 1: Negative    Assessment/Plan   *Left breast cancer, invasive ductal carcinoma, grade 3, ER negative, MA positive at 20%, HER-2 frederic negative.  Ki-67 was 50-55% on the biopsy from 7/19/17.   · She underwent a lumpectomy and sentinel lymph node biopsy on 9/26/17.   · Pathology examination revealed a 1.6 cm tumor, poorly differentiated with a Ki-67 50-60%, ER negative, MA weakly positive at <10%. Her-2 is negative.   · Adjuvant chemotherapy with Cytoxan and Taxotere with Neulasta support was started on 11/6/17 and completed on 1/8/18.  · Patient received post lumpectomy radiation and was completed on 3/5/18.  · Patient started Arimidex on 5/2/18.  · Patient is tolerating Arimidex.  · There is no evidence of recurrence or metastasis.  · Bilateral mammograms from 7/27/2021 were negative.  · Patient is almost 3-1/2 years since she started the medicine.  · She will complete Arimidex in May 2023.    *Osteopenia in the left femur neck.    · Bone density on 7/24/2020 revealed a decreased bone density with a T score at -1.6 at the left hip.  Bone density in the L-spine was normal.    · On review of the bone density from 5/29/2018, T score was -1.7 in the left hip.   · Patient is on vitamin D3 2000 units daily.    · Vitamin D level improved to 50.9 today.    *Left adrenal nodules.  They were seen on the CT scan on 8/4/2021.  They were considered nonspecific and the radiologist recommended repeating CT scan.  I explained the findings to the patient and recommended repeating CT scan in 6 months.    PLAN:    1.  Continue Arimidex 1 mg daily.   2.  Continue vitamin D 3 2000 units daily.   3.  Follow-up CT scan of the abdomen pelvis in 6  months.   4.  We will see her in follow-up in 6 months to review the CT scan.  CBC CMP vitamin D levels will be obtained.   5.  Patient will be due for follow-up bone density in July 2022.        Luz Elena Herndon MD  08/31/21

## 2022-02-07 RX ORDER — ANASTROZOLE 1 MG/1
1 TABLET ORAL DAILY
Qty: 90 TABLET | Refills: 1 | Status: SHIPPED | OUTPATIENT
Start: 2022-02-07 | End: 2022-10-03

## 2022-02-14 ENCOUNTER — HOSPITAL ENCOUNTER (OUTPATIENT)
Dept: PET IMAGING | Facility: HOSPITAL | Age: 77
Discharge: HOME | End: 2022-02-14
Admitting: INTERNAL MEDICINE

## 2022-02-14 DIAGNOSIS — E27.8 ADRENAL NODULE: ICD-10-CM

## 2022-02-14 DIAGNOSIS — C50.312 MALIGNANT NEOPLASM OF LOWER-INNER QUADRANT OF LEFT BREAST IN FEMALE, ESTROGEN RECEPTOR NEGATIVE: ICD-10-CM

## 2022-02-14 DIAGNOSIS — Z17.1 MALIGNANT NEOPLASM OF LOWER-INNER QUADRANT OF LEFT BREAST IN FEMALE, ESTROGEN RECEPTOR NEGATIVE: ICD-10-CM

## 2022-02-14 LAB — CREAT BLDA-MCNC: 1.1 MG/DL (ref 0.6–1.3)

## 2022-02-14 PROCEDURE — 25010000002 IOPAMIDOL 61 % SOLUTION: Performed by: INTERNAL MEDICINE

## 2022-02-14 PROCEDURE — 74178 CT ABD&PLV WO CNTR FLWD CNTR: CPT

## 2022-02-14 PROCEDURE — 82565 ASSAY OF CREATININE: CPT

## 2022-02-14 PROCEDURE — 0 DIATRIZOATE MEGLUMINE & SODIUM PER 1 ML: Performed by: INTERNAL MEDICINE

## 2022-02-14 RX ADMIN — IOPAMIDOL 85 ML: 612 INJECTION, SOLUTION INTRAVENOUS at 11:18

## 2022-02-14 RX ADMIN — DIATRIZOATE MEGLUMINE AND DIATRIZOATE SODIUM 30 ML: 660; 100 LIQUID ORAL; RECTAL at 10:30

## 2022-02-22 ENCOUNTER — LAB (OUTPATIENT)
Dept: LAB | Facility: HOSPITAL | Age: 77
End: 2022-02-22

## 2022-02-22 ENCOUNTER — OFFICE VISIT (OUTPATIENT)
Dept: ONCOLOGY | Facility: CLINIC | Age: 77
End: 2022-02-22

## 2022-02-22 VITALS
RESPIRATION RATE: 16 BRPM | TEMPERATURE: 97.1 F | SYSTOLIC BLOOD PRESSURE: 112 MMHG | BODY MASS INDEX: 27.61 KG/M2 | WEIGHT: 171.8 LBS | HEART RATE: 71 BPM | HEIGHT: 66 IN | DIASTOLIC BLOOD PRESSURE: 79 MMHG | OXYGEN SATURATION: 95 %

## 2022-02-22 DIAGNOSIS — C50.312 MALIGNANT NEOPLASM OF LOWER-INNER QUADRANT OF LEFT BREAST IN FEMALE, ESTROGEN RECEPTOR NEGATIVE: ICD-10-CM

## 2022-02-22 DIAGNOSIS — Z17.1 MALIGNANT NEOPLASM OF LOWER-INNER QUADRANT OF LEFT BREAST IN FEMALE, ESTROGEN RECEPTOR NEGATIVE: ICD-10-CM

## 2022-02-22 DIAGNOSIS — E55.9 VITAMIN D DEFICIENCY: ICD-10-CM

## 2022-02-22 DIAGNOSIS — Z12.31 ENCOUNTER FOR SCREENING MAMMOGRAM FOR MALIGNANT NEOPLASM OF BREAST: ICD-10-CM

## 2022-02-22 DIAGNOSIS — Z17.1 MALIGNANT NEOPLASM OF LOWER-INNER QUADRANT OF LEFT BREAST IN FEMALE, ESTROGEN RECEPTOR NEGATIVE: Primary | ICD-10-CM

## 2022-02-22 DIAGNOSIS — C50.312 MALIGNANT NEOPLASM OF LOWER-INNER QUADRANT OF LEFT BREAST IN FEMALE, ESTROGEN RECEPTOR NEGATIVE: Primary | ICD-10-CM

## 2022-02-22 DIAGNOSIS — M85.89 OSTEOPENIA OF MULTIPLE SITES: ICD-10-CM

## 2022-02-22 DIAGNOSIS — E27.8 ADRENAL NODULE: ICD-10-CM

## 2022-02-22 LAB
25(OH)D3 SERPL-MCNC: 48.5 NG/ML (ref 30–100)
ALBUMIN SERPL-MCNC: 4.1 G/DL (ref 3.5–5.2)
ALBUMIN/GLOB SERPL: 1.1 G/DL (ref 1.1–2.4)
ALP SERPL-CCNC: 133 U/L (ref 38–116)
ALT SERPL W P-5'-P-CCNC: 12 U/L (ref 0–33)
ANION GAP SERPL CALCULATED.3IONS-SCNC: 11.7 MMOL/L (ref 5–15)
AST SERPL-CCNC: 16 U/L (ref 0–32)
BASOPHILS # BLD AUTO: 0.08 10*3/MM3 (ref 0–0.2)
BASOPHILS NFR BLD AUTO: 0.9 % (ref 0–1.5)
BILIRUB SERPL-MCNC: 0.2 MG/DL (ref 0.2–1.2)
BUN SERPL-MCNC: 22 MG/DL (ref 6–20)
BUN/CREAT SERPL: 18.5 (ref 7.3–30)
CALCIUM SPEC-SCNC: 9.7 MG/DL (ref 8.5–10.2)
CHLORIDE SERPL-SCNC: 100 MMOL/L (ref 98–107)
CO2 SERPL-SCNC: 28.3 MMOL/L (ref 22–29)
CREAT SERPL-MCNC: 1.19 MG/DL (ref 0.6–1.1)
DEPRECATED RDW RBC AUTO: 45.5 FL (ref 37–54)
EOSINOPHIL # BLD AUTO: 0.25 10*3/MM3 (ref 0–0.4)
EOSINOPHIL NFR BLD AUTO: 2.8 % (ref 0.3–6.2)
ERYTHROCYTE [DISTWIDTH] IN BLOOD BY AUTOMATED COUNT: 14.2 % (ref 12.3–15.4)
GFR SERPL CREATININE-BSD FRML MDRD: 53 ML/MIN/1.73
GLOBULIN UR ELPH-MCNC: 3.7 GM/DL (ref 1.8–3.5)
GLUCOSE SERPL-MCNC: 135 MG/DL (ref 74–124)
HCT VFR BLD AUTO: 45.1 % (ref 34–46.6)
HGB BLD-MCNC: 13.8 G/DL (ref 12–15.9)
IMM GRANULOCYTES # BLD AUTO: 0.03 10*3/MM3 (ref 0–0.05)
IMM GRANULOCYTES NFR BLD AUTO: 0.3 % (ref 0–0.5)
LYMPHOCYTES # BLD AUTO: 1.29 10*3/MM3 (ref 0.7–3.1)
LYMPHOCYTES NFR BLD AUTO: 14.5 % (ref 19.6–45.3)
MCH RBC QN AUTO: 26.7 PG (ref 26.6–33)
MCHC RBC AUTO-ENTMCNC: 30.6 G/DL (ref 31.5–35.7)
MCV RBC AUTO: 87.4 FL (ref 79–97)
MONOCYTES # BLD AUTO: 0.83 10*3/MM3 (ref 0.1–0.9)
MONOCYTES NFR BLD AUTO: 9.3 % (ref 5–12)
NEUTROPHILS NFR BLD AUTO: 6.4 10*3/MM3 (ref 1.7–7)
NEUTROPHILS NFR BLD AUTO: 72.2 % (ref 42.7–76)
NRBC BLD AUTO-RTO: 0 /100 WBC (ref 0–0.2)
PLATELET # BLD AUTO: 237 10*3/MM3 (ref 140–450)
PMV BLD AUTO: 9.2 FL (ref 6–12)
POTASSIUM SERPL-SCNC: 4.5 MMOL/L (ref 3.5–4.7)
PROT SERPL-MCNC: 7.8 G/DL (ref 6.3–8)
RBC # BLD AUTO: 5.16 10*6/MM3 (ref 3.77–5.28)
SODIUM SERPL-SCNC: 140 MMOL/L (ref 134–145)
WBC NRBC COR # BLD: 8.88 10*3/MM3 (ref 3.4–10.8)

## 2022-02-22 PROCEDURE — 99214 OFFICE O/P EST MOD 30 MIN: CPT | Performed by: INTERNAL MEDICINE

## 2022-02-22 PROCEDURE — 36415 COLL VENOUS BLD VENIPUNCTURE: CPT

## 2022-02-22 PROCEDURE — 82306 VITAMIN D 25 HYDROXY: CPT | Performed by: INTERNAL MEDICINE

## 2022-02-22 PROCEDURE — 80053 COMPREHEN METABOLIC PANEL: CPT

## 2022-02-22 PROCEDURE — 85025 COMPLETE CBC W/AUTO DIFF WBC: CPT

## 2022-02-22 NOTE — PROGRESS NOTES
"Subjective     CHIEF COMPLAINT:      Chief Complaint   Patient presents with   • Follow-up     discuss CT Scan       HISTORY OF PRESENT ILLNESS:     Tyrone Rand is a 77 y.o. female patient who returns today for follow up on her left breast cancer. She returns today for follow up accompanied with her . She is on Arimidex. She is also taking vitamin D 2000 units daily.     Patient had acute cholecystitis with CBD stone.  She underwent laparoscopic cholecystectomy on August 5, 2021.  She had a stone in the common bile duct that was removed by ERCP on August 6, 2021.     Patient reports no problem with nausea or vomiting. She is not having abdominal pain or diarrhea.     ROS:  Pertinent ROS is in the HPI.     Past medical, surgical, social and family history were reviewed.     MEDICATIONS:    Current Outpatient Medications:   •  anastrozole (ARIMIDEX) 1 MG tablet, Take 1 tablet by mouth Daily., Disp: 90 tablet, Rfl: 1  •  aspirin 81 MG tablet, Take 81 mg by mouth Daily., Disp: , Rfl:   •  LÁZARO CONTOUR TEST test strip, TEST BLOOD SUGAR TWICE A DAY, Disp: , Rfl: 2  •  LÁZARO MICROLET LANCETS lancets, TEST BLOOD SUGAR TWICE A DAY, Disp: , Rfl: 3  •  cholecalciferol (VITAMIN D3) 1000 units tablet, Take 2,000 Units by mouth Daily., Disp: , Rfl:   •  metoprolol succinate XL (TOPROL-XL) 25 MG 24 hr tablet, TAKE ONE TABLET BY MOUTH IN THE MORNING, Disp: , Rfl: 11  •  NOVOFINE 32G X 6 MM misc, USE TWO TIMES A DAY, Disp: , Rfl: 1  •  TRESIBA FLEXTOUCH 200 UNIT/ML solution pen-injector, 30 Units. 30 Units at night, Disp: , Rfl: 0  •  SITagliptin (JANUVIA) 100 MG tablet, Take 100 mg by mouth Daily., Disp: , Rfl:     Objective   VITAL SIGNS:     Vitals:    02/22/22 1419   BP: 112/79   Pulse: 71   Resp: 16   Temp: 97.1 °F (36.2 °C)   TempSrc: Temporal   SpO2: 95%   Weight: 77.9 kg (171 lb 12.8 oz)   Height: 167.6 cm (65.98\")   PainSc: 0-No pain     Body mass index is 27.74 kg/m².     Wt Readings from Last 5 Encounters: "   02/22/22 77.9 kg (171 lb 12.8 oz)   08/31/21 76 kg (167 lb 8 oz)   08/27/21 80 kg (176 lb 5.9 oz)   08/04/21 80 kg (176 lb 6.4 oz)   03/16/21 80.9 kg (178 lb 4.8 oz)     PHYSICAL EXAMINATION:   GENERAL: The patient appears in good general condition, not in acute distress.   SKIN: No ecchymosis.  EYES: No jaundice.  LYMPHATICS: No cervical or supraclavicular lymphadenopathy.  BREASTS: Right breast exam unremarkable. Left breast exam revealed the surgical incision at 7-8 O'clock position with underlying scar tissue. No suspicious masses or abnormal skin changes.   CHEST: Normal respiratory effort. Lungs clear bilaterally. No added sounds.   CVS: Normal S1 and S2. No murmurs.  ABDOMEN: Soft. No tenderness. No Hepatomegaly. No Splenomegaly. No masses.  EXTREMITIES: No edema or calf tenderness.    DIAGNOSTIC DATA:     Results from last 7 days   Lab Units 02/22/22  1341   WBC 10*3/mm3 8.88   NEUTROS ABS 10*3/mm3 6.40   HEMOGLOBIN g/dL 13.8   HEMATOCRIT % 45.1   PLATELETS 10*3/mm3 237     Results from last 7 days   Lab Units 02/22/22  1341   SODIUM mmol/L 140   POTASSIUM mmol/L 4.5   CHLORIDE mmol/L 100   CO2 mmol/L 28.3   BUN mg/dL 22*   CREATININE mg/dL 1.19*   CALCIUM mg/dL 9.7   ALBUMIN g/dL 4.10   BILIRUBIN mg/dL 0.2   ALK PHOS U/L 133*   ALT (SGPT) U/L 12   AST (SGOT) U/L 16   GLUCOSE mg/dL 135*     CT abdomen pelvis on 2/14/2022:  1.  Stable bilateral indeterminate adrenal lesions. The smaller  hypodense lesion within the left adrenal gland measures less than 10  Hounsfield units in density and is likely a lipid rich adenoma.   2.  The ascending colon hepatic flexure while collapsed appear to  demonstrate relative increased engorgement of the surrounding  vasculature suggestive of a mild colitis in the appropriate clinical  context and correlation with patient history is recommended.    Assessment/Plan   *Left breast cancer, invasive ductal carcinoma, grade 3, ER negative, CA positive at 20%, HER-2 frederic negative.   Ki-67 was 50-55% on the biopsy from 7/19/17.   · She underwent a lumpectomy and sentinel lymph node biopsy on 9/26/17.   · Pathology examination revealed a 1.6 cm tumor, poorly differentiated with a Ki-67 50-60%, ER negative, MT weakly positive at <10%. Her-2 is negative.   · Adjuvant chemotherapy with Cytoxan and Taxotere with Neulasta support was started on 11/6/17 and completed on 1/8/18.  · Patient received post lumpectomy radiation and was completed on 3/5/18.  · Patient started Arimidex on 5/2/18.  · Bilateral mammograms from 7/27/2021 were negative.  · She had no evidence of recurrence on CT scan on 2/14/2022.  · Exam today revealed no suspicious findings.  · She is tolerating Arimidex.     *Osteopenia in the left femur neck.    · Bone density on 7/24/2020 revealed a decreased bone density with a T score at -1.6 at the left hip.  Bone density in the L-spine was normal.    · On review of the bone density from 5/29/2018, T score was -1.7 in the left hip.   · Patient is on vitamin D3 2000 units daily.    · Vitamin D level improved to 50.9 on 8/31/2021.  · Vitamin D level is 48.5 today.    *Left adrenal nodules.    · They were seen on the CT scan on 8/4/2021.    · CT scan on 2/14/2022 revealed bilateral adrenal lesions to be stable.   · I explained the findings to the patient. She reports that she had a procedure for the right adrenal gland around 2011. There are no records in the chart.   · I recommend repeating CT scan in 1 year.    PLAN:    1.  Continue Arimidex 1 mg daily.   2.  Continue vitamin D3 2000 units daily.  3.  I recommended monthly breast self exam.  4.  We will schedule bilateral mammograms and bone density in late July. I will see her in follow up in August with CBC CMP and vitamin D levels.       Luz Elena Herndon MD  02/22/22

## 2022-06-27 ENCOUNTER — TRANSCRIBE ORDERS (OUTPATIENT)
Dept: ADMINISTRATIVE | Facility: HOSPITAL | Age: 77
End: 2022-06-27

## 2022-06-27 ENCOUNTER — LAB (OUTPATIENT)
Dept: LAB | Facility: HOSPITAL | Age: 77
End: 2022-06-27

## 2022-06-27 DIAGNOSIS — N18.30 STAGE 3 CHRONIC KIDNEY DISEASE, UNSPECIFIED WHETHER STAGE 3A OR 3B CKD: Primary | ICD-10-CM

## 2022-06-27 DIAGNOSIS — E55.9 AVITAMINOSIS D: ICD-10-CM

## 2022-06-27 DIAGNOSIS — C50.919 MALIGNANT NEOPLASM OF BREAST IN FEMALE, ESTROGEN RECEPTOR NEGATIVE, UNSPECIFIED LATERALITY, UNSPECIFIED SITE OF BREAST: ICD-10-CM

## 2022-06-27 DIAGNOSIS — N18.30 STAGE 3 CHRONIC KIDNEY DISEASE, UNSPECIFIED WHETHER STAGE 3A OR 3B CKD: ICD-10-CM

## 2022-06-27 DIAGNOSIS — Z17.1 MALIGNANT NEOPLASM OF BREAST IN FEMALE, ESTROGEN RECEPTOR NEGATIVE, UNSPECIFIED LATERALITY, UNSPECIFIED SITE OF BREAST: ICD-10-CM

## 2022-06-27 LAB
25(OH)D3 SERPL-MCNC: 52.9 NG/ML (ref 30–100)
ALBUMIN SERPL-MCNC: 4 G/DL (ref 3.5–5.2)
ANION GAP SERPL CALCULATED.3IONS-SCNC: 14.9 MMOL/L (ref 5–15)
BACTERIA UR QL AUTO: NORMAL /HPF
BILIRUB UR QL STRIP: NEGATIVE
BUN SERPL-MCNC: 19 MG/DL (ref 8–23)
BUN/CREAT SERPL: 18.1 (ref 7–25)
CALCIUM SPEC-SCNC: 9.6 MG/DL (ref 8.6–10.5)
CHLORIDE SERPL-SCNC: 101 MMOL/L (ref 98–107)
CLARITY UR: CLEAR
CO2 SERPL-SCNC: 24.1 MMOL/L (ref 22–29)
COLOR UR: YELLOW
CREAT SERPL-MCNC: 1.05 MG/DL (ref 0.57–1)
CREAT UR-MCNC: 124.6 MG/DL
DEPRECATED RDW RBC AUTO: 40 FL (ref 37–54)
EGFRCR SERPLBLD CKD-EPI 2021: 54.2 ML/MIN/1.73
ERYTHROCYTE [DISTWIDTH] IN BLOOD BY AUTOMATED COUNT: 13.1 % (ref 12.3–15.4)
GLUCOSE SERPL-MCNC: 138 MG/DL (ref 65–99)
GLUCOSE UR STRIP-MCNC: NEGATIVE MG/DL
HCT VFR BLD AUTO: 42.2 % (ref 34–46.6)
HGB BLD-MCNC: 13.3 G/DL (ref 12–15.9)
HGB UR QL STRIP.AUTO: NEGATIVE
HYALINE CASTS UR QL AUTO: NORMAL /LPF
KETONES UR QL STRIP: NEGATIVE
LEUKOCYTE ESTERASE UR QL STRIP.AUTO: ABNORMAL
MCH RBC QN AUTO: 26.5 PG (ref 26.6–33)
MCHC RBC AUTO-ENTMCNC: 31.5 G/DL (ref 31.5–35.7)
MCV RBC AUTO: 84.2 FL (ref 79–97)
NITRITE UR QL STRIP: NEGATIVE
PH UR STRIP.AUTO: 5.5 [PH] (ref 5–8)
PHOSPHATE SERPL-MCNC: 3.3 MG/DL (ref 2.5–4.5)
PLATELET # BLD AUTO: 226 10*3/MM3 (ref 140–450)
PMV BLD AUTO: 10.2 FL (ref 6–12)
POTASSIUM SERPL-SCNC: 4.4 MMOL/L (ref 3.5–5.2)
PROT ?TM UR-MCNC: 8.4 MG/DL
PROT UR QL STRIP: NEGATIVE
PROT/CREAT UR: 67.4 MG/G CREA (ref 0–200)
RBC # BLD AUTO: 5.01 10*6/MM3 (ref 3.77–5.28)
RBC # UR STRIP: NORMAL /HPF
REF LAB TEST METHOD: NORMAL
SODIUM SERPL-SCNC: 140 MMOL/L (ref 136–145)
SP GR UR STRIP: 1.02 (ref 1–1.03)
SQUAMOUS #/AREA URNS HPF: NORMAL /HPF
UROBILINOGEN UR QL STRIP: ABNORMAL
WBC # UR STRIP: NORMAL /HPF
WBC NRBC COR # BLD: 7.15 10*3/MM3 (ref 3.4–10.8)

## 2022-06-27 PROCEDURE — 82306 VITAMIN D 25 HYDROXY: CPT

## 2022-06-27 PROCEDURE — 36415 COLL VENOUS BLD VENIPUNCTURE: CPT

## 2022-06-27 PROCEDURE — 85027 COMPLETE CBC AUTOMATED: CPT

## 2022-06-27 PROCEDURE — 84156 ASSAY OF PROTEIN URINE: CPT

## 2022-06-27 PROCEDURE — 81001 URINALYSIS AUTO W/SCOPE: CPT

## 2022-06-27 PROCEDURE — 80069 RENAL FUNCTION PANEL: CPT

## 2022-06-27 PROCEDURE — 82570 ASSAY OF URINE CREATININE: CPT

## 2022-07-29 ENCOUNTER — APPOINTMENT (OUTPATIENT)
Dept: BONE DENSITY | Facility: HOSPITAL | Age: 77
End: 2022-07-29

## 2022-07-29 ENCOUNTER — HOSPITAL ENCOUNTER (OUTPATIENT)
Dept: MAMMOGRAPHY | Facility: HOSPITAL | Age: 77
Discharge: HOME | End: 2022-07-29

## 2022-07-29 DIAGNOSIS — C50.312 MALIGNANT NEOPLASM OF LOWER-INNER QUADRANT OF LEFT BREAST IN FEMALE, ESTROGEN RECEPTOR NEGATIVE: ICD-10-CM

## 2022-07-29 DIAGNOSIS — Z17.1 MALIGNANT NEOPLASM OF LOWER-INNER QUADRANT OF LEFT BREAST IN FEMALE, ESTROGEN RECEPTOR NEGATIVE: ICD-10-CM

## 2022-07-29 DIAGNOSIS — M85.89 OSTEOPENIA OF MULTIPLE SITES: ICD-10-CM

## 2022-07-29 DIAGNOSIS — Z12.31 ENCOUNTER FOR SCREENING MAMMOGRAM FOR MALIGNANT NEOPLASM OF BREAST: ICD-10-CM

## 2022-07-29 PROCEDURE — 77063 BREAST TOMOSYNTHESIS BI: CPT

## 2022-07-29 PROCEDURE — 77067 SCR MAMMO BI INCL CAD: CPT

## 2022-07-29 PROCEDURE — 77080 DXA BONE DENSITY AXIAL: CPT

## 2022-08-23 ENCOUNTER — LAB (OUTPATIENT)
Dept: LAB | Facility: HOSPITAL | Age: 77
End: 2022-08-23

## 2022-08-23 ENCOUNTER — OFFICE VISIT (OUTPATIENT)
Dept: ONCOLOGY | Facility: CLINIC | Age: 77
End: 2022-08-23

## 2022-08-23 VITALS
WEIGHT: 179 LBS | RESPIRATION RATE: 16 BRPM | HEART RATE: 63 BPM | TEMPERATURE: 96.8 F | HEIGHT: 66 IN | BODY MASS INDEX: 28.77 KG/M2 | SYSTOLIC BLOOD PRESSURE: 127 MMHG | OXYGEN SATURATION: 95 % | DIASTOLIC BLOOD PRESSURE: 62 MMHG

## 2022-08-23 DIAGNOSIS — M85.89 OSTEOPENIA OF MULTIPLE SITES: ICD-10-CM

## 2022-08-23 DIAGNOSIS — C50.312 MALIGNANT NEOPLASM OF LOWER-INNER QUADRANT OF LEFT BREAST IN FEMALE, ESTROGEN RECEPTOR NEGATIVE: Primary | ICD-10-CM

## 2022-08-23 DIAGNOSIS — C50.312 MALIGNANT NEOPLASM OF LOWER-INNER QUADRANT OF LEFT BREAST IN FEMALE, ESTROGEN RECEPTOR NEGATIVE: ICD-10-CM

## 2022-08-23 DIAGNOSIS — Z17.1 MALIGNANT NEOPLASM OF LOWER-INNER QUADRANT OF LEFT BREAST IN FEMALE, ESTROGEN RECEPTOR NEGATIVE: ICD-10-CM

## 2022-08-23 DIAGNOSIS — Z17.1 MALIGNANT NEOPLASM OF LOWER-INNER QUADRANT OF LEFT BREAST IN FEMALE, ESTROGEN RECEPTOR NEGATIVE: Primary | ICD-10-CM

## 2022-08-23 DIAGNOSIS — E55.9 VITAMIN D DEFICIENCY: ICD-10-CM

## 2022-08-23 DIAGNOSIS — E27.8 ADRENAL NODULE: ICD-10-CM

## 2022-08-23 LAB
25(OH)D3 SERPL-MCNC: 56.8 NG/ML (ref 30–100)
ALBUMIN SERPL-MCNC: 4.2 G/DL (ref 3.5–5.2)
ALBUMIN/GLOB SERPL: 1.2 G/DL (ref 1.1–2.4)
ALP SERPL-CCNC: 139 U/L (ref 38–116)
ALT SERPL W P-5'-P-CCNC: 15 U/L (ref 0–33)
ANION GAP SERPL CALCULATED.3IONS-SCNC: 15.6 MMOL/L (ref 5–15)
AST SERPL-CCNC: 16 U/L (ref 0–32)
BASOPHILS # BLD AUTO: 0.06 10*3/MM3 (ref 0–0.2)
BASOPHILS NFR BLD AUTO: 0.9 % (ref 0–1.5)
BILIRUB SERPL-MCNC: 0.3 MG/DL (ref 0.2–1.2)
BUN SERPL-MCNC: 20 MG/DL (ref 6–20)
BUN/CREAT SERPL: 17.9 (ref 7.3–30)
CALCIUM SPEC-SCNC: 9.9 MG/DL (ref 8.5–10.2)
CHLORIDE SERPL-SCNC: 101 MMOL/L (ref 98–107)
CO2 SERPL-SCNC: 22.4 MMOL/L (ref 22–29)
CREAT SERPL-MCNC: 1.12 MG/DL (ref 0.6–1.1)
DEPRECATED RDW RBC AUTO: 43.7 FL (ref 37–54)
EGFRCR SERPLBLD CKD-EPI 2021: 50.1 ML/MIN/1.73
EOSINOPHIL # BLD AUTO: 0.15 10*3/MM3 (ref 0–0.4)
EOSINOPHIL NFR BLD AUTO: 2.3 % (ref 0.3–6.2)
ERYTHROCYTE [DISTWIDTH] IN BLOOD BY AUTOMATED COUNT: 13.7 % (ref 12.3–15.4)
GLOBULIN UR ELPH-MCNC: 3.4 GM/DL (ref 1.8–3.5)
GLUCOSE SERPL-MCNC: 194 MG/DL (ref 74–124)
HCT VFR BLD AUTO: 44.1 % (ref 34–46.6)
HGB BLD-MCNC: 13.9 G/DL (ref 12–15.9)
IMM GRANULOCYTES # BLD AUTO: 0.01 10*3/MM3 (ref 0–0.05)
IMM GRANULOCYTES NFR BLD AUTO: 0.2 % (ref 0–0.5)
LYMPHOCYTES # BLD AUTO: 1.24 10*3/MM3 (ref 0.7–3.1)
LYMPHOCYTES NFR BLD AUTO: 19.3 % (ref 19.6–45.3)
MCH RBC QN AUTO: 27.3 PG (ref 26.6–33)
MCHC RBC AUTO-ENTMCNC: 31.5 G/DL (ref 31.5–35.7)
MCV RBC AUTO: 86.6 FL (ref 79–97)
MONOCYTES # BLD AUTO: 0.44 10*3/MM3 (ref 0.1–0.9)
MONOCYTES NFR BLD AUTO: 6.8 % (ref 5–12)
NEUTROPHILS NFR BLD AUTO: 4.53 10*3/MM3 (ref 1.7–7)
NEUTROPHILS NFR BLD AUTO: 70.5 % (ref 42.7–76)
NRBC BLD AUTO-RTO: 0 /100 WBC (ref 0–0.2)
PLATELET # BLD AUTO: 203 10*3/MM3 (ref 140–450)
PMV BLD AUTO: 9.3 FL (ref 6–12)
POTASSIUM SERPL-SCNC: 4.2 MMOL/L (ref 3.5–4.7)
PROT SERPL-MCNC: 7.6 G/DL (ref 6.3–8)
RBC # BLD AUTO: 5.09 10*6/MM3 (ref 3.77–5.28)
SODIUM SERPL-SCNC: 139 MMOL/L (ref 134–145)
WBC NRBC COR # BLD: 6.43 10*3/MM3 (ref 3.4–10.8)

## 2022-08-23 PROCEDURE — 82306 VITAMIN D 25 HYDROXY: CPT | Performed by: INTERNAL MEDICINE

## 2022-08-23 PROCEDURE — 99214 OFFICE O/P EST MOD 30 MIN: CPT | Performed by: INTERNAL MEDICINE

## 2022-08-23 PROCEDURE — 80053 COMPREHEN METABOLIC PANEL: CPT

## 2022-08-23 PROCEDURE — 85025 COMPLETE CBC W/AUTO DIFF WBC: CPT

## 2022-08-23 PROCEDURE — 36415 COLL VENOUS BLD VENIPUNCTURE: CPT

## 2022-08-23 RX ORDER — ATORVASTATIN CALCIUM 20 MG/1
20 TABLET, FILM COATED ORAL DAILY
COMMUNITY
Start: 2022-08-17

## 2022-08-23 NOTE — PROGRESS NOTES
"Subjective     CHIEF COMPLAINT:      Chief Complaint   Patient presents with   • Follow-up       HISTORY OF PRESENT ILLNESS:     Tyrone Rand is a 77 y.o. female patient who returns today for follow up on her left breast cancer and osteopenia.  She returns today for follow-up accompanied by her .  She reports having alteration of her taste.  She thought that it was due to her Arimidex.  No mouth pain.  She did not develop COVID-19 infection.     Patient is not having arthritis symptoms.  No bone pain.  No hot flashes.    ROS:  Pertinent ROS is in the HPI.     Past medical, surgical, social and family history were reviewed.     MEDICATIONS:    Current Outpatient Medications:   •  anastrozole (ARIMIDEX) 1 MG tablet, Take 1 tablet by mouth Daily., Disp: 90 tablet, Rfl: 1  •  aspirin 81 MG tablet, Take 81 mg by mouth Daily., Disp: , Rfl:   •  atorvastatin (LIPITOR) 20 MG tablet, Take 20 mg by mouth Daily., Disp: , Rfl:   •  LÁZARO CONTOUR TEST test strip, TEST BLOOD SUGAR TWICE A DAY, Disp: , Rfl: 2  •  LÁZARO MICROLET LANCETS lancets, TEST BLOOD SUGAR TWICE A DAY, Disp: , Rfl: 3  •  cholecalciferol (VITAMIN D3) 1000 units tablet, Take 2,000 Units by mouth Daily., Disp: , Rfl:   •  metoprolol succinate XL (TOPROL-XL) 25 MG 24 hr tablet, TAKE ONE TABLET BY MOUTH IN THE MORNING, Disp: , Rfl: 11  •  Multiple Vitamins-Minerals (ZINC PO), Take  by mouth Daily., Disp: , Rfl:   •  NOVOFINE 32G X 6 MM misc, USE TWO TIMES A DAY, Disp: , Rfl: 1  •  TRESIBA FLEXTOUCH 200 UNIT/ML solution pen-injector, 30 Units. 30 Units at night, Disp: , Rfl: 0  •  SITagliptin (JANUVIA) 100 MG tablet, Take 100 mg by mouth Daily., Disp: , Rfl:   Objective     VITAL SIGNS:     Vitals:    08/23/22 1452   BP: 127/62   Pulse: 63   Resp: 16   Temp: 96.8 °F (36 °C)   TempSrc: Temporal   SpO2: 95%   Weight: 81.2 kg (179 lb)   Height: 167.6 cm (65.98\")   PainSc: 0-No pain     Body mass index is 28.91 kg/m².     Wt Readings from Last 5 Encounters: "   08/23/22 81.2 kg (179 lb)   02/22/22 77.9 kg (171 lb 12.8 oz)   08/31/21 76 kg (167 lb 8 oz)   08/27/21 80 kg (176 lb 5.9 oz)   08/04/21 80 kg (176 lb 6.4 oz)     PHYSICAL EXAMINATION:   GENERAL: The patient appears in fair general condition, not in acute distress.   SKIN: No ecchymosis.  EYES: No jaundice. No pallor.  MOUTH: No ulcers.  No thrush.  LYMPHATICS: No cervical, supraclavicular or axillary lymphadenopathy.  BREASTS: Right breast exam was unremarkable.  Left breast exam revealed the surgical incision at 8 o'clock position with underlying scar tissue.  No suspicious masses.  CHEST: Normal respiratory effort.  Lungs clear bilaterally.  No added sounds.  CVS: Normal S1-S2.  No murmurs  ABDOMEN: Soft. No tenderness. No Hepatomegaly. No Splenomegaly. No masses.    DIAGNOSTIC DATA:     Results from last 7 days   Lab Units 08/23/22  1433   WBC 10*3/mm3 6.43   NEUTROS ABS 10*3/mm3 4.53   HEMOGLOBIN g/dL 13.9   HEMATOCRIT % 44.1   PLATELETS 10*3/mm3 203     Results from last 7 days   Lab Units 08/23/22  1433   SODIUM mmol/L 139   POTASSIUM mmol/L 4.2   CHLORIDE mmol/L 101   CO2 mmol/L 22.4   BUN mg/dL 20   CREATININE mg/dL 1.12*   CALCIUM mg/dL 9.9   ALBUMIN g/dL 4.20   BILIRUBIN mg/dL 0.3   ALK PHOS U/L 139*   ALT (SGPT) U/L 15   AST (SGOT) U/L 16   GLUCOSE mg/dL 194*     Component      Latest Ref Rng & Units 2/22/2022 6/27/2022 8/23/2022   25 Hydroxy, Vitamin D      30.0 - 100.0 ng/ml 48.5 52.9 56.8     Bilateral mammograms on 7/29/2022:  · No change.  No evidence of malignancy.  · BI-RADS Category 2-benign finding    Bone density on 7/29/2022:  · Osteopenia of the left femoral neck.  T score was -2.0    Assessment & Plan    *Left breast cancer, invasive ductal carcinoma, grade 3, ER negative, DC positive at 20%, HER-2 frederic negative.  Ki-67 was 50-55% on the biopsy from 7/19/17.   · She underwent a lumpectomy and sentinel lymph node biopsy on 9/26/17.   · Pathology examination revealed a 1.6 cm tumor, poorly  differentiated with a Ki-67 50-60%, ER negative, TN weakly positive at <10%. Her-2 is negative.   · Adjuvant chemotherapy with Cytoxan and Taxotere with Neulasta support was started on 11/6/17 and completed on 1/8/18.  · Patient received post lumpectomy radiation and was completed on 3/5/18.  · Patient started Arimidex on 5/2/18.  · She had no evidence of recurrence on CT scan on 2/14/2022.  · Bilateral mammograms on 7/29/2022 were benign.  · She is doing well with no evidence of recurrence of her breast cancer.  · She is tolerating Arimidex.    *Osteopenia in the left femur neck.    · Bone density on 7/24/2020 revealed a decreased bone density with a T score at -1.6 at the left hip.  Bone density in the L-spine was normal.    · On review of the bone density from 5/29/2018, T score was -1.7 in the left hip.   · Patient is on vitamin D3 2000 units daily.    · Vitamin D level improved to 50.9 on 8/31/2021.  · Bone density 7/29/2022 showed osteopenia with a T score of -2.0.  · Vitamin D level is 56.8 today.   · I recommended continuing vitamin D replacement.    *Left adrenal nodules.    · They were seen on the CT scan on 8/4/2021.    · CT scan on 2/14/2022 revealed bilateral adrenal lesions to be stable.   · I explained the findings to the patient. She reports that she had a procedure for the right adrenal gland around 2011. There are no records in the chart.   · She is not having pain in the back.  I recommended obtaining a follow-up CT scan in 6 months.    PLAN:    1.  Continue Arimidex 1 mg daily.  2.  Continue vitamin D3 2000 is daily.  3.  I reminded the patient to perform monthly breast self-exam.   4.  Follow-up in 6 months.  We will obtain CT scan of the abdomen pelvis with attention to the adrenal gland 1 week before her return visit.   5.  CBC CMP vitamin D will be obtained at her return visit.        Luz Elena Herndon MD  08/23/22

## 2022-10-03 RX ORDER — ANASTROZOLE 1 MG/1
TABLET ORAL
Qty: 90 TABLET | Refills: 1 | Status: SHIPPED | OUTPATIENT
Start: 2022-10-03

## 2022-10-04 RX ORDER — ANASTROZOLE 1 MG/1
1 TABLET ORAL DAILY
Qty: 90 TABLET | Refills: 1 | OUTPATIENT
Start: 2022-10-04

## 2022-10-04 NOTE — TELEPHONE ENCOUNTER
Caller: Padmini Rand    Relationship: Emergency Contact    Best call back number: [phone]    Requested Prescriptions:   Requested Prescriptions     Pending Prescriptions Disp Refills   • anastrozole (ARIMIDEX) 1 MG tablet 90 tablet 1     Sig: Take 1 tablet by mouth Daily.        Pharmacy where request should be sent:      [facility]/pharmacy #[id number] - [place] - [address] - [phone] [facility] [phone]   [phone]    Additional details provided by patient:     Does the patient have less than a 3 day supply:  [] Yes  [x] No

## 2022-12-02 ENCOUNTER — TELEPHONE (OUTPATIENT)
Dept: ONCOLOGY | Facility: CLINIC | Age: 77
End: 2022-12-02

## 2022-12-02 NOTE — TELEPHONE ENCOUNTER
Caller: Padmini Rand    Relationship: Emergency Contact    Best call back number: [phone]    What is the best time to reach you: ANYTIME    Who are you requesting to speak with (clinical staff, provider,  specific staff member): NON-CLINICAL    Do you know the name of the person who called: NO    What was the call regarding: PT'S  SAYS THEY MISSED A CALL FROM THE OFFICE THIS MORNING, NO NOTES AND NO UPCOMING APPT'S, PLEASE CALL BACK TO ADVISE IF SOMETHING WAS NEEDED.    Do you require a callback: IF NEEDED

## 2022-12-15 NOTE — H&P (VIEW-ONLY)
General Surgery      Patient Care Team:  Maria Esther So DO as PCP - General (Internal Medicine)  Cris Mendieta DO as Referring Physician (Internal Medicine)  Luz Elena Herndon MD as Consulting Physician (Hematology and Oncology)    CHIEF COMPLAINT: Opinion regarding gallstones    HISTORY OF PRESENT ILLNESS:    This very pleasant 76-year-old female was admitted to the hospitalist service last night with gallstones.  On discussion with Tyrone Solo, on Monday evening she developed epigastric pain and vomiting after eating dinner.  She had eaten meatloaf with green beans.  She said vomiting helped the pain.  Then the next day she developed epigastric pain again.  Her pain was so severe she was unable to eat.  She came into the ER where she had a CT scan that showed an abnormal pancreas consistent with pancreatitis and gallstones.  She had an ultrasound done today that also showed gallstones.  She says her pain is improved but it is still present.  She points to the epigastric and right upper quadrant area.  She does not smoke or use tobacco products.  She does have a history of breast cancer.  She has no chest pain or shortness of breath.  She has no fevers or chills.  She has no other complaints.      Past Medical History:   Diagnosis Date   • Breast cancer (CMS/HCC) 2017    Left   • Depression    • Diabetes mellitus (CMS/HCC)    • Drug therapy    • GERD (gastroesophageal reflux disease)    • H/O Left breast mass 2017   • H/O vitamin D deficiency    • History of snoring    • Hx of radiation therapy    • Hyperlipidemia    • Hypertension    • Panic attack    • Poor sleep pattern    • Sleep apnea     CPAP     Past Surgical History:   Procedure Laterality Date   • BREAST LUMPECTOMY Left 09/2017   • COLON SURGERY     • COLONOSCOPY     • KIDNEY SURGERY  2011   • MOUTH SURGERY      TOOTH EXTRACTION   • TOOTH EXTRACTION       Family History   Problem Relation Age of Onset   • Stroke Mother    • Heart disease  Warm compresses / lid scrubs twice daily. "Mother    • Hypertension Mother    • Breast cancer Neg Hx      Social History     Tobacco Use   • Smoking status: Former Smoker     Packs/day: 1.00     Years: 25.00     Pack years: 25.00     Quit date:      Years since quittin.6   • Smokeless tobacco: Never Used   • Tobacco comment: smoked 25 years, 1 PPD   Vaping Use   • Vaping Use: Unknown   Substance Use Topics   • Alcohol use: Not Currently   • Drug use: No     Medications Prior to Admission   Medication Sig Dispense Refill Last Dose   • anastrozole (ARIMIDEX) 1 MG tablet Take 1 tablet by mouth Daily. 90 tablet 1 2021   • aspirin 81 MG tablet Take 81 mg by mouth Daily.   2021   • LÁZARO CONTOUR TEST test strip TEST BLOOD SUGAR TWICE A DAY  2    • LÁZARO MICROLET LANCETS lancets TEST BLOOD SUGAR TWICE A DAY  3    • metoprolol succinate XL (TOPROL-XL) 25 MG 24 hr tablet TAKE ONE TABLET BY MOUTH IN THE MORNING  11 2021   • SITagliptin (JANUVIA) 100 MG tablet Take 100 mg by mouth Daily.      • TRESIBA FLEXTOUCH 200 UNIT/ML solution pen-injector 30 Units. 30 Units at night  0 2021   • cholecalciferol (VITAMIN D3) 1000 units tablet Take 2,000 Units by mouth Daily.   2021   • NOVOFINE 32G X 6 MM misc USE TWO TIMES A DAY  1 Unknown at Unknown time     Allergies:  Sulfa antibiotics    REVIEW OF SYSTEMS:  Please see the above history of present illness for pertinent positives and negatives.  The remainder of the patient's systems have been reviewed and are negative.     Vital Signs  Temp:  [98 °F (36.7 °C)-98.7 °F (37.1 °C)] 98.4 °F (36.9 °C)  Heart Rate:  [61-77] 76  Resp:  [16-20] 20  BP: (126-179)/(71-96) 179/81    Flowsheet Rows      First Filed Value   Admission Height  167.6 cm (66\") Documented at 2021   Admission Weight  79.8 kg (176 lb) Documented at 2021           Physical Exam:  Physical Exam   Constitutional: Patient appears well-developed and well-nourished and in no acute distress   HEENT:   Head: Normocephalic " and atraumatic.   Mouth and Throat: Patient has moist mucous membranes. Oropharynx is clear of any erythema or exudate.     Neck: Neck supple. No JVD present. No thyromegaly present. No lymphadenopathy present.  Cardiovascular: Regular rate, regular rhythm.  Pulmonary/Chest: Lungs are clear to auscultation bilaterally.  Abdominal: soft, no bowel sounds, tender in the epigastric area and ruq  Musculoskeletal: Normal posture.  Extremities: No edema.No deformities noted  Neurological: Patient is alert and oriented.  Psychological:   Mood and behavior appropriate.  Skin: Skin is warm and dry.    Debilities/Disabilities Identified: None    Emotional Behavior: Appropriate           Results Review:    I reviewed the patient's new clinical results.  Lab Results (most recent)     Procedure Component Value Units Date/Time    POC Glucose Once [[id number]]  (Abnormal) Collected: 08/04/21 0616    Specimen: Blood Updated: 08/04/21 0623     Glucose 229 mg/dL      Comment: RN Notified R and V Meter: [id number] : [id number] Arelis RENEE       COVID PRE-OP / PRE-PROCEDURE SCREENING ORDER (NO ISOLATION) - Swab, Nasal Cavity [[id number]]  (Normal) Collected: 08/04/21 0103    Specimen: Swab from Nasal Cavity Updated: 08/04/21 0141    Narrative:      The following orders were created for panel order COVID PRE-OP / PRE-PROCEDURE SCREENING ORDER (NO ISOLATION) - Swab, Nasal Cavity.  Procedure                               Abnormality         Status                     ---------                               -----------         ------                     COVID-19,[facility]...[[id number]]  Normal              Final result                 Please view results for these tests on the individual orders.    COVID-19,[facility]HOU[facility],Nasal Swab No Transport Media 3-4 HR TAT - Swab, Nasal Cavity [[id number]]  (Normal) Collected: 08/04/21 0103    Specimen: Swab from Nasal Cavity Updated: 08/04/21 0141     COVID19 Not Detected     Narrative:      Fact sheet for providers: https://www.fda.gov/media/940630/download     Fact sheet for patients: https://www.fda.gov/media/058058/download    Test performed by PCR.    Consider negative results in combination with clinical observations, patient history, and epidemiological information.    Troponin [[id number]]  (Normal) Collected: 08/04/21 0051    Specimen: Blood Updated: 08/04/21 0122     Troponin T <0.010 ng/mL     Narrative:      Troponin T Reference Range:  <= 0.03 ng/mL-   Negative for AMI  >0.03 ng/mL-     Abnormal for myocardial necrosis.  Clinicians would have to utilize clinical acumen, EKG, Troponin and serial changes to determine if it is an Acute Myocardial Infarction or myocardial injury due to an underlying chronic condition.       Results may be falsely decreased if patient taking Biotin.      Comprehensive Metabolic Panel [[id number]]  (Abnormal) Collected: 08/03/21 2302    Specimen: Blood Updated: 08/03/21 2335     Glucose 268 mg/dL      BUN 18 mg/dL      Creatinine 1.12 mg/dL      Sodium 137 mmol/L      Potassium 4.1 mmol/L      Chloride 98 mmol/L      CO2 27.2 mmol/L      Calcium 9.7 mg/dL      Total Protein 7.0 g/dL      Albumin 4.20 g/dL      ALT (SGPT) 108 U/L      AST (SGOT) 165 U/L      Alkaline Phosphatase 138 U/L      Total Bilirubin 0.7 mg/dL      eGFR  African Amer 57 mL/min/1.73      Globulin 2.8 gm/dL      A/G Ratio 1.5 g/dL      BUN/Creatinine Ratio 16.1     Anion Gap 11.8 mmol/L     Narrative:      GFR Normal >60  Chronic Kidney Disease <60  Kidney Failure <15      Lipase [[id number]]  (Normal) Collected: 08/03/21 2302    Specimen: Blood Updated: 08/03/21 2335     Lipase 30 U/L     Urinalysis, Microscopic Only - Urine, Clean Catch [[id number]]  (Abnormal) Collected: 08/03/21 2302    Specimen: Urine, Clean Catch Updated: 08/03/21 2326     RBC, UA None Seen /HPF      WBC, UA 6-12 /HPF      Bacteria, UA 3+ /HPF      Squamous Epithelial Cells, UA 3-6 /HPF      Hyaline Casts,  UA None Seen /LPF      Methodology Manual Light Microscopy    Urine Culture - Urine, Urine, Clean Catch [[id number]] Collected: 08/03/21 2302    Specimen: Urine, Clean Catch Updated: 08/03/21 2326    Urinalysis With Culture If Indicated - Urine, Clean Catch [[id number]]  (Abnormal) Collected: 08/03/21 2302    Specimen: Urine, Clean Catch Updated: 08/03/21 2318     Color, UA Yellow     Appearance, UA Clear     pH, UA 6.0     Specific Gravity, UA 1.025     Glucose, UA >=1000 mg/dL (3+)     Ketones, UA Negative     Bilirubin, UA Negative     Blood, UA Negative     Protein, UA 30 mg/dL (1+)     Leuk Esterase, UA Trace     Nitrite, UA Negative     Urobilinogen, UA 2.0 E.U./dL    CBC & Differential [[id number]]  (Abnormal) Collected: 08/03/21 2302    Specimen: Blood Updated: 08/03/21 2316    Narrative:      The following orders were created for panel order CBC & Differential.  Procedure                               Abnormality         Status                     ---------                               -----------         ------                     CBC Auto Differential[[id number]]        Abnormal            Final result                 Please view results for these tests on the individual orders.    CBC Auto Differential [[id number]]  (Abnormal) Collected: 08/03/21 2302    Specimen: Blood Updated: 08/03/21 2316     WBC 8.55 10*3/mm3      RBC 5.01 10*6/mm3      Hemoglobin 13.3 g/dL      Hematocrit 42.4 %      MCV 84.6 fL      MCH 26.5 pg      MCHC 31.4 g/dL      RDW 14.4 %      RDW-SD 44.1 fl      MPV 9.9 fL      Platelets 190 10*3/mm3      Neutrophil % 82.7 %      Lymphocyte % 8.5 %      Monocyte % 7.5 %      Eosinophil % 0.5 %      Basophil % 0.6 %      Immature Grans % 0.2 %      Neutrophils, Absolute 7.07 10*3/mm3      Lymphocytes, Absolute 0.73 10*3/mm3      Monocytes, Absolute 0.64 10*3/mm3      Eosinophils, Absolute 0.04 10*3/mm3      Basophils, Absolute 0.05 10*3/mm3      Immature Grans, Absolute 0.02 10*3/mm3      nRBC  0.0 /100 WBC           Imaging Results (Most Recent)     Procedure Component Value Units Date/Time    US Abdomen Limited [[id number]] Collected: 08/04/21 0818     Updated: 08/04/21 0824    Narrative:      ULTRASOUND ABDOMEN, LIMITED, 08/04/2021         HISTORY:  76-year-old female admitted to the hospital last evening after new onset  postprandial abdomen pain with nausea and vomiting. CT examination  showed cholelithiasis and questioned mild pancreatitis. Serum lipase  level was normal. White blood cell count is normal.     TECHNIQUE:  Grayscale ultrasound imaging of the right upper abdomen.     COMPARISON:  *  CT abdomen/pelvis, 08/03/2021.     FINDINGS:  The gallbladder is markedly abnormal in appearance. Cholelithiasis is  present with at least one large gallstone measuring about 2.5 cm. There  is also a large amount of compacted biliary sludge within the  gallbladder lumen. The gallbladder wall is diffusely thickened, the  gallbladder is tender with direct pressure.     There is no intrahepatic or extrahepatic bile duct dilatation (CBD 5  mm).     Liver is normal in size and appearance, although somewhat poorly seen.  Partially obscured pancreas is negative where visualized. Right kidney  is negative with no hydronephrosis.       Impression:      1.  Abnormal gallbladder. Cholelithiasis with dense biliary sludge  filling a thick-walled, nontender gallbladder. Concern for acute  cholecystitis.  2.  Consider radionuclide hepatobiliary scan to confirm acute  cholecystitis if clinically indicated (after halting opioid pain  medication for at least 6 hours).     This report was finalized on 8/4/2021 8:22 AM by Dr. Jose Chambers MD.       CT Abdomen Pelvis With Contrast [[id number]] Collected: 08/04/21 0024     Updated: 08/04/21 0027    Narrative:      CT Abdomen Pelvis W    INDICATION:   Epigastric pain. Symptoms began 5 hours prior to arrival following [place].    TECHNIQUE:   CT of the abdomen and pelvis with IV  contrast. Coronal and sagittal reconstructions were obtained.  Radiation dose reduction techniques included automated exposure control or exposure modulation based on body size. Count of known CT and cardiac nuc med  studies performed in previous 12 months: 0.     COMPARISON:   None available.    FINDINGS:  Abdomen: Included lung bases demonstrate emphysematous change. There is no effusion. Atherosclerotic aorta and ectasia of the distal thoracic aorta. Spleen is negative. There are clips associated with the medial limb right adrenal gland is otherwise  negative. There is nodularity of the left adrenal gland. The largest nodule measures 1.4 cm and there is a smaller nodule measuring 9 mm. These cannot be further characterized on this single phase study but absent any history of malignancy or risk  factors for adrenal malignancy are likely benign adenomas. Suggest comparison to prior outside studies initially to assess stability.    There is diminished attenuation and subtle edema of the pancreas suggestive of acute pancreatitis. Correlate with laboratory data. Mild hepatic steatosis. The gallbladder is distended up to 3.5 cm. There are multiple gallstones with peripheral rim  calcification, measuring up to 18 mm. No distinct CT evidence of acute cholecystitis at this time. There is no biliary ductal dilatation. Extrahepatic common bile duct measures up to 6 mm in.    The kidneys are nonobstructed and demonstrate cortical scarring. Tiny benign left renal cyst. No adenopathy.    Pelvis: Negative bladder and leiomyomatous change of the uterus. No drainable fluid collection. Extensive diverticulosis of the colon. No bowel obstruction. Appendix diminutive but normal. Redundancy of the ascending colon and cecum. Tiny fat-containing  right inguinal hernia. Fat-containing umbilical hernia. Degenerative changes. No suspicious bone lesion.  negative.      Impression:      1. Abnormal appearance of the pancreas suggestive  of mild acute pancreatitis. Laboratory data would be complimentary.  2. Cholelithiasis without additional CT evidence of acute cholecystitis. Gallbladder ultrasound would be complimentary along with laboratory data.  3. Normal appendix.  4. Left adrenal nodules, technically indeterminate but likely benign adenomas. Suggest initial comparison to prior outside studies to assess stability.          Signer Name: Charles Garcia MD   Signed: 8/4/2021 12:24 AM   Workstation Name: [employer]    [facility]        reviewed    ECG/EMG Results (most recent)     Procedure Component Value Units Date/Time    ECG 12 Lead [[id number]] Collected: 08/03/21 2306     Updated: 08/04/21 0703     QT Interval 451 ms     Narrative:      HEART RATE= 62  bpm  RR Interval= 984  ms  WY Interval= 141  ms  P Horizontal Axis= 4  deg  P Front Axis= 52  deg  QRSD Interval= 141  ms  QT Interval= 451  ms  QRS Axis= -84  deg  T Wave Axis= 27  deg  - ABNORMAL ECG -  Sinus rhythm  Atrial premature complex  RBBB and LAFB  Inferior infarct, old  NO PRIOR TRACING AVAILABLE FOR COMPARISON  Electronically Signed By: Hans Isaac ([facility]) 04-Aug-2021 07:02:57  Date and Time of Study: 2021-08-03 23:06:03            Assessment/Plan     Gallstone pancreatitis    I discussed with Tyrone Solo, her  and daughter, the benefits and risks of performing a laparoscopic cholecystectomy with intraoperative cholangiogram possible open procedure.  Benefits and risks not limited to but including: Bleeding, infection, hernia formation, having to convert to an open procedure, injury to intra-abdominal structures, intra-abdominal abscess, intra-abdominal biloma, bile leak, DVT, PE, atelectasis, pneumonia, anesthetic complications.  They appeared to understand and is willing to proceed.    I will add her on for tomorrow    I discussed the patients findings and my recommendations with patient and the hospitalist service.     Yasmeen Andrade,  DO  08/04/21  10:40 EDT

## 2023-03-15 ENCOUNTER — HOSPITAL ENCOUNTER (OUTPATIENT)
Dept: CT IMAGING | Facility: HOSPITAL | Age: 78
Discharge: HOME | End: 2023-03-15
Admitting: INTERNAL MEDICINE
Payer: MEDICARE

## 2023-03-15 DIAGNOSIS — E27.8 ADRENAL NODULE: ICD-10-CM

## 2023-03-15 PROCEDURE — 25510000001 IOPAMIDOL 61 % SOLUTION: Performed by: INTERNAL MEDICINE

## 2023-03-15 PROCEDURE — 74177 CT ABD & PELVIS W/CONTRAST: CPT

## 2023-03-15 PROCEDURE — 0 DIATRIZOATE MEGLUMINE & SODIUM PER 1 ML: Performed by: INTERNAL MEDICINE

## 2023-03-15 PROCEDURE — 82565 ASSAY OF CREATININE: CPT

## 2023-03-15 RX ADMIN — DIATRIZOATE MEGLUMINE AND DIATRIZOATE SODIUM 30 ML: 660; 100 LIQUID ORAL; RECTAL at 12:26

## 2023-03-15 RX ADMIN — IOPAMIDOL 100 ML: 612 INJECTION, SOLUTION INTRAVENOUS at 13:37

## 2023-03-16 LAB — CREAT BLDA-MCNC: 1.1 MG/DL (ref 0.6–1.3)

## 2023-03-22 ENCOUNTER — LAB (OUTPATIENT)
Dept: OTHER | Facility: HOSPITAL | Age: 78
End: 2023-03-22
Payer: MEDICARE

## 2023-03-22 ENCOUNTER — OFFICE VISIT (OUTPATIENT)
Dept: ONCOLOGY | Facility: CLINIC | Age: 78
End: 2023-03-22
Payer: MEDICARE

## 2023-03-22 VITALS
TEMPERATURE: 97.7 F | HEIGHT: 66 IN | WEIGHT: 175.6 LBS | OXYGEN SATURATION: 97 % | BODY MASS INDEX: 28.22 KG/M2 | DIASTOLIC BLOOD PRESSURE: 75 MMHG | RESPIRATION RATE: 18 BRPM | SYSTOLIC BLOOD PRESSURE: 122 MMHG | HEART RATE: 63 BPM

## 2023-03-22 DIAGNOSIS — M85.89 OSTEOPENIA OF MULTIPLE SITES: ICD-10-CM

## 2023-03-22 DIAGNOSIS — C50.312 MALIGNANT NEOPLASM OF LOWER-INNER QUADRANT OF LEFT BREAST IN FEMALE, ESTROGEN RECEPTOR NEGATIVE: ICD-10-CM

## 2023-03-22 DIAGNOSIS — Z17.1 MALIGNANT NEOPLASM OF LOWER-INNER QUADRANT OF LEFT BREAST IN FEMALE, ESTROGEN RECEPTOR NEGATIVE: ICD-10-CM

## 2023-03-22 DIAGNOSIS — E55.9 VITAMIN D DEFICIENCY: ICD-10-CM

## 2023-03-22 DIAGNOSIS — Z17.1 MALIGNANT NEOPLASM OF LOWER-INNER QUADRANT OF LEFT BREAST IN FEMALE, ESTROGEN RECEPTOR NEGATIVE: Primary | ICD-10-CM

## 2023-03-22 DIAGNOSIS — Z12.31 ENCOUNTER FOR SCREENING MAMMOGRAM FOR MALIGNANT NEOPLASM OF BREAST: ICD-10-CM

## 2023-03-22 DIAGNOSIS — E27.8 ADRENAL NODULE: ICD-10-CM

## 2023-03-22 DIAGNOSIS — K86.9 PANCREATIC LESION: ICD-10-CM

## 2023-03-22 DIAGNOSIS — C50.312 MALIGNANT NEOPLASM OF LOWER-INNER QUADRANT OF LEFT BREAST IN FEMALE, ESTROGEN RECEPTOR NEGATIVE: Primary | ICD-10-CM

## 2023-03-22 LAB
25(OH)D3 SERPL-MCNC: 45.8 NG/ML (ref 30–100)
ALBUMIN SERPL-MCNC: 3.9 G/DL (ref 3.5–5.2)
ALBUMIN/GLOB SERPL: 1.2 G/DL
ALP SERPL-CCNC: 115 U/L (ref 39–117)
ALT SERPL W P-5'-P-CCNC: 17 U/L (ref 1–33)
ANION GAP SERPL CALCULATED.3IONS-SCNC: 9.2 MMOL/L (ref 5–15)
AST SERPL-CCNC: 19 U/L (ref 1–32)
BASOPHILS # BLD AUTO: 0.07 10*3/MM3 (ref 0–0.2)
BASOPHILS NFR BLD AUTO: 1.1 % (ref 0–1.5)
BILIRUB SERPL-MCNC: 0.2 MG/DL (ref 0–1.2)
BUN SERPL-MCNC: 18 MG/DL (ref 8–23)
BUN/CREAT SERPL: 16.2 (ref 7–25)
CALCIUM SPEC-SCNC: 9.6 MG/DL (ref 8.6–10.5)
CHLORIDE SERPL-SCNC: 103 MMOL/L (ref 98–107)
CO2 SERPL-SCNC: 23.8 MMOL/L (ref 22–29)
CREAT SERPL-MCNC: 1.11 MG/DL (ref 0.57–1)
DEPRECATED RDW RBC AUTO: 45.1 FL (ref 37–54)
EGFRCR SERPLBLD CKD-EPI 2021: 50.4 ML/MIN/1.73
EOSINOPHIL # BLD AUTO: 0.11 10*3/MM3 (ref 0–0.4)
EOSINOPHIL NFR BLD AUTO: 1.8 % (ref 0.3–6.2)
ERYTHROCYTE [DISTWIDTH] IN BLOOD BY AUTOMATED COUNT: 14.2 % (ref 12.3–15.4)
GLOBULIN UR ELPH-MCNC: 3.3 GM/DL
GLUCOSE SERPL-MCNC: 217 MG/DL (ref 65–99)
HCT VFR BLD AUTO: 41.3 % (ref 34–46.6)
HGB BLD-MCNC: 12.7 G/DL (ref 12–15.9)
IMM GRANULOCYTES # BLD AUTO: 0.02 10*3/MM3 (ref 0–0.05)
IMM GRANULOCYTES NFR BLD AUTO: 0.3 % (ref 0–0.5)
LYMPHOCYTES # BLD AUTO: 1.21 10*3/MM3 (ref 0.7–3.1)
LYMPHOCYTES NFR BLD AUTO: 19.4 % (ref 19.6–45.3)
MCH RBC QN AUTO: 26.7 PG (ref 26.6–33)
MCHC RBC AUTO-ENTMCNC: 30.8 G/DL (ref 31.5–35.7)
MCV RBC AUTO: 86.8 FL (ref 79–97)
MONOCYTES # BLD AUTO: 0.5 10*3/MM3 (ref 0.1–0.9)
MONOCYTES NFR BLD AUTO: 8 % (ref 5–12)
NEUTROPHILS NFR BLD AUTO: 4.32 10*3/MM3 (ref 1.7–7)
NEUTROPHILS NFR BLD AUTO: 69.4 % (ref 42.7–76)
NRBC BLD AUTO-RTO: 0 /100 WBC (ref 0–0.2)
PLATELET # BLD AUTO: 216 10*3/MM3 (ref 140–450)
PMV BLD AUTO: 9.7 FL (ref 6–12)
POTASSIUM SERPL-SCNC: 4.4 MMOL/L (ref 3.5–5.2)
PROT SERPL-MCNC: 7.2 G/DL (ref 6–8.5)
RBC # BLD AUTO: 4.76 10*6/MM3 (ref 3.77–5.28)
SODIUM SERPL-SCNC: 136 MMOL/L (ref 136–145)
WBC NRBC COR # BLD: 6.23 10*3/MM3 (ref 3.4–10.8)

## 2023-03-22 PROCEDURE — 80053 COMPREHEN METABOLIC PANEL: CPT | Performed by: INTERNAL MEDICINE

## 2023-03-22 PROCEDURE — 1160F RVW MEDS BY RX/DR IN RCRD: CPT | Performed by: INTERNAL MEDICINE

## 2023-03-22 PROCEDURE — 1126F AMNT PAIN NOTED NONE PRSNT: CPT | Performed by: INTERNAL MEDICINE

## 2023-03-22 PROCEDURE — 3074F SYST BP LT 130 MM HG: CPT | Performed by: INTERNAL MEDICINE

## 2023-03-22 PROCEDURE — 1159F MED LIST DOCD IN RCRD: CPT | Performed by: INTERNAL MEDICINE

## 2023-03-22 PROCEDURE — 3078F DIAST BP <80 MM HG: CPT | Performed by: INTERNAL MEDICINE

## 2023-03-22 PROCEDURE — 82306 VITAMIN D 25 HYDROXY: CPT | Performed by: INTERNAL MEDICINE

## 2023-03-22 PROCEDURE — 85025 COMPLETE CBC W/AUTO DIFF WBC: CPT | Performed by: INTERNAL MEDICINE

## 2023-03-22 PROCEDURE — 36415 COLL VENOUS BLD VENIPUNCTURE: CPT

## 2023-03-22 PROCEDURE — 99214 OFFICE O/P EST MOD 30 MIN: CPT | Performed by: INTERNAL MEDICINE

## 2023-03-22 RX ORDER — INSULIN DEGLUDEC INJECTION 100 U/ML
INJECTION, SOLUTION SUBCUTANEOUS
COMMUNITY
Start: 2023-02-21

## 2023-03-22 RX ORDER — FLUTICASONE PROPIONATE 50 MCG
SPRAY, SUSPENSION (ML) NASAL
COMMUNITY
Start: 2023-03-17

## 2023-03-22 NOTE — PROGRESS NOTES
"Subjective     CHIEF COMPLAINT:      Chief Complaint   Patient presents with   • Follow-up     HISTORY OF PRESENT ILLNESS:     Tyrone Rand is a 78 y.o. female patient who returns today for follow up on her breast cancer and adrenal nodules.  She returns today for follow-up accompanied by her .  She continues to take Arimidex.  She is tolerating it well.  She is not having abdominal or back pain.  No significant hot flashes.    ROS:  Pertinent ROS is in the HPI.     Past medical, surgical, social and family history were reviewed.     MEDICATIONS:    Current Outpatient Medications:   •  anastrozole (ARIMIDEX) 1 MG tablet, TAKE 1 TABLET BY MOUTH EVERY DAY, Disp: 90 tablet, Rfl: 1  •  aspirin 81 MG tablet, Take 1 tablet by mouth Daily., Disp: , Rfl:   •  atorvastatin (LIPITOR) 20 MG tablet, Take 1 tablet by mouth Daily., Disp: , Rfl:   •  LÁZARO CONTOUR TEST test strip, TEST BLOOD SUGAR TWICE A DAY, Disp: , Rfl: 2  •  LÁZARO MICROLET LANCETS lancets, TEST BLOOD SUGAR TWICE A DAY, Disp: , Rfl: 3  •  cholecalciferol (VITAMIN D3) 1000 units tablet, Take 2 tablets by mouth Daily., Disp: , Rfl:   •  fluticasone (FLONASE) 50 MCG/ACT nasal spray, SPRAY 2 SPRAYS INTO EACH NOSTRIL EVERY DAY AS DIRECTED, Disp: , Rfl:   •  metoprolol succinate XL (TOPROL-XL) 25 MG 24 hr tablet, TAKE ONE TABLET BY MOUTH IN THE MORNING, Disp: , Rfl: 11  •  Multiple Vitamins-Minerals (ZINC PO), Take  by mouth Daily., Disp: , Rfl:   •  NOVOFINE 32G X 6 MM misc, USE TWO TIMES A DAY, Disp: , Rfl: 1  •  Tresiba FlexTouch 100 UNIT/ML solution pen-injector injection, , Disp: , Rfl:   •  SITagliptin (JANUVIA) 100 MG tablet, Take 100 mg by mouth Daily., Disp: , Rfl:   Objective     VITAL SIGNS:     Vitals:    03/22/23 1417   BP: 122/75   Pulse: 63   Resp: 18   Temp: 97.7 °F (36.5 °C)   TempSrc: Temporal   SpO2: 97%   Weight: 79.7 kg (175 lb 9.6 oz)   Height: 167.6 cm (65.98\")   PainSc: 0-No pain     Body mass index is 28.36 kg/m².     Wt Readings " from Last 5 Encounters:   03/22/23 79.7 kg (175 lb 9.6 oz)   08/23/22 81.2 kg (179 lb)   02/22/22 77.9 kg (171 lb 12.8 oz)   08/31/21 76 kg (167 lb 8 oz)   08/27/21 80 kg (176 lb 5.9 oz)     PHYSICAL EXAMINATION:   GENERAL: The patient appears in good general condition, not in acute distress.   SKIN: No ecchymosis.  EYES: No jaundice. No pallor.  LYMPHATICS: No cervical or supraclavicular lymphadenopathy.  CHEST: Normal respiratory effort.  Lungs clear bilaterally.  No added sounds.  CVS: Normal S1-S2.  No murmurs.  ABDOMEN: Soft. No tenderness. No Hepatomegaly. No Splenomegaly. No masses.  EXTREMITIES: No noted deformity.     DIAGNOSTIC DATA:     Results from last 7 days   Lab Units 03/22/23  1415   WBC 10*3/mm3 6.23   NEUTROS ABS 10*3/mm3 4.32   HEMOGLOBIN g/dL 12.7   HEMATOCRIT % 41.3   PLATELETS 10*3/mm3 216     Results from last 7 days   Lab Units 03/22/23  1415   SODIUM mmol/L 136   POTASSIUM mmol/L 4.4   CHLORIDE mmol/L 103   CO2 mmol/L 23.8   BUN mg/dL 18   CREATININE mg/dL 1.11*   CALCIUM mg/dL 9.6   ALBUMIN g/dL 3.9   BILIRUBIN mg/dL 0.2   ALK PHOS U/L 115   ALT (SGPT) U/L 17   AST (SGOT) U/L 19   GLUCOSE mg/dL 217*     CT abdomen pelvis on 3/15/2023:  1.  No significant change from the prior exams.  2.  Fatty infiltration of the proximal pancreas with irregular ductal  dilatation favoring the sequela of prior pancreatitis. Nonspecific 8 mm  hypervascular focus in the midbody similar to 08/03/2021 favoring  neuroendocrine tumor versus less likely small pseudoaneurysm. Consider  subsequent surveillance with MRI/MRCP for better evaluation.  3.  Stable indeterminate adrenal nodules.  4.  Please see above for additional findings/recommendations.    Assessment & Plan    *Left breast cancer, invasive ductal carcinoma, grade 3, ER negative, MO positive at 20%, HER-2 frederic negative.  Ki-67 was 50-55% on the biopsy from 7/19/17.   · She underwent a lumpectomy and sentinel lymph node biopsy on 9/26/17.   · Pathology  examination revealed a 1.6 cm tumor, poorly differentiated with a Ki-67 50-60%, ER negative, SC weakly positive at <10%. Her-2 is negative.   · Adjuvant chemotherapy with Cytoxan and Taxotere with Neulasta support was started on 11/6/17 and completed on 1/8/18.  · Patient received post lumpectomy radiation and was completed on 3/5/18.  · Patient started Arimidex on 5/2/18.  · She had no evidence of recurrence on CT scan on 2/14/2022.  · Bilateral mammograms on 7/29/2022 were benign.  · She is doing well with no evidence of recurrence.  · CT on 3/15/2023 showed no evidence of metastasis.    *Osteopenia in the left femur neck.    · Bone density on 7/24/2020 revealed a decreased bone density with a T score at -1.6 at the left hip.  Bone density in the L-spine was normal.    · On review of the bone density from 5/29/2018, T score was -1.7 in the left hip.   · Patient is on vitamin D3 2000 units daily.    · Vitamin D level improved to 50.9 on 8/31/2021.  · Bone density 7/29/2022 showed osteopenia with a T score of -2.0.  · Vitamin D level was 56.8 on 8/23/2022.  · She was continued on vitamin D replacement.  · Vitamin D level will be obtained today.    *Left adrenal nodules.    · They were seen on the CT scan on 8/4/2021.    · CT scan on 2/14/2022 revealed bilateral adrenal lesions to be stable.   · I explained the findings to the patient. She reports that she had a procedure for the right adrenal gland around 2011. There are no records in the chart.   · CT on 3/15/2023 showed the nodules to be stable.    *8 mm hypervascular focus in the mid body of the pancreas.  · It was similar to 8/3/2021 scan favoring neuroendocrine tumor versus a pseudoaneurysm.  · MRI/MRCP was recommended for follow-up.  · I explained the findings to the patient and her .  · Explained the stability over the past 18 months.    PLAN:    1.  Continue Arimidex 1 mg daily.    2.  Continue vitamin D3 2000 units daily.   3.  Since the lesion in  the pancreas was stable over the past 18 months, we will obtain MRCP in 6 months to reevaluate it.  The MRI will be obtained in mid September 2023.  4.  We will obtain bilateral mammograms in late July 2023.  5.  I will see her in follow-up in late September 2023 with CBC CMP and vitamin D level.       Luz Elena Herndon MD  03/22/23

## 2023-06-19 ENCOUNTER — TELEPHONE (OUTPATIENT)
Dept: ONCOLOGY | Facility: CLINIC | Age: 78
End: 2023-06-19

## 2023-07-31 ENCOUNTER — HOSPITAL ENCOUNTER (OUTPATIENT)
Dept: MAMMOGRAPHY | Facility: HOSPITAL | Age: 78
Discharge: HOME | End: 2023-07-31
Admitting: INTERNAL MEDICINE
Payer: MEDICARE

## 2023-07-31 DIAGNOSIS — Z17.1 MALIGNANT NEOPLASM OF LOWER-INNER QUADRANT OF LEFT BREAST IN FEMALE, ESTROGEN RECEPTOR NEGATIVE: ICD-10-CM

## 2023-07-31 DIAGNOSIS — Z12.31 ENCOUNTER FOR SCREENING MAMMOGRAM FOR MALIGNANT NEOPLASM OF BREAST: ICD-10-CM

## 2023-07-31 DIAGNOSIS — K86.9 PANCREATIC LESION: ICD-10-CM

## 2023-07-31 DIAGNOSIS — C50.312 MALIGNANT NEOPLASM OF LOWER-INNER QUADRANT OF LEFT BREAST IN FEMALE, ESTROGEN RECEPTOR NEGATIVE: ICD-10-CM

## 2023-07-31 DIAGNOSIS — E27.8 ADRENAL NODULE: ICD-10-CM

## 2023-07-31 PROCEDURE — 77067 SCR MAMMO BI INCL CAD: CPT

## 2023-07-31 PROCEDURE — 77063 BREAST TOMOSYNTHESIS BI: CPT

## 2023-08-02 ENCOUNTER — TELEPHONE (OUTPATIENT)
Dept: ONCOLOGY | Facility: CLINIC | Age: 78
End: 2023-08-02
Payer: MEDICARE

## 2023-09-10 ENCOUNTER — APPOINTMENT (OUTPATIENT)
Dept: GENERAL RADIOLOGY | Facility: HOSPITAL | Age: 78
End: 2023-09-10
Payer: MEDICARE

## 2023-09-10 ENCOUNTER — APPOINTMENT (OUTPATIENT)
Dept: CT IMAGING | Facility: HOSPITAL | Age: 78
End: 2023-09-10
Payer: MEDICARE

## 2023-09-10 ENCOUNTER — HOSPITAL ENCOUNTER (OUTPATIENT)
Facility: HOSPITAL | Age: 78
Setting detail: OBSERVATION
Discharge: HOME | End: 2023-09-12
Attending: EMERGENCY MEDICINE | Admitting: INTERNAL MEDICINE
Payer: MEDICARE

## 2023-09-10 DIAGNOSIS — I63.81 LACUNAR STROKE: ICD-10-CM

## 2023-09-10 DIAGNOSIS — R41.0 DELIRIUM: ICD-10-CM

## 2023-09-10 DIAGNOSIS — F03.A0 MILD DEMENTIA WITHOUT BEHAVIORAL DISTURBANCE, PSYCHOTIC DISTURBANCE, MOOD DISTURBANCE, OR ANXIETY, UNSPECIFIED DEMENTIA TYPE: ICD-10-CM

## 2023-09-10 DIAGNOSIS — R53.1 GENERALIZED WEAKNESS: ICD-10-CM

## 2023-09-10 DIAGNOSIS — R65.20 SEPSIS WITH ENCEPHALOPATHY WITHOUT SEPTIC SHOCK, DUE TO UNSPECIFIED ORGANISM: Primary | ICD-10-CM

## 2023-09-10 DIAGNOSIS — A41.9 SEPSIS WITH ENCEPHALOPATHY WITHOUT SEPTIC SHOCK, DUE TO UNSPECIFIED ORGANISM: Primary | ICD-10-CM

## 2023-09-10 DIAGNOSIS — G93.40 SEPSIS WITH ENCEPHALOPATHY WITHOUT SEPTIC SHOCK, DUE TO UNSPECIFIED ORGANISM: Primary | ICD-10-CM

## 2023-09-10 LAB
ALBUMIN SERPL-MCNC: 3.7 G/DL (ref 3.5–5.2)
ALBUMIN/GLOB SERPL: 0.9 G/DL
ALP SERPL-CCNC: 152 U/L (ref 39–117)
ALT SERPL W P-5'-P-CCNC: 353 U/L (ref 1–33)
ANION GAP SERPL CALCULATED.3IONS-SCNC: 15.2 MMOL/L (ref 5–15)
AST SERPL-CCNC: 378 U/L (ref 1–32)
BACTERIA UR QL AUTO: ABNORMAL /HPF
BASOPHILS # BLD AUTO: 0.03 10*3/MM3 (ref 0–0.2)
BASOPHILS NFR BLD AUTO: 0.3 % (ref 0–1.5)
BILIRUB SERPL-MCNC: 0.6 MG/DL (ref 0–1.2)
BILIRUB UR QL STRIP: ABNORMAL
BUN SERPL-MCNC: 20 MG/DL (ref 8–23)
BUN/CREAT SERPL: 14.7 (ref 7–25)
CALCIUM SPEC-SCNC: 9.3 MG/DL (ref 8.6–10.5)
CHLORIDE SERPL-SCNC: 93 MMOL/L (ref 98–107)
CLARITY UR: CLEAR
CO2 SERPL-SCNC: 19.8 MMOL/L (ref 22–29)
COLOR UR: ABNORMAL
CREAT SERPL-MCNC: 1.36 MG/DL (ref 0.57–1)
D-LACTATE SERPL-SCNC: 3 MMOL/L (ref 0.5–2)
DEPRECATED RDW RBC AUTO: 45.3 FL (ref 37–54)
EGFRCR SERPLBLD CKD-EPI 2021: 39.5 ML/MIN/1.73
EOSINOPHIL # BLD AUTO: 0.04 10*3/MM3 (ref 0–0.4)
EOSINOPHIL NFR BLD AUTO: 0.4 % (ref 0.3–6.2)
ERYTHROCYTE [DISTWIDTH] IN BLOOD BY AUTOMATED COUNT: 14.1 % (ref 12.3–15.4)
FATTY CASTS #/AREA URNS LPF: ABNORMAL /LPF
FLUAV RNA RESP QL NAA+PROBE: NOT DETECTED
FLUBV RNA RESP QL NAA+PROBE: NOT DETECTED
GLOBULIN UR ELPH-MCNC: 3.9 GM/DL
GLUCOSE SERPL-MCNC: 263 MG/DL (ref 65–99)
GLUCOSE UR STRIP-MCNC: ABNORMAL MG/DL
HCT VFR BLD AUTO: 46.4 % (ref 34–46.6)
HGB BLD-MCNC: 14.3 G/DL (ref 12–15.9)
HGB UR QL STRIP.AUTO: ABNORMAL
HYALINE CASTS UR QL AUTO: ABNORMAL /LPF
IMM GRANULOCYTES # BLD AUTO: 0.06 10*3/MM3 (ref 0–0.05)
IMM GRANULOCYTES NFR BLD AUTO: 0.6 % (ref 0–0.5)
KETONES UR QL STRIP: ABNORMAL
LEUKOCYTE ESTERASE UR QL STRIP.AUTO: NEGATIVE
LYMPHOCYTES # BLD AUTO: 0.23 10*3/MM3 (ref 0.7–3.1)
LYMPHOCYTES NFR BLD AUTO: 2.1 % (ref 19.6–45.3)
MCH RBC QN AUTO: 27.1 PG (ref 26.6–33)
MCHC RBC AUTO-ENTMCNC: 30.8 G/DL (ref 31.5–35.7)
MCV RBC AUTO: 87.9 FL (ref 79–97)
MONOCYTES # BLD AUTO: 0.44 10*3/MM3 (ref 0.1–0.9)
MONOCYTES NFR BLD AUTO: 4.1 % (ref 5–12)
NEUTROPHILS NFR BLD AUTO: 9.9 10*3/MM3 (ref 1.7–7)
NEUTROPHILS NFR BLD AUTO: 92.5 % (ref 42.7–76)
NITRITE UR QL STRIP: NEGATIVE
NRBC BLD AUTO-RTO: 0 /100 WBC (ref 0–0.2)
PH UR STRIP.AUTO: 5.5 [PH] (ref 4.5–8)
PLATELET # BLD AUTO: 135 10*3/MM3 (ref 140–450)
PMV BLD AUTO: 9.7 FL (ref 6–12)
POTASSIUM SERPL-SCNC: 4.4 MMOL/L (ref 3.5–5.2)
PROCALCITONIN SERPL-MCNC: 4.3 NG/ML (ref 0–0.25)
PROT SERPL-MCNC: 7.6 G/DL (ref 6–8.5)
PROT UR QL STRIP: ABNORMAL
RBC # BLD AUTO: 5.28 10*6/MM3 (ref 3.77–5.28)
RBC # UR STRIP: ABNORMAL /HPF
REF LAB TEST METHOD: ABNORMAL
SARS-COV-2 RNA RESP QL NAA+PROBE: NOT DETECTED
SODIUM SERPL-SCNC: 128 MMOL/L (ref 136–145)
SP GR UR STRIP: 1.02 (ref 1–1.03)
SQUAMOUS #/AREA URNS HPF: ABNORMAL /HPF
UNIDENT CRYS URNS QL MICRO: ABNORMAL /HPF
URATE CRY URNS QL MICRO: ABNORMAL /HPF
UROBILINOGEN UR QL STRIP: ABNORMAL
WBC # UR STRIP: ABNORMAL /HPF
WBC NRBC COR # BLD: 10.7 10*3/MM3 (ref 3.4–10.8)

## 2023-09-10 PROCEDURE — G0378 HOSPITAL OBSERVATION PER HR: HCPCS

## 2023-09-10 PROCEDURE — 83605 ASSAY OF LACTIC ACID: CPT | Performed by: EMERGENCY MEDICINE

## 2023-09-10 PROCEDURE — 99284 EMERGENCY DEPT VISIT MOD MDM: CPT

## 2023-09-10 PROCEDURE — 36415 COLL VENOUS BLD VENIPUNCTURE: CPT

## 2023-09-10 PROCEDURE — 70450 CT HEAD/BRAIN W/O DYE: CPT

## 2023-09-10 PROCEDURE — 96365 THER/PROPH/DIAG IV INF INIT: CPT

## 2023-09-10 PROCEDURE — 80053 COMPREHEN METABOLIC PANEL: CPT | Performed by: EMERGENCY MEDICINE

## 2023-09-10 PROCEDURE — 96361 HYDRATE IV INFUSION ADD-ON: CPT

## 2023-09-10 PROCEDURE — 81001 URINALYSIS AUTO W/SCOPE: CPT | Performed by: EMERGENCY MEDICINE

## 2023-09-10 PROCEDURE — 84145 PROCALCITONIN (PCT): CPT | Performed by: EMERGENCY MEDICINE

## 2023-09-10 PROCEDURE — 71046 X-RAY EXAM CHEST 2 VIEWS: CPT

## 2023-09-10 PROCEDURE — 25010000002 CEFTRIAXONE SODIUM-DEXTROSE 2-2.22 GM-%(50ML) RECONSTITUTED SOLUTION: Performed by: EMERGENCY MEDICINE

## 2023-09-10 PROCEDURE — 87636 SARSCOV2 & INF A&B AMP PRB: CPT | Performed by: EMERGENCY MEDICINE

## 2023-09-10 PROCEDURE — 85025 COMPLETE CBC W/AUTO DIFF WBC: CPT | Performed by: EMERGENCY MEDICINE

## 2023-09-10 PROCEDURE — 87086 URINE CULTURE/COLONY COUNT: CPT | Performed by: INTERNAL MEDICINE

## 2023-09-10 RX ORDER — SODIUM CHLORIDE 9 MG/ML
250 INJECTION, SOLUTION INTRAVENOUS CONTINUOUS
Status: DISCONTINUED | OUTPATIENT
Start: 2023-09-10 | End: 2023-09-11

## 2023-09-10 RX ORDER — ACETAMINOPHEN 500 MG
1000 TABLET ORAL ONCE
Status: COMPLETED | OUTPATIENT
Start: 2023-09-10 | End: 2023-09-10

## 2023-09-10 RX ORDER — CEFTRIAXONE 2 G/50ML
2000 INJECTION, SOLUTION INTRAVENOUS ONCE
Status: COMPLETED | OUTPATIENT
Start: 2023-09-10 | End: 2023-09-10

## 2023-09-10 RX ORDER — SODIUM CHLORIDE 9 MG/ML
INJECTION, SOLUTION INTRAVENOUS
Status: COMPLETED
Start: 2023-09-10 | End: 2023-09-10

## 2023-09-10 RX ORDER — SODIUM CHLORIDE 0.9 % (FLUSH) 0.9 %
10 SYRINGE (ML) INJECTION AS NEEDED
Status: DISCONTINUED | OUTPATIENT
Start: 2023-09-10 | End: 2023-09-12 | Stop reason: HOSPADM

## 2023-09-10 RX ADMIN — SODIUM CHLORIDE 250 ML/HR: 9 INJECTION, SOLUTION INTRAVENOUS at 22:06

## 2023-09-10 RX ADMIN — CEFTRIAXONE 2000 MG: 2 INJECTION, SOLUTION INTRAVENOUS at 22:44

## 2023-09-10 RX ADMIN — ACETAMINOPHEN 1000 MG: 500 TABLET ORAL at 22:09

## 2023-09-10 RX ADMIN — SODIUM CHLORIDE 500 ML: 9 INJECTION, SOLUTION INTRAVENOUS at 22:07

## 2023-09-11 ENCOUNTER — APPOINTMENT (OUTPATIENT)
Dept: ULTRASOUND IMAGING | Facility: HOSPITAL | Age: 78
End: 2023-09-11
Payer: MEDICARE

## 2023-09-11 PROBLEM — A41.9 SEPSIS: Status: RESOLVED | Noted: 2023-09-10 | Resolved: 2023-09-11

## 2023-09-11 PROBLEM — R53.1 GENERALIZED WEAKNESS: Status: ACTIVE | Noted: 2023-09-11

## 2023-09-11 LAB
ALBUMIN SERPL-MCNC: 3.2 G/DL (ref 3.5–5.2)
ALBUMIN/GLOB SERPL: 1 G/DL
ALP SERPL-CCNC: 129 U/L (ref 39–117)
ALT SERPL W P-5'-P-CCNC: 330 U/L (ref 1–33)
ANION GAP SERPL CALCULATED.3IONS-SCNC: 12.9 MMOL/L (ref 5–15)
AST SERPL-CCNC: 309 U/L (ref 1–32)
B PARAPERT DNA SPEC QL NAA+PROBE: NOT DETECTED
B PERT DNA SPEC QL NAA+PROBE: NOT DETECTED
BASOPHILS # BLD AUTO: 0.03 10*3/MM3 (ref 0–0.2)
BASOPHILS NFR BLD AUTO: 0.4 % (ref 0–1.5)
BILIRUB SERPL-MCNC: 0.4 MG/DL (ref 0–1.2)
BUN SERPL-MCNC: 18 MG/DL (ref 8–23)
BUN/CREAT SERPL: 15.3 (ref 7–25)
C PNEUM DNA NPH QL NAA+NON-PROBE: NOT DETECTED
CALCIUM SPEC-SCNC: 8.5 MG/DL (ref 8.6–10.5)
CHLORIDE SERPL-SCNC: 102 MMOL/L (ref 98–107)
CO2 SERPL-SCNC: 21.1 MMOL/L (ref 22–29)
CREAT SERPL-MCNC: 1.18 MG/DL (ref 0.57–1)
D-LACTATE SERPL-SCNC: 1.5 MMOL/L (ref 0.5–2)
DEPRECATED RDW RBC AUTO: 43 FL (ref 37–54)
EGFRCR SERPLBLD CKD-EPI 2021: 46.8 ML/MIN/1.73
EOSINOPHIL # BLD AUTO: 0.08 10*3/MM3 (ref 0–0.4)
EOSINOPHIL NFR BLD AUTO: 1.2 % (ref 0.3–6.2)
ERYTHROCYTE [DISTWIDTH] IN BLOOD BY AUTOMATED COUNT: 13.6 % (ref 12.3–15.4)
FLUAV SUBTYP SPEC NAA+PROBE: NOT DETECTED
FLUBV RNA ISLT QL NAA+PROBE: NOT DETECTED
GLOBULIN UR ELPH-MCNC: 3.2 GM/DL
GLUCOSE BLDC GLUCOMTR-MCNC: 133 MG/DL (ref 70–130)
GLUCOSE BLDC GLUCOMTR-MCNC: 140 MG/DL (ref 70–130)
GLUCOSE BLDC GLUCOMTR-MCNC: 193 MG/DL (ref 70–130)
GLUCOSE BLDC GLUCOMTR-MCNC: 194 MG/DL (ref 70–130)
GLUCOSE SERPL-MCNC: 211 MG/DL (ref 65–99)
HADV DNA SPEC NAA+PROBE: NOT DETECTED
HCOV 229E RNA SPEC QL NAA+PROBE: NOT DETECTED
HCOV HKU1 RNA SPEC QL NAA+PROBE: NOT DETECTED
HCOV NL63 RNA SPEC QL NAA+PROBE: NOT DETECTED
HCOV OC43 RNA SPEC QL NAA+PROBE: NOT DETECTED
HCT VFR BLD AUTO: 38.4 % (ref 34–46.6)
HGB BLD-MCNC: 12 G/DL (ref 12–15.9)
HMPV RNA NPH QL NAA+NON-PROBE: NOT DETECTED
HPIV1 RNA ISLT QL NAA+PROBE: NOT DETECTED
HPIV2 RNA SPEC QL NAA+PROBE: NOT DETECTED
HPIV3 RNA NPH QL NAA+PROBE: NOT DETECTED
HPIV4 P GENE NPH QL NAA+PROBE: NOT DETECTED
IMM GRANULOCYTES # BLD AUTO: 0.02 10*3/MM3 (ref 0–0.05)
IMM GRANULOCYTES NFR BLD AUTO: 0.3 % (ref 0–0.5)
LYMPHOCYTES # BLD AUTO: 0.18 10*3/MM3 (ref 0.7–3.1)
LYMPHOCYTES NFR BLD AUTO: 2.7 % (ref 19.6–45.3)
M PNEUMO IGG SER IA-ACNC: NOT DETECTED
MCH RBC QN AUTO: 26.5 PG (ref 26.6–33)
MCHC RBC AUTO-ENTMCNC: 31.3 G/DL (ref 31.5–35.7)
MCV RBC AUTO: 85 FL (ref 79–97)
MONOCYTES # BLD AUTO: 0.43 10*3/MM3 (ref 0.1–0.9)
MONOCYTES NFR BLD AUTO: 6.4 % (ref 5–12)
NEUTROPHILS NFR BLD AUTO: 5.94 10*3/MM3 (ref 1.7–7)
NEUTROPHILS NFR BLD AUTO: 89 % (ref 42.7–76)
PLATELET # BLD AUTO: 139 10*3/MM3 (ref 140–450)
PMV BLD AUTO: 10.4 FL (ref 6–12)
POTASSIUM SERPL-SCNC: 4.4 MMOL/L (ref 3.5–5.2)
PROCALCITONIN SERPL-MCNC: 14.7 NG/ML (ref 0–0.25)
PROT SERPL-MCNC: 6.4 G/DL (ref 6–8.5)
RBC # BLD AUTO: 4.52 10*6/MM3 (ref 3.77–5.28)
RHINOVIRUS RNA SPEC NAA+PROBE: NOT DETECTED
RSV RNA NPH QL NAA+NON-PROBE: NOT DETECTED
SARS-COV-2 RNA NPH QL NAA+NON-PROBE: NOT DETECTED
SODIUM SERPL-SCNC: 136 MMOL/L (ref 136–145)
WBC NRBC COR # BLD: 6.68 10*3/MM3 (ref 3.4–10.8)

## 2023-09-11 PROCEDURE — 87040 BLOOD CULTURE FOR BACTERIA: CPT | Performed by: INTERNAL MEDICINE

## 2023-09-11 PROCEDURE — 85025 COMPLETE CBC W/AUTO DIFF WBC: CPT | Performed by: INTERNAL MEDICINE

## 2023-09-11 PROCEDURE — 96361 HYDRATE IV INFUSION ADD-ON: CPT

## 2023-09-11 PROCEDURE — 84145 PROCALCITONIN (PCT): CPT | Performed by: INTERNAL MEDICINE

## 2023-09-11 PROCEDURE — 0202U NFCT DS 22 TRGT SARS-COV-2: CPT | Performed by: INTERNAL MEDICINE

## 2023-09-11 PROCEDURE — 82948 REAGENT STRIP/BLOOD GLUCOSE: CPT

## 2023-09-11 PROCEDURE — 97165 OT EVAL LOW COMPLEX 30 MIN: CPT

## 2023-09-11 PROCEDURE — 97161 PT EVAL LOW COMPLEX 20 MIN: CPT

## 2023-09-11 PROCEDURE — 94799 UNLISTED PULMONARY SVC/PX: CPT

## 2023-09-11 PROCEDURE — 76705 ECHO EXAM OF ABDOMEN: CPT

## 2023-09-11 PROCEDURE — 63710000001 INSULIN ASPART PER 5 UNITS: Performed by: INTERNAL MEDICINE

## 2023-09-11 PROCEDURE — G0378 HOSPITAL OBSERVATION PER HR: HCPCS

## 2023-09-11 PROCEDURE — 80053 COMPREHEN METABOLIC PANEL: CPT | Performed by: INTERNAL MEDICINE

## 2023-09-11 PROCEDURE — 25010000002 CEFTRIAXONE SODIUM-DEXTROSE 1-3.74 GM-%(50ML) RECONSTITUTED SOLUTION: Performed by: INTERNAL MEDICINE

## 2023-09-11 RX ORDER — MELATONIN
1000 DAILY
Status: DISCONTINUED | OUTPATIENT
Start: 2023-09-11 | End: 2023-09-12 | Stop reason: HOSPADM

## 2023-09-11 RX ORDER — POLYETHYLENE GLYCOL 3350 17 G/17G
17 POWDER, FOR SOLUTION ORAL DAILY PRN
Status: DISCONTINUED | OUTPATIENT
Start: 2023-09-11 | End: 2023-09-12 | Stop reason: HOSPADM

## 2023-09-11 RX ORDER — CEFTRIAXONE 1 G/50ML
1000 INJECTION, SOLUTION INTRAVENOUS EVERY 24 HOURS
Status: DISCONTINUED | OUTPATIENT
Start: 2023-09-11 | End: 2023-09-12 | Stop reason: HOSPADM

## 2023-09-11 RX ORDER — SODIUM CHLORIDE 0.9 % (FLUSH) 0.9 %
10 SYRINGE (ML) INJECTION AS NEEDED
Status: DISCONTINUED | OUTPATIENT
Start: 2023-09-11 | End: 2023-09-12 | Stop reason: HOSPADM

## 2023-09-11 RX ORDER — BISACODYL 10 MG
10 SUPPOSITORY, RECTAL RECTAL DAILY PRN
Status: DISCONTINUED | OUTPATIENT
Start: 2023-09-11 | End: 2023-09-12 | Stop reason: HOSPADM

## 2023-09-11 RX ORDER — ANASTROZOLE 1 MG/1
1 TABLET ORAL DAILY
Status: DISCONTINUED | OUTPATIENT
Start: 2023-09-11 | End: 2023-09-12 | Stop reason: HOSPADM

## 2023-09-11 RX ORDER — BISACODYL 5 MG/1
5 TABLET, DELAYED RELEASE ORAL DAILY PRN
Status: DISCONTINUED | OUTPATIENT
Start: 2023-09-11 | End: 2023-09-12 | Stop reason: HOSPADM

## 2023-09-11 RX ORDER — ACETAMINOPHEN 650 MG/1
650 SUPPOSITORY RECTAL EVERY 4 HOURS PRN
Status: DISCONTINUED | OUTPATIENT
Start: 2023-09-11 | End: 2023-09-12 | Stop reason: HOSPADM

## 2023-09-11 RX ORDER — ACETAMINOPHEN 160 MG/5ML
650 SOLUTION ORAL EVERY 4 HOURS PRN
Status: DISCONTINUED | OUTPATIENT
Start: 2023-09-11 | End: 2023-09-12 | Stop reason: HOSPADM

## 2023-09-11 RX ORDER — METOPROLOL SUCCINATE 25 MG/1
25 TABLET, EXTENDED RELEASE ORAL EVERY MORNING
Status: DISCONTINUED | OUTPATIENT
Start: 2023-09-11 | End: 2023-09-11

## 2023-09-11 RX ORDER — NICOTINE POLACRILEX 4 MG
15 LOZENGE BUCCAL
Status: DISCONTINUED | OUTPATIENT
Start: 2023-09-11 | End: 2023-09-12 | Stop reason: HOSPADM

## 2023-09-11 RX ORDER — SODIUM CHLORIDE 0.9 % (FLUSH) 0.9 %
10 SYRINGE (ML) INJECTION EVERY 12 HOURS SCHEDULED
Status: DISCONTINUED | OUTPATIENT
Start: 2023-09-11 | End: 2023-09-12 | Stop reason: HOSPADM

## 2023-09-11 RX ORDER — AMOXICILLIN 250 MG
2 CAPSULE ORAL 2 TIMES DAILY
Status: DISCONTINUED | OUTPATIENT
Start: 2023-09-11 | End: 2023-09-12 | Stop reason: HOSPADM

## 2023-09-11 RX ORDER — INSULIN ASPART 100 [IU]/ML
2-9 INJECTION, SOLUTION INTRAVENOUS; SUBCUTANEOUS
Status: DISCONTINUED | OUTPATIENT
Start: 2023-09-11 | End: 2023-09-12 | Stop reason: HOSPADM

## 2023-09-11 RX ORDER — DEXTROSE MONOHYDRATE 25 G/50ML
25 INJECTION, SOLUTION INTRAVENOUS
Status: DISCONTINUED | OUTPATIENT
Start: 2023-09-11 | End: 2023-09-12 | Stop reason: HOSPADM

## 2023-09-11 RX ORDER — ONDANSETRON 4 MG/1
4 TABLET, FILM COATED ORAL EVERY 6 HOURS PRN
Status: DISCONTINUED | OUTPATIENT
Start: 2023-09-11 | End: 2023-09-12 | Stop reason: HOSPADM

## 2023-09-11 RX ORDER — SODIUM CHLORIDE 9 MG/ML
40 INJECTION, SOLUTION INTRAVENOUS AS NEEDED
Status: DISCONTINUED | OUTPATIENT
Start: 2023-09-11 | End: 2023-09-12 | Stop reason: HOSPADM

## 2023-09-11 RX ORDER — ACETAMINOPHEN 325 MG/1
650 TABLET ORAL EVERY 4 HOURS PRN
Status: DISCONTINUED | OUTPATIENT
Start: 2023-09-11 | End: 2023-09-12 | Stop reason: HOSPADM

## 2023-09-11 RX ORDER — ASPIRIN 81 MG/1
81 TABLET ORAL DAILY
Status: DISCONTINUED | OUTPATIENT
Start: 2023-09-11 | End: 2023-09-12 | Stop reason: HOSPADM

## 2023-09-11 RX ORDER — CHOLECALCIFEROL (VITAMIN D3) 125 MCG
5 CAPSULE ORAL NIGHTLY PRN
Status: DISCONTINUED | OUTPATIENT
Start: 2023-09-11 | End: 2023-09-12 | Stop reason: HOSPADM

## 2023-09-11 RX ORDER — SODIUM CHLORIDE 9 MG/ML
100 INJECTION, SOLUTION INTRAVENOUS CONTINUOUS
Status: DISCONTINUED | OUTPATIENT
Start: 2023-09-11 | End: 2023-09-12

## 2023-09-11 RX ORDER — ONDANSETRON 2 MG/ML
4 INJECTION INTRAMUSCULAR; INTRAVENOUS EVERY 6 HOURS PRN
Status: DISCONTINUED | OUTPATIENT
Start: 2023-09-11 | End: 2023-09-12 | Stop reason: HOSPADM

## 2023-09-11 RX ADMIN — INSULIN ASPART 2 UNITS: 100 INJECTION, SOLUTION INTRAVENOUS; SUBCUTANEOUS at 12:36

## 2023-09-11 RX ADMIN — ACETAMINOPHEN 650 MG: 325 TABLET ORAL at 23:22

## 2023-09-11 RX ADMIN — Medication 10 ML: at 09:02

## 2023-09-11 RX ADMIN — CHOLECALCIFEROL TAB 25 MCG (1000 UNIT) 1000 UNITS: 25 TAB at 09:01

## 2023-09-11 RX ADMIN — CEFTRIAXONE 1000 MG: 1 INJECTION, SOLUTION INTRAVENOUS at 21:40

## 2023-09-11 RX ADMIN — ANASTROZOLE 1 MG: 1 TABLET ORAL at 09:00

## 2023-09-11 RX ADMIN — INSULIN ASPART 2 UNITS: 100 INJECTION, SOLUTION INTRAVENOUS; SUBCUTANEOUS at 09:01

## 2023-09-11 RX ADMIN — SODIUM CHLORIDE 100 ML/HR: 9 INJECTION, SOLUTION INTRAVENOUS at 21:40

## 2023-09-11 RX ADMIN — Medication 10 ML: at 21:39

## 2023-09-11 RX ADMIN — ASPIRIN 81 MG: 81 TABLET, COATED ORAL at 09:01

## 2023-09-11 RX ADMIN — SODIUM CHLORIDE 100 ML/HR: 9 INJECTION, SOLUTION INTRAVENOUS at 01:59

## 2023-09-11 RX ADMIN — Medication 10 ML: at 01:59

## 2023-09-11 RX ADMIN — SENNOSIDES AND DOCUSATE SODIUM 2 TABLET: 50; 8.6 TABLET ORAL at 09:01

## 2023-09-11 RX ADMIN — ACETAMINOPHEN 650 MG: 325 TABLET ORAL at 09:01

## 2023-09-11 RX ADMIN — SODIUM CHLORIDE 100 ML/HR: 9 INJECTION, SOLUTION INTRAVENOUS at 12:13

## 2023-09-11 NOTE — DISCHARGE PLACEMENT REQUEST
"Bishnu Rand (78 y.o. Female)       Date of Birth   1945    Social Security Number       Address   [address] [place] [address]    Home Phone   [phone]    MRN   [id number]       [facility]    Marital Status                               Admission Date   9/10/23    Admission Type   Emergency    Admitting Provider   Richard Majano DO    Attending Provider   Richard Majano DO    Department, Room/Bed   [facility] MED SURG, 1410/1       Discharge Date       Discharge Disposition       Discharge Destination                                 Attending Provider: Richard Majano DO    Allergies: Sulfa Antibiotics    Isolation: Droplet   Infection: COVID (rule out) (09/10/23)   Code Status: CPR    Ht: 167.6 cm (66\")   Wt: 76.9 kg (169 lb 8.5 oz)    Admission Cmt: None   Principal Problem: UTI (urinary tract infection), bacterial [N39.0,A49.9]                   Active Insurance as of 9/10/2023       Primary Coverage       Payor Plan Insurance Group Employer/Plan Group    MEDICARE MEDICARE A & B        Payor Plan Address Payor Plan Phone Number Payor Plan Fax Number Effective Dates    [address] [phone]  1/1/2008 - None Entered    [facility] [id number]         Subscriber Name Subscriber Birth Date Member ID       BISHNU RAND 1945 [id number]               Secondary Coverage       Payor Plan Insurance Group Employer/Plan Group    [facility] [employer] [facility]        Payor Plan Address Payor Plan Phone Number Payor Plan Fax Number Effective Dates    [facility] [phone]  1/1/2016 - None Entered    [address]       [facility] [address]         Subscriber Name Subscriber Birth Date Member ID       BISHNU RAND 1945 [id number]                     Emergency Contacts        (Rel.) Home Phone Work Phone Mobile Phone    Chely Linares (Daughter) -- -- [phone]    Padmini Rand (Spouse) " [phone] -- [phone]

## 2023-09-11 NOTE — ED NOTES
Unable to obtain second blood culture due to pt post mastectomy on left side and poor vasculature.  This RN attempted blood cultures and phlebotomy attempted as well.

## 2023-09-11 NOTE — ED PROVIDER NOTES
"Subjective     History provided by:  Patient and spouse (Daughter)  History of Present Illness    Chief complaint: Altered mental status    Location: N/A    Quality/Severity: The patient has been confused and lethargic and not eating or drinking.  She has had nausea without vomiting or diarrhea.  Her  states she felt warm.  She is also had a nonproductive cough.    Timing/Onset: Started yesterday.    Modifying Factors: The patient was started on Macrobid for a UTI from a urine sample dated 8/31/2023 and has had 4 doses since Friday.    Associated symptoms: As above.  She denies any pain anywhere.    Narrative: The patient is a 78-year-old -American female who yesterday became lethargic, not eating or drinking.  She had abdominal pain yesterday but not today.  She has had nausea without vomiting.  She has been confused.  Her  states she felt warm.  She has had a nonproductive cough since yesterday.  2 days ago she was seen by her PCP Maria Esther So who reviewed her labs that were drawn and obtained on 8/31/2023 which showed she had a urinary tract infection.  She was prescribed Macrobid 100 mg at at 2 doses 2 days ago, 1 dose yesterday and 1 dose today.    .BP 90/47   Pulse 94   Temp (!) 101.4 °F (38.6 °C) (Oral)   Resp 17   Ht 167.6 cm (66\")   Wt 77.6 kg (171 lb)   SpO2 92%   BMI 27.60 kg/m²   Review of Systems    Past Medical History:   Diagnosis Date    Breast cancer 2017    Left    Depression     Diabetes mellitus     Drug therapy     GERD (gastroesophageal reflux disease)     H/O Left breast mass 2017    H/O vitamin D deficiency     History of snoring     Hx of radiation therapy     Hyperlipidemia     Hypertension     Panic attack     Poor sleep pattern     Sleep apnea     CPAP       Allergies   Allergen Reactions    Sulfa Antibiotics Itching       Past Surgical History:   Procedure Laterality Date    BREAST LUMPECTOMY Left 09/2017    CHOLECYSTECTOMY WITH INTRAOPERATIVE CHOLANGIOGRAM " N/A 2021    Procedure: CHOLECYSTECTOMY LAPAROSCOPIC INTRAOPERATIVE CHOLANGIOGRAM;  Surgeon: Yasmeen Andrade DO;  Location:  [facility] OR;  Service: General;  Laterality: N/A;    COLON SURGERY      COLONOSCOPY      ERCP N/A 2021    Procedure: ENDOSCOPIC RETROGRADE CHOLANGIOPANCREATOGRAPHY;  Surgeon: Wally Mercado MD;  Location: [facility] OR;  Service: Gastroenterology;  Laterality: N/A;    KIDNEY SURGERY      MOUTH SURGERY      TOOTH EXTRACTION    TOOTH EXTRACTION         Family History   Problem Relation Age of Onset    Stroke Mother     Heart disease Mother     Hypertension Mother     Breast cancer Neg Hx        Social History     Socioeconomic History    Marital status:      Spouse name: Pascual    Number of children: 1    Years of education: GED   Tobacco Use    Smoking status: Former     Packs/day: 1.00     Years: 25.00     Pack years: 25.00     Types: Cigarettes     Quit date:      Years since quittin.7    Smokeless tobacco: Never    Tobacco comments:     smoked 25 years, 1 PPD   Vaping Use    Vaping Use: Unknown   Substance and Sexual Activity    Alcohol use: Not Currently    Drug use: No    Sexual activity: Defer           Objective   Physical Exam  Vitals and nursing note reviewed.   Constitutional:       General: She is not in acute distress.     Appearance: She is well-developed. She is not ill-appearing, toxic-appearing or diaphoretic.      Comments: The patient does not appear toxic.  She does not appear in any acute distress.  She does appear confused.  Review of her vital signs: She is febrile with a temperature 101.4, tachycardic with a heart rate of 111, blood pressure normal 125/66, respirations normal 18 with a room air oxygen saturation of 93%.   HENT:      Head: Normocephalic and atraumatic.      Nose: Nose normal.      Mouth/Throat:      Mouth: Mucous membranes are moist.      Pharynx: Oropharynx is clear.   Eyes:      General: No scleral icterus.         Right eye: No discharge.         Left eye: No discharge.      Conjunctiva/sclera: Conjunctivae normal.      Pupils: Pupils are equal, round, and reactive to light.   Neck:      Thyroid: No thyromegaly.      Vascular: No JVD.   Cardiovascular:      Rate and Rhythm: Normal rate and regular rhythm.      Heart sounds: Normal heart sounds. No murmur heard.  Pulmonary:      Effort: Pulmonary effort is normal.      Breath sounds: Rhonchi (Faint in the right base.) present. No wheezing or rales.   Chest:      Chest wall: No tenderness.   Abdominal:      General: Bowel sounds are normal. There is no distension.      Palpations: Abdomen is soft.      Tenderness: There is no abdominal tenderness.   Musculoskeletal:         General: No tenderness or deformity. Normal range of motion.      Cervical back: Normal range of motion and neck supple. No tenderness.   Lymphadenopathy:      Cervical: No cervical adenopathy.   Skin:     General: Skin is warm and dry.      Findings: No rash.   Neurological:      Mental Status: She is alert.      Cranial Nerves: No cranial nerve deficit.      Coordination: Coordination normal.      Comments: No focal motor sensory deficit.  She is slow to respond and confused.  She is oriented to person and place and her daughter and .   Psychiatric:      Comments: The patient is confused and slow to respond.       Procedures           ED Course  ED Course as of 09/10/23 2328   Sun Sep 10, 2023   2241 Review of the patient's laboratory studies: The patient's CBC has an upper limits normal white count of 10.7 with a left shift.  Hemoglobin, hematocrit and platelets within normal limits.  Lactic acid is elevated at 3.  Procalcitonin elevated at 4.3.  CMP has an elevated BUN of 20 and elevated creatinine 1.36 with a diminished GFR of 39.5 which is a change from the patient's baseline of creatinine 1.11 and a GFR of 50 consistent with acute kidney injury on chronic kidney disease.  The glucose was  elevated to 63 and sodium low at 128 with a normal potassium of 4.4.  She had marked elevations of her transaminases with an ALT of 353, AST at 378 and alk phos of 152 with a normal total bilirubin of 0.6.  Urinalysis is negative for nitrates and leukocyte esterases and microscopic exam of the urine reveals no WBCs and 6-12 RBCs.  There was 1+ bacteria but there was 3-6 epithelial cells consistent with a contaminated specimen.  2 sets of blood cultures are pending. [TP]   2244 The patient's chest x-ray was interpreted by me and the radiologist as no acute disease.  The patient CT of the head was interpreted by the radiologist as no acute intracranial abnormality. [TP]   2244 The patient was initially administered IV normal saline 500 cc bolus followed by infusion at 250 cc/h.  She was administered Tylenol 1 g p.o. for her fever.  I do feel no source has not yet been found for the patient's fever and delirium and sepsis, but empiric antibiotic therapy will be initiated with Rocephin 2 g IV. [TP]   2320 COVID and flu PCR's are negative. [TP]   2322 23:25 patient discussed with Dr. Majano, hospitalist, who agrees to admit the patient to observation for further evaluation and treatment. [TP]      ED Course User Index  [TP] Cheo Parker MD                                           Medical Decision Making  My differential diagnosis for altered mental status includes but is not limited to:  Hypoglycemia, hyperglycemia, DKA, overdose, ethanol intoxication, thiamine deficiency, niacin deficiency, hypothymia, hyperviscosity, Abhi's disease, hyponatremia, hypernatremia, liver failure, kidney failure, hyper or hypothyroid, no insufficiency, hypoxia, hypercarbia, carbon monoxide poisoning, postanoxic encephalopathy, ischemic stroke, intracranial bleed, subarachnoid hemorrhage, brain tumor, closed head injury, epidural hematoma, epidural hematoma, seizure activity, postictal state, syncopal episode, disseminated  encephalomyelitis, central pontine myelinolysis, post cardiac arrest, bacterial meningitis, viral meningitis, fungal meningitis, encephalitis, brain abscess, subdural empyema, hysteria, catatonic state, malingering, hypertensive encephalopathy, vasculitis, TTP, DIC     Problems Addressed:  Delirium: complicated acute illness or injury  Sepsis with encephalopathy without septic shock, due to unspecified organism: complicated acute illness or injury    Amount and/or Complexity of Data Reviewed  Labs: ordered. Decision-making details documented in ED Course.  Radiology: ordered. Decision-making details documented in ED Course.  Discussion of management or test interpretation with external provider(s): Details documented in the ED course.    Risk  OTC drugs.  Prescription drug management.  Decision regarding hospitalization.      Edlabs  CT Head Without Contrast   Final Result   1. No acute intracranial abnormality.   2. Subacute or chronic lacunar infarct in the head of left caudate   nucleus.   3. Diffuse chronic changes as noted above.       This report was finalized on 9/10/2023 10:08 PM by Dr. Jose Chambers MD.          XR Chest 2 View   Final Result   1. No active disease.   2. Fluid or pleural thickening in the left costophrenic angle.   3. Previous left breast surgery.       This report was finalized on 9/10/2023 10:09 PM by Dr. Jose Chambers MD.          Dp    .[email or web address]      Final diagnoses:   Sepsis with encephalopathy without septic shock, due to unspecified organism   Delirium       ED Disposition  ED Disposition       ED Disposition   Decision to Admit    Condition   --    Comment   Level of Care: Med/Surg [1]   Diagnosis: Sepsis [3055985]   Admitting Physician: ALESSIA TRIPLETT [[id number]]   Bed Request Comments: Sepsis and delirium of uncertain etiology                 No follow-up provider specified.       Medication List      No changes were made to your prescriptions during this  visit.            Cheo Parker MD  09/10/23 [id number]

## 2023-09-11 NOTE — THERAPY EVALUATION
Patient Name: Tyrone Rand  : 1945    MRN: [id number]                              Today's Date: 2023       Admit Date: 9/10/2023    Visit Dx:     ICD-10-CM ICD-9-CM   1. Sepsis with encephalopathy without septic shock, due to unspecified organism  A41.9 038.9    R65.20 995.91    G93.40 348.30   2. Delirium  R41.0 780.09     Patient Active Problem List   Diagnosis    Abnormal electrocardiogram    Chronic kidney disease, stage III (moderate)    Cognitive complaints    Dementia    Depression    Uncontrolled type 2 diabetes mellitus    Diabetes mellitus    Dysuria    Gastroesophageal reflux disease    Headache    Hyperlipidemia    Hypertension    Obesity (BMI 30-39.9)    Obstructive sleep apnea syndrome    Ventricular premature beats    Renal mass    Shortness of breath    Dyssomnia    Type 2 diabetes mellitus    UTI (urinary tract infection), bacterial    Vitamin D deficiency    Weight gain    Calculus of gallbladder without cholecystitis without obstruction    Malignant neoplasm of lower-inner quadrant of left breast in female, estrogen receptor negative    Menopausal and perimenopausal disorder     Chondromalacia    Greater trochanteric bursitis of right hip    Calculus of bile duct with acute cholecystitis without obstruction    Common bile duct calculus    Generalized weakness     Past Medical History:   Diagnosis Date    Breast cancer 2017    Left    Depression     Diabetes mellitus     Drug therapy     GERD (gastroesophageal reflux disease)     H/O Left breast mass 2017    H/O vitamin D deficiency     History of snoring     Hx of radiation therapy     Hyperlipidemia     Hypertension     Panic attack     Poor sleep pattern     Sleep apnea     CPAP     Past Surgical History:   Procedure Laterality Date    BREAST LUMPECTOMY Left 2017    CHOLECYSTECTOMY WITH INTRAOPERATIVE CHOLANGIOGRAM N/A 2021    Procedure: CHOLECYSTECTOMY LAPAROSCOPIC INTRAOPERATIVE CHOLANGIOGRAM;  Surgeon: Raymond  DO Yasmeen;  Location: [facility] OR;  Service: General;  Laterality: N/A;    COLON SURGERY      COLONOSCOPY      ERCP N/A 8/6/2021    Procedure: ENDOSCOPIC RETROGRADE CHOLANGIOPANCREATOGRAPHY;  Surgeon: Wally Mercado MD;  Location: [facility] OR;  Service: Gastroenterology;  Laterality: N/A;    KIDNEY SURGERY  2011    MOUTH SURGERY      TOOTH EXTRACTION    TOOTH EXTRACTION        General Information       Row Name 09/11/23 0955          OT Time and Intention    Document Type evaluation  -SD     Mode of Treatment occupational therapy  -SD       Row Name 09/11/23 0955          General Information    Patient Profile Reviewed yes  pt admitted for weakness, difficulty ambulating and confusion. pt diagnosed with UTI . pt reports independence with daily tasks prior to admittance. No assistive device needed for mobility at home.  -SD     Prior Level of Function independent:;ADL's;all household mobility;community mobility  -SD     Existing Precautions/Restrictions fall  pt reported weakness in BLE's lately with more difficulty with ambulation  -SD     Barriers to Rehab none identified  -SD       Row Name 09/11/23 0955          Occupational Profile    Reason for Services/Referral (Occupational Profile) decreased adl status, discharge placement assessment  -SD     Successful Occupations (Occupational Profile) I with daily tasks prior to admittance  -SD     Environmental Supports and Barriers (Occupational Profile) lives with spouse  -SD     Patient Goals (Occupational Profile) feel better  -SD       Row Name 09/11/23 0955          Living Environment    People in Home spouse  -SD       Row Name 09/11/23 0955          Home Main Entrance    Number of Stairs, Main Entrance six  -SD     Stair Railings, Main Entrance railings on both sides of stairs  -SD       Row Name 09/11/23 0955          Stairs Within Home, Primary    Stairs, Within Home, Primary pt lives in a single story home  -SD     Number of Stairs, Within Home,  Primary none  -SD       Row Name 09/11/23 0955          Cognition    Orientation Status (Cognition) oriented x 3  -SD               User Key  (r) = Recorded By, (t) = Taken By, (c) = Cosigned By      Initials Name Provider Type    Noé Grant OTR Occupational Therapist                     Mobility/ADL's       Row Name 09/11/23 1105          Bed Mobility    Bed Mobility supine-sit  -SD     Supine-Sit [place] (Bed Mobility) standby assist  -SD     Assistive Device (Bed Mobility) bed rails;head of bed elevated  -SD       Row Name 09/11/23 1105          Transfers    Transfers stand-sit transfer  -SD       Row Name 09/11/23 1105          Sit-Stand Transfer    Sit-Stand [place] (Transfers) contact guard;verbal cues  -SD     Assistive Device (Sit-Stand Transfers) walker, front-wheeled  -SD       Row Name 09/11/23 1105          Stand-Sit Transfer    Stand-Sit [place] (Transfers) verbal cues;contact guard  -SD     Assistive Device (Stand-Sit Transfers) walker, front-wheeled  -SD       Row Name 09/11/23 1105          Functional Mobility    Functional Mobility- Ind. Level contact guard assist  -SD     Functional Mobility- Device walker, front-wheeled  -SD     Functional Mobility-Distance (Feet) 30  -SD       Row Name 09/11/23 1105          Activities of Daily Living    BADL Assessment/Intervention lower body dressing  -SD       Row Name 09/11/23 1105          Lower Body Dressing Assessment/Training    Comment, (Lower Body Dressing) pt demonstrated ability to reach down to don/doff her socks while seated at EOB. Pt reports fatigue with activity recently  -SD               User Key  (r) = Recorded By, (t) = Taken By, (c) = Cosigned By      Initials Name Provider Type    Noé Grant OTR Occupational Therapist                   Obj/Interventions       Row Name 09/11/23 1106          Sensory Assessment (Somatosensory)    Sensory Assessment (Somatosensory) other (see comments)  no c/o sensory  "issues  -SD       Row Name 09/11/23 1106          Range of Motion Comprehensive    Comment, General Range of Motion bilateral shoulder rom approx 90 flexion, rom wfl distal to shoulder. Pt reports no injury to her shoulders. \"I think they are stiff because I haven't been doing much.\"  -SD       Row Name 09/11/23 1106          Strength Comprehensive (MMT)    Comment, General Manual Muscle Testing (MMT) Assessment bilateral shoulder strength 3-/5, distal to shoulders 4/5  -SD       Row Name 09/11/23 1106          Balance    Comment, Balance supervision for sitting balance and CGA for standing balance using rolling walker for support  -SD               User Key  (r) = Recorded By, (t) = Taken By, (c) = Cosigned By      Initials Name Provider Type    Noé Grant OTR Occupational Therapist                   Goals/Plan       Row Name 09/11/23 1217          Transfer Goal 1 (OT)    Activity/Assistive Device (Transfer Goal 1, OT) commode, 3-in-1;walker, rolling  -SD     [place] Level/Cues Needed (Transfer Goal 1, OT) standby assist  -SD     Time Frame (Transfer Goal 1, OT) by discharge  -SD     Progress/Outcome (Transfer Goal 1, OT) new goal  -SD       Row Name 09/11/23 1217          ROM Goal 1 (OT)    ROM Goal 1 (OT) Pt to participate in BUE HEP to address rom/strength for basic daily tasks.  -SD     Time Frame (ROM Goal 1, OT) by discharge  -SD     Progress/Outcome (ROM Goal 1, OT) new goal  -SD       Row Name 09/11/23 1217          Therapy Assessment/Plan (OT)    Planned Therapy Interventions (OT) patient/caregiver education/training;transfer/mobility retraining;functional balance retraining;BADL retraining;activity tolerance training  -SD               User Key  (r) = Recorded By, (t) = Taken By, (c) = Cosigned By      Initials Name Provider Type    Noé Grant OTR Occupational Therapist                   Clinical Impression       Row Name 09/11/23 1204          Pain Assessment    Pretreatment " Pain Rating 0/10 - no pain  -SD     Posttreatment Pain Rating 0/10 - no pain  -SD       Row Name 09/11/23 1204          Plan of Care Review    Plan of Care Reviewed With patient;daughter  -SD     Outcome Evaluation Occupational therapy evaluation completed. Pt alert, oriented x3, yet she did require extended time to respond to questions at times. Pt independent with daily tasks prior to admission. Pt managed bed mobility with SBA and transfers/mobility with CGA using a rolling walker for support. Pt demonstrated ability to reach down to don/doff socks while at EOB. Pt c/o generalized weakness and limited activity tolerance at this time. Will follow in OT to address safety with basic daily tasks. Pt plans to return home with family suppport upon discharge.  -SD       Row Name 09/11/23 1204          Therapy Assessment/Plan (OT)    Patient/Family Therapy Goal Statement (OT) go home  -SD     Rehab Potential (OT) good, to achieve stated therapy goals  -SD     Criteria for Skilled Therapeutic Interventions Met (OT) yes;skilled treatment is necessary  -SD     Therapy Frequency (OT) 5 times/wk  -SD     Predicted Duration of Therapy Intervention (OT) 1 week  -SD       Row Name 09/11/23 1204          Therapy Plan Review/Discharge Plan (OT)    Anticipated Discharge Disposition (OT) home with assist  pt may benefit from  services upon discharge depending upon progress  -SD       Row Name 09/11/23 1204          Positioning and Restraints    Pre-Treatment Position in bed  -SD     Post Treatment Position chair  -SD     In Chair reclined;call light within reach;encouraged to call for assist;with family/caregiver;exit alarm on  -SD               User Key  (r) = Recorded By, (t) = Taken By, (c) = Cosigned By      Initials Name Provider Type    Noé Grant, OTR Occupational Therapist                   Outcome Measures       Row Name 09/11/23 1224          How much help from another is currently needed...    Putting on and  taking off regular lower body clothing? 4  -SD     Bathing (including washing, rinsing, and drying) 3  -SD     Toileting (which includes using toilet bed pan or urinal) 3  -SD     Putting on and taking off regular upper body clothing 4  -SD     Taking care of personal grooming (such as brushing teeth) 4  -SD     Eating meals 4  -SD     AM-PAC 6 Clicks Score (OT) 22  -SD       Row Name 09/11/23 0914 09/11/23 0857       How much help from another person do you currently need...    Turning from your back to your side while in flat bed without using bedrails? 4  -JW 4  -TC    Moving from lying on back to sitting on the side of a flat bed without bedrails? 3  - 3  -TC    Moving to and from a bed to a chair (including a wheelchair)? 3  - 3  -TC    Standing up from a chair using your arms (e.g., wheelchair, bedside chair)? 3  - 3  -TC    Climbing 3-5 steps with a railing? 3  - 3  -TC    To walk in hospital room? 3  -JW 3  -TC    AM-PAC 6 Clicks Score (PT) 19  - 19  -TC    Highest level of mobility 6 --> Walked 10 steps or more  - 6 --> Walked 10 steps or more  -      Row Name 09/11/23 1224 09/11/23 0914       Functional Assessment    Outcome Measure Options AM-PAC 6 Clicks Daily Activity (OT)  -SD AM-PAC 6 Clicks Basic Mobility (PT)  -              User Key  (r) = Recorded By, (t) = Taken By, (c) = Cosigned By      Initials Name Provider Type    SD Noé Romo OTR Occupational Therapist    Johanne Nickerson, PT Physical Therapist    Alexandrea Stephens, RN Registered Nurse                    Occupational Therapy Education       Title: PT OT SLP Therapies (In Progress)       Topic: Occupational Therapy (In Progress)       Point: ADL training (Done)       Description:   Instruct learner(s) on proper safety adaptation and remediation techniques during self care or transfers.   Instruct in proper use of assistive devices.                  Learning Progress Summary             Patient Acceptance, E, VU by  SD at 9/11/2023 1225    Comment: Education regarding OT services, benefits of activity and safety with bed mobility/transfers/functinal mobility.                         Point: Home exercise program (Not Started)       Description:   Instruct learner(s) on appropriate technique for monitoring, assisting and/or progressing therapeutic exercises/activities.                  Learner Progress:  Not documented in this visit.                              User Key       Initials Effective Dates Name Provider Type Discipline    SD 06/16/21 -  Noé Romo OTR Occupational Therapist OT                  OT Recommendation and Plan  Planned Therapy Interventions (OT): patient/caregiver education/training, transfer/mobility retraining, functional balance retraining, BADL retraining, activity tolerance training  Therapy Frequency (OT): 5 times/wk  Plan of Care Review  Plan of Care Reviewed With: patient, daughter  Outcome Evaluation: Occupational therapy evaluation completed. Pt alert, oriented x3, yet she did require extended time to respond to questions at times. Pt independent with daily tasks prior to admission. Pt managed bed mobility with SBA and transfers/mobility with CGA using a rolling walker for support. Pt demonstrated ability to reach down to don/doff socks while at EOB. Pt c/o generalized weakness and limited activity tolerance at this time. Will follow in OT to address safety with basic daily tasks. Pt plans to return home with family suppport upon discharge.     Time Calculation:   Evaluation Complexity (OT)  Review Occupational Profile/Medical/Therapy History Complexity: brief/low complexity  Assessment, Occupational Performance/Identification of Deficit Complexity: 1-3 performance deficits  Clinical Decision Making Complexity (OT): problem focused assessment/low complexity  Overall Complexity of Evaluation (OT): low complexity     Time Calculation- OT       Row Name 09/11/23 1233             Time  Calculation- OT    OT Start Time 0913  -SD      OT Stop Time 0938  -SD      OT Time Calculation (min) 25 min  -SD         Untimed Charges    OT Eval/Re-eval Minutes 25  -SD         Total Minutes    Untimed Charges Total Minutes 25  -SD       Total Minutes 25  -SD                User Key  (r) = Recorded By, (t) = Taken By, (c) = Cosigned By      Initials Name Provider Type    SD Noé Romo OTR Occupational Therapist                  Therapy Charges for Today       Code Description Service Date Service Provider Modifiers Qty    [id number]  OT EVAL LOW COMPLEXITY 2 9/11/2023 Noé Romo OTR GO 1                 FUAD Tobar  9/11/2023

## 2023-09-11 NOTE — PLAN OF CARE
Goal Outcome Evaluation:  Plan of Care Reviewed With: patient, daughter        Progress: no change  Outcome Evaluation: pt and family oriented to room, call light w/in reach - pt alert and oriented - no c/o pain - pt states she feels weak - bed alarm in place - no lab draws/VS on left side - IV fluids - rested well through night

## 2023-09-11 NOTE — CASE MANAGEMENT/SOCIAL WORK
Continued Stay Note  [facility]     Patient Name: Tyrone Rand  MRN: [id number]  Today's Date: 9/11/2023    Admit Date: 9/10/2023    Plan: home with / [facility]   Discharge Plan       Row Name 09/11/23 1401       Plan    Plan home with / [facility]    Patient/Family in Agreement with Plan yes    Plan Comments Follow up and family has decided they will need the RW, Dr Varela updated. CM will deliver RW to bedside once order is rec'd. CM will contiue to follow.      Row Name 09/11/23 1240       Plan    Plan Plan home with     Patient/Family in Agreement with Plan yes    Plan Comments Spoke with patient at bedside, permission to speak with family present. Face sheet verified. Patient lives in a home with her . She is independent of ADLs but no longer drives, her family provides transportation as needed. She denies use of DME/HH/Rehab. She does not have a living will and is not interested in creating one. She sees Dr MariaE sther So as PCP. She uses [facility] pharmacy and denies issues obtaining  medication. She is agreeable to home health at [place] and has no preference of agency. PT recommended a RW and she is unsure if she will need it or if they may have one at home, CM will follow up and provide RW if needed. Patient plans to return home whit her  and family to assist as needed. CM # placed on white board. LVM for [facility] regarding referral for HH.  CM will continue to follow.                   Discharge Codes    No documentation.                       All Barry RN

## 2023-09-11 NOTE — THERAPY EVALUATION
Patient Name: Tyrone Rand  : 1945    MRN: [id number]                              Today's Date: 2023       Admit Date: 9/10/2023    Visit Dx:     ICD-10-CM ICD-9-CM   1. Sepsis with encephalopathy without septic shock, due to unspecified organism  A41.9 038.9    R65.20 995.91    G93.40 348.30   2. Delirium  R41.0 780.09     Patient Active Problem List   Diagnosis    Abnormal electrocardiogram    Chronic kidney disease, stage III (moderate)    Cognitive complaints    Dementia    Depression    Uncontrolled type 2 diabetes mellitus    Diabetes mellitus    Dysuria    Gastroesophageal reflux disease    Headache    Hyperlipidemia    Hypertension    Obesity (BMI 30-39.9)    Obstructive sleep apnea syndrome    Ventricular premature beats    Renal mass    Shortness of breath    Dyssomnia    Type 2 diabetes mellitus    Vitamin D deficiency    Weight gain    Calculus of gallbladder without cholecystitis without obstruction    Malignant neoplasm of lower-inner quadrant of left breast in female, estrogen receptor negative    Menopausal and perimenopausal disorder     Chondromalacia    Greater trochanteric bursitis of right hip    Calculus of bile duct with acute cholecystitis without obstruction    Common bile duct calculus    Sepsis     Past Medical History:   Diagnosis Date    Breast cancer 2017    Left    Depression     Diabetes mellitus     Drug therapy     GERD (gastroesophageal reflux disease)     H/O Left breast mass 2017    H/O vitamin D deficiency     History of snoring     Hx of radiation therapy     Hyperlipidemia     Hypertension     Panic attack     Poor sleep pattern     Sleep apnea     CPAP     Past Surgical History:   Procedure Laterality Date    BREAST LUMPECTOMY Left 2017    CHOLECYSTECTOMY WITH INTRAOPERATIVE CHOLANGIOGRAM N/A 2021    Procedure: CHOLECYSTECTOMY LAPAROSCOPIC INTRAOPERATIVE CHOLANGIOGRAM;  Surgeon: Yasmeen Andrade DO;  Location: [facility];  Service: General;   Laterality: N/A;    COLON SURGERY      COLONOSCOPY      ERCP N/A 8/6/2021    Procedure: ENDOSCOPIC RETROGRADE CHOLANGIOPANCREATOGRAPHY;  Surgeon: Wally Mercado MD;  Location: [facility];  Service: Gastroenterology;  Laterality: N/A;    KIDNEY SURGERY  2011    MOUTH SURGERY      TOOTH EXTRACTION    TOOTH EXTRACTION        General Information       Row Name 09/11/23 0914          Physical Therapy Time and Intention    Document Type evaluation  -     Mode of Treatment physical therapy  -       Row Name 09/11/23 0914          General Information    Patient Profile Reviewed yes  pt admitted for weakness, difficulty ambulating and confusion.  pt diagnosed with UTI  -     Prior Level of Function independent:;all household mobility;community mobility;ADL's  -     Existing Precautions/Restrictions fall  -     Barriers to Rehab none identified  -       Row Name 09/11/23 0914          Living Environment    People in Home spouse  -       Row Name 09/11/23 0914          Home Main Entrance    Number of Stairs, Main Entrance six  -     Stair Railings, Main Entrance railings on both sides of stairs  -       Row Name 09/11/23 0914          Stairs Within Home, Primary    Stairs, Within Home, Primary pt lives in single story house  -     Number of Stairs, Within Home, Primary none  -       Row Name 09/11/23 0914          Cognition    Orientation Status (Cognition) oriented x 3  -               User Key  (r) = Recorded By, (t) = Taken By, (c) = Cosigned By      Initials Name Provider Type    JW Johanne Ojeda, PT Physical Therapist                   Mobility       Row Name 09/11/23 0914          Bed Mobility    Bed Mobility supine-sit  -     Supine-Sit [place] (Bed Mobility) standby assist  -     Assistive Device (Bed Mobility) bed rails;head of bed elevated  -       Row Name 09/11/23 0914          Transfers    Comment, (Transfers) cues for hand placement  -       Row Name 09/11/23  0914          Sit-Stand Transfer    Sit-Stand [place] (Transfers) contact guard;verbal cues  -     Assistive Device (Sit-Stand Transfers) walker, front-wheeled  -       Row Name 09/11/23 0914          Gait/Stairs (Locomotion)    [place] Level (Gait) contact guard;verbal cues  -     Assistive Device (Gait) walker, front-wheeled  -     Distance in Feet (Gait) 30  -     Deviations/Abnormal Patterns (Gait) base of support, narrow;cat decreased  -     Bilateral Gait Deviations forward flexed posture  -     Comment, (Gait/Stairs) pt with decreased gait speed, requires verbal cues for proper distance from walker during direction change  -               User Key  (r) = Recorded By, (t) = Taken By, (c) = Cosigned By      Initials Name Provider Type    Johanne Nickerson, OMER Physical Therapist                   Obj/Interventions       Row Name 09/11/23 0914          Range of Motion Comprehensive    Comment, General Range of Motion LE ROM WFL bilaterally  -[facility] Name 09/11/23 0914          Strength Comprehensive (MMT)    Comment, General Manual Muscle Testing (MMT) Assessment LE strength 4/5 bilaterally  -[facility] Name 09/11/23 0914          Balance    Comment, Balance CGA for standing balance with walker  -[facility] Name 09/11/23 0914          Sensory Assessment (Somatosensory)    Sensory Assessment (Somatosensory) other (see comments)  pt reports no numbness/tingling  -               User Key  (r) = Recorded By, (t) = Taken By, (c) = Cosigned By      Initials Name Provider Type    Johanne Nickerson, PT Physical Therapist                   Goals/Plan       Row Name 09/11/23 0914          Bed Mobility Goal 1 (PT)    Activity/Assistive Device (Bed Mobility Goal 1, PT) bed mobility activities, all  -     [place] Level/Cues Needed (Bed Mobility Goal 1, PT) modified independence  -     Time Frame (Bed Mobility Goal 1, PT) 3 days  -     Progress/Outcomes (Bed Mobility Goal  1, PT) new goal  -       Row Name 09/11/23 0914          Transfer Goal 1 (PT)    Activity/Assistive Device (Transfer Goal 1, PT) transfers, all  -     [place] Level/Cues Needed (Transfer Goal 1, PT) supervision required  -JW     Time Frame (Transfer Goal 1, PT) 3 days  -JW     Progress/Outcome (Transfer Goal 1, PT) new goal  -       Row Name 09/11/23 0914          Gait Training Goal 1 (PT)    Activity/Assistive Device (Gait Training Goal 1, PT) gait (walking locomotion);assistive device use  -     [place] Level (Gait Training Goal 1, PT) supervision required  -JW     Distance (Gait Training Goal 1, PT) 100  -JW     Time Frame (Gait Training Goal 1, PT) 3 days  -JW     Progress/Outcome (Gait Training Goal 1, PT) new goal  -       Row Name 09/11/23 0914          Therapy Assessment/Plan (PT)    Planned Therapy Interventions (PT) balance training;bed mobility training;gait training;home exercise program;patient/family education;strengthening;transfer training  -               User Key  (r) = Recorded By, (t) = Taken By, (c) = Cosigned By      Initials Name Provider Type    Johanne Nickerson, PT Physical Therapist                   Clinical Impression       Row Name 09/11/23 0945          Pain    Pretreatment Pain Rating 0/10 - no pain  -     Posttreatment Pain Rating 0/10 - no pain  -[facility] Name 09/11/23 0945          Plan of Care Review    Plan of Care Reviewed With patient;daughter  -     Outcome Evaluation Physical therapy evaluation complete.  patient oriented x3, however does display some difficulty at times completing her thoughts/finishing sentences.  Patient reports at baseline she is independent with all functional mobility without use of device.  Patient performs supine to sit with SBA and sit to stand with CGA.  Patient performs gait with rolling walker x30 feet, CGA with cues for safety.  Patient will benefit from physical therapy to address deficits in functional mobility,  strength and activity tolerance.  Recommend use of rolling walker at this time, anticipate home with home health PT services.  Will continue to follow.  -       Row Name 09/11/23 0945          Therapy Assessment/Plan (PT)    Patient/Family Therapy Goals Statement (PT) pt does not state goals  -     Rehab Potential (PT) good, to achieve stated therapy goals  -     Criteria for Skilled Interventions Met (PT) yes;meets criteria  -     Therapy Frequency (PT) 6 times/wk  -     Predicted Duration of Therapy Intervention (PT) 3 days  -       Row Name 09/11/23 0945          Positioning and Restraints    Pre-Treatment Position in bed  -JW     Post Treatment Position chair  -JW     In Chair reclined;call light within reach;encouraged to call for assist;with family/caregiver;exit alarm on  -               User Key  (r) = Recorded By, (t) = Taken By, (c) = Cosigned By      Initials Name Provider Type    Johanne Nickerson, PT Physical Therapist                   Outcome Measures       Row Name 09/11/23 0914          How much help from another person do you currently need...    Turning from your back to your side while in flat bed without using bedrails? 4  -JW     Moving from lying on back to sitting on the side of a flat bed without bedrails? 3  -JW     Moving to and from a bed to a chair (including a wheelchair)? 3  -JW     Standing up from a chair using your arms (e.g., wheelchair, bedside chair)? 3  -JW     Climbing 3-5 steps with a railing? 3  -JW     To walk in hospital room? 3  -JW     AM-PAC 6 Clicks Score (PT) 19  -JW     Highest level of mobility 6 --> Walked 10 steps or more  -       Row Name 09/11/23 0914          Functional Assessment    Outcome Measure Options AM-PAC 6 Clicks Basic Mobility (PT)  -               User Key  (r) = Recorded By, (t) = Taken By, (c) = Cosigned By      Initials Name Provider Type    Johanne Nickerson PT Physical Therapist                                 Physical  Therapy Education       Title: PT OT SLP Therapies (In Progress)       Topic: Physical Therapy (In Progress)       Point: Mobility training (Done)       Learning Progress Summary             Patient Acceptance, E,TB, VU by PAUL at 9/11/2023 0950                         Point: Home exercise program (Not Started)       Learner Progress:  Not documented in this visit.                              User Key       Initials Effective Dates Name Provider Type Discipline     06/16/21 -  Johanne Ojeda, PT Physical Therapist PT                  PT Recommendation and Plan  Planned Therapy Interventions (PT): balance training, bed mobility training, gait training, home exercise program, patient/family education, strengthening, transfer training  Plan of Care Reviewed With: patient, daughter  Outcome Evaluation: Physical therapy evaluation complete.  patient oriented x3, however does display some difficulty at times completing her thoughts/finishing sentences.  Patient reports at baseline she is independent with all functional mobility without use of device.  Patient performs supine to sit with SBA and sit to stand with CGA.  Patient performs gait with rolling walker x30 feet, CGA with cues for safety.  Patient will benefit from physical therapy to address deficits in functional mobility, strength and activity tolerance.  Recommend use of rolling walker at this time, anticipate home with home health PT services.  Will continue to follow.     Time Calculation:   PT Evaluation Complexity  History, PT Evaluation Complexity: no personal factors and/or comorbidities  Examination of Body Systems (PT Eval Complexity): 1-2 elements  Clinical Presentation (PT Evaluation Complexity): stable  Clinical Decision Making (PT Evaluation Complexity): low complexity  Overall Complexity (PT Evaluation Complexity): low complexity     PT Charges       Row Name 09/11/23 0950             Time Calculation    Start Time 0914  -      Stop Time 0937   -PAUL      Time Calculation (min) 23 min  -PAUL      PT Received On 09/11/23  -PAUL      PT - Next Appointment 09/12/23  -PAUL                User Key  (r) = Recorded By, (t) = Taken By, (c) = Cosigned By      Initials Name Provider Type    Johanne Nickerson, PT Physical Therapist                  Therapy Charges for Today       Code Description Service Date Service Provider Modifiers Qty    34748084819 HC PT EVAL LOW COMPLEXITY 2 9/11/2023 Johanne Ojeda, PT GP 1            PT G-Codes  Outcome Measure Options: AM-PAC 6 Clicks Basic Mobility (PT)  AM-PAC 6 Clicks Score (PT): 19  PT Discharge Summary  Anticipated Discharge Disposition (PT): home with home health    Johanne Ojeda, OMER  9/11/2023

## 2023-09-11 NOTE — PLAN OF CARE
Problem: Adult Inpatient Plan of Care  Goal: Plan of Care Review  Recent Flowsheet Documentation  Taken 9/11/2023 1204 by Noé Romo OTR  Plan of Care Reviewed With:   patient   daughter  Outcome Evaluation: Occupational therapy evaluation completed. Pt alert, oriented x3, yet she did require extended time to respond to questions at times. Pt independent with daily tasks prior to admission. Pt managed bed mobility with SBA and transfers/mobility with CGA using a rolling walker for support. Pt demonstrated ability to reach down to don/doff socks while at EOB. Pt c/o generalized weakness and limited activity tolerance at this time. Will follow in OT to address safety with basic daily tasks. Pt plans to return home with family suppport upon discharge. Pt may benefit from home health services depending on her progress during acute care stay.

## 2023-09-11 NOTE — PROGRESS NOTES
SERVICE: [facility] HOSPITALIST    CONSULTANTS: None    CHIEF COMPLAINT: Confusion    SUBJECTIVE: Patient seen and examined at bedside. Patient reports no acute complaint.  She was initially somnolent but awakened to verbal stimuli.  She denies any acute complaints other than headache.  Family member present at bedside reports she acutely became lethargic and had difficulty walking which is out of her normal baseline behavior on Saturday and continued to remain lethargic and confused.  She reports that her family member has been sleeping overnight well without issues.  We discussed ongoing treatment of possible UTI, and working with PT and OT today.  They are agreeable to this.  No other acute issues reported.     OBJECTIVE:    Physical exam is largely unchanged from previous exam, except where documented below, examination is accurate as of 9/11/2023    Physical Exam:  General: Patient is awake and alert.  Elderly female.  Appears at baseline mentation, no acute distress noted.   HENT: Head is atraumatic, normocephalic. Hearing is grossly intact. Nose is without obvious congestion and appears patent. Neck is supple and trachea is midline.   Eyes: Vision is grossly intact. Pupils appear equal and round.   Cardiovascular: Heart has regular rate and rhythm with no murmurs, rubs or gallops noted.   Respiratory: Lungs are clear to ausculation without wheezes, rhonchi or rales.   Abdominal/GI: Soft, nontender, bowel sounds present. No rebound or guarding present.   Extremities: No peripheral edema noted.   Musculoskeletal: Spontaneous movement of bilateral upper and lower extremities against gravity noted. No signs of injury or deformity noted.   Skin: Warm and dry.   Psych: Mood and affect are appropriate. Cooperative with exam.   Neuro: No facial asymmetry noted. No focal deficits noted, hearing and vision are grossly intact.     /74 (BP Location: Right arm, Patient Position: Lying)   Pulse 87   " Temp 98.3 °F (36.8 °C) (Oral)   Resp 18   Ht 167.6 cm (66\")   Wt 76.9 kg (169 lb 8.5 oz)   SpO2 95%   BMI 27.36 kg/m²     MEDS/LABS REVIEWED AND ORDERED    anastrozole, 1 mg, Oral, Daily  aspirin, 81 mg, Oral, Daily  cefTRIAXone, 1,000 mg, Intravenous, Q24H  cholecalciferol, 1,000 Units, Oral, Daily  Insulin Aspart, 2-9 Units, Subcutaneous, 4x Daily AC & at Bedtime  senna-docusate sodium, 2 tablet, Oral, BID  sodium chloride, 10 mL, Intravenous, Q12H          LAB/DIAGNOSTICS:    Lab Results (last 24 hours)       Procedure Component Value Units Date/Time    Comprehensive Metabolic Panel [[id number]]  (Abnormal) Collected: 09/11/23 0608    Specimen: Blood Updated: 09/11/23 0705     Glucose 211 mg/dL      BUN 18 mg/dL      Creatinine 1.18 mg/dL      Sodium 136 mmol/L      Potassium 4.4 mmol/L      Chloride 102 mmol/L      CO2 21.1 mmol/L      Calcium 8.5 mg/dL      Total Protein 6.4 g/dL      Albumin 3.2 g/dL      ALT (SGPT) 330 U/L      AST (SGOT) 309 U/L      Alkaline Phosphatase 129 U/L      Total Bilirubin 0.4 mg/dL      Globulin 3.2 gm/dL      A/G Ratio 1.0 g/dL      BUN/Creatinine Ratio 15.3     Anion Gap 12.9 mmol/L      eGFR 46.8 mL/min/1.73     Narrative:      GFR Normal >60  Chronic Kidney Disease <60  Kidney Failure <15    The GFR formula is only valid for adults with stable renal function between ages 18 and 70.    CBC Auto Differential [[id number]]  (Abnormal) Collected: 09/11/23 0608    Specimen: Blood Updated: 09/11/23 0656     WBC 6.68 10*3/mm3      RBC 4.52 10*6/mm3      Hemoglobin 12.0 g/dL      Hematocrit 38.4 %      MCV 85.0 fL      MCH 26.5 pg      MCHC 31.3 g/dL      RDW 13.6 %      RDW-SD 43.0 fl      MPV 10.4 fL      Platelets 139 10*3/mm3      Neutrophil % 89.0 %      Lymphocyte % 2.7 %      Monocyte % 6.4 %      Eosinophil % 1.2 %      Basophil % 0.4 %      Immature Grans % 0.3 %      Neutrophils, Absolute 5.94 10*3/mm3      Lymphocytes, Absolute 0.18 10*3/mm3      Monocytes, Absolute " 0.43 10*3/mm3      Eosinophils, Absolute 0.08 10*3/mm3      Basophils, Absolute 0.03 10*3/mm3      Immature Grans, Absolute 0.02 10*3/mm3     Blood Culture - Blood, Arm, Right [[id number]] Collected: 09/11/23 0610    Specimen: Blood from Arm, Right Updated: 09/11/23 0641    Urine Culture - Urine, Urine, Clean Catch [[id number]] Collected: 09/10/23 2119    Specimen: Urine, Clean Catch Updated: 09/11/23 0433    Respiratory Panel PCR w/COVID-19(SARS-CoV-2) ELIESER/YANG/LATRICIA/PAD/COR/MAD/ALMA ROSA In-House, NP Swab in UTM/VTM, 3-4 HR TAT - Swab, Nasopharynx [[id number]] Collected: 09/11/23 0332    Specimen: Swab from Nasopharynx Updated: 09/11/23 0333    STAT Lactic Acid, Reflex [[id number]]  (Normal) Collected: 09/10/23 2352    Specimen: Blood Updated: 09/11/23 0023     Lactate 1.5 mmol/L     COVID-19 and FLU A/B PCR - Swab, Nasopharynx [[id number]]  (Normal) Collected: 09/10/23 2242    Specimen: Swab from Nasopharynx Updated: 09/10/23 2311     COVID19 Not Detected     Influenza A PCR Not Detected     Influenza B PCR Not Detected    Narrative:      Fact sheet for providers: https://www.fda.gov/media/096898/download    Fact sheet for patients: https://www.fda.gov/media/775992/download    Test performed by PCR.    Urinalysis, Microscopic Only - Urine, Clean Catch [[id number]]  (Abnormal) Collected: 09/10/23 2119    Specimen: Urine, Clean Catch Updated: 09/10/23 2222     RBC, UA 6-12 /HPF      WBC, UA None Seen /HPF      Comment: Urine culture not indicated.        Bacteria, UA 1+ /HPF      Squamous Epithelial Cells, UA 3-6 /HPF      Hyaline Casts, UA 0-2 /LPF      Fatty Casts 3-6 /LPF      Uric Acid Crystals, UA Small/1+ /HPF      Amorphous Urate Crystals, UA Large/3+ /HPF      Methodology Manual Light Microscopy    Procalcitonin [[id number]]  (Abnormal) Collected: 09/10/23 2138    Specimen: Blood Updated: 09/10/23 2210     Procalcitonin 4.30 ng/mL     Narrative:      As a Marker for Sepsis (Non-Neonates):    1. <0.5 ng/mL represents a low  "risk of severe sepsis and/or septic shock.  2. >2 ng/mL represents a high risk of severe sepsis and/or septic shock.    As a Marker for Lower Respiratory Tract Infections that require antibiotic therapy:    PCT on Admission    Antibiotic Therapy       6-12 Hrs later    >0.5                Strongly Recommended  >0.25 - <0.5        Recommended   0.1 - 0.25          Discouraged              Remeasure/reassess PCT  <0.1                Strongly Discouraged     Remeasure/reassess PCT    As 28 day mortality risk marker: \"Change in Procalcitonin Result\" (>80% or <=80%) if Day 0 (or Day 1) and Day 4 values are available. Refer to http://www.[facility]-pct-calculator.com    Change in PCT <=80%  A decrease of PCT levels below or equal to 80% defines a positive change in PCT test result representing a higher risk for 28-day all-cause mortality of patients diagnosed with severe sepsis for septic shock.    Change in PCT >80%  A decrease of PCT levels of more than 80% defines a negative change in PCT result representing a lower risk for 28-day all-cause mortality of patients diagnosed with severe sepsis or septic shock.       Comprehensive Metabolic Panel [[id number]]  (Abnormal) Collected: 09/10/23 2138    Specimen: Blood Updated: 09/10/23 2206     Glucose 263 mg/dL      BUN 20 mg/dL      Creatinine 1.36 mg/dL      Sodium 128 mmol/L      Potassium 4.4 mmol/L      Chloride 93 mmol/L      CO2 19.8 mmol/L      Calcium 9.3 mg/dL      Total Protein 7.6 g/dL      Albumin 3.7 g/dL      ALT (SGPT) 353 U/L      AST (SGOT) 378 U/L      Alkaline Phosphatase 152 U/L      Total Bilirubin 0.6 mg/dL      Globulin 3.9 gm/dL      A/G Ratio 0.9 g/dL      BUN/Creatinine Ratio 14.7     Anion Gap 15.2 mmol/L      eGFR 39.5 mL/min/1.73     Narrative:      GFR Normal >60  Chronic Kidney Disease <60  Kidney Failure <15    The GFR formula is only valid for adults with stable renal function between ages 18 and 70.    Lactic Acid, Plasma [[id number]]  (Abnormal) " Collected: 09/10/23 2138    Specimen: Blood Updated: 09/10/23 2202     Lactate 3.0 mmol/L     Urinalysis With Culture If Indicated - Urine, Clean Catch [[id number]]  (Abnormal) Collected: 09/10/23 2119    Specimen: Urine, Clean Catch Updated: 09/10/23 2200     Color, UA Dark Yellow     Appearance, UA Clear     pH, UA 5.5     Specific Gravity, UA 1.023     Glucose,  mg/dL (Trace)     Ketones, UA Trace     Bilirubin, UA Small (1+)     Blood, UA Moderate (2+)     Protein, UA >=300 mg/dL (3+)     Leuk Esterase, UA Negative     Nitrite, UA Negative     Urobilinogen, UA 2.0 E.U./dL    Narrative:      In absence of clinical symptoms, the presence of pyuria, bacteria, and/or nitrites on the urinalysis result does not correlate with infection.    CBC & Differential [[id number]]  (Abnormal) Collected: 09/10/23 2138    Specimen: Blood Updated: 09/10/23 2141    Narrative:      The following orders were created for panel order CBC & Differential.  Procedure                               Abnormality         Status                     ---------                               -----------         ------                     CBC Auto Differential[[id number]]        Abnormal            Final result                 Please view results for these tests on the individual orders.    CBC Auto Differential [[id number]]  (Abnormal) Collected: 09/10/23 2138    Specimen: Blood Updated: 09/10/23 2141     WBC 10.70 10*3/mm3      RBC 5.28 10*6/mm3      Hemoglobin 14.3 g/dL      Hematocrit 46.4 %      MCV 87.9 fL      MCH 27.1 pg      MCHC 30.8 g/dL      RDW 14.1 %      RDW-SD 45.3 fl      MPV 9.7 fL      Platelets 135 10*3/mm3      Neutrophil % 92.5 %      Lymphocyte % 2.1 %      Monocyte % 4.1 %      Eosinophil % 0.4 %      Basophil % 0.3 %      Immature Grans % 0.6 %      Neutrophils, Absolute 9.90 10*3/mm3      Lymphocytes, Absolute 0.23 10*3/mm3      Monocytes, Absolute 0.44 10*3/mm3      Eosinophils, Absolute 0.04 10*3/mm3      Basophils,  Absolute 0.03 10*3/mm3      Immature Grans, Absolute 0.06 10*3/mm3      nRBC 0.0 /100 WBC           No orders to display     Results for orders placed in visit on 08/29/17    SCANNED - ECHOCARDIOGRAM    XR Chest 2 View    Result Date: 9/10/2023  1. No active disease. 2. Fluid or pleural thickening in the left costophrenic angle. 3. Previous left breast surgery.  This report was finalized on 9/10/2023 10:09 PM by Dr. Jose Chambers MD.      CT Head Without Contrast    Result Date: 9/10/2023  1. No acute intracranial abnormality. 2. Subacute or chronic lacunar infarct in the head of left caudate nucleus. 3. Diffuse chronic changes as noted above.  This report was finalized on 9/10/2023 10:08 PM by Dr. Jose Chambers MD.         ASSESSMENT/PLAN:  Please note portions of this assessment/plan may have been copied and pasted, but I have personally seen this patient and reviewed each line of this assessment and plan for accuracy and made updates to reflect my necessary changes on 9/11/2023    Rule out sepsis due to adequately treated UTI, acute metabolic encephalopathy  -Patient has been on Macrobid since Friday, UA shows no ongoing evidence of UTI, however patient remains lethargic and confused, therefore continuing IV Rocephin while awaiting final urine culture  -Patient appears at baseline mentation, although still lethargic and reporting weakness.  Therefore will consult PT and OT for assistance.   -Sepsis has fully resolved, vital signs are within normal limits and lactic has trended down to 1.5, no leukocytosis present.   -Blood cultures pending.  As above urine culture pending.  RVP pending.   -On admission my colleague has reviewed records which showed abnormal UA with nitrite positive urine and 3+ bacteria, thus patient treated with Macrobid, as above continue IV Rocephin for now.     Transaminitis  -Suspect secondary to infection as well as Macrobid use  -Has trended down since arrival  -Liver ultrasound  "pending, holding home statin.     Mild hyponatremia  -Now resolved with IV fluid, therefore likely mild hypovolemia.  Continue IV fluid for now.     Subacute-chronic lacunar stroke  -As seen on CT head, patient denies history of known stroke.  No new focal deficit.   -Continue aspirin.  Statin on hold due to transaminitis as above.   -For now we will plan on outpatient referral to neurology, if mentation not improving with above treatment we will consult in-house neurology for evaluation.     Hypertension-continue home metoprolol as able.  Blood pressure currently at goal with holding.     Diabetes mellitus type 2 with hyperglycemia in obese female  -Holding Sitagliptin.  Continue sliding scale insulin and Accu-Cheks.     History of breast cancer-continue Arimidex      PLAN FOR DISPOSITION: MIGUE Varela DO  Hospitalist, [facility]  09/11/23  07:26 EDT    At [facility], we believe that sharing information builds trust and better relationships. You are receiving this note because you recently visited [facility]. It is possible you will see health information before a provider has talked with you about it. This kind of information can be easy to misunderstand. To help you fully understand what it means for your health, we urge you to discuss this note with your provider.    \"Dictated utilizing Dragon dictation\"    "

## 2023-09-11 NOTE — CASE MANAGEMENT/SOCIAL WORK
Continued Stay Note  TATI Vo     Patient Name: Tyrone Rand  MRN: [id number]  Today's Date: 9/11/2023    Admit Date: 9/10/2023    Plan: Plan home with    Discharge Plan       Row Name 09/11/23 1240       Plan    Plan Plan home with     Patient/Family in Agreement with Plan yes    Plan Comments Spoke with patient at bedside, permission to speak with family present. Face sheet verified. Patient lives in a home with her . She is independent of ADLs but no longer drives, her family provides transportation as needed. She denies use of DME/HH/Rehab. She does not have a living will and is not interested in creating one. She sees Dr Maria Esther So as PCP. She uses [facility] pharmacy and denies issues obtaining  medication. She is agreeable to home health at [place] and has no preference of agency. PT recommended a RW and she is unsure if she will need it or if they may have one at home, CM will follow up and provide RW if needed. Patient plans to return home whit her  and family to assist as needed. CM # placed on white board. Spoke to Jessica/Yaa  regarding referral for HH, they are able to accept. CM will continue to follow.                   Discharge Codes    No documentation.                       All Barry RN

## 2023-09-11 NOTE — PLAN OF CARE
Goal Outcome Evaluation:           Progress: no change  Outcome Evaluation: Pt up to chair w assist. Family remain at  bedside this shift.  Continue IVFs. Resp panel negative, isolation removed. Liver US completed. blood cultures pending. Labs in AM. Tolerating diet. All care explained. All questions answered. Pt and family verb understanding.

## 2023-09-11 NOTE — PLAN OF CARE
Goal Outcome Evaluation:  Plan of Care Reviewed With: patient, daughter           Outcome Evaluation: Physical therapy evaluation complete.  patient oriented x3, however does display some difficulty at times completing her thoughts/finishing sentences.  Patient reports at baseline she is independent with all functional mobility without use of device.  Patient performs supine to sit with SBA and sit to stand with CGA.  Patient performs gait with rolling walker x30 feet, CGA with cues for safety.  Patient will benefit from physical therapy to address deficits in functional mobility, strength and activity tolerance.  Recommend use of rolling walker at this time, anticipate home with home health PT services.  Will continue to follow.      Anticipated Discharge Disposition (PT): home with home health

## 2023-09-11 NOTE — H&P
[facility] HOSPITALIST     Maria Esther So DO    CHIEF COMPLAINT:     Altered mental status       HISTORY OF PRESENT ILLNESS:    Patient is an 78 year old female with a past medical history of diabetes mellitus type 2, hypertension, hyperlipidemia, and hx of breast cancer who presented to the ED complaining of some confusion and lethargy. Patient reports that last Monday she was feeling kind of tired, but felt slightly better on Tuesday and Wednesday. Thursday she went for a routine check up with her PCP and was diagnosed with a UTI and was started on macrobid, which she started taking on Friday. Family at bedside reports that on Saturday and Sunday she was sleeping more than usual and had shaking chills. Patient also reports a np cough, and some nausea without vomiting.      Past Medical History:   Diagnosis Date    Breast cancer 2017    Left    Depression     Diabetes mellitus     Drug therapy     GERD (gastroesophageal reflux disease)     H/O Left breast mass 2017    H/O vitamin D deficiency     History of snoring     Hx of radiation therapy     Hyperlipidemia     Hypertension     Panic attack     Poor sleep pattern     Sleep apnea     CPAP     Past Surgical History:   Procedure Laterality Date    BREAST LUMPECTOMY Left 09/2017    CHOLECYSTECTOMY WITH INTRAOPERATIVE CHOLANGIOGRAM N/A 8/5/2021    Procedure: CHOLECYSTECTOMY LAPAROSCOPIC INTRAOPERATIVE CHOLANGIOGRAM;  Surgeon: Yasmeen Andrade DO;  Location: [facility] OR;  Service: General;  Laterality: N/A;    COLON SURGERY      COLONOSCOPY      ERCP N/A 8/6/2021    Procedure: ENDOSCOPIC RETROGRADE CHOLANGIOPANCREATOGRAPHY;  Surgeon: Wally Mercado MD;  Location: [facility] OR;  Service: Gastroenterology;  Laterality: N/A;    KIDNEY SURGERY  2011    MOUTH SURGERY      TOOTH EXTRACTION    TOOTH EXTRACTION       Family History   Problem Relation Age of Onset    Stroke Mother     Heart disease Mother     Hypertension Mother     Breast  cancer Neg Hx      Social History     Tobacco Use    Smoking status: Former     Packs/day: 1.00     Years: 25.00     Pack years: 25.00     Types: Cigarettes     Quit date: 2000     Years since quittin.7    Smokeless tobacco: Never    Tobacco comments:     smoked 25 years, 1 PPD   Vaping Use    Vaping Use: Unknown   Substance Use Topics    Alcohol use: Not Currently    Drug use: No     Medications Prior to Admission   Medication Sig Dispense Refill Last Dose    anastrozole (ARIMIDEX) 1 MG tablet TAKE 1 TABLET BY MOUTH EVERY DAY 90 tablet 1 2023    aspirin 81 MG tablet Take 1 tablet by mouth Daily.   2023    atorvastatin (LIPITOR) 20 MG tablet Take 1 tablet by mouth Daily.   2023    LÁZARO CONTOUR TEST test strip TEST BLOOD SUGAR TWICE A DAY  2 2023    LÁZARO MICROLET LANCETS lancets TEST BLOOD SUGAR TWICE A DAY  3 2023    metoprolol succinate XL (TOPROL-XL) 25 MG 24 hr tablet TAKE ONE TABLET BY MOUTH IN THE MORNING  11 2023    NOVOFINE 32G X 6 MM misc USE TWO TIMES A DAY  1 2023    Tresiba FlexTouch 100 UNIT/ML solution pen-injector injection    2023    cholecalciferol (VITAMIN D3) 1000 units tablet Take 1 tablet by mouth Daily.       SITagliptin (JANUVIA) 100 MG tablet Take 100 mg by mouth Daily.        Allergies:  Sulfa antibiotics    Immunization History   Administered Date(s) Administered    COVID-19 (MODERNA) 1st,2nd,3rd Dose Monovalent 2021, 2021, 2021    FluMist 2-49yrs 10/16/2015    Fluzone High Dose =>65 Years ([employer] ONLY) 2016, 10/20/2017, 10/18/2018, 10/23/2019    Influenza TIV (IM) 2016, 10/20/2017, 10/18/2018, 10/23/2019, 10/13/2020    Influenza, Unspecified 10/13/2020    Pneumococcal Conjugate 13-Valent (PCV13) 2017    Pneumococcal Polysaccharide (PPSV23) 10/16/2015, 10/07/2016, 2017    Pneumococcal, Unspecified 10/16/2015    Tdap 2016           REVIEW OF SYSTEMS:    Please see the above history of present  "illness for pertinent positives and negatives.  The remainder of the patient's systems have been reviewed and are negative.     Vital Signs  Temp:  [98.2 °F (36.8 °C)-101.4 °F (38.6 °C)] 98.2 °F (36.8 °C)  Heart Rate:  [] 90  Resp:  [17-18] 18  BP: ()/(42-66) 99/64    Oxygen Therapy  SpO2: 94 %  Pulse Oximetry Type: Intermittent  Device (Oxygen Therapy): room air}    Body mass index is 27.36 kg/m².    Flowsheet Rows      Flowsheet Row First Filed Value   Admission Height 167.6 cm (66\") Documented at 09/10/2023 2032   Admission Weight 77.6 kg (171 lb) Documented at 09/10/2023 2032                 Physical Exam:    Physical Exam  Vitals reviewed.   Constitutional:       General: She is not in acute distress.     Appearance: She is normal weight.   HENT:      Head: Normocephalic and atraumatic.      Mouth/Throat:      Mouth: Mucous membranes are moist.   Cardiovascular:      Rate and Rhythm: Normal rate and regular rhythm.   Pulmonary:      Effort: Pulmonary effort is normal. No respiratory distress.      Breath sounds: No wheezing or rales.   Abdominal:      General: Bowel sounds are normal. There is no distension.      Palpations: Abdomen is soft.      Tenderness: There is no abdominal tenderness.   Musculoskeletal:      Right lower leg: No edema.      Left lower leg: No edema.   Skin:     General: Skin is warm and dry.      Findings: No rash.   Neurological:      Mental Status: She is alert and oriented to person, place, and time.      Cranial Nerves: No cranial nerve deficit.      Motor: No weakness.   Psychiatric:         Mood and Affect: Mood normal.         Behavior: Behavior normal.         Emotional Behavior: wnl   Judgment and Insight:    Mental Status:    Alertness:   Memory:     Mood and Affect:         Depression                 Anxiety      Debilities: none noted   Physical Weakness:   Handicaps:     Disabilities:     Agitation:        Results Review:    I reviewed the patient's new clinical " "results.  Lab Results (most recent)       Procedure Component Value Units Date/Time    STAT Lactic Acid, Reflex [[id number]]  (Normal) Collected: 09/10/23 2352    Specimen: Blood Updated: 09/11/23 0023     Lactate 1.5 mmol/L     COVID-19 and FLU A/B PCR - Swab, Nasopharynx [[id number]]  (Normal) Collected: 09/10/23 2242    Specimen: Swab from Nasopharynx Updated: 09/10/23 2311     COVID19 Not Detected     Influenza A PCR Not Detected     Influenza B PCR Not Detected    Narrative:      Fact sheet for providers: https://www.fda.gov/media/683863/download    Fact sheet for patients: https://www.fda.gov/media/459587/download    Test performed by PCR.    Urinalysis, Microscopic Only - Urine, Clean Catch [[id number]]  (Abnormal) Collected: 09/10/23 2119    Specimen: Urine, Clean Catch Updated: 09/10/23 2222     RBC, UA 6-12 /HPF      WBC, UA None Seen /HPF      Comment: Urine culture not indicated.        Bacteria, UA 1+ /HPF      Squamous Epithelial Cells, UA 3-6 /HPF      Hyaline Casts, UA 0-2 /LPF      Fatty Casts 3-6 /LPF      Uric Acid Crystals, UA Small/1+ /HPF      Amorphous Urate Crystals, UA Large/3+ /HPF      Methodology Manual Light Microscopy    Procalcitonin [[id number]]  (Abnormal) Collected: 09/10/23 2138    Specimen: Blood Updated: 09/10/23 2210     Procalcitonin 4.30 ng/mL     Narrative:      As a Marker for Sepsis (Non-Neonates):    1. <0.5 ng/mL represents a low risk of severe sepsis and/or septic shock.  2. >2 ng/mL represents a high risk of severe sepsis and/or septic shock.    As a Marker for Lower Respiratory Tract Infections that require antibiotic therapy:    PCT on Admission    Antibiotic Therapy       6-12 Hrs later    >0.5                Strongly Recommended  >0.25 - <0.5        Recommended   0.1 - 0.25          Discouraged              Remeasure/reassess PCT  <0.1                Strongly Discouraged     Remeasure/reassess PCT    As 28 day mortality risk marker: \"Change in Procalcitonin Result\" " (>80% or <=80%) if Day 0 (or Day 1) and Day 4 values are available. Refer to http://www.[email or web address]-pct-calculator.com    Change in PCT <=80%  A decrease of PCT levels below or equal to 80% defines a positive change in PCT test result representing a higher risk for 28-day all-cause mortality of patients diagnosed with severe sepsis for septic shock.    Change in PCT >80%  A decrease of PCT levels of more than 80% defines a negative change in PCT result representing a lower risk for 28-day all-cause mortality of patients diagnosed with severe sepsis or septic shock.       Comprehensive Metabolic Panel [[id number]]  (Abnormal) Collected: 09/10/23 2138    Specimen: Blood Updated: 09/10/23 2206     Glucose 263 mg/dL      BUN 20 mg/dL      Creatinine 1.36 mg/dL      Sodium 128 mmol/L      Potassium 4.4 mmol/L      Chloride 93 mmol/L      CO2 19.8 mmol/L      Calcium 9.3 mg/dL      Total Protein 7.6 g/dL      Albumin 3.7 g/dL      ALT (SGPT) 353 U/L      AST (SGOT) 378 U/L      Alkaline Phosphatase 152 U/L      Total Bilirubin 0.6 mg/dL      Globulin 3.9 gm/dL      A/G Ratio 0.9 g/dL      BUN/Creatinine Ratio 14.7     Anion Gap 15.2 mmol/L      eGFR 39.5 mL/min/1.73     Narrative:      GFR Normal >60  Chronic Kidney Disease <60  Kidney Failure <15    The GFR formula is only valid for adults with stable renal function between ages 18 and 70.    Lactic Acid, Plasma [[id number]]  (Abnormal) Collected: 09/10/23 2138    Specimen: Blood Updated: 09/10/23 2202     Lactate 3.0 mmol/L     Urinalysis With Culture If Indicated - Urine, Clean Catch [[id number]]  (Abnormal) Collected: 09/10/23 2119    Specimen: Urine, Clean Catch Updated: 09/10/23 2200     Color, UA Dark Yellow     Appearance, UA Clear     pH, UA 5.5     Specific Gravity, UA 1.023     Glucose,  mg/dL (Trace)     Ketones, UA Trace     Bilirubin, UA Small (1+)     Blood, UA Moderate (2+)     Protein, UA >=300 mg/dL (3+)     Leuk Esterase, UA Negative     Nitrite, UA  Negative     Urobilinogen, UA 2.0 E.U./dL    Narrative:      In absence of clinical symptoms, the presence of pyuria, bacteria, and/or nitrites on the urinalysis result does not correlate with infection.    CBC & Differential [[id number]]  (Abnormal) Collected: 09/10/23 2138    Specimen: Blood Updated: 09/10/23 2141    Narrative:      The following orders were created for panel order CBC & Differential.  Procedure                               Abnormality         Status                     ---------                               -----------         ------                     CBC Auto Differential[[id number]]        Abnormal            Final result                 Please view results for these tests on the individual orders.    CBC Auto Differential [[id number]]  (Abnormal) Collected: 09/10/23 2138    Specimen: Blood Updated: 09/10/23 2141     WBC 10.70 10*3/mm3      RBC 5.28 10*6/mm3      Hemoglobin 14.3 g/dL      Hematocrit 46.4 %      MCV 87.9 fL      MCH 27.1 pg      MCHC 30.8 g/dL      RDW 14.1 %      RDW-SD 45.3 fl      MPV 9.7 fL      Platelets 135 10*3/mm3      Neutrophil % 92.5 %      Lymphocyte % 2.1 %      Monocyte % 4.1 %      Eosinophil % 0.4 %      Basophil % 0.3 %      Immature Grans % 0.6 %      Neutrophils, Absolute 9.90 10*3/mm3      Lymphocytes, Absolute 0.23 10*3/mm3      Monocytes, Absolute 0.44 10*3/mm3      Eosinophils, Absolute 0.04 10*3/mm3      Basophils, Absolute 0.03 10*3/mm3      Immature Grans, Absolute 0.06 10*3/mm3      nRBC 0.0 /100 WBC             Imaging Results (Most Recent)       Procedure Component Value Units Date/Time    XR Chest 2 View [[id number]] Collected: 09/10/23 2208     Updated: 09/10/23 2211    Narrative:      CHEST X-RAY, 9/10/2023     HISTORY:  78-year-old female in the ED with confusion, lethargy. Nausea since  yesterday. Recent diagnosis of urinary tract infection     TECHNIQUE:  AP and lateral upright chest x-ray.     FINDINGS:  Heart size and pulmonary vascularity  are within normal limits. The lungs  are expanded and clear. No visible pulmonary infiltrate. Fluid or  pleural thickening causing blunting of the left costophrenic angle.     Previous left breast surgery with left axillary surgical clips.       Impression:      1. No active disease.  2. Fluid or pleural thickening in the left costophrenic angle.  3. Previous left breast surgery.     This report was finalized on 9/10/2023 10:09 PM by Dr. Jose Chambers MD.       CT Head Without Contrast [[id number]] Collected: 09/10/23 2205     Updated: 09/10/23 2210    Narrative:      CT HEAD, NONCONTRAST, 9/10/2023     HISTORY:  78-year-old female in the ED with confusion, lethargy. Nausea since  yesterday. Recent diagnosis of urinary tract infection.     TECHNIQUE:    CT imaging of the abdomen and pelvis without IV contrast. Radiation dose  reduction techniques included automated exposure control or exposure  modulation based on body size. Radiation audit for CT and nuclear  cardiology exams in the last 12 months: 1.     FINDINGS:    Subacute or chronic infarct in the head of the left caudate nucleus.     No evidence of acute intracranial hemorrhage. Mild generalized  age-appropriate cerebral volume loss. Mild patchy diffuse chronic small  vessel type white matter changes.     No intracranial mass, mass effect, cerebral edema, extra-axial fluid  collection or hydrocephalus. Visualized upper paranasal sinuses and  mastoid air spaces are clear.       Impression:      1. No acute intracranial abnormality.  2. Subacute or chronic lacunar infarct in the head of left caudate  nucleus.  3. Diffuse chronic changes as noted above.     This report was finalized on 9/10/2023 10:08 PM by Dr. Jose Chambers MD.             reviewed    ECG/EMG Results (most recent)       None          reviewed    Assessment & Plan   Active Hospital Problems    Diagnosis  POA    **Sepsis [A41.9]  Yes      Resolved Hospital Problems   No resolved problems  to display.         ? Sepsis d/t possible UTI- POA  - Failed outpatient treatment with macrobid  - lactic acid 3 ->1.5  - did not receive sepsis bolus in the ED as map >65  - s/p 500 cc fluid bolus   - blood culture- pending  - urine cx- pending  - viral panel- pending  - review of records shows recent abnormal UA which was nitrite positive and 3+ bacteria and has patient has been on macrobid since Friday  - continue IV Rocephin for now     Transaminitis  - unclear etiology  - ? Related to macrobid, holding  - ultrasound liver  - repeat CMP in am  - hold statin     Mild Hyponatremia  - corrected for glucose   - IV fluids  - repeat in am    ? Subacute-Chronic lacunar stroke  - CT head reviewed   - patient denies any known history of stroke, no new focal deficits  - continue aspirin, holding statin as above  - may benefit from referral to neurology vs outpatient work up    Hypertension  - hold metoprolol given borderline low bp   - monitor with routine vs and make adjustments as needed     Diabetes Mellitus type 2 with hyperglycemia  - holding sitagliptin  - awaiting dosing of long acting insulin  - add ssi for now  - monitor with routine accu-checks and make adjustments as needed     Hx of Breast Cancer  - continue Arimidex    DVT Prophylaxis  - SCDs    Code Status  - Full Code      I discussed the patient's findings and my recommendations with nursing, patient, and her daughter at bedside.      SEPTIC SHOCK FOCUSED EXAM ATTESTATION    I attest that I have reassessed tissue perfusion after the fluid bolus given.    Richard Majano DO  09/11/23  01:34 EDT        Note disclaimer: At [facility], we believe that sharing information builds trust and better relationships. You are receiving this note because you recently visited [facility]. It is possible you will see health information before a provider has talked with you about it. This kind of information can be easy to misunderstand. To help you fully  understand what it means for your health, we urge you to discuss this note with your provider.

## 2023-09-12 ENCOUNTER — TELEPHONE (OUTPATIENT)
Dept: NEUROLOGY | Facility: CLINIC | Age: 78
End: 2023-09-12

## 2023-09-12 ENCOUNTER — READMISSION MANAGEMENT (OUTPATIENT)
Dept: CALL CENTER | Facility: HOSPITAL | Age: 78
End: 2023-09-12
Payer: MEDICARE

## 2023-09-12 ENCOUNTER — APPOINTMENT (OUTPATIENT)
Dept: ULTRASOUND IMAGING | Facility: HOSPITAL | Age: 78
End: 2023-09-12
Payer: MEDICARE

## 2023-09-12 VITALS
TEMPERATURE: 97.5 F | BODY MASS INDEX: 27.25 KG/M2 | RESPIRATION RATE: 16 BRPM | HEART RATE: 87 BPM | WEIGHT: 169.53 LBS | HEIGHT: 66 IN | DIASTOLIC BLOOD PRESSURE: 59 MMHG | SYSTOLIC BLOOD PRESSURE: 107 MMHG | OXYGEN SATURATION: 97 %

## 2023-09-12 PROBLEM — R53.1 GENERALIZED WEAKNESS: Status: RESOLVED | Noted: 2023-09-11 | Resolved: 2023-09-12

## 2023-09-12 LAB
ALBUMIN SERPL-MCNC: 2.9 G/DL (ref 3.5–5.2)
ALBUMIN/GLOB SERPL: 0.9 G/DL
ALP SERPL-CCNC: 122 U/L (ref 39–117)
ALT SERPL W P-5'-P-CCNC: 319 U/L (ref 1–33)
AMMONIA BLD-SCNC: 34 UMOL/L (ref 11–51)
ANION GAP SERPL CALCULATED.3IONS-SCNC: 13.3 MMOL/L (ref 5–15)
AST SERPL-CCNC: 262 U/L (ref 1–32)
BACTERIA SPEC AEROBE CULT: NO GROWTH
BASOPHILS # BLD AUTO: 0.04 10*3/MM3 (ref 0–0.2)
BASOPHILS NFR BLD AUTO: 0.8 % (ref 0–1.5)
BILIRUB SERPL-MCNC: 0.2 MG/DL (ref 0–1.2)
BUN SERPL-MCNC: 13 MG/DL (ref 8–23)
BUN/CREAT SERPL: 12.5 (ref 7–25)
CALCIUM SPEC-SCNC: 8.6 MG/DL (ref 8.6–10.5)
CHLORIDE SERPL-SCNC: 103 MMOL/L (ref 98–107)
CHOLEST SERPL-MCNC: 102 MG/DL (ref 0–200)
CO2 SERPL-SCNC: 18.7 MMOL/L (ref 22–29)
CREAT SERPL-MCNC: 1.04 MG/DL (ref 0.57–1)
DEPRECATED RDW RBC AUTO: 43.4 FL (ref 37–54)
EGFRCR SERPLBLD CKD-EPI 2021: 54.4 ML/MIN/1.73
EOSINOPHIL # BLD AUTO: 0.37 10*3/MM3 (ref 0–0.4)
EOSINOPHIL NFR BLD AUTO: 7 % (ref 0.3–6.2)
ERYTHROCYTE [DISTWIDTH] IN BLOOD BY AUTOMATED COUNT: 14.1 % (ref 12.3–15.4)
FOLATE SERPL-MCNC: 4.48 NG/ML (ref 4.78–24.2)
GLOBULIN UR ELPH-MCNC: 3.2 GM/DL
GLUCOSE BLDC GLUCOMTR-MCNC: 114 MG/DL (ref 70–130)
GLUCOSE BLDC GLUCOMTR-MCNC: 201 MG/DL (ref 70–130)
GLUCOSE SERPL-MCNC: 117 MG/DL (ref 65–99)
HBA1C MFR BLD: 8.8 % (ref 4.8–5.6)
HCT VFR BLD AUTO: 35.6 % (ref 34–46.6)
HDLC SERPL-MCNC: 35 MG/DL (ref 40–60)
HGB BLD-MCNC: 11.5 G/DL (ref 12–15.9)
IMM GRANULOCYTES # BLD AUTO: 0.02 10*3/MM3 (ref 0–0.05)
IMM GRANULOCYTES NFR BLD AUTO: 0.4 % (ref 0–0.5)
LDLC SERPL CALC-MCNC: 48 MG/DL (ref 0–100)
LDLC/HDLC SERPL: 1.34 {RATIO}
LYMPHOCYTES # BLD AUTO: 0.53 10*3/MM3 (ref 0.7–3.1)
LYMPHOCYTES NFR BLD AUTO: 10.1 % (ref 19.6–45.3)
MCH RBC QN AUTO: 27.3 PG (ref 26.6–33)
MCHC RBC AUTO-ENTMCNC: 32.3 G/DL (ref 31.5–35.7)
MCV RBC AUTO: 84.6 FL (ref 79–97)
MONOCYTES # BLD AUTO: 0.71 10*3/MM3 (ref 0.1–0.9)
MONOCYTES NFR BLD AUTO: 13.5 % (ref 5–12)
NEUTROPHILS NFR BLD AUTO: 3.58 10*3/MM3 (ref 1.7–7)
NEUTROPHILS NFR BLD AUTO: 68.2 % (ref 42.7–76)
NRBC BLD AUTO-RTO: 0 /100 WBC (ref 0–0.2)
PLATELET # BLD AUTO: 122 10*3/MM3 (ref 140–450)
PMV BLD AUTO: 11 FL (ref 6–12)
POTASSIUM SERPL-SCNC: 3.9 MMOL/L (ref 3.5–5.2)
PROCALCITONIN SERPL-MCNC: 11.48 NG/ML (ref 0–0.25)
PROT SERPL-MCNC: 6.1 G/DL (ref 6–8.5)
RBC # BLD AUTO: 4.21 10*6/MM3 (ref 3.77–5.28)
RBC MORPH BLD: NORMAL
SMALL PLATELETS BLD QL SMEAR: NORMAL
SODIUM SERPL-SCNC: 135 MMOL/L (ref 136–145)
TRIGL SERPL-MCNC: 101 MG/DL (ref 0–150)
VIT B12 BLD-MCNC: 1057 PG/ML (ref 211–946)
VLDLC SERPL-MCNC: 19 MG/DL (ref 5–40)
WBC MORPH BLD: NORMAL
WBC NRBC COR # BLD: 5.25 10*3/MM3 (ref 3.4–10.8)

## 2023-09-12 PROCEDURE — 82746 ASSAY OF FOLIC ACID SERUM: CPT | Performed by: PSYCHIATRY & NEUROLOGY

## 2023-09-12 PROCEDURE — 63710000001 INSULIN ASPART PER 5 UNITS: Performed by: INTERNAL MEDICINE

## 2023-09-12 PROCEDURE — G0378 HOSPITAL OBSERVATION PER HR: HCPCS

## 2023-09-12 PROCEDURE — 85007 BL SMEAR W/DIFF WBC COUNT: CPT | Performed by: INTERNAL MEDICINE

## 2023-09-12 PROCEDURE — 82948 REAGENT STRIP/BLOOD GLUCOSE: CPT

## 2023-09-12 PROCEDURE — 84145 PROCALCITONIN (PCT): CPT | Performed by: INTERNAL MEDICINE

## 2023-09-12 PROCEDURE — 85025 COMPLETE CBC W/AUTO DIFF WBC: CPT | Performed by: INTERNAL MEDICINE

## 2023-09-12 PROCEDURE — 83036 HEMOGLOBIN GLYCOSYLATED A1C: CPT | Performed by: PSYCHIATRY & NEUROLOGY

## 2023-09-12 PROCEDURE — 97116 GAIT TRAINING THERAPY: CPT

## 2023-09-12 PROCEDURE — 80053 COMPREHEN METABOLIC PANEL: CPT | Performed by: INTERNAL MEDICINE

## 2023-09-12 PROCEDURE — 96361 HYDRATE IV INFUSION ADD-ON: CPT

## 2023-09-12 PROCEDURE — 93880 EXTRACRANIAL BILAT STUDY: CPT

## 2023-09-12 PROCEDURE — 82140 ASSAY OF AMMONIA: CPT | Performed by: INTERNAL MEDICINE

## 2023-09-12 PROCEDURE — 97530 THERAPEUTIC ACTIVITIES: CPT

## 2023-09-12 PROCEDURE — 80061 LIPID PANEL: CPT | Performed by: PSYCHIATRY & NEUROLOGY

## 2023-09-12 PROCEDURE — 82607 VITAMIN B-12: CPT | Performed by: PSYCHIATRY & NEUROLOGY

## 2023-09-12 RX ORDER — SACCHAROMYCES BOULARDII 250 MG
250 CAPSULE ORAL 2 TIMES DAILY
Qty: 12 CAPSULE | Refills: 0 | Status: SHIPPED | OUTPATIENT
Start: 2023-09-12

## 2023-09-12 RX ORDER — CEFDINIR 300 MG/1
300 CAPSULE ORAL 2 TIMES DAILY
Qty: 12 CAPSULE | Refills: 0 | Status: SHIPPED | OUTPATIENT
Start: 2023-09-12

## 2023-09-12 RX ADMIN — ASPIRIN 81 MG: 81 TABLET, COATED ORAL at 09:00

## 2023-09-12 RX ADMIN — CHOLECALCIFEROL TAB 25 MCG (1000 UNIT) 1000 UNITS: 25 TAB at 09:00

## 2023-09-12 RX ADMIN — SODIUM CHLORIDE 100 ML/HR: 9 INJECTION, SOLUTION INTRAVENOUS at 07:28

## 2023-09-12 RX ADMIN — ANASTROZOLE 1 MG: 1 TABLET ORAL at 09:00

## 2023-09-12 RX ADMIN — INSULIN ASPART 4 UNITS: 100 INJECTION, SOLUTION INTRAVENOUS; SUBCUTANEOUS at 12:13

## 2023-09-12 RX ADMIN — Medication 10 ML: at 09:00

## 2023-09-12 NOTE — PLAN OF CARE
"Goal Outcome Evaluation:  Plan of Care Reviewed With: patient           Outcome Evaluation: PT: Patient performs sit to/from stand transfers with CGA and gait x 72 feet with CGA with use of FWW. Patient requires cues for improved safety with device as well as cues for improved gait mechanics. No loss of balance noted however patient reports her legs were \"wobbly.\" Patient is oriented and follows commands however displays intermittent confusion. Continue to recommend home health PT and use of FWW at discharge. Will continue to see to adavance mobility and safety as tolerated.      Anticipated Discharge Disposition (PT): home with home health  "

## 2023-09-12 NOTE — THERAPY TREATMENT NOTE
Acute Care - Physical Therapy Treatment Note  [facility]     Patient Name: Tyrone Rand  : 1945  MRN: [id number]  Today's Date: 2023      Visit Dx:     ICD-10-CM ICD-9-CM   1. Sepsis with encephalopathy without septic shock, due to unspecified organism  A41.9 038.9    R65.20 995.91    G93.40 348.30   2. Delirium  R41.0 780.09     Patient Active Problem List   Diagnosis    Abnormal electrocardiogram    Chronic kidney disease, stage III (moderate)    Cognitive complaints    Dementia    Depression    Uncontrolled type 2 diabetes mellitus    Diabetes mellitus    Dysuria    Gastroesophageal reflux disease    Headache    Hyperlipidemia    Hypertension    Obesity (BMI 30-39.9)    Obstructive sleep apnea syndrome    Ventricular premature beats    Renal mass    Shortness of breath    Dyssomnia    Type 2 diabetes mellitus    UTI (urinary tract infection), bacterial    Vitamin D deficiency    Weight gain    Calculus of gallbladder without cholecystitis without obstruction    Malignant neoplasm of lower-inner quadrant of left breast in female, estrogen receptor negative    Menopausal and perimenopausal disorder     Chondromalacia    Greater trochanteric bursitis of right hip    Calculus of bile duct with acute cholecystitis without obstruction    Common bile duct calculus    Generalized weakness     Past Medical History:   Diagnosis Date    Breast cancer 2017    Left    Depression     Diabetes mellitus     Drug therapy     GERD (gastroesophageal reflux disease)     H/O Left breast mass 2017    H/O vitamin D deficiency     History of snoring     Hx of radiation therapy     Hyperlipidemia     Hypertension     Panic attack     Poor sleep pattern     Sleep apnea     CPAP     Past Surgical History:   Procedure Laterality Date    BREAST LUMPECTOMY Left 2017    CHOLECYSTECTOMY WITH INTRAOPERATIVE CHOLANGIOGRAM N/A 2021    Procedure: CHOLECYSTECTOMY LAPAROSCOPIC INTRAOPERATIVE CHOLANGIOGRAM;  Surgeon:  Yasmeen Andrade DO;  Location: [facility] OR;  Service: General;  Laterality: N/A;    COLON SURGERY      COLONOSCOPY      ERCP N/A 8/6/2021    Procedure: ENDOSCOPIC RETROGRADE CHOLANGIOPANCREATOGRAPHY;  Surgeon: Wally Mercado MD;  Location: [facility] OR;  Service: Gastroenterology;  Laterality: N/A;    KIDNEY SURGERY  2011    MOUTH SURGERY      TOOTH EXTRACTION    TOOTH EXTRACTION       PT Assessment (last 12 hours)       PT Evaluation and Treatment       Row Name 09/12/23 0820          Physical Therapy Time and Intention    Subjective Information complains of;weakness  -BP     Document Type therapy note (daily note)  -BP     Mode of Treatment physical therapy  -BP     Patient Effort adequate  -BP     Symptoms Noted During/After Treatment fatigue  -BP     Comment Patient fatigues quickly  -BP       Row Name 09/12/23 0820          General Information    Patient Profile Reviewed yes  -BP     Patient/Family/Caregiver Comments/Observations Patient sitting EOB, daughter present. RN presented to discontinue IV. Patient agreeable to PT treatment.  -BP     Existing Precautions/Restrictions fall  -BP     Risks Reviewed patient:;LOB;increased discomfort  -BP     Benefits Reviewed patient:;improve function;increase independence;increase strength  -BP     Barriers to Rehab none identified  -BP       Row Name 09/12/23 0820          Pain    Pre/Posttreatment Pain Comment Patient denies pain  -BP       Row Name 09/12/23 0820          Cognition    Personal Safety Interventions gait belt;nonskid shoes/slippers when out of bed  -BP       Row Name 09/12/23 0820          Bed Mobility    Comment, (Bed Mobility) deferred, patient sitting EOB  -BP       Row Name 09/12/23 0820          Transfers    Transfers sit-stand transfer;stand-sit transfer  -BP     Comment, (Transfers) verbal cues for hand placement  -BP       Row Name 09/12/23 0820          Sit-Stand Transfer    Sit-Stand [place] (Transfers) contact guard;verbal  "cues  -BP     Assistive Device (Sit-Stand Transfers) walker, front-wheeled  -BP       Row Name 09/12/23 0820          Stand-Sit Transfer    Stand-Sit [place] (Transfers) contact guard;verbal cues  -BP     Assistive Device (Stand-Sit Transfers) walker, front-wheeled  -BP       Row Name 09/12/23 0820          Gait/Stairs (Locomotion)    [place] Level (Gait) contact guard;verbal cues  -BP     Assistive Device (Gait) walker, front-wheeled  -BP     Distance in Feet (Gait) 72  -BP     Pattern (Gait) swing-through  -BP     Deviations/Abnormal Patterns (Gait) gait speed decreased;stride length decreased  -BP     Bilateral Gait Deviations forward flexed posture  -BP     Comment, (Gait/Stairs) Patient requires intermittent cues for improved distance to device and upright posture. No loss of balance noted however patient demonstrates decreased gait speed and reports being \"wobbly.\"  -BP       Row Name 09/12/23 0820          Safety Issues, Functional Mobility    Safety Issues Affecting Function (Mobility) positioning of assistive device;at risk behavior observed  -BP     Comment, Safety Issues/Impairments (Mobility) verbal cues for safety throughout  -BP       Row Name 09/12/23 0820          Balance    Comment, Balance sitting balance-supervision. Static standing balance-CGA with device  -BP       Row Name 09/12/23 0820          Plan of Care Review    Plan of Care Reviewed With patient  -BP     Outcome Evaluation PT: Patient performs sit to/from stand transfers with CGA and gait x 72 feet with CGA with use of FWW. Patient requires cues for improved safety with device as well as cues for improved gait mechanics. No loss of balance noted however patient reports her legs were \"wobbly.\" Patient is oriented and follows commands however displays intermittent confusion. Continue to recommend home health PT and use of FWW at discharge. Will continue to see to adavance mobility and safety as tolerated.  -BP       Row Name " "09/12/23 0820          Positioning and Restraints    Pre-Treatment Position in bed  -BP     Post Treatment Position chair  -BP     In Chair reclined;call light within reach;encouraged to call for assist;with family/caregiver  -BP       Row Name 09/12/23 0820          Progress Summary (PT)    Progress Toward Functional Goals (PT) progress toward functional goals is gradual  -BP       Row Name 09/12/23 0820          Therapy Plan Review/Discharge Plan (PT)    Therapy Plan Review (PT) patient;risks/benefits reviewed;participants included  -               User Key  (r) = Recorded By, (t) = Taken By, (c) = Cosigned By      Initials Name Provider Type    BP Erin Packer, PT Physical Therapist                    Physical Therapy Education       Title: PT OT SLP Therapies (In Progress)       Topic: Physical Therapy (In Progress)       Point: Mobility training (Done)       Learning Progress Summary             Patient Acceptance, E,TB, VU by  at 9/12/2023 0856    Acceptance, E,TB, VU by  at 9/11/2023 0950                         Point: Home exercise program (Not Started)       Learner Progress:  Not documented in this visit.                              User Key       Initials Effective Dates Name Provider Type Discipline     06/16/21 -  Erin Packer, PT Physical Therapist PT     06/16/21 -  Johanne Ojeda, PT Physical Therapist PT                  PT Recommendation and Plan  Anticipated Discharge Disposition (PT): home with home health  Progress Summary (PT)  Progress Toward Functional Goals (PT): progress toward functional goals is gradual  Plan of Care Reviewed With: patient  Outcome Evaluation: PT: Patient performs sit to/from stand transfers with CGA and gait x 72 feet with CGA with use of FWW. Patient requires cues for improved safety with device as well as cues for improved gait mechanics. No loss of balance noted however patient reports her legs were \"wobbly.\" Patient is oriented and follows " commands however displays intermittent confusion. Continue to recommend home health PT and use of FWW at discharge. Will continue to see to adavance mobility and safety as tolerated.   Outcome Measures       Row Name 09/12/23 0820             How much help from another person do you currently need...    Turning from your back to your side while in flat bed without using bedrails? 3  -BP      Moving from lying on back to sitting on the side of a flat bed without bedrails? 3  -BP      Moving to and from a bed to a chair (including a wheelchair)? 3  -BP      Standing up from a chair using your arms (e.g., wheelchair, bedside chair)? 3  -BP      Climbing 3-5 steps with a railing? 3  -BP      To walk in hospital room? 3  -BP      AM-PAC 6 Clicks Score (PT) 18  -BP         Functional Assessment    Outcome Measure Options AM-PAC 6 Clicks Basic Mobility (PT)  -BP                User Key  (r) = Recorded By, (t) = Taken By, (c) = Cosigned By      Initials Name Provider Type    BP Erin Packer, PT Physical Therapist                     Time Calculation:    PT Charges       Row Name 09/12/23 0857             Time Calculation    Start Time 0820  -BP      Stop Time 0835  -BP      Time Calculation (min) 15 min  -BP      PT Received On 09/12/23  -BP      PT - Next Appointment 09/13/23  -BP         Timed Charges    61772 - Gait Training Minutes  15  -BP         Total Minutes    Timed Charges Total Minutes 15  -BP       Total Minutes 15  -BP                User Key  (r) = Recorded By, (t) = Taken By, (c) = Cosigned By      Initials Name Provider Type    BP Erin Packer, PT Physical Therapist                  Therapy Charges for Today       Code Description Service Date Service Provider Modifiers Qty    55065798263 HC GAIT TRAINING EA 15 MIN 9/12/2023 Erin Packer, PT GP 1            PT G-Codes  Outcome Measure Options: AM-PAC 6 Clicks Basic Mobility (PT)  AM-PAC 6 Clicks Score (PT): 18  AM-PAC 6 Clicks Score (OT):  22    Erin Packer, PT  9/12/2023

## 2023-09-12 NOTE — PROGRESS NOTES
"SERVICE: [facility] HOSPITALIST    CONSULTANTS: None    CHIEF COMPLAINT: Confusion    SUBJECTIVE: Patient seen and examined at bedside. Patient reports feeling significantly better this morning.  She denies any acute complaints.  Daughter remains present at bedside, reports no acute issues.  She later reports to me that her mother continues to report funny feeling in her head.  She request neurology consultation.  No other acute issues or complaints reported.     OBJECTIVE:    Physical exam is largely unchanged from previous exam, except where documented below, examination is accurate as of 9/12/2023    Physical Exam:  General: Patient is awake and alert-notably more so than yesterday.  Elderly female.  Appears at baseline mentation, no acute distress noted.   HENT: Head is atraumatic, normocephalic. Hearing is grossly intact. Nose is without obvious congestion and appears patent. Neck is supple and trachea is midline.   Eyes: Vision is grossly intact. Pupils appear equal and round.   Cardiovascular: Heart has regular rate and rhythm with no murmurs, rubs or gallops noted.   Respiratory: Lungs are clear to ausculation without wheezes, rhonchi or rales.   Abdominal/GI: Soft, nontender, bowel sounds present. No rebound or guarding present.   Extremities: No peripheral edema noted.   Musculoskeletal: Spontaneous movement of bilateral upper and lower extremities against gravity noted. No signs of injury or deformity noted.   Skin: Warm and dry.   Psych: Mood and affect are appropriate. Cooperative with exam.   Neuro: No facial asymmetry noted. No focal deficits noted, hearing and vision are grossly intact.     /59 (BP Location: Right arm, Patient Position: Lying)   Pulse 87   Temp 97.5 °F (36.4 °C) (Oral)   Resp 16   Ht 167.6 cm (66\")   Wt 76.9 kg (169 lb 8.5 oz)   SpO2 97%   BMI 27.36 kg/m²     MEDS/LABS REVIEWED AND ORDERED    anastrozole, 1 mg, Oral, Daily  aspirin, 81 mg, Oral, " Daily  cefTRIAXone, 1,000 mg, Intravenous, Q24H  cholecalciferol, 1,000 Units, Oral, Daily  Insulin Aspart, 2-9 Units, Subcutaneous, 4x Daily AC & at Bedtime  senna-docusate sodium, 2 tablet, Oral, BID  sodium chloride, 10 mL, Intravenous, Q12H          LAB/DIAGNOSTICS:    Lab Results (last 24 hours)       Procedure Component Value Units Date/Time    Blood Culture - Blood, Arm, Right [[id number]]  (Normal) Collected: 09/11/23 0610    Specimen: Blood from Arm, Right Updated: 09/12/23 0645     Blood Culture No growth at 24 hours    CBC & Differential [[id number]]  (Abnormal) Collected: 09/12/23 0306    Specimen: Blood Updated: 09/12/23 0432    Narrative:      The following orders were created for panel order CBC & Differential.  Procedure                               Abnormality         Status                     ---------                               -----------         ------                     CBC Auto Differential[[id number]]        Abnormal            Final result               Scan Slide[[id number]]                                       Final result                 Please view results for these tests on the individual orders.    Scan Slide [[id number]] Collected: 09/12/23 0306    Specimen: Blood Updated: 09/12/23 0432     RBC Morphology Normal     WBC Morphology Normal     Platelet Estimate Decreased    Procalcitonin [[id number]]  (Abnormal) Collected: 09/12/23 0306    Specimen: Blood Updated: 09/12/23 0419     Procalcitonin 11.48 ng/mL     Narrative:      As a Marker for Sepsis (Non-Neonates):    1. <0.5 ng/mL represents a low risk of severe sepsis and/or septic shock.  2. >2 ng/mL represents a high risk of severe sepsis and/or septic shock.    As a Marker for Lower Respiratory Tract Infections that require antibiotic therapy:    PCT on Admission    Antibiotic Therapy       6-12 Hrs later    >0.5                Strongly Recommended  >0.25 - <0.5        Recommended   0.1 - 0.25          Discouraged               "Remeasure/reassess PCT  <0.1                Strongly Discouraged     Remeasure/reassess PCT    As 28 day mortality risk marker: \"Change in Procalcitonin Result\" (>80% or <=80%) if Day 0 (or Day 1) and Day 4 values are available. Refer to http://www.[facility]-pct-calculator.com    Change in PCT <=80%  A decrease of PCT levels below or equal to 80% defines a positive change in PCT test result representing a higher risk for 28-day all-cause mortality of patients diagnosed with severe sepsis for septic shock.    Change in PCT >80%  A decrease of PCT levels of more than 80% defines a negative change in PCT result representing a lower risk for 28-day all-cause mortality of patients diagnosed with severe sepsis or septic shock.       CBC Auto Differential [[id number]]  (Abnormal) Collected: 09/12/23 0306    Specimen: Blood Updated: 09/12/23 0411     WBC 5.25 10*3/mm3      RBC 4.21 10*6/mm3      Hemoglobin 11.5 g/dL      Hematocrit 35.6 %      MCV 84.6 fL      MCH 27.3 pg      MCHC 32.3 g/dL      RDW 14.1 %      RDW-SD 43.4 fl      MPV 11.0 fL      Platelets 122 10*3/mm3      Neutrophil % 68.2 %      Lymphocyte % 10.1 %      Monocyte % 13.5 %      Eosinophil % 7.0 %      Basophil % 0.8 %      Immature Grans % 0.4 %      Neutrophils, Absolute 3.58 10*3/mm3      Lymphocytes, Absolute 0.53 10*3/mm3      Monocytes, Absolute 0.71 10*3/mm3      Eosinophils, Absolute 0.37 10*3/mm3      Basophils, Absolute 0.04 10*3/mm3      Immature Grans, Absolute 0.02 10*3/mm3      nRBC 0.0 /100 WBC     POC Glucose Once [[id number]]  (Abnormal) Collected: 09/11/23 2056    Specimen: Blood Updated: 09/11/23 2102     Glucose 133 mg/dL     POC Glucose Once [[id number]]  (Abnormal) Collected: 09/11/23 1628    Specimen: Blood Updated: 09/11/23 1634     Glucose 140 mg/dL     Procalcitonin [[id number]]  (Abnormal) Collected: 09/11/23 1310    Specimen: Blood Updated: 09/11/23 1339     Procalcitonin 14.70 ng/mL     Narrative:      As a Marker for Sepsis " "(Non-Neonates):    1. <0.5 ng/mL represents a low risk of severe sepsis and/or septic shock.  2. >2 ng/mL represents a high risk of severe sepsis and/or septic shock.    As a Marker for Lower Respiratory Tract Infections that require antibiotic therapy:    PCT on Admission    Antibiotic Therapy       6-12 Hrs later    >0.5                Strongly Recommended  >0.25 - <0.5        Recommended   0.1 - 0.25          Discouraged              Remeasure/reassess PCT  <0.1                Strongly Discouraged     Remeasure/reassess PCT    As 28 day mortality risk marker: \"Change in Procalcitonin Result\" (>80% or <=80%) if Day 0 (or Day 1) and Day 4 values are available. Refer to http://www.[employer] [facility] Skinpct-calculator.com    Change in PCT <=80%  A decrease of PCT levels below or equal to 80% defines a positive change in PCT test result representing a higher risk for 28-day all-cause mortality of patients diagnosed with severe sepsis for septic shock.    Change in PCT >80%  A decrease of PCT levels of more than 80% defines a negative change in PCT result representing a lower risk for 28-day all-cause mortality of patients diagnosed with severe sepsis or septic shock.       POC Glucose Once [[id number]]  (Abnormal) Collected: 09/11/23 1202    Specimen: Blood Updated: 09/11/23 1209     Glucose 194 mg/dL     Respiratory Panel PCR w/COVID-19(SARS-CoV-2) ELIESER/YANG/LATRICIA/PAD/COR/MAD/ALMA ROSA In-House, NP Swab in UTM/VTM, 3-4 HR TAT - Swab, Nasopharynx [[id number]]  (Normal) Collected: 09/11/23 0332    Specimen: Swab from Nasopharynx Updated: 09/11/23 1119     ADENOVIRUS, PCR Not Detected     Coronavirus 229E Not Detected     Coronavirus HKU1 Not Detected     Coronavirus NL63 Not Detected     Coronavirus OC43 Not Detected     COVID19 Not Detected     Human Metapneumovirus Not Detected     Human Rhinovirus/Enterovirus Not Detected     Influenza A PCR Not Detected     Influenza B PCR Not Detected     Parainfluenza Virus 1 Not Detected     Parainfluenza " Virus 2 Not Detected     Parainfluenza Virus 3 Not Detected     Parainfluenza Virus 4 Not Detected     RSV, PCR Not Detected     Bordetella pertussis pcr Not Detected     Bordetella parapertussis PCR Not Detected     Chlamydophila pneumoniae PCR Not Detected     Mycoplasma pneumo by PCR Not Detected    Narrative:      In the setting of a positive respiratory panel with a viral infection PLUS a negative procalcitonin without other underlying concern for bacterial infection, consider observing off antibiotics or discontinuation of antibiotics and continue supportive care. If the respiratory panel is positive for atypical bacterial infection (Bordetella pertussis, Chlamydophila pneumoniae, or Mycoplasma pneumoniae), consider antibiotic de-escalation to target atypical bacterial infection.    POC Glucose Once [[id number]]  (Abnormal) Collected: 09/11/23 0824    Specimen: Blood Updated: 09/11/23 0831     Glucose 193 mg/dL           No orders to display     Results for orders placed in visit on 08/29/17    SCANNED - ECHOCARDIOGRAM    XR Chest 2 View    Result Date: 9/10/2023  1. No active disease. 2. Fluid or pleural thickening in the left costophrenic angle. 3. Previous left breast surgery.  This report was finalized on 9/10/2023 10:09 PM by Dr. Jose Chambers MD.      CT Head Without Contrast    Result Date: 9/10/2023  1. No acute intracranial abnormality. 2. Subacute or chronic lacunar infarct in the head of left caudate nucleus. 3. Diffuse chronic changes as noted above.  This report was finalized on 9/10/2023 10:08 PM by Dr. Jose Chambers MD.      US Liver    Result Date: 9/11/2023   1. Negative right upper quadrant ultrasound. Surgically absent gallbladder.  This report was finalized on 9/11/2023 9:08 AM by Dr. Charles Garcia MD.         ASSESSMENT/PLAN:  Please note portions of this assessment/plan may have been copied and pasted, but I have personally seen this patient and reviewed each line of this  assessment and plan for accuracy and made updates to reflect my necessary changes on 9/12/2023    Rule out sepsis due to adequately treated UTI, acute metabolic encephalopathy  -Metabolic encephalopathy appears resolved with treatment of UTI.  Suspect Macrobid was adequately treating infection patient may have suffered from side effect.   -Continue IV Rocephin while awaiting final urinary culture  -Patient appears at baseline mentation, although still lethargic and reporting weakness.  Therefore PT and OT consulted for assistance they recommend walker at discharge with home health, this will be arranged at discharge.   -Sepsis has fully resolved, vital signs are within normal limits and lactic has trended down to 1.5, no leukocytosis present.   -Blood cultures show no growth to date as above urine culture pending.  RVP is negative  -On admission my colleague has reviewed records which showed abnormal UA with nitrite positive urine and 3+ bacteria, thus patient treated with Macrobid, as above continue IV Rocephin for now.   -Patient's daughter requesting to see neurology for her ongoing confusion.     Transaminitis  -Suspect secondary to infection as well as Macrobid use  -Has trended down since arrival  -Liver ultrasound is normal holding home statin.     Mild hyponatremia-resolved.  IV fluid discontinued at this time.     Subacute-chronic lacunar stroke  -As seen on CT head, patient denies history of known stroke.  No new focal deficit.   -Continue aspirin.  Statin on hold due to transaminitis as above.   -For now we will plan on outpatient referral to neurology, if mentation not improving with above treatment we will consult in-house neurology for evaluation.     Hypertension-continue home metoprolol as able.  Blood pressure currently at goal with holding.     Diabetes mellitus type 2 with hyperglycemia in obese female  -Holding Sitagliptin.  Continue sliding scale insulin and Accu-Cheks.  Glucose currently at  "goal    History of breast cancer-continue Arimidex      PLAN FOR DISPOSITION: TBD    German Varela DO  Hospitalist, [facility]  09/12/23  07:26 EDT    At [facility], we believe that sharing information builds trust and better relationships. You are receiving this note because you recently visited [facility]. It is possible you will see health information before a provider has talked with you about it. This kind of information can be easy to misunderstand. To help you fully understand what it means for your health, we urge you to discuss this note with your provider.    \"Dictated utilizing Dragon dictation\"    "

## 2023-09-12 NOTE — DISCHARGE SUMMARY
Tyrone Rand  1945  [id number]    Hospitalists Discharge Summary    Date of Admission: 9/10/2023  Date of Discharge:  9/12/2023    History of Present Illness from [facility] on admit: Patient is an 78 year old female with a past medical history of diabetes mellitus type 2, hypertension, hyperlipidemia, and hx of breast cancer who presented to the ED complaining of some confusion and lethargy. Patient reports that last Monday she was feeling kind of tired, but felt slightly better on Tuesday and Wednesday. Thursday she went for a routine check up with her PCP and was diagnosed with a UTI and was started on macrobid, which she started taking on Friday. Family at bedside reports that on Saturday and Sunday she was sleeping more than usual and had shaking chills. Patient also reports a np cough, and some nausea without vomiting.       Primary Discharge diagnoses: Acute metabolic encephalopathy-suspected etiology at this time is due to adverse reaction from Macrobid antibiotic for UTI treatment-this is now resolved.     Secondary Discharge Diagnoses: Rule out sepsis due to UTI-urine culture showed no growth.  Sepsis therefore ruled out.  However patient continues to have significantly elevated procalcitonin which is downtrending with Rocephin treatment, therefore we will continue treatment with Omnicef to ensure resolution of any infection that has at this time has not yet been identified definitively.  Transaminitis-suspect due to Macrobid use, had down trended, liver ultrasound negative, defer to PCP to repeat liver enzymes.  Mild hyponatremia-likely hypovolemic as this resolved with IV fluid treatment.  Subacute-chronic lacunar stroke-referral made to outpatient neurology, evaluated inpatient by neurology, please see their note.  Hypertension-BP at goal without use of metoprolol therefore discontinued.  Diabetes mellitus type 2 with hyperglycemia in obese female-resume home regimen on discharge.  History of breast  cancer-continue home Arimidex.     Hospital Course Summary: Patient was admitted for acute metabolic encephalopathy, initially thought due to UTI, urine culture would later returned with no growth, also thought to be septic due to this.  Patient therefore not septic as no source of infection has been identified, patient with significantly elevated procalcitonin however that seems to be downtrending with use of IV Rocephin therefore Omnicef prescribed at discharge.  Patient's encephalopathy therefore likely due to adverse reaction from Macrobid which she had been prescribed for UTI prior to arrival.  This fully cleared at time of discharge.  Patient also found to have subacute lacunar stroke, evaluated by neurology as above and will have outpatient neurology follow-up.  Due to patient reporting generalized weakness and difficulty with ambulation she was seen by PT and OT who recommended home health this will be arranged for patient prior to discharge, she was also prescribed rolling walker on discharge.  on 9/12/2023 patient's condition had improved. They were deemed stable for discharge. They were advised to take all medications as prescribed, follow up with PCP within 1 week. If there are any issues patient should contact PCP and/or return to the ED for follow up. Patient was agreeable to the plan and subsequently discharged at this time.     PCP  Patient Care Team:  Maria Esther So DO as PCP - General (Internal Medicine)  Cris Mendieta DO as Referring Physician (Internal Medicine)  Luz Elena Herndon MD as Consulting Physician (Hematology and Oncology)    Consults:   Consults       Date and Time Order Name Status Description    9/12/2023  8:38 AM Inpatient Neurology Consult General              Operations and Procedures Performed:       XR Chest 2 View    Result Date: 9/10/2023  Narrative: CHEST X-RAY, 9/10/2023  HISTORY: 78-year-old female in the ED with confusion, lethargy. Nausea since yesterday.  Recent diagnosis of urinary tract infection  TECHNIQUE: AP and lateral upright chest x-ray.  FINDINGS: Heart size and pulmonary vascularity are within normal limits. The lungs are expanded and clear. No visible pulmonary infiltrate. Fluid or pleural thickening causing blunting of the left costophrenic angle.  Previous left breast surgery with left axillary surgical clips.      Impression: 1. No active disease. 2. Fluid or pleural thickening in the left costophrenic angle. 3. Previous left breast surgery.  This report was finalized on 9/10/2023 10:09 PM by Dr. Jose Chambers MD.      CT Head Without Contrast    Result Date: 9/10/2023  Narrative: CT HEAD, NONCONTRAST, 9/10/2023  HISTORY: 78-year-old female in the ED with confusion, lethargy. Nausea since yesterday. Recent diagnosis of urinary tract infection.  TECHNIQUE:  CT imaging of the abdomen and pelvis without IV contrast. Radiation dose reduction techniques included automated exposure control or exposure modulation based on body size. Radiation audit for CT and nuclear cardiology exams in the last 12 months: 1.  FINDINGS:  Subacute or chronic infarct in the head of the left caudate nucleus.  No evidence of acute intracranial hemorrhage. Mild generalized age-appropriate cerebral volume loss. Mild patchy diffuse chronic small vessel type white matter changes.  No intracranial mass, mass effect, cerebral edema, extra-axial fluid collection or hydrocephalus. Visualized upper paranasal sinuses and mastoid air spaces are clear.      Impression: 1. No acute intracranial abnormality. 2. Subacute or chronic lacunar infarct in the head of left caudate nucleus. 3. Diffuse chronic changes as noted above.  This report was finalized on 9/10/2023 10:08 PM by Dr. Jose Chambers MD.      US Liver    Result Date: 9/11/2023  Narrative: ULTRASOUND ABDOMEN, LIMITED,  9/11/2023  HISTORY: Elevated liver enzymes. History of cholecystectomy.  TECHNIQUE: Grayscale ultrasound  imaging of the right upper quadrant was performed.  FINDINGS: Pancreas unremarkable as visualized. Portions obscured from view by bowel gas. Survey images of the liver are negative. Main portal vein is patent with appropriate directional flow. The right kidney is nonobstructed and measures 9.3 cm. The gallbladder is surgically absent. Liver measures 12.6 cm. Extrahepatic common bile duct measures 2 mm.       Impression:  1. Negative right upper quadrant ultrasound. Surgically absent gallbladder.  This report was finalized on 9/11/2023 9:08 AM by Dr. Charles Garcia MD.       Allergies:  is allergic to sulfa antibiotics.    Armando  Reviewed    Discharge Medications:     Discharge Medications        New Medications        Instructions Start Date   cefdinir 300 MG capsule  Commonly known as: OMNICEF   300 mg, Oral, 2 Times Daily      saccharomyces boulardii 250 MG capsule  Commonly known as: Florastor   250 mg, Oral, 2 Times Daily             Continue These Medications        Instructions Start Date   anastrozole 1 MG tablet  Commonly known as: ARIMIDEX   TAKE 1 TABLET BY MOUTH EVERY DAY      aspirin 81 MG tablet   81 mg, Oral, Daily      atorvastatin 20 MG tablet  Commonly known as: LIPITOR   20 mg, Oral, Daily      Domi Contour Test test strip  Generic drug: glucose blood   TEST BLOOD SUGAR TWICE A DAY      Domi Microlet Lancets lancets   TEST BLOOD SUGAR TWICE A DAY      cholecalciferol 25 MCG (1000 UT) tablet  Commonly known as: VITAMIN D3   1,000 Units, Oral, Daily      NovoFine 32G X 6 MM misc  Generic drug: Insulin Pen Needle   USE TWO TIMES A DAY      SITagliptin 100 MG tablet  Commonly known as: JANUVIA   100 mg, Oral, Daily      Tresiba FlexTouch 100 UNIT/ML solution pen-injector injection  Generic drug: insulin degludec   18 Units, Nightly             Stop These Medications      metoprolol succinate XL 25 MG 24 hr tablet  Commonly known as: TOPROL-XL              Last Lab Results:   Lab Results (most recent)        Procedure Component Value Units Date/Time    Urine Culture - Urine, Urine, Clean Catch [[id number]]  (Normal) Collected: 09/10/23 2119    Specimen: Urine, Clean Catch Updated: 09/12/23 0955     Urine Culture No growth    Comprehensive Metabolic Panel [[id number]]  (Abnormal) Collected: 09/12/23 0306    Specimen: Blood Updated: 09/12/23 0953     Glucose 117 mg/dL      BUN 13 mg/dL      Creatinine 1.04 mg/dL      Sodium 135 mmol/L      Potassium 3.9 mmol/L      Chloride 103 mmol/L      CO2 18.7 mmol/L      Calcium 8.6 mg/dL      Total Protein 6.1 g/dL      Albumin 2.9 g/dL      ALT (SGPT) 319 U/L      AST (SGOT) 262 U/L      Alkaline Phosphatase 122 U/L      Total Bilirubin 0.2 mg/dL      Globulin 3.2 gm/dL      A/G Ratio 0.9 g/dL      BUN/Creatinine Ratio 12.5     Anion Gap 13.3 mmol/L      eGFR 54.4 mL/min/1.73     Narrative:      GFR Normal >60  Chronic Kidney Disease <60  Kidney Failure <15    The GFR formula is only valid for adults with stable renal function between ages 18 and 70.    Ammonia [[id number]] Collected: 09/12/23 0945    Specimen: Blood Updated: 09/12/23 0953    POC Glucose Once [[id number]]  (Normal) Collected: 09/12/23 0751    Specimen: Blood Updated: 09/12/23 0758     Glucose 114 mg/dL     Blood Culture - Blood, Arm, Right [[id number]]  (Normal) Collected: 09/11/23 0610    Specimen: Blood from Arm, Right Updated: 09/12/23 0645     Blood Culture No growth at 24 hours    CBC & Differential [[id number]]  (Abnormal) Collected: 09/12/23 0306    Specimen: Blood Updated: 09/12/23 0432    Narrative:      The following orders were created for panel order CBC & Differential.  Procedure                               Abnormality         Status                     ---------                               -----------         ------                     CBC Auto Differential[[id number]]        Abnormal            Final result               Scan Slide[[id number]]                                       Final result      "            Please view results for these tests on the individual orders.    Scan Slide [[id number]] Collected: 09/12/23 0306    Specimen: Blood Updated: 09/12/23 0432     RBC Morphology Normal     WBC Morphology Normal     Platelet Estimate Decreased    Procalcitonin [[id number]]  (Abnormal) Collected: 09/12/23 0306    Specimen: Blood Updated: 09/12/23 0419     Procalcitonin 11.48 ng/mL     Narrative:      As a Marker for Sepsis (Non-Neonates):    1. <0.5 ng/mL represents a low risk of severe sepsis and/or septic shock.  2. >2 ng/mL represents a high risk of severe sepsis and/or septic shock.    As a Marker for Lower Respiratory Tract Infections that require antibiotic therapy:    PCT on Admission    Antibiotic Therapy       6-12 Hrs later    >0.5                Strongly Recommended  >0.25 - <0.5        Recommended   0.1 - 0.25          Discouraged              Remeasure/reassess PCT  <0.1                Strongly Discouraged     Remeasure/reassess PCT    As 28 day mortality risk marker: \"Change in Procalcitonin Result\" (>80% or <=80%) if Day 0 (or Day 1) and Day 4 values are available. Refer to [email or web address]-pct-[email or web address]    Change in PCT <=80%  A decrease of PCT levels below or equal to 80% defines a positive change in PCT test result representing a higher risk for 28-day all-cause mortality of patients diagnosed with severe sepsis for septic shock.    Change in PCT >80%  A decrease of PCT levels of more than 80% defines a negative change in PCT result representing a lower risk for 28-day all-cause mortality of patients diagnosed with severe sepsis or septic shock.       CBC Auto Differential [[id number]]  (Abnormal) Collected: 09/12/23 0306    Specimen: Blood Updated: 09/12/23 0411     WBC 5.25 10*3/mm3      RBC 4.21 10*6/mm3      Hemoglobin 11.5 g/dL      Hematocrit 35.6 %      MCV 84.6 fL      MCH 27.3 pg      MCHC 32.3 g/dL      RDW 14.1 %      RDW-SD 43.4 fl      MPV 11.0 fL      Platelets 122 10*3/mm3  " "    Neutrophil % 68.2 %      Lymphocyte % 10.1 %      Monocyte % 13.5 %      Eosinophil % 7.0 %      Basophil % 0.8 %      Immature Grans % 0.4 %      Neutrophils, Absolute 3.58 10*3/mm3      Lymphocytes, Absolute 0.53 10*3/mm3      Monocytes, Absolute 0.71 10*3/mm3      Eosinophils, Absolute 0.37 10*3/mm3      Basophils, Absolute 0.04 10*3/mm3      Immature Grans, Absolute 0.02 10*3/mm3      nRBC 0.0 /100 WBC     POC Glucose Once [[id number]]  (Abnormal) Collected: 09/11/23 2056    Specimen: Blood Updated: 09/11/23 2102     Glucose 133 mg/dL     Procalcitonin [[id number]]  (Abnormal) Collected: 09/11/23 1310    Specimen: Blood Updated: 09/11/23 1339     Procalcitonin 14.70 ng/mL     Narrative:      As a Marker for Sepsis (Non-Neonates):    1. <0.5 ng/mL represents a low risk of severe sepsis and/or septic shock.  2. >2 ng/mL represents a high risk of severe sepsis and/or septic shock.    As a Marker for Lower Respiratory Tract Infections that require antibiotic therapy:    PCT on Admission    Antibiotic Therapy       6-12 Hrs later    >0.5                Strongly Recommended  >0.25 - <0.5        Recommended   0.1 - 0.25          Discouraged              Remeasure/reassess PCT  <0.1                Strongly Discouraged     Remeasure/reassess PCT    As 28 day mortality risk marker: \"Change in Procalcitonin Result\" (>80% or <=80%) if Day 0 (or Day 1) and Day 4 values are available. Refer to http://www.[facility]-pct-calculator.com    Change in PCT <=80%  A decrease of PCT levels below or equal to 80% defines a positive change in PCT test result representing a higher risk for 28-day all-cause mortality of patients diagnosed with severe sepsis for septic shock.    Change in PCT >80%  A decrease of PCT levels of more than 80% defines a negative change in PCT result representing a lower risk for 28-day all-cause mortality of patients diagnosed with severe sepsis or septic shock.       Respiratory Panel PCR w/COVID-19(SARS-CoV-2) " [facility]/YANG/LATRICIA/PAD/COR/MAD/ALMA ROSA In-House, NP Swab in UTM/VTM, 3-4 HR TAT - Swab, Nasopharynx [[id number]]  (Normal) Collected: 09/11/23 0332    Specimen: Swab from Nasopharynx Updated: 09/11/23 1119     ADENOVIRUS, PCR Not Detected     Coronavirus 229E Not Detected     Coronavirus HKU1 Not Detected     Coronavirus NL63 Not Detected     Coronavirus OC43 Not Detected     COVID19 Not Detected     Human Metapneumovirus Not Detected     Human Rhinovirus/Enterovirus Not Detected     Influenza A PCR Not Detected     Influenza B PCR Not Detected     Parainfluenza Virus 1 Not Detected     Parainfluenza Virus 2 Not Detected     Parainfluenza Virus 3 Not Detected     Parainfluenza Virus 4 Not Detected     RSV, PCR Not Detected     Bordetella pertussis pcr Not Detected     Bordetella parapertussis PCR Not Detected     Chlamydophila pneumoniae PCR Not Detected     Mycoplasma pneumo by PCR Not Detected    Narrative:      In the setting of a positive respiratory panel with a viral infection PLUS a negative procalcitonin without other underlying concern for bacterial infection, consider observing off antibiotics or discontinuation of antibiotics and continue supportive care. If the respiratory panel is positive for atypical bacterial infection (Bordetella pertussis, Chlamydophila pneumoniae, or Mycoplasma pneumoniae), consider antibiotic de-escalation to target atypical bacterial infection.    Comprehensive Metabolic Panel [[id number]]  (Abnormal) Collected: 09/11/23 0608    Specimen: Blood Updated: 09/11/23 0705     Glucose 211 mg/dL      BUN 18 mg/dL      Creatinine 1.18 mg/dL      Sodium 136 mmol/L      Potassium 4.4 mmol/L      Chloride 102 mmol/L      CO2 21.1 mmol/L      Calcium 8.5 mg/dL      Total Protein 6.4 g/dL      Albumin 3.2 g/dL      ALT (SGPT) 330 U/L      AST (SGOT) 309 U/L      Alkaline Phosphatase 129 U/L      Total Bilirubin 0.4 mg/dL      Globulin 3.2 gm/dL      A/G Ratio 1.0 g/dL      BUN/Creatinine Ratio 15.3      Anion Gap 12.9 mmol/L      eGFR 46.8 mL/min/1.73     Narrative:      GFR Normal >60  Chronic Kidney Disease <60  Kidney Failure <15    The GFR formula is only valid for adults with stable renal function between ages 18 and 70.    CBC Auto Differential [[id number]]  (Abnormal) Collected: 09/11/23 0608    Specimen: Blood Updated: 09/11/23 0656     WBC 6.68 10*3/mm3      RBC 4.52 10*6/mm3      Hemoglobin 12.0 g/dL      Hematocrit 38.4 %      MCV 85.0 fL      MCH 26.5 pg      MCHC 31.3 g/dL      RDW 13.6 %      RDW-SD 43.0 fl      MPV 10.4 fL      Platelets 139 10*3/mm3      Neutrophil % 89.0 %      Lymphocyte % 2.7 %      Monocyte % 6.4 %      Eosinophil % 1.2 %      Basophil % 0.4 %      Immature Grans % 0.3 %      Neutrophils, Absolute 5.94 10*3/mm3      Lymphocytes, Absolute 0.18 10*3/mm3      Monocytes, Absolute 0.43 10*3/mm3      Eosinophils, Absolute 0.08 10*3/mm3      Basophils, Absolute 0.03 10*3/mm3      Immature Grans, Absolute 0.02 10*3/mm3     STAT Lactic Acid, Reflex [[id number]]  (Normal) Collected: 09/10/23 2352    Specimen: Blood Updated: 09/11/23 0023     Lactate 1.5 mmol/L     COVID-19 and FLU A/B PCR - Swab, Nasopharynx [[id number]]  (Normal) Collected: 09/10/23 2242    Specimen: Swab from Nasopharynx Updated: 09/10/23 2311     COVID19 Not Detected     Influenza A PCR Not Detected     Influenza B PCR Not Detected    Narrative:      Fact sheet for providers: https://www.fda.gov/media/095108/download    Fact sheet for patients: https://www.fda.gov/media/310108/download    Test performed by PCR.    Urinalysis, Microscopic Only - Urine, Clean Catch [[id number]]  (Abnormal) Collected: 09/10/23 2119    Specimen: Urine, Clean Catch Updated: 09/10/23 2222     RBC, UA 6-12 /HPF      WBC, UA None Seen /HPF      Comment: Urine culture not indicated.        Bacteria, UA 1+ /HPF      Squamous Epithelial Cells, UA 3-6 /HPF      Hyaline Casts, UA 0-2 /LPF      Fatty Casts 3-6 /LPF      Uric Acid Crystals, UA Small/1+  /HPF      Amorphous Urate Crystals, UA Large/3+ /HPF      Methodology Manual Light Microscopy    Lactic Acid, Plasma [[id number]]  (Abnormal) Collected: 09/10/23 2138    Specimen: Blood Updated: 09/10/23 2202     Lactate 3.0 mmol/L     Urinalysis With Culture If Indicated - Urine, Clean Catch [[id number]]  (Abnormal) Collected: 09/10/23 2119    Specimen: Urine, Clean Catch Updated: 09/10/23 2200     Color, UA Dark Yellow     Appearance, UA Clear     pH, UA 5.5     Specific Gravity, UA 1.023     Glucose,  mg/dL (Trace)     Ketones, UA Trace     Bilirubin, UA Small (1+)     Blood, UA Moderate (2+)     Protein, UA >=300 mg/dL (3+)     Leuk Esterase, UA Negative     Nitrite, UA Negative     Urobilinogen, UA 2.0 E.U./dL    Narrative:      In absence of clinical symptoms, the presence of pyuria, bacteria, and/or nitrites on the urinalysis result does not correlate with infection.    CBC & Differential [[id number]]  (Abnormal) Collected: 09/10/23 2138    Specimen: Blood Updated: 09/10/23 2141    Narrative:      The following orders were created for panel order CBC & Differential.  Procedure                               Abnormality         Status                     ---------                               -----------         ------                     CBC Auto Differential[[id number]]        Abnormal            Final result                 Please view results for these tests on the individual orders.          Imaging Results (Most Recent)       Procedure Component Value Units Date/Time    US Liver [[id number]] Collected: 09/11/23 0906     Updated: 09/11/23 0910    Narrative:      ULTRASOUND ABDOMEN, LIMITED,  9/11/2023     HISTORY:  Elevated liver enzymes. History of cholecystectomy.     TECHNIQUE:  Grayscale ultrasound imaging of the right upper quadrant was performed.     FINDINGS:  Pancreas unremarkable as visualized. Portions obscured from view by  bowel gas. Survey images of the liver are negative. Main portal vein  is  patent with appropriate directional flow. The right kidney is  nonobstructed and measures 9.3 cm. The gallbladder is surgically absent.  Liver measures 12.6 cm. Extrahepatic common bile duct measures 2 mm.          Impression:         1. Negative right upper quadrant ultrasound. Surgically absent  gallbladder.     This report was finalized on 9/11/2023 9:08 AM by Dr. Charles Garcia MD.       XR Chest 2 View [[id number]] Collected: 09/10/23 2208     Updated: 09/10/23 2211    Narrative:      CHEST X-RAY, 9/10/2023     HISTORY:  78-year-old female in the ED with confusion, lethargy. Nausea since  yesterday. Recent diagnosis of urinary tract infection     TECHNIQUE:  AP and lateral upright chest x-ray.     FINDINGS:  Heart size and pulmonary vascularity are within normal limits. The lungs  are expanded and clear. No visible pulmonary infiltrate. Fluid or  pleural thickening causing blunting of the left costophrenic angle.     Previous left breast surgery with left axillary surgical clips.       Impression:      1. No active disease.  2. Fluid or pleural thickening in the left costophrenic angle.  3. Previous left breast surgery.     This report was finalized on 9/10/2023 10:09 PM by Dr. Jose Chambers MD.       CT Head Without Contrast [[id number]] Collected: 09/10/23 2205     Updated: 09/10/23 2210    Narrative:      CT HEAD, NONCONTRAST, 9/10/2023     HISTORY:  78-year-old female in the ED with confusion, lethargy. Nausea since  yesterday. Recent diagnosis of urinary tract infection.     TECHNIQUE:    CT imaging of the abdomen and pelvis without IV contrast. Radiation dose  reduction techniques included automated exposure control or exposure  modulation based on body size. Radiation audit for CT and nuclear  cardiology exams in the last 12 months: 1.     FINDINGS:    Subacute or chronic infarct in the head of the left caudate nucleus.     No evidence of acute intracranial hemorrhage. Mild  generalized  age-appropriate cerebral volume loss. Mild patchy diffuse chronic small  vessel type white matter changes.     No intracranial mass, mass effect, cerebral edema, extra-axial fluid  collection or hydrocephalus. Visualized upper paranasal sinuses and  mastoid air spaces are clear.       Impression:      1. No acute intracranial abnormality.  2. Subacute or chronic lacunar infarct in the head of left caudate  nucleus.  3. Diffuse chronic changes as noted above.     This report was finalized on 9/10/2023 10:08 PM by Dr. Jose Chambers MD.               PROCEDURES      Condition on Discharge:  Stable, Improved.     Physical Exam at Discharge  Vital Signs  Temp:  [97 °F (36.1 °C)-98.8 °F (37.1 °C)] 97.5 °F (36.4 °C)  Heart Rate:  [73-87] 87  Resp:  [16-18] 16  BP: ()/(54-68) 107/59    Physical Exam  Please see today's progress note    Discharge Disposition  To home in stable condition with rolling walker and home health    Visiting Nurse:    Yes    Home PT/OT:  Yes    Home Safety Evaluation:  No    DME  Rolling walker    Discharge Diet:      Dietary Orders (From admission, onward)       Start     Ordered    09/11/23 0130  Diet: Diabetic Diets; Consistent Carbohydrate; Texture: Regular Texture (IDDSI 7); Fluid Consistency: Thin (IDDSI 0)  Diet Effective Now        References:    Diet Order Crosswalk   Question Answer Comment   Diets: Diabetic Diets    Diabetic Diet: Consistent Carbohydrate    Texture: Regular Texture (IDDSI 7)    Fluid Consistency: Thin (IDDSI 0)        09/11/23 0131                    Activity at Discharge:  As tolerated    Pre-discharge education  None       Follow-up Appointments  Future Appointments   Date Time Provider Department Center   9/20/2023 11:00 AM [facility] MRI 1  [facility]   9/27/2023 12:30 PM LAB CHAIR 1 [facility]  [facility]   9/27/2023  1:00 PM Luz Elena Herndon MD [facility]         Test Results Pending at Discharge  Pending Labs        Order Current Status    Ammonia In process    Blood Culture - Blood, Arm, Right Preliminary result            Discharge over 30 min (if over 30 minutes give explanation as to why it took greater than 30 minutes)  Secondary to:   Coordination of care/follow up  Medication reconciliation  D/W patient      At [facility], we believe that sharing information builds trust and better relationships. You are receiving this note because you recently visited [facility]. It is possible you will see health information before a provider has talked with you about it. This kind of information can be easy to misunderstand. To help you fully understand what it means for your health, we urge you to discuss this note with your provider.    German Varela,   09/12/23  10:19 EDT

## 2023-09-12 NOTE — PLAN OF CARE
Goal Outcome Evaluation:  Plan of Care Reviewed With: patient, daughter        Progress: improving  Outcome Evaluation: pt alternated between sitting in recliner and in bed - ambulating to bathroom - IV fluids and antibiotics - resting comfortably - bed alarm in place - daughter at bedside

## 2023-09-12 NOTE — CASE MANAGEMENT/SOCIAL WORK
Continued Stay Note  [facility]     Patient Name: Tyrone Rand  MRN: [id number]  Today's Date: 9/12/2023    Admit Date: 9/10/2023    Plan: plan home with /Yaa    Discharge Plan       Row Name 09/12/23 1429       Plan    Plan plan home with /Yaa     Patient/Family in Agreement with Plan yes    Plan Comments RW delivered to bedside, [employer] paper work completed. Jessica/Yaa  notified of dc home today. CM will follow for dc needs.                   Discharge Codes    No documentation.                 Expected Discharge Date and Time       Expected Discharge Date Expected Discharge Time    Sep 12, 2023               All Barry RN

## 2023-09-12 NOTE — DISCHARGE INSTR - APPOINTMENTS
Patient needs to call and make a hospital follow up with Dr. So within 1-2 weeks of discharge [phone]  I was unable to make this follow up office phone not ringing has been busy for 10 mins.     Dr. Freeman office will call patient with appointment information                        [phone]

## 2023-09-12 NOTE — THERAPY TREATMENT NOTE
Acute Care - Occupational Therapy Treatment Note  [facility]     Patient Name: Tyrone Rand  : 1945  MRN: [id number]  Today's Date: 2023             Admit Date: 9/10/2023       ICD-10-CM ICD-9-CM   1. Sepsis with encephalopathy without septic shock, due to unspecified organism  A41.9 038.9    R65.20 995.91    G93.40 348.30   2. Delirium  R41.0 780.09   3. Generalized weakness  R53.1 780.79   4. Mild dementia without behavioral disturbance, psychotic disturbance, mood disturbance, or anxiety, unspecified dementia type  F03.A0 294.10   5. Lacunar stroke  I63.81 434.91     Patient Active Problem List   Diagnosis    Abnormal electrocardiogram    Chronic kidney disease, stage III (moderate)    Cognitive complaints    Dementia    Depression    Uncontrolled type 2 diabetes mellitus    Diabetes mellitus    Dysuria    Gastroesophageal reflux disease    Headache    Hyperlipidemia    Hypertension    Obesity (BMI 30-39.9)    Obstructive sleep apnea syndrome    Ventricular premature beats    Renal mass    Shortness of breath    Dyssomnia    Type 2 diabetes mellitus    Vitamin D deficiency    Weight gain    Calculus of gallbladder without cholecystitis without obstruction    Malignant neoplasm of lower-inner quadrant of left breast in female, estrogen receptor negative    Menopausal and perimenopausal disorder     Chondromalacia    Greater trochanteric bursitis of right hip    Calculus of bile duct with acute cholecystitis without obstruction    Common bile duct calculus     Past Medical History:   Diagnosis Date    Breast cancer 2017    Left    Depression     Diabetes mellitus     Drug therapy     GERD (gastroesophageal reflux disease)     H/O Left breast mass 2017    H/O vitamin D deficiency     History of snoring     Hx of radiation therapy     Hyperlipidemia     Hypertension     Panic attack     Poor sleep pattern     Sleep apnea     CPAP     Past Surgical History:   Procedure Laterality Date    BREAST  LUMPECTOMY Left 09/2017    CHOLECYSTECTOMY WITH INTRAOPERATIVE CHOLANGIOGRAM N/A 8/5/2021    Procedure: CHOLECYSTECTOMY LAPAROSCOPIC INTRAOPERATIVE CHOLANGIOGRAM;  Surgeon: Yasmeen Andrade DO;  Location:  [facility];  Service: General;  Laterality: N/A;    COLON SURGERY      COLONOSCOPY      ERCP N/A 8/6/2021    Procedure: ENDOSCOPIC RETROGRADE CHOLANGIOPANCREATOGRAPHY;  Surgeon: Wally Mercado MD;  Location:  [facility];  Service: Gastroenterology;  Laterality: N/A;    KIDNEY SURGERY  2011    MOUTH SURGERY      TOOTH EXTRACTION    TOOTH EXTRACTION           OT ASSESSMENT FLOWSHEET (last 12 hours)       OT Evaluation and Treatment       Row Name 09/12/23 1100                   OT Time and Intention    Subjective Information complains of;weakness  -JJ        Document Type therapy note (daily note)  -JJ        Mode of Treatment occupational therapy  -JJ        Patient Effort adequate  -JJ           General Information    Patient Profile Reviewed yes  -JJ        Patient/Family/Caregiver Comments/Observations pt sitting in recliner chair, family in room, agreed to treatment  -JJ        Existing Precautions/Restrictions fall  -JJ           Pain Assessment    Pretreatment Pain Rating 0/10 - no pain  -JJ        Posttreatment Pain Rating 0/10 - no pain  -JJ           Strength Comprehensive (MMT)    Comment, General Manual Muscle Testing (MMT) Assessment pt provided with and educated on writtin B UE AROM HEP. demonstrated all exercises adequately  -JJ           Bed Mobility    Comment, (Bed Mobility) deferred up in chair  -JJ           Plan of Care Review    Plan of Care Reviewed With patient  -JJ        Outcome Evaluation OT: pt provided with and educated on written B UE AROM HEP. pt demonstrated exercises adequately x 5 reps each. pt and family state understanding of exercise program and recommendation to complete daily- 2xdaily. plan is for pt to discharge home with family  -JJ           Positioning and  Restraints    Pre-Treatment Position sitting in chair/recliner  -JJ        Post Treatment Position chair  -JJ        In Chair reclined;call light within reach;with family/caregiver  -JJ           Progress Summary (OT)    Progress Toward Functional Goals (OT) progress toward functional goals as expected  -J        Daily Progress Summary (OT) cont poc  -JJ           ROM Goal 1 (OT)    ROM Goal 1 (OT) Pt to participate in BUE HEP to address rom/strength for basic daily tasks.  -J        Progress/Outcome (ROM Goal 1, OT) goal met  -JJ                  User Key  (r) = Recorded By, (t) = Taken By, (c) = Cosigned By      Initials Name Effective Dates    JRosario Caldera, OTR 06/16/21 -                      Occupational Therapy Education       Title: PT OT SLP Therapies (In Progress)       Topic: Occupational Therapy (Done)       Point: ADL training (Done)       Description:   Instruct learner(s) on proper safety adaptation and remediation techniques during self care or transfers.   Instruct in proper use of assistive devices.                  Learning Progress Summary             Patient Acceptance, E, VU by SD at 9/11/2023 1225    Comment: Education regarding OT services, benefits of activity and safety with bed mobility/transfers/functinal mobility.                         Point: Home exercise program (Done)       Description:   Instruct learner(s) on appropriate technique for monitoring, assisting and/or progressing therapeutic exercises/activities.                  Learning Progress Summary             Patient Acceptance, E,TB,H,D, VU,DU by  at 9/12/2023 1247    Comment: pt educated on B UE AROM HEP   Family Acceptance, E,TB,H,D, VU,DU by  at 9/12/2023 1247    Comment: pt educated on B UE AROM HEP                                         User Key       Initials Effective Dates Name Provider Type Discipline    SD 06/16/21 -  Noé Romo OTR Occupational Therapist OT    J 06/16/21 -  Jim  Rosario Drake, OTR Occupational Therapist OT                      OT Recommendation and Plan     Progress Toward Functional Goals (OT): progress toward functional goals as expected  Plan of Care Review  Plan of Care Reviewed With: patient  Outcome Evaluation: OT: pt provided with and educated on written B UE AROM HEP. pt demonstrated exercises adequately x 5 reps each. pt and family state understanding of exercise program and recommendation to complete daily- 2xdaily. plan is for pt to discharge home with family  Plan of Care Reviewed With: patient  Outcome Evaluation: OT: pt provided with and educated on written B UE AROM HEP. pt demonstrated exercises adequately x 5 reps each. pt and family state understanding of exercise program and recommendation to complete daily- 2xdaily. plan is for pt to discharge home with family     Outcome Measures       Row Name 09/12/23 1100 09/12/23 0820          How much help from another person do you currently need...    Turning from your back to your side while in flat bed without using bedrails? -- 3  -BP     Moving from lying on back to sitting on the side of a flat bed without bedrails? -- 3  -BP     Moving to and from a bed to a chair (including a wheelchair)? -- 3  -BP     Standing up from a chair using your arms (e.g., wheelchair, bedside chair)? -- 3  -BP     Climbing 3-5 steps with a railing? -- 3  -BP     To walk in hospital room? -- 3  -BP     AM-PAC 6 Clicks Score (PT) -- 18  -BP        How much help from another is currently needed...    Putting on and taking off regular lower body clothing? 4  -JJ --     Bathing (including washing, rinsing, and drying) 3  -JJ --     Toileting (which includes using toilet bed pan or urinal) 3  -JJ --     Putting on and taking off regular upper body clothing 4  -JJ --     Taking care of personal grooming (such as brushing teeth) 4  -JJ --     Eating meals 4  -JJ --     AM-PAC 6 Clicks Score (OT) 22  -JJ --        Functional Assessment     Outcome Measure Options -- AM-PAC 6 Clicks Basic Mobility (PT)  -BP               User Key  (r) = Recorded By, (t) = Taken By, (c) = Cosigned By      Initials Name Provider Type    Rosario Waite OTR Occupational Therapist    Erin Lopez, PT Physical Therapist                    Time Calculation:    Time Calculation- OT       Row Name 09/12/23 1248 09/12/23 0857          Time Calculation- OT    OT Start Time 1100  -JJ --     OT Stop Time 1115  -JJ --     OT Time Calculation (min) 15 min  -JJ --        Timed Charges    91292 - Gait Training Minutes  -- 15  -BP        Total Minutes    Timed Charges Total Minutes -- 15  -BP      Total Minutes -- 15  -BP               User Key  (r) = Recorded By, (t) = Taken By, (c) = Cosigned By      Initials Name Provider Type    Rosario Waite OTKEILY Occupational Therapist    Erin Lopez, PT Physical Therapist                  Therapy Charges for Today       Code Description Service Date Service Provider Modifiers Qty    [id number]  OT THERAPEUTIC ACT EA 15 MIN 9/12/2023 Rosario Fernandez OTR GO 1                 FUAD Koehler  9/12/2023

## 2023-09-12 NOTE — PLAN OF CARE
Goal Outcome Evaluation:  Plan of Care Reviewed With: patient           Outcome Evaluation: OT: pt provided with and educated on written B UE AROM HEP. pt demonstrated exercises adequately x 5 reps each. pt and family state understanding of exercise program and recommendation to complete daily- 2xdaily. plan is for pt to discharge home with family

## 2023-09-12 NOTE — TELEPHONE ENCOUNTER
Caller: RUBI    Relationship to patient: [facility]     Best call back number: [phone]    New or established patient?  [x] New  [] Established    Date of discharge: 09/12/23    Facility discharged from:  [facility]    Diagnosis/Symptoms: CHANGE IN MENTAL STATUS AND ABN CT SCAN    Length of stay (If applicable): 2 DAYS    Specialty Only: Did you see a [facility] health provider?    [x] Yes  [] No  If so, who? GIOVANNI LAZO

## 2023-09-12 NOTE — CONSULTS
Patient Identification:  NAME:  Tyrone Rand  Age:  78 y.o.   Sex:  female   :  1945   MRN:  [id number]       Chief complaint: He does not have 1, reason for consult change in mental status and abnormal CAT scan    History of present illness:    This patient is an 78-year-old left-handed black female past history of breast cancer, hyperlipidemia, hypertension but no history of stroke or heart attack comes to the hospital after a day or 2 of being sort of lethargic and confused.  Quality lethargy and confusion.  Duration is noted.  Context patient who had a urinary tract infection and was also treated with Macrobid as a modifying factor associated symptoms.  No focal paresthesias or paralysis.  Context the patient who states she has never had a stroke before.  CAT scan did show chronic changes in the left greater than right hemisphere.  They suggested possibly subacute stroke but by my independent eyeball review I think all of this is chronic.  Certainly location is not pertinent.  She has no change in speech.  No double vision no focal paresthesias no paralysis.  She is absolutely back to normal.  She is a diabetic but denies ever having a stroke or TIA.  She does take 1 aspirin per day.  Has been noted to have some elevated AST ALT and alkaline phosphatase and that etiology is not exactly known at this time.      Past medical history:  Past Medical History:   Diagnosis Date    Breast cancer 2017    Left    Depression     Diabetes mellitus     Drug therapy     GERD (gastroesophageal reflux disease)     H/O Left breast mass 2017    H/O vitamin D deficiency     History of snoring     Hx of radiation therapy     Hyperlipidemia     Hypertension     Panic attack     Poor sleep pattern     Sleep apnea     CPAP       Past surgical history:  Past Surgical History:   Procedure Laterality Date    BREAST LUMPECTOMY Left 2017    CHOLECYSTECTOMY WITH INTRAOPERATIVE CHOLANGIOGRAM N/A 2021    Procedure:  CHOLECYSTECTOMY LAPAROSCOPIC INTRAOPERATIVE CHOLANGIOGRAM;  Surgeon: Yasmeen Andrade DO;  Location: [facility] OR;  Service: General;  Laterality: N/A;    COLON SURGERY      COLONOSCOPY      ERCP N/A 8/6/2021    Procedure: ENDOSCOPIC RETROGRADE CHOLANGIOPANCREATOGRAPHY;  Surgeon: Wally Mercado MD;  Location: [facility] OR;  Service: Gastroenterology;  Laterality: N/A;    KIDNEY SURGERY  2011    MOUTH SURGERY      TOOTH EXTRACTION    TOOTH EXTRACTION         Allergies:  Sulfa antibiotics    Home medications:  Medications Prior to Admission   Medication Sig Dispense Refill Last Dose    anastrozole (ARIMIDEX) 1 MG tablet TAKE 1 TABLET BY MOUTH EVERY DAY 90 tablet 1 9/11/2023    aspirin 81 MG tablet Take 1 tablet by mouth Daily.   9/11/2023    atorvastatin (LIPITOR) 20 MG tablet Take 1 tablet by mouth Daily.   9/11/2023    LÁZARO CONTOUR TEST test strip TEST BLOOD SUGAR TWICE A DAY  2 9/11/2023    LÁZARO MICROLET LANCETS lancets TEST BLOOD SUGAR TWICE A DAY  3 9/11/2023    metoprolol succinate XL (TOPROL-XL) 25 MG 24 hr tablet TAKE ONE TABLET BY MOUTH IN THE MORNING  11 9/11/2023    NOVOFINE 32G X 6 MM misc USE TWO TIMES A DAY  1 9/11/2023    Tresiba FlexTouch 100 UNIT/ML solution pen-injector injection 18 Units Every Night.   9/11/2023    cholecalciferol (VITAMIN D3) 1000 units tablet Take 1 tablet by mouth Daily.       SITagliptin (JANUVIA) 100 MG tablet Take 100 mg by mouth Daily.           Hospital medications:  anastrozole, 1 mg, Oral, Daily  aspirin, 81 mg, Oral, Daily  cefTRIAXone, 1,000 mg, Intravenous, Q24H  cholecalciferol, 1,000 Units, Oral, Daily  Insulin Aspart, 2-9 Units, Subcutaneous, 4x Daily AC & at Bedtime  senna-docusate sodium, 2 tablet, Oral, BID  sodium chloride, 10 mL, Intravenous, Q12H           acetaminophen **OR** acetaminophen **OR** acetaminophen    senna-docusate sodium **AND** polyethylene glycol **AND** bisacodyl **AND** bisacodyl    dextrose    dextrose    glucagon (human  recombinant)    melatonin    ondansetron **OR** ondansetron    [COMPLETED] Insert Peripheral IV **AND** sodium chloride    sodium chloride    sodium chloride    Family history:  Family History   Problem Relation Age of Onset    Stroke Mother     Heart disease Mother     Hypertension Mother     Breast cancer Neg Hx        Social history:  Social History     Tobacco Use    Smoking status: Former     Packs/day: 1.00     Years: 25.00     Pack years: 25.00     Types: Cigarettes     Quit date:      Years since quittin.    Smokeless tobacco: Never    Tobacco comments:     smoked 25 years, 1 PPD   Vaping Use    Vaping Use: Unknown   Substance Use Topics    Alcohol use: Not Currently    Drug use: No       Review of systems:    No hair eyes ears nose throat skin bone joint lymphatic hematologic oncologic complaints no chest pain abdominal pain bowel bladder incontinence or rash.  She did have a urinary tract infection.  Her daughter notes that her memory is not as good as it used to be    Objective:  Vitals Ranges:   Temp:  [97 °F (36.1 °C)-98.8 °F (37.1 °C)] 97.5 °F (36.4 °C)  Heart Rate:  [73-87] 87  Resp:  [16] 16  BP: (103-119)/(57-68) 107/59      Physical Exam:  Patient is awake, alert, a little slow to answer questions but is actually oriented x3 fund of knowledge not so good attention span concentration fair recent remote memory fair language function normal elderly appearing in no distress pupils 3 constricting slightly bilaterally unable to visualize disc retinas extraocular movements full without nystagmus nasolabial folds palate tongue symmetrical normal hearing facial sensation head turning shoulder shrug motor 5 out of 5 x 4 extremities.  She is bradykinesia but without significant rigidity and no resting tremor.  Good  strength no pronator drift.  Reflex testing trace throughout symmetrical toes downgoing.  Sensation normal light touch face both arms both legs coordination normal in the upper  extremities Station and gait she rather quickly arises from her chair and has pretty stable legs.  Heart is regular without murmur neck supple without bruits extremities no clubbing cyanosis or edema    Results review:   I reviewed the patient's new clinical results.    Data review:  Lab Results (last 24 hours)       Procedure Component Value Units Date/Time    POC Glucose Once [[id number]]  (Abnormal) Collected: 09/12/23 1152    Specimen: Blood Updated: 09/12/23 1159     Glucose 201 mg/dL     Ammonia [[id number]]  (Normal) Collected: 09/12/23 0945    Specimen: Blood Updated: 09/12/23 1021     Ammonia 34 umol/L     Urine Culture - Urine, Urine, Clean Catch [[id number]]  (Normal) Collected: 09/10/23 2119    Specimen: Urine, Clean Catch Updated: 09/12/23 0955     Urine Culture No growth    Comprehensive Metabolic Panel [[id number]]  (Abnormal) Collected: 09/12/23 0306    Specimen: Blood Updated: 09/12/23 0953     Glucose 117 mg/dL      BUN 13 mg/dL      Creatinine 1.04 mg/dL      Sodium 135 mmol/L      Potassium 3.9 mmol/L      Chloride 103 mmol/L      CO2 18.7 mmol/L      Calcium 8.6 mg/dL      Total Protein 6.1 g/dL      Albumin 2.9 g/dL      ALT (SGPT) 319 U/L      AST (SGOT) 262 U/L      Alkaline Phosphatase 122 U/L      Total Bilirubin 0.2 mg/dL      Globulin 3.2 gm/dL      A/G Ratio 0.9 g/dL      BUN/Creatinine Ratio 12.5     Anion Gap 13.3 mmol/L      eGFR 54.4 mL/min/1.73     Narrative:      GFR Normal >60  Chronic Kidney Disease <60  Kidney Failure <15    The GFR formula is only valid for adults with stable renal function between ages 18 and 70.    POC Glucose Once [[id number]]  (Normal) Collected: 09/12/23 0751    Specimen: Blood Updated: 09/12/23 0758     Glucose 114 mg/dL     Blood Culture - Blood, Arm, Right [[id number]]  (Normal) Collected: 09/11/23 0610    Specimen: Blood from Arm, Right Updated: 09/12/23 0645     Blood Culture No growth at 24 hours    CBC & Differential [[id number]]  (Abnormal) Collected:  "09/12/23 0306    Specimen: Blood Updated: 09/12/23 0432    Narrative:      The following orders were created for panel order CBC & Differential.  Procedure                               Abnormality         Status                     ---------                               -----------         ------                     CBC Auto Differential[[id number]]        Abnormal            Final result               Scan Slide[[id number]]                                       Final result                 Please view results for these tests on the individual orders.    Scan Slide [[id number]] Collected: 09/12/23 0306    Specimen: Blood Updated: 09/12/23 0432     RBC Morphology Normal     WBC Morphology Normal     Platelet Estimate Decreased    Procalcitonin [[id number]]  (Abnormal) Collected: 09/12/23 0306    Specimen: Blood Updated: 09/12/23 0419     Procalcitonin 11.48 ng/mL     Narrative:      As a Marker for Sepsis (Non-Neonates):    1. <0.5 ng/mL represents a low risk of severe sepsis and/or septic shock.  2. >2 ng/mL represents a high risk of severe sepsis and/or septic shock.    As a Marker for Lower Respiratory Tract Infections that require antibiotic therapy:    PCT on Admission    Antibiotic Therapy       6-12 Hrs later    >0.5                Strongly Recommended  >0.25 - <0.5        Recommended   0.1 - 0.25          Discouraged              Remeasure/reassess PCT  <0.1                Strongly Discouraged     Remeasure/reassess PCT    As 28 day mortality risk marker: \"Change in Procalcitonin Result\" (>80% or <=80%) if Day 0 (or Day 1) and Day 4 values are available. Refer to http://www.[email or web address]-pct-calculator.com    Change in PCT <=80%  A decrease of PCT levels below or equal to 80% defines a positive change in PCT test result representing a higher risk for 28-day all-cause mortality of patients diagnosed with severe sepsis for septic shock.    Change in PCT >80%  A decrease of PCT levels of more than 80% defines a negative " "change in PCT result representing a lower risk for 28-day all-cause mortality of patients diagnosed with severe sepsis or septic shock.       CBC Auto Differential [[id number]]  (Abnormal) Collected: 09/12/23 0306    Specimen: Blood Updated: 09/12/23 0411     WBC 5.25 10*3/mm3      RBC 4.21 10*6/mm3      Hemoglobin 11.5 g/dL      Hematocrit 35.6 %      MCV 84.6 fL      MCH 27.3 pg      MCHC 32.3 g/dL      RDW 14.1 %      RDW-SD 43.4 fl      MPV 11.0 fL      Platelets 122 10*3/mm3      Neutrophil % 68.2 %      Lymphocyte % 10.1 %      Monocyte % 13.5 %      Eosinophil % 7.0 %      Basophil % 0.8 %      Immature Grans % 0.4 %      Neutrophils, Absolute 3.58 10*3/mm3      Lymphocytes, Absolute 0.53 10*3/mm3      Monocytes, Absolute 0.71 10*3/mm3      Eosinophils, Absolute 0.37 10*3/mm3      Basophils, Absolute 0.04 10*3/mm3      Immature Grans, Absolute 0.02 10*3/mm3      nRBC 0.0 /100 WBC     POC Glucose Once [[id number]]  (Abnormal) Collected: 09/11/23 2056    Specimen: Blood Updated: 09/11/23 2102     Glucose 133 mg/dL     POC Glucose Once [[id number]]  (Abnormal) Collected: 09/11/23 1628    Specimen: Blood Updated: 09/11/23 1634     Glucose 140 mg/dL     Procalcitonin [[id number]]  (Abnormal) Collected: 09/11/23 1310    Specimen: Blood Updated: 09/11/23 1339     Procalcitonin 14.70 ng/mL     Narrative:      As a Marker for Sepsis (Non-Neonates):    1. <0.5 ng/mL represents a low risk of severe sepsis and/or septic shock.  2. >2 ng/mL represents a high risk of severe sepsis and/or septic shock.    As a Marker for Lower Respiratory Tract Infections that require antibiotic therapy:    PCT on Admission    Antibiotic Therapy       6-12 Hrs later    >0.5                Strongly Recommended  >0.25 - <0.5        Recommended   0.1 - 0.25          Discouraged              Remeasure/reassess PCT  <0.1                Strongly Discouraged     Remeasure/reassess PCT    As 28 day mortality risk marker: \"Change in Procalcitonin " "Result\" (>80% or <=80%) if Day 0 (or Day 1) and Day 4 values are available. Refer to http://www.[facility]-pct-[email or web address].com    Change in PCT <=80%  A decrease of PCT levels below or equal to 80% defines a positive change in PCT test result representing a higher risk for 28-day all-cause mortality of patients diagnosed with severe sepsis for septic shock.    Change in PCT >80%  A decrease of PCT levels of more than 80% defines a negative change in PCT result representing a lower risk for 28-day all-cause mortality of patients diagnosed with severe sepsis or septic shock.                Imaging:  Imaging Results (Last 24 Hours)       ** No results found for the last 24 hours. **               Assessment and Plan:     This patient presents with a definite toxic metabolic encephalopathy related to the urinary tract infection as well as probably the toxic effect of the Macrobid itself.  Note that this patient almost certainly has vascular dementia without behavioral changes.  The daughter is ask about this and I am not going to start any new memory type medications at this time.  Note that the metabolic encephalopathy is much improved overnight and she is getting ready to go home.  I have reviewed the CAT scan of her head which shows chronic changes only.  Neither the CAT scan nor her history nor her exam is consistent with an acute or subacute stroke.  I have no doubt she has had some small subcortical strokes in the past that has added to her current vascular dementia but this is not an acute stroke syndrome.    In short, she is already on one 81 mg aspirin per day.  I am going to repeat her hemoglobin A1c and lipid panels both of which were abnormal back in 2015.  Note I am tempted based upon her past lipid panels to go ahead and start her on Zocor but I note that her AST and ALT are both elevated to 62 and 319 respectively.  Alkaline phosphatase also 122 so I am not going to start her on a statin at this time.    So, " before she goes today.  I will check hemoglobin A1c, a lipid panel, and she will get bilateral carotid Dopplers.    I will also go ahead and check B12 and folate levels.  We will have her seen by an outpatient neurologist for further work-up of her memory issues.      Adán Kimbrough MD  09/12/23  12:23 EDT

## 2023-09-13 ENCOUNTER — TELEPHONE (OUTPATIENT)
Dept: NEUROLOGY | Facility: CLINIC | Age: 78
End: 2023-09-13

## 2023-09-13 NOTE — TELEPHONE ENCOUNTER
Caller: LAWERENCCE    Relationship to patient:     Best call back number: [phone]     New or established patient?  [x] New  [] Established    Date of discharge: 09/12/23    Facility discharged from: [facility]    Diagnosis/Symptoms: CONFUSION  Length of stay (If applicable): 2 DAYS    Specialty Only: Did you see a [facility] provider?    [x] Yes  [] No  If so, who?   GIOVANNI LAZO MD    PT WAS SEEN FOR CONFUSION AND IS NEEDING A H/P FOLLOW UP. PER [facility] TO SCHEDULE.     THANK YOU

## 2023-09-13 NOTE — OUTREACH NOTE
Prep Survey      Flowsheet Row Responses   [facility] facility patient discharged from? [place]   Is LACE score < 7 ? No   Eligibility Readm Mgmt   Discharge diagnosis UTI   Does the patient have one of the following disease processes/diagnoses(primary or secondary)? Other   Does the patient have Home health ordered? Yes   What is the Home health agency?  [facility]   Is there a DME ordered? Yes   What DME was ordered? [facility]   Prep survey completed? Yes            Michela LAINEZ - Registered Nurse

## 2023-09-14 ENCOUNTER — READMISSION MANAGEMENT (OUTPATIENT)
Dept: CALL CENTER | Facility: HOSPITAL | Age: 78
End: 2023-09-14
Payer: MEDICARE

## 2023-09-14 NOTE — OUTREACH NOTE
Medical Week 1 Survey      Flowsheet Row Responses   [facility] patient discharged from? [place]   Does the patient have one of the following disease processes/diagnoses(primary or secondary)? Other   Week 1 attempt successful? Yes   Call start time 1241   Call end time 1251   Discharge diagnosis UTI   Meds reviewed with patient/caregiver? Yes   Is the patient having any side effects they believe may be caused by any medication additions or changes? No   Does the patient have all medications ordered at discharge? Yes   Is the patient taking all medications as directed (includes completed medication regime)? Yes   Does the patient have a primary care provider?  Yes   Does the patient have an appointment with their PCP within 7 days of discharge? No   What is preventing the patient from scheduling follow up appointments within 7 days of discharge? Haven't had time   Nursing Interventions Advised patient to make appointment   Has the patient kept scheduled appointments due by today? N/A   What is the Home health agency?  [facility]   Has home health visited the patient within 72 hours of discharge? Yes   Psychosocial issues? No   Did the patient receive a copy of their discharge instructions? Yes   Nursing interventions Reviewed instructions with patient   What is the patient's perception of their health status since discharge? Improving   Is the patient/caregiver able to teach back signs and symptoms related to disease process for when to call PCP? Yes   Is the patient/caregiver able to teach back signs and symptoms related to disease process for when to call 911? Yes   Is the patient/caregiver able to teach back the hierarchy of who to call/visit for symptoms/problems? PCP, Specialist, Home health nurse, Urgent Care, ED, 911 Yes   Additional teach back comments pt states increasing water intake   Week 1 call completed? Yes   Call end time 1251            Keiko ESTRADA - Registered Nurse

## 2023-09-16 LAB — BACTERIA SPEC AEROBE CULT: NORMAL

## 2023-09-20 ENCOUNTER — HOSPITAL ENCOUNTER (OUTPATIENT)
Dept: MRI IMAGING | Facility: HOSPITAL | Age: 78
Discharge: HOME | End: 2023-09-20
Payer: MEDICARE

## 2023-09-20 DIAGNOSIS — E27.8 ADRENAL NODULE: ICD-10-CM

## 2023-09-20 DIAGNOSIS — Z17.1 MALIGNANT NEOPLASM OF LOWER-INNER QUADRANT OF LEFT BREAST IN FEMALE, ESTROGEN RECEPTOR NEGATIVE: ICD-10-CM

## 2023-09-20 DIAGNOSIS — K86.9 PANCREATIC LESION: ICD-10-CM

## 2023-09-20 DIAGNOSIS — C50.312 MALIGNANT NEOPLASM OF LOWER-INNER QUADRANT OF LEFT BREAST IN FEMALE, ESTROGEN RECEPTOR NEGATIVE: ICD-10-CM

## 2023-09-21 ENCOUNTER — READMISSION MANAGEMENT (OUTPATIENT)
Dept: CALL CENTER | Facility: HOSPITAL | Age: 78
End: 2023-09-21
Payer: MEDICARE

## 2023-09-21 ENCOUNTER — HOSPITAL ENCOUNTER (OUTPATIENT)
Dept: MRI IMAGING | Facility: HOSPITAL | Age: 78
Discharge: HOME | End: 2023-09-21
Admitting: INTERNAL MEDICINE
Payer: MEDICARE

## 2023-09-21 PROCEDURE — 74183 MRI ABD W/O CNTR FLWD CNTR: CPT

## 2023-09-21 PROCEDURE — 0 GADOBENATE DIMEGLUMINE 529 MG/ML SOLUTION: Performed by: INTERNAL MEDICINE

## 2023-09-21 PROCEDURE — A9577 INJ MULTIHANCE: HCPCS | Performed by: INTERNAL MEDICINE

## 2023-09-21 RX ADMIN — GADOBENATE DIMEGLUMINE 15 ML: 529 INJECTION, SOLUTION INTRAVENOUS at 14:39

## 2023-09-21 NOTE — OUTREACH NOTE
Medical Week 2 Survey      Flowsheet Row Responses   [facility] patient discharged from? [place]   Does the patient have one of the following disease processes/diagnoses(primary or secondary)? Other   Week 2 attempt successful? Yes   Call start time 1302   Discharge diagnosis UTI   Call end time 1304   Meds reviewed with patient/caregiver? Yes   Is the patient having any side effects they believe may be caused by any medication additions or changes? No   Is the patient taking all medications as directed (includes completed medication regime)? Yes   Does the patient have a primary care provider?  Yes   Has the patient kept scheduled appointments due by today? N/A   What is the Home health agency?  [facility]   Has home health visited the patient within 72 hours of discharge? Yes   What DME was ordered? [facility]   Has all DME been delivered? Yes   Psychosocial issues? No   What is the patient's perception of their health status since discharge? Improving   Is the patient/caregiver able to teach back the hierarchy of who to call/visit for symptoms/problems? PCP, Specialist, Home health nurse, Urgent Care, ED, 911 Yes   Week 2 Call Completed? Yes   Call end time 1304            Kaylin ESTRADA - Registered Nurse

## 2023-09-27 ENCOUNTER — LAB (OUTPATIENT)
Dept: LAB | Facility: HOSPITAL | Age: 78
End: 2023-09-27
Payer: MEDICARE

## 2023-09-27 ENCOUNTER — TRANSCRIBE ORDERS (OUTPATIENT)
Dept: ADMINISTRATIVE | Facility: HOSPITAL | Age: 78
End: 2023-09-27
Payer: MEDICARE

## 2023-09-27 ENCOUNTER — LAB (OUTPATIENT)
Dept: OTHER | Facility: HOSPITAL | Age: 78
End: 2023-09-27
Payer: MEDICARE

## 2023-09-27 ENCOUNTER — OFFICE VISIT (OUTPATIENT)
Dept: ONCOLOGY | Facility: CLINIC | Age: 78
End: 2023-09-27
Payer: MEDICARE

## 2023-09-27 VITALS
DIASTOLIC BLOOD PRESSURE: 79 MMHG | BODY MASS INDEX: 27.13 KG/M2 | RESPIRATION RATE: 16 BRPM | SYSTOLIC BLOOD PRESSURE: 136 MMHG | OXYGEN SATURATION: 96 % | TEMPERATURE: 96.6 F | HEART RATE: 87 BPM | WEIGHT: 168.8 LBS | HEIGHT: 66 IN

## 2023-09-27 DIAGNOSIS — E55.9 VITAMIN D DEFICIENCY: ICD-10-CM

## 2023-09-27 DIAGNOSIS — E55.9 AVITAMINOSIS D: ICD-10-CM

## 2023-09-27 DIAGNOSIS — K86.9 PANCREATIC LESION: ICD-10-CM

## 2023-09-27 DIAGNOSIS — N18.31 CHRONIC KIDNEY DISEASE (CKD) STAGE G3A/A1, MODERATELY DECREASED GLOMERULAR FILTRATION RATE (GFR) BETWEEN 45-59 ML/MIN/1.73 SQUARE METER AND ALBUMINURIA CREATININE RATIO LESS THAN 30 MG/G (CMS/H*: Primary | ICD-10-CM

## 2023-09-27 DIAGNOSIS — C50.312 MALIGNANT NEOPLASM OF LOWER-INNER QUADRANT OF LEFT BREAST IN FEMALE, ESTROGEN RECEPTOR NEGATIVE: Primary | ICD-10-CM

## 2023-09-27 DIAGNOSIS — C50.312 MALIGNANT NEOPLASM OF LOWER-INNER QUADRANT OF LEFT BREAST IN FEMALE, ESTROGEN RECEPTOR NEGATIVE: ICD-10-CM

## 2023-09-27 DIAGNOSIS — I12.9 HYPERTENSIVE NEPHROPATHY: ICD-10-CM

## 2023-09-27 DIAGNOSIS — N18.31 CHRONIC KIDNEY DISEASE (CKD) STAGE G3A/A1, MODERATELY DECREASED GLOMERULAR FILTRATION RATE (GFR) BETWEEN 45-59 ML/MIN/1.73 SQUARE METER AND ALBUMINURIA CREATININE RATIO LESS THAN 30 MG/G (CMS/H*: ICD-10-CM

## 2023-09-27 DIAGNOSIS — E27.8 ADRENAL NODULE: ICD-10-CM

## 2023-09-27 DIAGNOSIS — Z17.1 MALIGNANT NEOPLASM OF LOWER-INNER QUADRANT OF LEFT BREAST IN FEMALE, ESTROGEN RECEPTOR NEGATIVE: Primary | ICD-10-CM

## 2023-09-27 DIAGNOSIS — Z17.1 MALIGNANT NEOPLASM OF LOWER-INNER QUADRANT OF LEFT BREAST IN FEMALE, ESTROGEN RECEPTOR NEGATIVE: ICD-10-CM

## 2023-09-27 DIAGNOSIS — M85.89 OSTEOPENIA OF MULTIPLE SITES: ICD-10-CM

## 2023-09-27 LAB
25(OH)D3 SERPL-MCNC: 49.7 NG/ML (ref 30–100)
25(OH)D3 SERPL-MCNC: 53.7 NG/ML (ref 30–100)
ALBUMIN SERPL-MCNC: 4 G/DL (ref 3.5–5.2)
ALBUMIN SERPL-MCNC: 4.4 G/DL (ref 3.5–5.2)
ALBUMIN/GLOB SERPL: 1.2 G/DL
ALP SERPL-CCNC: 143 U/L (ref 39–117)
ALT SERPL W P-5'-P-CCNC: 26 U/L (ref 1–33)
ANION GAP SERPL CALCULATED.3IONS-SCNC: 14.2 MMOL/L (ref 5–15)
ANION GAP SERPL CALCULATED.3IONS-SCNC: 9.5 MMOL/L (ref 5–15)
AST SERPL-CCNC: 21 U/L (ref 1–32)
BASOPHILS # BLD AUTO: 0.07 10*3/MM3 (ref 0–0.2)
BASOPHILS NFR BLD AUTO: 1.1 % (ref 0–1.5)
BILIRUB SERPL-MCNC: 0.4 MG/DL (ref 0–1.2)
BUN SERPL-MCNC: 16 MG/DL (ref 8–23)
BUN SERPL-MCNC: 17 MG/DL (ref 8–23)
BUN/CREAT SERPL: 14.4 (ref 7–25)
BUN/CREAT SERPL: 16 (ref 7–25)
CALCIUM SPEC-SCNC: 9.7 MG/DL (ref 8.6–10.5)
CALCIUM SPEC-SCNC: 9.8 MG/DL (ref 8.6–10.5)
CHLORIDE SERPL-SCNC: 100 MMOL/L (ref 98–107)
CHLORIDE SERPL-SCNC: 101 MMOL/L (ref 98–107)
CO2 SERPL-SCNC: 22.8 MMOL/L (ref 22–29)
CO2 SERPL-SCNC: 25.5 MMOL/L (ref 22–29)
CREAT SERPL-MCNC: 1.06 MG/DL (ref 0.57–1)
CREAT SERPL-MCNC: 1.11 MG/DL (ref 0.57–1)
DEPRECATED RDW RBC AUTO: 46 FL (ref 37–54)
EGFRCR SERPLBLD CKD-EPI 2021: 50.4 ML/MIN/1.73
EGFRCR SERPLBLD CKD-EPI 2021: 53.2 ML/MIN/1.73
EOSINOPHIL # BLD AUTO: 0.08 10*3/MM3 (ref 0–0.4)
EOSINOPHIL NFR BLD AUTO: 1.3 % (ref 0.3–6.2)
ERYTHROCYTE [DISTWIDTH] IN BLOOD BY AUTOMATED COUNT: 14.5 % (ref 12.3–15.4)
GLOBULIN UR ELPH-MCNC: 3.4 GM/DL
GLUCOSE SERPL-MCNC: 115 MG/DL (ref 65–99)
GLUCOSE SERPL-MCNC: 244 MG/DL (ref 65–99)
HCT VFR BLD AUTO: 41 % (ref 34–46.6)
HGB BLD-MCNC: 12.6 G/DL (ref 12–15.9)
IMM GRANULOCYTES # BLD AUTO: 0.02 10*3/MM3 (ref 0–0.05)
IMM GRANULOCYTES NFR BLD AUTO: 0.3 % (ref 0–0.5)
LYMPHOCYTES # BLD AUTO: 1.07 10*3/MM3 (ref 0.7–3.1)
LYMPHOCYTES NFR BLD AUTO: 17.4 % (ref 19.6–45.3)
MCH RBC QN AUTO: 26.6 PG (ref 26.6–33)
MCHC RBC AUTO-ENTMCNC: 30.7 G/DL (ref 31.5–35.7)
MCV RBC AUTO: 86.7 FL (ref 79–97)
MONOCYTES # BLD AUTO: 0.5 10*3/MM3 (ref 0.1–0.9)
MONOCYTES NFR BLD AUTO: 8.1 % (ref 5–12)
NEUTROPHILS NFR BLD AUTO: 4.4 10*3/MM3 (ref 1.7–7)
NEUTROPHILS NFR BLD AUTO: 71.8 % (ref 42.7–76)
NRBC BLD AUTO-RTO: 0 /100 WBC (ref 0–0.2)
PHOSPHATE SERPL-MCNC: 2.7 MG/DL (ref 2.5–4.5)
PLATELET # BLD AUTO: 253 10*3/MM3 (ref 140–450)
PMV BLD AUTO: 10.3 FL (ref 6–12)
POTASSIUM SERPL-SCNC: 3.9 MMOL/L (ref 3.5–5.2)
POTASSIUM SERPL-SCNC: 4.6 MMOL/L (ref 3.5–5.2)
PROT SERPL-MCNC: 7.4 G/DL (ref 6–8.5)
RBC # BLD AUTO: 4.73 10*6/MM3 (ref 3.77–5.28)
SODIUM SERPL-SCNC: 136 MMOL/L (ref 136–145)
SODIUM SERPL-SCNC: 137 MMOL/L (ref 136–145)
WBC NRBC COR # BLD: 6.14 10*3/MM3 (ref 3.4–10.8)

## 2023-09-27 PROCEDURE — 3078F DIAST BP <80 MM HG: CPT | Performed by: INTERNAL MEDICINE

## 2023-09-27 PROCEDURE — 3075F SYST BP GE 130 - 139MM HG: CPT | Performed by: INTERNAL MEDICINE

## 2023-09-27 PROCEDURE — 85025 COMPLETE CBC W/AUTO DIFF WBC: CPT | Performed by: INTERNAL MEDICINE

## 2023-09-27 PROCEDURE — 1126F AMNT PAIN NOTED NONE PRSNT: CPT | Performed by: INTERNAL MEDICINE

## 2023-09-27 PROCEDURE — 84100 ASSAY OF PHOSPHORUS: CPT | Performed by: INTERNAL MEDICINE

## 2023-09-27 PROCEDURE — 36415 COLL VENOUS BLD VENIPUNCTURE: CPT

## 2023-09-27 PROCEDURE — 80053 COMPREHEN METABOLIC PANEL: CPT | Performed by: INTERNAL MEDICINE

## 2023-09-27 PROCEDURE — 82306 VITAMIN D 25 HYDROXY: CPT | Performed by: INTERNAL MEDICINE

## 2023-09-27 PROCEDURE — 82306 VITAMIN D 25 HYDROXY: CPT

## 2023-09-27 PROCEDURE — 99214 OFFICE O/P EST MOD 30 MIN: CPT | Performed by: INTERNAL MEDICINE

## 2023-09-27 NOTE — PROGRESS NOTES
Subjective     CHIEF COMPLAINT:      Chief Complaint   Patient presents with    Follow-up     Discuss MRI and mammogram      HISTORY OF PRESENT ILLNESS:     Tyrone Rand is a 78 y.o. female patient who returns today for follow up on her breast cancer.  She returns today for follow-up accompanied by her  and daughter.    Patient was hospitalized at [facility] between 9/10/2023 and 9/12/2023.  She had acute metabolic encephalopathy that was suspected of being due to reaction to Macrobid for UTI.  Since discharge from the hospital, she is feeling somewhat better.    ROS:  Pertinent ROS is in the HPI.     Past medical, surgical, social and family history were reviewed.     MEDICATIONS:    Current Outpatient Medications:     anastrozole (ARIMIDEX) 1 MG tablet, TAKE 1 TABLET BY MOUTH EVERY DAY, Disp: 90 tablet, Rfl: 1    aspirin 81 MG tablet, Take 1 tablet by mouth Daily., Disp: , Rfl:     atorvastatin (LIPITOR) 20 MG tablet, Take 1 tablet by mouth Daily., Disp: , Rfl:     LÁZARO CONTOUR TEST test strip, TEST BLOOD SUGAR TWICE A DAY, Disp: , Rfl: 2    LÁZARO MICROLET LANCETS lancets, TEST BLOOD SUGAR TWICE A DAY, Disp: , Rfl: 3    cholecalciferol (VITAMIN D3) 1000 units tablet, Take 1 tablet by mouth Daily., Disp: , Rfl:     NOVOFINE 32G X 6 MM misc, USE TWO TIMES A DAY, Disp: , Rfl: 1    Tresiba FlexTouch 100 UNIT/ML solution pen-injector injection, 18 Units Every Night., Disp: , Rfl:     cefdinir (OMNICEF) 300 MG capsule, Take 1 capsule by mouth 2 (Two) Times a Day. (Patient not taking: Reported on 9/27/2023), Disp: 12 capsule, Rfl: 0    saccharomyces boulardii (Florastor) 250 MG capsule, Take 1 capsule by mouth 2 (Two) Times a Day. (Patient not taking: Reported on 9/27/2023), Disp: 12 capsule, Rfl: 0    SITagliptin (JANUVIA) 100 MG tablet, Take 100 mg by mouth Daily., Disp: , Rfl:   Objective     VITAL SIGNS:     Vitals:    09/27/23 1254   BP: 136/79   Pulse: 87   Resp: 16   Temp: 96.6 °F (35.9  "°C)   TempSrc: Temporal   SpO2: 96%   Weight: 76.6 kg (168 lb 12.8 oz)   Height: 167.6 cm (65.98\")   PainSc: 0-No pain     Body mass index is 27.26 kg/m².     Wt Readings from Last 5 Encounters:   09/27/23 76.6 kg (168 lb 12.8 oz)   09/11/23 76.9 kg (169 lb 8.5 oz)   03/22/23 79.7 kg (175 lb 9.6 oz)   08/23/22 81.2 kg (179 lb)   02/22/22 77.9 kg (171 lb 12.8 oz)     PHYSICAL EXAMINATION:   GENERAL: The patient appears in fair general condition, not in acute distress.   SKIN: No ecchymosis.  EYES: No jaundice. No Pallor.  LYMPHATIC: No cervical, supraclavicular or axillary adenopathy.  CHEST: Normal respiratory effort.  Lungs clear bilaterally.  No added sounds.  CVS: Normal S1-S2.  No murmurs.  ABDOMEN: Soft. No tenderness. No Hepatomegaly. No Splenomegaly. No masses.  EXTREMITIES: No edema.    DIAGNOSTIC DATA:     Results from last 7 days   Lab Units 09/27/23  1246   WBC 10*3/mm3 6.14   NEUTROS ABS 10*3/mm3 4.40   HEMOGLOBIN g/dL 12.6   HEMATOCRIT % 41.0   PLATELETS 10*3/mm3 253     Results from last 7 days   Lab Units 09/27/23  1510 09/27/23  1246   SODIUM mmol/L 137 136   POTASSIUM mmol/L 3.9 4.6   CHLORIDE mmol/L 100 101   CO2 mmol/L 22.8 25.5   BUN mg/dL 16 17   CREATININE mg/dL 1.11* 1.06*   CALCIUM mg/dL 9.7 9.8   ALBUMIN g/dL 4.4 4.0   BILIRUBIN mg/dL  --  0.4   ALK PHOS U/L  --  143*   ALT (SGPT) U/L  --  26   AST (SGOT) U/L  --  21   GLUCOSE mg/dL 115* 244*     Component      Latest Ref Rng 8/23/2022 3/22/2023 9/27/2023   25 Hydroxy, Vitamin D      30.0 - 100.0 ng/ml 56.8  45.8  49.7      Mammogram on 7/31/2023:  No change, no evidence of malignancy     BI-RADS CATEGORY 2: Benign Findings.  Recommend routine screening mammogram    MRI abdomen on 9/21/2023:   There is mild dilation of the pancreatic duct near the ampulla  within the head and neck up to 6 mm. The common duct is normal in  caliber. The pancreatic duct smoothly tapers to a mildly dilated to 4 mm  in the body and tail.     A left adrenal nodule " does not demonstrate intravoxel lipid, measuring  1.4 x 1.4 cm on series 14 image 2, image 48. Cysts are seen in the  kidneys. No enhancing pancreatic lesions are seen.     The spleen, right adrenal gland, stomach, and visualized large and small  bowel and abdominal vasculature are normal. No enlarged lymph nodes are  seen. No suspicious osseous lesions are seen. There is a right-sided  lumbar hernia series 1403, image 74.     IMPRESSION:  1. Pancreatic duct dilation, without mass seen. Correlate with history  and laboratory findings and ERCP if clinically indicated     2. Indeterminate left adrenal nodule. Adrenal mass protocol CT may  provide further evaluation if clinically indicated     3. Incidental findings as above.     Assessment & Plan    *Left breast cancer, invasive ductal carcinoma, grade 3, ER negative, NV positive at 20%, HER-2 frederic negative.  Ki-67 was 50-55% on the biopsy from 7/19/17.   She underwent a lumpectomy and sentinel lymph node biopsy on 9/26/17.   Pathology examination revealed a 1.6 cm tumor, poorly differentiated with a Ki-67 50-60%, ER negative, NV weakly positive at <10%. Her-2 is negative.   [address] - no metastasis..   Adjuvant chemotherapy with Cytoxan and Taxotere with Neulasta support was started on 11/6/17 and completed on 1/8/18.  Patient received post lumpectomy radiation and was completed on 3/5/18.  Patient started Arimidex on 5/2/18.  She had no evidence of recurrence on CT scan on 2/14/2022.  Bilateral mammograms on 7/29/2022 were benign.  She is doing well with no evidence of recurrence.  CT on 3/15/2023 showed no evidence of metastasis.  Mammogram on 7/31/2023 was benign.  MRI abdomen on 9/21/2023 revealed no evidence of metastasis.  Since she needed 5 years of Arimidex, I recommended discontinuing it.    *Osteopenia in the left femur neck.    Bone density on 7/24/2020 revealed a decreased bone density with a T score at -1.6 at the left hip.  Bone density in the L-spine  was normal.    On review of the bone density from 5/29/2018, T score was -1.7 in the left hip.    Vitamin D level improved to 50.9 on 8/31/2021.  Bone density 7/29/2022 showed osteopenia with a T score of -2.0.  3/22/2023: Vitamin D level 45.8.  She is taking vitamin D 1000 units daily.  9/27/2023: Vitamin D49.7.    *Left adrenal nodules.    They were seen on the CT scan on 8/4/2021.    CT scan on 2/14/2022 revealed bilateral adrenal lesions to be stable.   I explained the findings to the patient. She reports that she had a procedure for the right adrenal gland around 2011. There are no records in the chart.   CT on 3/15/2023 showed the nodules to be stable.  MRI on 9/21/2023 showed the left adrenal nodule to measure 1.4 x 1.4 cm which is smaller than the measurements on CT scan.  We will consider repeating imaging during in 6-12 months.    *8 mm hypervascular focus in the mid body of the pancreas.  It was similar to 8/3/2021 scan favoring neuroendocrine tumor versus a pseudoaneurysm.  MRI/MRCP was recommended for follow-up.  MRI on 9/21/2023 revealed pancreatic duct dilatation without a mass.    PLAN:    1.  Discontinue Arimidex.  2.  Continue vitamin D 1000 units daily..   3.  Recommended monthly breast self-exam.   4.  Follow-up in 6 months with CBC CMP vitamin D level.  5.  We will discuss at her follow-up visit repeating MRI in 1 year.         Luz Elena Herndon MD  09/27/23

## 2023-09-28 ENCOUNTER — LAB (OUTPATIENT)
Dept: LAB | Facility: HOSPITAL | Age: 78
End: 2023-09-28
Payer: MEDICARE

## 2023-09-28 LAB
BILIRUB UR QL STRIP: NEGATIVE
CLARITY UR: CLEAR
COLOR UR: YELLOW
GLUCOSE UR STRIP-MCNC: NEGATIVE MG/DL
HGB UR QL STRIP.AUTO: NEGATIVE
KETONES UR QL STRIP: NEGATIVE
LEUKOCYTE ESTERASE UR QL STRIP.AUTO: NEGATIVE
NITRITE UR QL STRIP: NEGATIVE
PH UR STRIP.AUTO: 6 [PH] (ref 5–8)
PROT UR QL STRIP: NEGATIVE
SP GR UR STRIP: 1.02 (ref 1–1.03)
UROBILINOGEN UR QL STRIP: NORMAL

## 2023-09-28 PROCEDURE — 81003 URINALYSIS AUTO W/O SCOPE: CPT

## 2023-10-16 ENCOUNTER — OFFICE VISIT (OUTPATIENT)
Dept: NEUROLOGY | Facility: CLINIC | Age: 78
End: 2023-10-16
Payer: MEDICARE

## 2023-10-16 VITALS
OXYGEN SATURATION: 97 % | HEIGHT: 66 IN | WEIGHT: 169.2 LBS | SYSTOLIC BLOOD PRESSURE: 124 MMHG | DIASTOLIC BLOOD PRESSURE: 66 MMHG | BODY MASS INDEX: 27.19 KG/M2 | HEART RATE: 84 BPM

## 2023-10-16 DIAGNOSIS — R41.3 MEMORY LOSS: Primary | ICD-10-CM

## 2023-10-16 DIAGNOSIS — F32.9 MAJOR DEPRESSIVE DISORDER WITH CURRENT ACTIVE EPISODE, UNSPECIFIED DEPRESSION EPISODE SEVERITY, UNSPECIFIED WHETHER RECURRENT: ICD-10-CM

## 2023-10-16 NOTE — PROGRESS NOTES
Notes by MA:  PT is here today for a hospital follow up for memory loss. Her daughter Chely is with her today. PT verifies consent to speak with Chely in regards to her medical care.      Subjective:     Patient ID: Tyrone Rand is a 78 y.o. female.    History of Present Illness  The following portions of the patient's history were reviewed and updated as appropriate: allergies, current medications, past family history, past medical history, past social history, past surgical history, and problem list.    Review of Systems   Constitutional:  Positive for activity change and appetite change. Negative for fatigue.   HENT:  Positive for hearing loss. Negative for facial swelling, trouble swallowing and voice change.    Eyes:  Negative for photophobia, pain and visual disturbance.   Respiratory:  Negative for chest tightness, shortness of breath and wheezing.    Cardiovascular:  Negative for chest pain, palpitations and leg swelling.   Gastrointestinal:  Negative for abdominal pain, nausea and vomiting.   Endocrine: Negative for cold intolerance and heat intolerance.   Musculoskeletal:  Negative for arthralgias, back pain, gait problem, joint swelling, myalgias, neck pain and neck stiffness.   Neurological:  Positive for dizziness. Negative for tremors, seizures, syncope, facial asymmetry, speech difficulty, weakness, light-headedness, numbness and headaches.   Hematological:  Does not bruise/bleed easily.   Psychiatric/Behavioral:  Positive for confusion and decreased concentration. Negative for agitation, behavioral problems, dysphoric mood, hallucinations, self-injury, sleep disturbance and suicidal ideas. The patient is not nervous/anxious and is not hyperactive.         Objective:    Neurologic Exam  Awake alert pleasant and cooperative but withdrawn.  Multiple cognitive screens were performed.  She scored 30 on the Folstein but only got 9 animals on animal fluency, 2 out of 3 on clock drawing and scored  only 17 on the Capron.    Cranial nerves II through XII normal and symmetric.    Motor exam reveals age-appropriate and symmetric tone bulk and power.  No drift.  Reasonable  bilaterally.    The sensory exam reveals symmetric sensation to light touch, temperature, vibration.    Coordination testing reveals smooth and accurate finger-nose-finger and rhythmic rapid alternating movements bilaterally.  Gait examination is unremarkable.    Tendon reflexes are 1+ and symmetric except at the ankles where they are absent.  Plantar signs are mute.  Physical Exam  Neck is supple.  No cervical bruits.  Cardiac rhythm is regular.  No extremity edema.  Assessment/Plan:     Diagnoses and all orders for this visit:    1. Memory loss (Primary)    2. Major depressive disorder with current active episode, unspecified depression episode severity, unspecified whether recurrent     78-year-old woman with memory loss and cognitive decline.  Although she does have risk factors for vascular dementia she also has significant overlying depression.  I think there is potential that at least some of her presentation represents pseudodementia secondary to this significant mood disorder.  She has been tried on multiple antidepressants in the past and has not been consistent with taking them.  I had a long discussion with her about this.  I am waiting for her genetic testing to be sent to us regarding antidepressant compatibility and then we will prescribe an appropriate medication at that point.  I discussed this with her and her family and we will see her back in 2 months to check her progress and make any other adjustments that may be necessary.  (I reviewed her recent inpatient work-up including head CT and multiple laboratory studies.  B12 levels were normal.  Comprehensive metabolic panels were reviewed.  CT of the head was personally reviewed which reveals old small vessel disease and diffuse chronic changes.)    45 minutes total patient  care time including extensive record review, examination, discussion, counseling, and documentation.

## 2023-10-16 NOTE — PROGRESS NOTES
Notes by MA:  PT is here today for a hospital follow up for memory loss. Her daughter Chely is with her today. PT verifies consent to speak with Chely in regards to her medical care.      Subjective:     Patient ID: Tyrone Rand is a 78 y.o. female.    History of Present Illness  The following portions of the patient's history were reviewed and updated as appropriate: allergies, current medications, past family history, past medical history, past social history, past surgical history, and problem list.    Review of Systems   Constitutional:  Positive for activity change and appetite change. Negative for fatigue.   HENT:  Positive for hearing loss. Negative for facial swelling, trouble swallowing and voice change.    Eyes:  Negative for photophobia, pain and visual disturbance.   Respiratory:  Negative for chest tightness, shortness of breath and wheezing.    Cardiovascular:  Negative for chest pain, palpitations and leg swelling.   Gastrointestinal:  Negative for abdominal pain, nausea and vomiting.   Endocrine: Negative for cold intolerance and heat intolerance.   Musculoskeletal:  Negative for arthralgias, back pain, gait problem, joint swelling, myalgias, neck pain and neck stiffness.   Neurological:  Positive for dizziness. Negative for tremors, seizures, syncope, facial asymmetry, speech difficulty, weakness, light-headedness, numbness and headaches.   Hematological:  Does not bruise/bleed easily.   Psychiatric/Behavioral:  Positive for confusion and decreased concentration. Negative for agitation, behavioral problems, dysphoric mood, hallucinations, self-injury, sleep disturbance and suicidal ideas. The patient is not nervous/anxious and is not hyperactive.         Objective:    Neurologic Exam  Awake alert pleasant and cooperative but withdrawn.  Multiple cognitive screens were performed.  She scored 30 on the Folstein but only got 9 animals on animal fluency, 2 out of 3 on clock drawing and scored  only 17 on the [place]vel[place].    Cranial nerves II through XII normal and symmetric.    Motor exam reveals age-appropriate and symmetric tone bulk and power.  No drift.  Reasonable  bilaterally.    The sensory exam reveals symmetric sensation to light touch, temperature, vibration.    Coordination testing reveals smooth and accurate finger-nose-finger and rhythmic rapid alternating movements bilaterally.  Gait examination is unremarkable.    Tendon reflexes are 1+ and symmetric except at the ankles where they are absent.  Plantar signs are mute.  Physical Exam  Neck is supple.  No cervical bruits.  Cardiac rhythm is regular.  No extremity edema.  Assessment/Plan:     Diagnoses and all orders for this visit:    1. Memory loss (Primary)    2. Major depressive disorder with current active episode, unspecified depression episode severity, unspecified whether recurrent     78-year-old woman with memory loss and cognitive decline.  Although she does have risk factors for vascular dementia she also has significant overlying depression.  I think there is potential that at least some of her presentation represents pseudodementia secondary to this significant mood disorder.  She has been tried on multiple antidepressants in the past and has not been consistent with taking them.  I had a long discussion with her about this.  I am waiting for her genetic testing to be sent to us regarding antidepressant compatibility and then we will prescribe an appropriate medication at that point.  I discussed this with her and her family and we will see her back in 2 months to check her progress and make any other adjustments that may be necessary.  (I reviewed her recent inpatient work-up including head CT and multiple laboratory studies.  B12 levels were normal.  Comprehensive metabolic panels were reviewed.  CT of the head was personally reviewed which reveals old small vessel disease and diffuse chronic changes.)    45 minutes total patient  care time including extensive record review, examination, discussion, counseling, and documentation.      Addendum: We received her psychotropic pharmacologenomic testing.  Based on results we will start her on Pristiq 50 mg daily.  In the past, she has tried and failed Lexapro, Remeron, Wellbutrin and BuSpar.

## 2023-10-17 ENCOUNTER — TELEPHONE (OUTPATIENT)
Dept: NEUROLOGY | Facility: CLINIC | Age: 78
End: 2023-10-17
Payer: MEDICARE

## 2023-10-17 RX ORDER — DESVENLAFAXINE SUCCINATE 50 MG/1
50 TABLET, EXTENDED RELEASE ORAL DAILY
Qty: 30 TABLET | Refills: 3 | OUTPATIENT
Start: 2023-10-17

## 2023-10-17 NOTE — TELEPHONE ENCOUNTER
Caller: Chely Linares    Relationship: Emergency Contact    Best call back number: [phone]    What was the call regarding:   PT'S DAUGHTER CALLED TO CONFIRM WE RECEIVED FAX OF RX AND THAT [facility] WAS LISTED FOR PT.    KYLE AT OFFICE CONFIRMED FAX WAS RECEIVED AND [facility] IS IN PT'S [facility] AS PREFERR[facility]ARMACY.

## 2023-10-20 DIAGNOSIS — F32.9 MAJOR DEPRESSIVE DISORDER WITH CURRENT ACTIVE EPISODE, UNSPECIFIED DEPRESSION EPISODE SEVERITY, UNSPECIFIED WHETHER RECURRENT: Primary | ICD-10-CM

## 2023-10-20 RX ORDER — DESVENLAFAXINE SUCCINATE 50 MG/1
50 TABLET, EXTENDED RELEASE ORAL DAILY
Qty: 30 TABLET | Refills: 3 | Status: SHIPPED | OUTPATIENT
Start: 2023-10-20 | End: 2024-02-17

## 2023-10-20 NOTE — TELEPHONE ENCOUNTER
PT DAUGHTER CALLING TO FIND OUT ABOUT RX THAT WAS SUPPOSED TO BE CALLED  IN FOR HER MOM . [facility]?   SAID RX  WAS FOR A ANTIDEPRESSANT    PLEASE SEND ASAP     [id number] [place] [place] - [address] [phone] [facility] [phone]  [phone]     PLEASE ADVISE

## 2023-10-25 ENCOUNTER — TRANSCRIBE ORDERS (OUTPATIENT)
Dept: ADMINISTRATIVE | Facility: HOSPITAL | Age: 78
End: 2023-10-25
Payer: MEDICARE

## 2023-10-25 ENCOUNTER — LAB (OUTPATIENT)
Dept: LAB | Facility: HOSPITAL | Age: 78
End: 2023-10-25
Payer: MEDICARE

## 2023-10-25 DIAGNOSIS — E55.9 AVITAMINOSIS D: ICD-10-CM

## 2023-10-25 DIAGNOSIS — I12.9 HYPERTENSIVE NEPHROPATHY: ICD-10-CM

## 2023-10-25 DIAGNOSIS — N18.31 CHRONIC KIDNEY DISEASE (CKD) STAGE G3A/A1, MODERATELY DECREASED GLOMERULAR FILTRATION RATE (GFR) BETWEEN 45-59 ML/MIN/1.73 SQUARE METER AND ALBUMINURIA CREATININE RATIO LESS THAN 30 MG/G (CMS/H*: Primary | ICD-10-CM

## 2023-10-25 DIAGNOSIS — N18.31 CHRONIC KIDNEY DISEASE (CKD) STAGE G3A/A1, MODERATELY DECREASED GLOMERULAR FILTRATION RATE (GFR) BETWEEN 45-59 ML/MIN/1.73 SQUARE METER AND ALBUMINURIA CREATININE RATIO LESS THAN 30 MG/G (CMS/H*: ICD-10-CM

## 2023-10-25 LAB
ALBUMIN SERPL-MCNC: 3.8 G/DL (ref 3.5–5.2)
ANION GAP SERPL CALCULATED.3IONS-SCNC: 13.8 MMOL/L (ref 5–15)
BACTERIA UR QL AUTO: ABNORMAL /HPF
BILIRUB UR QL STRIP: ABNORMAL
BUN SERPL-MCNC: 17 MG/DL (ref 8–23)
BUN/CREAT SERPL: 14.3 (ref 7–25)
CALCIUM SPEC-SCNC: 9.4 MG/DL (ref 8.6–10.5)
CHLORIDE SERPL-SCNC: 99 MMOL/L (ref 98–107)
CLARITY UR: CLEAR
CO2 SERPL-SCNC: 19.2 MMOL/L (ref 22–29)
COLOR UR: YELLOW
CREAT SERPL-MCNC: 1.19 MG/DL (ref 0.57–1)
EGFRCR SERPLBLD CKD-EPI 2021: 46.3 ML/MIN/1.73
GLUCOSE SERPL-MCNC: 145 MG/DL (ref 65–99)
GLUCOSE UR STRIP-MCNC: NEGATIVE MG/DL
HGB UR QL STRIP.AUTO: NEGATIVE
HOLD SPECIMEN: NORMAL
HYALINE CASTS UR QL AUTO: ABNORMAL /LPF
KETONES UR QL STRIP: NEGATIVE
LEUKOCYTE ESTERASE UR QL STRIP.AUTO: ABNORMAL
MUCOUS THREADS URNS QL MICRO: ABNORMAL /HPF
NITRITE UR QL STRIP: NEGATIVE
PH UR STRIP.AUTO: 5.5 [PH] (ref 4.5–8)
PHOSPHATE SERPL-MCNC: 2.6 MG/DL (ref 2.5–4.5)
POTASSIUM SERPL-SCNC: 4.3 MMOL/L (ref 3.5–5.2)
PROT UR QL STRIP: ABNORMAL
RBC # UR STRIP: ABNORMAL /HPF
REF LAB TEST METHOD: ABNORMAL
RENAL EPI CELLS #/AREA URNS HPF: ABNORMAL /HPF
SODIUM SERPL-SCNC: 132 MMOL/L (ref 136–145)
SP GR UR STRIP: 1.02 (ref 1–1.03)
SQUAMOUS #/AREA URNS HPF: ABNORMAL /HPF
UROBILINOGEN UR QL STRIP: ABNORMAL
WBC # UR STRIP: ABNORMAL /HPF

## 2023-10-25 PROCEDURE — 81001 URINALYSIS AUTO W/SCOPE: CPT

## 2023-10-25 PROCEDURE — 82306 VITAMIN D 25 HYDROXY: CPT

## 2023-10-25 PROCEDURE — 80069 RENAL FUNCTION PANEL: CPT

## 2023-10-25 PROCEDURE — 36415 COLL VENOUS BLD VENIPUNCTURE: CPT

## 2023-10-26 LAB — 25(OH)D3 SERPL-MCNC: 51.9 NG/ML (ref 30–100)

## 2023-10-30 ENCOUNTER — SPECIALTY PHARMACY (OUTPATIENT)
Dept: INFUSION THERAPY | Facility: HOSPITAL | Age: 78
End: 2023-10-30

## 2024-09-25 ENCOUNTER — TRANSCRIBE ORDERS (OUTPATIENT)
Dept: ADMINISTRATIVE | Facility: HOSPITAL | Age: 79
End: 2024-09-25
Payer: MEDICARE

## 2024-09-25 ENCOUNTER — LAB (OUTPATIENT)
Dept: LAB | Facility: HOSPITAL | Age: 79
End: 2024-09-25
Payer: MEDICARE

## 2024-09-25 DIAGNOSIS — E55.9 AVITAMINOSIS D: ICD-10-CM

## 2024-09-25 DIAGNOSIS — N18.31 CHRONIC KIDNEY DISEASE (CKD) STAGE G3A/A1, MODERATELY DECREASED GLOMERULAR FILTRATION RATE (GFR) BETWEEN 45-59 ML/MIN/1.73 SQUARE METER AND ALBUMINURIA CREATININE RATIO LESS THAN 30 MG/G (CMS/H*: ICD-10-CM

## 2024-09-25 DIAGNOSIS — I12.9 HYPERTENSIVE NEPHROPATHY: ICD-10-CM

## 2024-09-25 DIAGNOSIS — E55.9 AVITAMINOSIS D: Primary | ICD-10-CM

## 2024-09-25 LAB
25(OH)D3 SERPL-MCNC: 34.2 NG/ML (ref 30–100)
BACTERIA UR QL AUTO: ABNORMAL /HPF
BILIRUB UR QL STRIP: NEGATIVE
CLARITY UR: ABNORMAL
COLOR UR: YELLOW
CREAT UR-MCNC: 185.8 MG/DL
DEPRECATED RDW RBC AUTO: 39.2 FL (ref 37–54)
ERYTHROCYTE [DISTWIDTH] IN BLOOD BY AUTOMATED COUNT: 12.7 % (ref 12.3–15.4)
GLUCOSE UR STRIP-MCNC: NEGATIVE MG/DL
HCT VFR BLD AUTO: 46.9 % (ref 34–46.6)
HGB BLD-MCNC: 15 G/DL (ref 12–15.9)
HGB UR QL STRIP.AUTO: NEGATIVE
HYALINE CASTS UR QL AUTO: ABNORMAL /LPF
KETONES UR QL STRIP: NEGATIVE
LEUKOCYTE ESTERASE UR QL STRIP.AUTO: ABNORMAL
MCH RBC QN AUTO: 27.2 PG (ref 26.6–33)
MCHC RBC AUTO-ENTMCNC: 32 G/DL (ref 31.5–35.7)
MCV RBC AUTO: 85 FL (ref 79–97)
NITRITE UR QL STRIP: NEGATIVE
PH UR STRIP.AUTO: 6 [PH] (ref 5–8)
PLATELET # BLD AUTO: 233 10*3/MM3 (ref 140–450)
PMV BLD AUTO: 10.8 FL (ref 6–12)
PROT ?TM UR-MCNC: 19.3 MG/DL
PROT UR QL STRIP: ABNORMAL
PROT/CREAT UR: 103.9 MG/G CREA (ref 0–200)
RBC # BLD AUTO: 5.52 10*6/MM3 (ref 3.77–5.28)
RBC # UR STRIP: ABNORMAL /HPF
REF LAB TEST METHOD: ABNORMAL
SP GR UR STRIP: 1.02 (ref 1–1.03)
SQUAMOUS #/AREA URNS HPF: ABNORMAL /HPF
UROBILINOGEN UR QL STRIP: ABNORMAL
WBC # UR STRIP: ABNORMAL /HPF
WBC NRBC COR # BLD AUTO: 6.39 10*3/MM3 (ref 3.4–10.8)

## 2024-09-25 PROCEDURE — 81001 URINALYSIS AUTO W/SCOPE: CPT

## 2024-09-25 PROCEDURE — 36415 COLL VENOUS BLD VENIPUNCTURE: CPT

## 2024-09-25 PROCEDURE — 82570 ASSAY OF URINE CREATININE: CPT

## 2024-09-25 PROCEDURE — 82306 VITAMIN D 25 HYDROXY: CPT

## 2024-09-25 PROCEDURE — 85027 COMPLETE CBC AUTOMATED: CPT

## 2024-09-25 PROCEDURE — 84156 ASSAY OF PROTEIN URINE: CPT

## 2024-09-25 PROCEDURE — 80069 RENAL FUNCTION PANEL: CPT

## 2024-09-27 LAB
ALBUMIN SERPL-MCNC: 4.3 G/DL (ref 3.5–5.2)
ANION GAP SERPL CALCULATED.3IONS-SCNC: 18 MMOL/L (ref 5–15)
BUN SERPL-MCNC: 13 MG/DL (ref 8–23)
BUN/CREAT SERPL: 12.5 (ref 7–25)
CALCIUM SPEC-SCNC: 9.6 MG/DL (ref 8.6–10.5)
CHLORIDE SERPL-SCNC: 99 MMOL/L (ref 98–107)
CO2 SERPL-SCNC: 20 MMOL/L (ref 22–29)
CREAT SERPL-MCNC: 1.04 MG/DL (ref 0.57–1)
EGFRCR SERPLBLD CKD-EPI 2021: 54.1 ML/MIN/1.73
GLUCOSE SERPL-MCNC: 107 MG/DL (ref 65–99)
PHOSPHATE SERPL-MCNC: 2.7 MG/DL (ref 2.5–4.5)
POTASSIUM SERPL-SCNC: 4.6 MMOL/L (ref 3.5–5.2)
SODIUM SERPL-SCNC: 137 MMOL/L (ref 136–145)

## 2024-10-07 ENCOUNTER — TRANSCRIBE ORDERS (OUTPATIENT)
Dept: MAMMOGRAPHY | Facility: HOSPITAL | Age: 79
End: 2024-10-07
Payer: MEDICARE

## 2024-10-07 DIAGNOSIS — Z12.31 BREAST CANCER SCREENING BY MAMMOGRAM: Primary | ICD-10-CM

## 2024-11-15 ENCOUNTER — TRANSCRIBE ORDERS (OUTPATIENT)
Dept: ADMINISTRATIVE | Facility: HOSPITAL | Age: 79
End: 2024-11-15
Payer: MEDICARE

## 2024-11-15 DIAGNOSIS — Z13.820 OSTEOPOROSIS SCREENING: ICD-10-CM

## 2024-11-15 DIAGNOSIS — M85.80 OSTEOPENIA, UNSPECIFIED LOCATION: ICD-10-CM

## 2024-11-15 DIAGNOSIS — Z78.0 POSTMENOPAUSAL: Primary | ICD-10-CM

## 2024-11-29 ENCOUNTER — APPOINTMENT (OUTPATIENT)
Dept: BONE DENSITY | Facility: HOSPITAL | Age: 79
End: 2024-11-29
Payer: MEDICARE

## 2024-11-29 ENCOUNTER — HOSPITAL ENCOUNTER (OUTPATIENT)
Dept: MAMMOGRAPHY | Facility: HOSPITAL | Age: 79
Discharge: HOME | End: 2024-11-29
Payer: MEDICARE

## 2024-11-29 DIAGNOSIS — Z12.31 BREAST CANCER SCREENING BY MAMMOGRAM: ICD-10-CM

## 2024-11-29 DIAGNOSIS — M85.80 OSTEOPENIA, UNSPECIFIED LOCATION: ICD-10-CM

## 2024-11-29 DIAGNOSIS — Z13.820 OSTEOPOROSIS SCREENING: ICD-10-CM

## 2024-11-29 DIAGNOSIS — Z78.0 POSTMENOPAUSAL: ICD-10-CM

## 2024-11-29 PROCEDURE — 77067 SCR MAMMO BI INCL CAD: CPT

## 2024-11-29 PROCEDURE — 77063 BREAST TOMOSYNTHESIS BI: CPT

## 2024-11-29 PROCEDURE — 77080 DXA BONE DENSITY AXIAL: CPT

## 2025-05-02 ENCOUNTER — TELEPHONE (OUTPATIENT)
Dept: NEUROLOGY | Facility: CLINIC | Age: 80
End: 2025-05-02
Payer: MEDICARE

## 2025-05-05 ENCOUNTER — OFFICE VISIT (OUTPATIENT)
Dept: NEUROLOGY | Facility: CLINIC | Age: 80
End: 2025-05-05
Payer: MEDICARE

## 2025-05-05 VITALS
OXYGEN SATURATION: 97 % | DIASTOLIC BLOOD PRESSURE: 78 MMHG | HEART RATE: 68 BPM | BODY MASS INDEX: 26.22 KG/M2 | WEIGHT: 162.4 LBS | SYSTOLIC BLOOD PRESSURE: 140 MMHG

## 2025-05-05 DIAGNOSIS — F32.9 MAJOR DEPRESSIVE DISORDER WITH CURRENT ACTIVE EPISODE, UNSPECIFIED DEPRESSION EPISODE SEVERITY, UNSPECIFIED WHETHER RECURRENT: ICD-10-CM

## 2025-05-05 DIAGNOSIS — R41.3 MEMORY LOSS: Primary | ICD-10-CM

## 2025-05-05 RX ORDER — DESVENLAFAXINE 50 MG/1
50 TABLET, FILM COATED, EXTENDED RELEASE ORAL DAILY
Qty: 90 TABLET | Refills: 2 | Status: SHIPPED | OUTPATIENT
Start: 2025-05-05 | End: 2026-04-30

## 2025-05-05 NOTE — PROGRESS NOTES
Notes by [facility]:  Ms Rand is here today with her daughter and .      Subjective:     Patient ID: Tyrone Rand is a 80 y.o. female.    Memory Loss    The following portions of the patient's history were reviewed and updated as appropriate: allergies, current medications, past family history, past medical history, past social history, past surgical history, and problem list.    History of Present Illness  The patient is an 80-year-old woman here for a follow-up of a memory disorder. She is accompanied by her .    Her  reports that she has not initiated Pristiq therapy due to a general aversion to medication. Her daily routine is characterized by indecisiveness, slow cognitive processing, and a lack of interest in activities. She exhibits a negative outlook, struggles with conversation, and derives no pleasure from television. She requires prompting from her  to engage in household chores or personal hygiene tasks. Despite these challenges, she maintains the ability to dress and feed herself.    She has been under psychiatric care and has also received occupational therapy. She previously tried interactive metronome therapy but found it unhelpful. Her mornings are particularly challenging, often spent in a state of inertia for the first few hours. She needs reminders to eat and dress, and her cooking skills have significantly declined. She frequently calls her 's name, which he attributes to her being lost in thought.    She was diagnosed with depression in 2013 and was treated with Wellbutrin, which appeared to be effective until 2017. In 2020, she was prescribed Pristiq and BuSpar but discontinued them due to side effects. She has expressed concerns about potential side effects of medications, including drowsiness and grogginess.    She was diagnosed with stage 1 breast cancer in 2019 and underwent lumpectomies. Currently, she is cancer-free but experiences anosmia, ageusia, and  hearing loss.    INTERVAL: Since last visit, she has not started Pristiq therapy and continues to exhibit symptoms of depression and memory impairment.    SOCIAL HISTORY  Marital Status:     MEDICATIONS  PREVIOUS MEDS:  Wellbutrin  Start Date: 2013  End Date: 2017  Pristiq  Start Date: 2020  Buspar  Start Date: 2020    Review of Systems   Psychiatric/Behavioral:  Positive for confusion and decreased concentration.         Objective:    Neurological Exam   Awake alert pleasant and cooperative but withdrawn.  Multiple cognitive screens were performed.      Cranial nerves II through XII normal and symmetric.     Motor exam reveals age-appropriate and symmetric tone bulk and power.  No drift.  Reasonable  bilaterally.     The sensory exam reveals symmetric sensation to light touch, temperature, vibration.     Coordination testing reveals smooth and accurate finger-nose-finger and rhythmic rapid alternating movements bilaterally.  Gait examination is unremarkable.     Tendon reflexes are 1+ and symmetric except at the ankles where they are absent.  Plantar signs are mute.  Physical Exam    Assessment/Plan:     Diagnoses and all orders for this visit:    1. Memory loss (Primary)    2. Major depressive disorder with current active episode, unspecified depression episode severity, unspecified whether recurrent  -     desvenlafaxine (Pristiq) 50 MG 24 hr tablet; Take 1 tablet by mouth Daily for 360 days.  Dispense: 90 tablet; Refill: 2         Assessment & Plan  1. Depression.  Her depression is still significant, as indicated by her inability to make decisions, lack of samira, and negative outlook.  Her PHQ-9 score today is 14.  She has not been taking Pristiq despite it being prescribed previously. A prescription for Pristiq once daily has been sent to [facility] pharmacy. It was explained that it may take about 6 weeks to see improvement. If there is no improvement in her symptoms after 2 months, a referral to  [facility] will be considered.    2. Memory disorder.  Still having difficulty with memory.  The primary issue still seems to be that she is significantly depressed and this is untreated.  This poses a possible treatable or reversible cause for her memory loss and I stressed that with her once again today.  She has agreed to a trial of antidepressants once again and if there is no improvement in her symptoms after 2 months, a referral to [facility] will be considered.    Follow-up  The patient will follow up in 2 months.    Patient or patient representative verbalized consent for the use of Ambient Listening during the visit with  Toño Freeman MD for chart documentation. 5/6/2025  07:52 EDT

## 2025-07-03 ENCOUNTER — TELEPHONE (OUTPATIENT)
Dept: NEUROLOGY | Facility: CLINIC | Age: 80
End: 2025-07-03
Payer: MEDICARE

## 2025-07-03 NOTE — TELEPHONE ENCOUNTER
Caller: Padmini Rand    Relationship: Emergency Contact    Best call back number:   Telephone Information:   Mobile [phone]     What is the best time to reach you:     Who are you requesting to speak with (clinical staff, provider,  specific staff member):   DR SHAH    Do you know the name of the person who called:     What was the call regarding:   PT'S APPT ON 7-7-25 WAS CANCELLED BY HER  BECAUSE PT ISN'T TAKING HER RX.

## (undated) DEVICE — SUCTION CANISTER, 1000CC,SAFELINER: Brand: DEROYAL

## (undated) DEVICE — DRP C/ARM 41X74IN

## (undated) DEVICE — SUT ETHLN 2/0 FS 18IN 664H

## (undated) DEVICE — SPHINCTEROTOME: Brand: DREAMTOME™ RX 44

## (undated) DEVICE — BW-412T DISP COMBO CLEANING BRUSH: Brand: SINGLE USE COMBINATION CLEANING BRUSH

## (undated) DEVICE — Device: Brand: SINGLE USE SOFT BRUSH

## (undated) DEVICE — SUT VIC 0/0 UR6 27IN DYED J603H

## (undated) DEVICE — UNDYED BRAIDED (POLYGLACTIN 910), SYNTHETIC ABSORBABLE SUTURE: Brand: COATED VICRYL

## (undated) DEVICE — GLV SURG SENSICARE W/ALOE PF LF 6.5 STRL

## (undated) DEVICE — RESERVOIR,SUCTION,100CC,SILICONE: Brand: MEDLINE

## (undated) DEVICE — DECANT BG O JET

## (undated) DEVICE — SPNG GZ 2S 2X2 8PLY STRL PK/2

## (undated) DEVICE — 3M™ STERI-STRIP™ REINFORCED ADHESIVE SKIN CLOSURES, R1547, 1/2 IN X 4 IN (12 MM X 100 MM), 6 STRIPS/ENVELOPE: Brand: 3M™ STERI-STRIP™

## (undated) DEVICE — KT ORCA ORCAPOD DISP STRL

## (undated) DEVICE — ENDOPATH XCEL BLADELESS TROCARS WITH STABILITY SLEEVES: Brand: ENDOPATH XCEL

## (undated) DEVICE — GLV SURG SENSICARE PI MIC PF SZ7.5 LF STRL

## (undated) DEVICE — PATIENT RETURN ELECTRODE, SINGLE-USE, CONTACT QUALITY MONITORING, ADULT, WITH 9FT CORD, FOR PATIENTS WEIGING OVER 33LBS. (15KG): Brand: MEGADYNE

## (undated) DEVICE — ENDOPOUCH RETRIEVER SPECIMEN RETRIEVAL BAGS: Brand: ENDOPOUCH RETRIEVER

## (undated) DEVICE — TROCARS: Brand: KII® BALLOON BLUNT TIP SYSTEM

## (undated) DEVICE — SPNG GZ WOVN 4X4IN 12PLY 10/BX STRL

## (undated) DEVICE — PAD GRND E/S W/CORD SPLT A/

## (undated) DEVICE — SYR LUERLOK 30CC

## (undated) DEVICE — SET CATH CHOLANG REDK W/SCOOP TIP INTRO 4F 50CM

## (undated) DEVICE — SUT SILK 2/0 FS BLK 18IN 685G

## (undated) DEVICE — APPL HEMOS FOR DELIVERY FLOSEAL

## (undated) DEVICE — APPL CHLORAPREP HI/LITE 26ML ORNG

## (undated) DEVICE — CONTAINER,SPECIMEN,OR STERILE,4OZ: Brand: MEDLINE

## (undated) DEVICE — DRN WND HUBLSS FLUT FULL PERF SIL10MM

## (undated) DEVICE — SYR LUERLOK 20CC BX/50

## (undated) DEVICE — SYR CONTRL LUERLOK 10CC

## (undated) DEVICE — ENDOPATH XCEL UNIVERSAL TROCAR STABLILITY SLEEVES: Brand: ENDOPATH XCEL

## (undated) DEVICE — SYR LL TP 10ML STRL

## (undated) DEVICE — RETRIEVAL BALLOON CATHETER: Brand: EXTRACTOR™ PRO RX

## (undated) DEVICE — TBG INSUFL W FLTR STRL

## (undated) DEVICE — PK LAP GEN 90

## (undated) DEVICE — DRSNG TELFA PAD NONADH STR 1S 3X8IN

## (undated) DEVICE — 2, DISPOSABLE SUCTION/IRRIGATOR WITH DISPOSABLE TIP: Brand: STRYKEFLOW

## (undated) DEVICE — ADAPT CLN BIOGUARD AIR/H2O DISP

## (undated) DEVICE — DRSNG SURESITE WNDW 2.38X2.75

## (undated) DEVICE — DEV POSITION LK AND BIOP CAP SYS

## (undated) DEVICE — DRSNG SURESITE WNDW 4X4.5

## (undated) DEVICE — DECANTER: Brand: UNBRANDED

## (undated) DEVICE — Device

## (undated) DEVICE — ENDOCUT SCISSOR TIP, DISPOSABLE: Brand: RENEW

## (undated) DEVICE — THE BITE BLOCK MAXI, LATEX FREE STRAP IS USED TO PROTECT THE ENDOSCOPE INSERTION TUBE FROM BEING BITTEN BY THE PATIENT.

## (undated) DEVICE — LAPAROSCOPIC SMOKE FILTRATION SYSTEM: Brand: PALL LAPAROSHIELD® PLUS LAPAROSCOPIC SMOKE FILTRATION SYSTEM